# Patient Record
Sex: FEMALE | Race: WHITE | NOT HISPANIC OR LATINO | URBAN - METROPOLITAN AREA
[De-identification: names, ages, dates, MRNs, and addresses within clinical notes are randomized per-mention and may not be internally consistent; named-entity substitution may affect disease eponyms.]

---

## 2017-02-08 ENCOUNTER — OUTPATIENT (OUTPATIENT)
Dept: OUTPATIENT SERVICES | Age: 37
LOS: 1 days | Discharge: HOME | End: 2017-02-08

## 2017-02-08 DIAGNOSIS — Z95.4 PRESENCE OF OTHER HEART-VALVE REPLACEMENT: Chronic | ICD-10-CM

## 2017-02-08 DIAGNOSIS — Z98.89 OTHER SPECIFIED POSTPROCEDURAL STATES: Chronic | ICD-10-CM

## 2017-02-09 ENCOUNTER — APPOINTMENT (OUTPATIENT)
Dept: PEDIATRIC CARDIOLOGY | Facility: CLINIC | Age: 37
End: 2017-02-09

## 2017-03-05 ENCOUNTER — RESULT CHARGE (OUTPATIENT)
Age: 37
End: 2017-03-05

## 2017-03-06 ENCOUNTER — APPOINTMENT (OUTPATIENT)
Dept: PEDIATRIC CARDIOLOGY | Facility: CLINIC | Age: 37
End: 2017-03-06

## 2017-03-06 VITALS
WEIGHT: 144.4 LBS | HEIGHT: 55.51 IN | BODY MASS INDEX: 32.95 KG/M2 | OXYGEN SATURATION: 98 % | HEART RATE: 64 BPM | SYSTOLIC BLOOD PRESSURE: 105 MMHG | DIASTOLIC BLOOD PRESSURE: 69 MMHG

## 2017-03-09 NOTE — ASU PATIENT PROFILE, ADULT - VISION (WITH CORRECTIVE LENSES IF THE PATIENT USUALLY WEARS THEM):
Partially impaired: cannot see medication labels or newsprint, but can see obstacles in path, and the surrounding layout; can count fingers at arm's length/Right eye blurry

## 2017-03-09 NOTE — ASU PATIENT PROFILE, ADULT - PMH
Asthma  never been admitted in the hospital or intubated  DM (diabetes mellitus)  Type II  Down syndrome    HTN (hypertension)    Hypothyroidism    Ovarian cyst    Sleep apnea

## 2017-03-09 NOTE — ASU PATIENT PROFILE, ADULT - MEDICATIONS TO HOLD
Diuretics,diabetic pills and vitamins if applicable. Diuretics, diabetic pills and vitamins if applicable.

## 2017-03-10 ENCOUNTER — OUTPATIENT (OUTPATIENT)
Dept: OUTPATIENT SERVICES | Facility: HOSPITAL | Age: 37
LOS: 1 days | End: 2017-03-10
Payer: MEDICARE

## 2017-03-10 ENCOUNTER — TRANSCRIPTION ENCOUNTER (OUTPATIENT)
Age: 37
End: 2017-03-10

## 2017-03-10 VITALS
WEIGHT: 142.42 LBS | HEIGHT: 55.5 IN | OXYGEN SATURATION: 100 % | RESPIRATION RATE: 10 BRPM | TEMPERATURE: 98 F | HEART RATE: 67 BPM | SYSTOLIC BLOOD PRESSURE: 118 MMHG | DIASTOLIC BLOOD PRESSURE: 81 MMHG

## 2017-03-10 VITALS
HEART RATE: 66 BPM | RESPIRATION RATE: 13 BRPM | DIASTOLIC BLOOD PRESSURE: 68 MMHG | OXYGEN SATURATION: 100 % | SYSTOLIC BLOOD PRESSURE: 120 MMHG

## 2017-03-10 DIAGNOSIS — H25.11 AGE-RELATED NUCLEAR CATARACT, RIGHT EYE: ICD-10-CM

## 2017-03-10 DIAGNOSIS — Z98.89 OTHER SPECIFIED POSTPROCEDURAL STATES: Chronic | ICD-10-CM

## 2017-03-10 DIAGNOSIS — Z95.4 PRESENCE OF OTHER HEART-VALVE REPLACEMENT: Chronic | ICD-10-CM

## 2017-03-10 LAB — HCG UR QL: NEGATIVE — SIGNIFICANT CHANGE UP

## 2017-03-10 PROCEDURE — 81025 URINE PREGNANCY TEST: CPT

## 2017-03-10 PROCEDURE — 66984 XCAPSL CTRC RMVL W/O ECP: CPT | Mod: RT

## 2017-03-10 NOTE — ASU DISCHARGE PLAN (ADULT/PEDIATRIC). - PT EDUC
other (specify)/Intraocular lens implant (IOL) Eye shield with instructions , sunglasses and eye kit given to patient./Implant card (specify)

## 2017-03-17 ENCOUNTER — OUTPATIENT (OUTPATIENT)
Dept: OUTPATIENT SERVICES | Facility: HOSPITAL | Age: 37
LOS: 1 days | End: 2017-03-17
Payer: MEDICARE

## 2017-03-17 ENCOUNTER — TRANSCRIPTION ENCOUNTER (OUTPATIENT)
Age: 37
End: 2017-03-17

## 2017-03-17 VITALS
SYSTOLIC BLOOD PRESSURE: 103 MMHG | RESPIRATION RATE: 14 BRPM | HEART RATE: 72 BPM | OXYGEN SATURATION: 98 % | DIASTOLIC BLOOD PRESSURE: 55 MMHG

## 2017-03-17 VITALS
HEIGHT: 56 IN | DIASTOLIC BLOOD PRESSURE: 60 MMHG | HEART RATE: 71 BPM | TEMPERATURE: 98 F | WEIGHT: 140.65 LBS | RESPIRATION RATE: 18 BRPM | SYSTOLIC BLOOD PRESSURE: 122 MMHG

## 2017-03-17 DIAGNOSIS — H25.12 AGE-RELATED NUCLEAR CATARACT, LEFT EYE: ICD-10-CM

## 2017-03-17 DIAGNOSIS — Z98.89 OTHER SPECIFIED POSTPROCEDURAL STATES: Chronic | ICD-10-CM

## 2017-03-17 DIAGNOSIS — Z95.4 PRESENCE OF OTHER HEART-VALVE REPLACEMENT: Chronic | ICD-10-CM

## 2017-03-17 PROCEDURE — 66984 XCAPSL CTRC RMVL W/O ECP: CPT | Mod: LT

## 2017-03-17 PROCEDURE — 99261: CPT

## 2017-03-17 NOTE — ASU DISCHARGE PLAN (ADULT/PEDIATRIC). - NOTIFY
Persistent Nausea and Vomiting/Fever greater than 101/Pain not relieved by Medications/Bleeding that does not stop Fever greater than 101/Swelling that continues/Pain not relieved by Medications/Persistent Nausea and Vomiting/Bleeding that does not stop

## 2017-03-24 ENCOUNTER — LABORATORY RESULT (OUTPATIENT)
Age: 37
End: 2017-03-24

## 2017-04-11 ENCOUNTER — RESULT REVIEW (OUTPATIENT)
Age: 37
End: 2017-04-11

## 2017-05-12 ENCOUNTER — OUTPATIENT (OUTPATIENT)
Dept: OUTPATIENT SERVICES | Facility: HOSPITAL | Age: 37
LOS: 1 days | Discharge: HOME | End: 2017-05-12

## 2017-05-12 DIAGNOSIS — D69.6 THROMBOCYTOPENIA, UNSPECIFIED: ICD-10-CM

## 2017-05-12 DIAGNOSIS — Z95.4 PRESENCE OF OTHER HEART-VALVE REPLACEMENT: Chronic | ICD-10-CM

## 2017-05-12 DIAGNOSIS — Z98.89 OTHER SPECIFIED POSTPROCEDURAL STATES: Chronic | ICD-10-CM

## 2017-05-15 ENCOUNTER — RESULT REVIEW (OUTPATIENT)
Age: 37
End: 2017-05-15

## 2017-05-15 ENCOUNTER — APPOINTMENT (OUTPATIENT)
Dept: HEMATOLOGY ONCOLOGY | Facility: CLINIC | Age: 37
End: 2017-05-15

## 2017-05-15 VITALS
RESPIRATION RATE: 16 BRPM | HEART RATE: 85 BPM | HEIGHT: 55.67 IN | WEIGHT: 149.03 LBS | OXYGEN SATURATION: 98 % | DIASTOLIC BLOOD PRESSURE: 78 MMHG | SYSTOLIC BLOOD PRESSURE: 115 MMHG | TEMPERATURE: 98.6 F | BODY MASS INDEX: 34 KG/M2

## 2017-05-15 LAB
HCT VFR BLD CALC: 39.3 % — SIGNIFICANT CHANGE UP (ref 34.5–45)
HGB BLD-MCNC: 13.8 G/DL — SIGNIFICANT CHANGE UP (ref 11.5–15.5)
MCHC RBC-ENTMCNC: 33.7 PG — SIGNIFICANT CHANGE UP (ref 27–34)
MCHC RBC-ENTMCNC: 35.2 G/DL — SIGNIFICANT CHANGE UP (ref 32–36)
MCV RBC AUTO: 95.7 FL — SIGNIFICANT CHANGE UP (ref 80–100)
PLATELET # BLD AUTO: 144 K/UL — LOW (ref 150–400)
RBC # BLD: 4.11 M/UL — SIGNIFICANT CHANGE UP (ref 3.8–5.2)
RBC # FLD: 12.5 % — SIGNIFICANT CHANGE UP (ref 10.3–14.5)
WBC # BLD: 6.2 K/UL — SIGNIFICANT CHANGE UP (ref 3.8–10.5)
WBC # FLD AUTO: 6.2 K/UL — SIGNIFICANT CHANGE UP (ref 3.8–10.5)

## 2017-05-15 RX ORDER — ALBUTEROL SULFATE 90 UG/1
AEROSOL, METERED RESPIRATORY (INHALATION)
Refills: 0 | Status: ACTIVE | COMMUNITY

## 2017-05-16 LAB
ALBUMIN MFR SERPL ELPH: 57.1 %
ALBUMIN SERPL ELPH-MCNC: 4 G/DL
ALBUMIN SERPL-MCNC: 3.9 G/DL
ALBUMIN/GLOB SERPL: 1.3 RATIO
ALP BLD-CCNC: 54 U/L
ALPHA1 GLOB MFR SERPL ELPH: 2.9 %
ALPHA1 GLOB SERPL ELPH-MCNC: 0.2 G/DL
ALPHA2 GLOB MFR SERPL ELPH: 7.6 %
ALPHA2 GLOB SERPL ELPH-MCNC: 0.5 G/DL
ALT SERPL-CCNC: 26 U/L
AST SERPL-CCNC: 27 U/L
B-GLOBULIN MFR SERPL ELPH: 13.6 %
B-GLOBULIN SERPL ELPH-MCNC: 0.9 G/DL
BILIRUB DIRECT SERPL-MCNC: 0.2 MG/DL
BILIRUB INDIRECT SERPL-MCNC: 0.3 MG/DL
BILIRUB SERPL-MCNC: 0.5 MG/DL
DEPRECATED KAPPA LC FREE/LAMBDA SER: 2.16 RATIO
GAMMA GLOB FLD ELPH-MCNC: 1.3 G/DL
GAMMA GLOB MFR SERPL ELPH: 18.8 %
IGA SER QL IEP: 455 MG/DL
IGG SER QL IEP: 1250 MG/DL
IGM SER QL IEP: 84 MG/DL
INTERPRETATION SERPL IEP-IMP: NORMAL
KAPPA LC CSF-MCNC: 3.86 MG/DL
KAPPA LC SERPL-MCNC: 8.34 MG/DL
LDH SERPL-CCNC: 243 U/L
M PROTEIN SPEC IFE-MCNC: NORMAL
PROT SERPL-MCNC: 6.9 G/DL

## 2017-05-17 LAB — ANA SER IF-ACNC: NEGATIVE

## 2017-08-16 ENCOUNTER — OUTPATIENT (OUTPATIENT)
Dept: OUTPATIENT SERVICES | Facility: HOSPITAL | Age: 37
LOS: 1 days | Discharge: HOME | End: 2017-08-16

## 2017-08-16 DIAGNOSIS — D69.6 THROMBOCYTOPENIA, UNSPECIFIED: ICD-10-CM

## 2017-08-16 DIAGNOSIS — Z98.89 OTHER SPECIFIED POSTPROCEDURAL STATES: Chronic | ICD-10-CM

## 2017-08-16 DIAGNOSIS — Z95.4 PRESENCE OF OTHER HEART-VALVE REPLACEMENT: Chronic | ICD-10-CM

## 2017-08-21 ENCOUNTER — RESULT REVIEW (OUTPATIENT)
Age: 37
End: 2017-08-21

## 2017-08-21 ENCOUNTER — APPOINTMENT (OUTPATIENT)
Dept: HEMATOLOGY ONCOLOGY | Facility: CLINIC | Age: 37
End: 2017-08-21
Payer: MEDICARE

## 2017-08-21 VITALS
OXYGEN SATURATION: 98 % | RESPIRATION RATE: 14 BRPM | HEART RATE: 68 BPM | SYSTOLIC BLOOD PRESSURE: 102 MMHG | TEMPERATURE: 98.3 F | BODY MASS INDEX: 34.51 KG/M2 | DIASTOLIC BLOOD PRESSURE: 60 MMHG | WEIGHT: 152.12 LBS

## 2017-08-21 DIAGNOSIS — R76.8 OTHER SPECIFIED ABNORMAL IMMUNOLOGICAL FINDINGS IN SERUM: ICD-10-CM

## 2017-08-21 LAB
HCT VFR BLD CALC: 41.2 % — SIGNIFICANT CHANGE UP (ref 34.5–45)
HGB BLD-MCNC: 13.9 G/DL — SIGNIFICANT CHANGE UP (ref 11.5–15.5)
MCHC RBC-ENTMCNC: 33 PG — SIGNIFICANT CHANGE UP (ref 27–34)
MCHC RBC-ENTMCNC: 33.8 G/DL — SIGNIFICANT CHANGE UP (ref 32–36)
MCV RBC AUTO: 97.7 FL — SIGNIFICANT CHANGE UP (ref 80–100)
PLATELET # BLD AUTO: 101 K/UL — LOW (ref 150–400)
RBC # BLD: 4.22 M/UL — SIGNIFICANT CHANGE UP (ref 3.8–5.2)
RBC # FLD: 14.6 % — HIGH (ref 10.3–14.5)
WBC # BLD: 6.4 K/UL — SIGNIFICANT CHANGE UP (ref 3.8–10.5)
WBC # FLD AUTO: 6.4 K/UL — SIGNIFICANT CHANGE UP (ref 3.8–10.5)

## 2017-08-21 PROCEDURE — 99215 OFFICE O/P EST HI 40 MIN: CPT

## 2017-08-27 PROBLEM — R76.8 ELEVATED SERUM IMMUNOGLOBULIN FREE LIGHT CHAIN LEVEL: Status: ACTIVE | Noted: 2017-08-27

## 2017-09-12 ENCOUNTER — OUTPATIENT (OUTPATIENT)
Dept: OUTPATIENT SERVICES | Facility: HOSPITAL | Age: 37
LOS: 1 days | End: 2017-09-12
Payer: MEDICARE

## 2017-09-12 DIAGNOSIS — D69.9 HEMORRHAGIC CONDITION, UNSPECIFIED: ICD-10-CM

## 2017-09-12 DIAGNOSIS — Z95.4 PRESENCE OF OTHER HEART-VALVE REPLACEMENT: Chronic | ICD-10-CM

## 2017-09-12 DIAGNOSIS — Z98.89 OTHER SPECIFIED POSTPROCEDURAL STATES: Chronic | ICD-10-CM

## 2017-09-13 ENCOUNTER — RESULT REVIEW (OUTPATIENT)
Age: 37
End: 2017-09-13

## 2017-09-13 ENCOUNTER — APPOINTMENT (OUTPATIENT)
Dept: HEMATOLOGY ONCOLOGY | Facility: CLINIC | Age: 37
End: 2017-09-13
Payer: MEDICARE

## 2017-09-13 VITALS
TEMPERATURE: 98.5 F | SYSTOLIC BLOOD PRESSURE: 130 MMHG | HEART RATE: 76 BPM | DIASTOLIC BLOOD PRESSURE: 81 MMHG | WEIGHT: 151.01 LBS | OXYGEN SATURATION: 92 % | BODY MASS INDEX: 34.26 KG/M2 | RESPIRATION RATE: 16 BRPM

## 2017-09-13 LAB
HCT VFR BLD CALC: 39.4 % — SIGNIFICANT CHANGE UP (ref 34.5–45)
HGB BLD-MCNC: 14.5 G/DL — SIGNIFICANT CHANGE UP (ref 11.5–15.5)
MCHC RBC-ENTMCNC: 35.5 PG — HIGH (ref 27–34)
MCHC RBC-ENTMCNC: 36.7 G/DL — HIGH (ref 32–36)
MCV RBC AUTO: 96.7 FL — SIGNIFICANT CHANGE UP (ref 80–100)
PLATELET # BLD AUTO: 93 K/UL — LOW (ref 150–400)
RBC # BLD: 4.07 M/UL — SIGNIFICANT CHANGE UP (ref 3.8–5.2)
RBC # FLD: 12.6 % — SIGNIFICANT CHANGE UP (ref 10.3–14.5)
WBC # BLD: 6.7 K/UL — SIGNIFICANT CHANGE UP (ref 3.8–10.5)
WBC # FLD AUTO: 6.7 K/UL — SIGNIFICANT CHANGE UP (ref 3.8–10.5)

## 2017-09-13 PROCEDURE — 88313 SPECIAL STAINS GROUP 2: CPT | Mod: 26

## 2017-09-13 PROCEDURE — 88280 CHROMOSOME KARYOTYPE STUDY: CPT

## 2017-09-13 PROCEDURE — 88313 SPECIAL STAINS GROUP 2: CPT

## 2017-09-13 PROCEDURE — 88189 FLOWCYTOMETRY/READ 16 & >: CPT

## 2017-09-13 PROCEDURE — 88360 TUMOR IMMUNOHISTOCHEM/MANUAL: CPT

## 2017-09-13 PROCEDURE — 85097 BONE MARROW INTERPRETATION: CPT

## 2017-09-13 PROCEDURE — 88305 TISSUE EXAM BY PATHOLOGIST: CPT | Mod: 26

## 2017-09-13 PROCEDURE — 88185 FLOWCYTOMETRY/TC ADD-ON: CPT

## 2017-09-13 PROCEDURE — 88237 TISSUE CULTURE BONE MARROW: CPT

## 2017-09-13 PROCEDURE — 88271 CYTOGENETICS DNA PROBE: CPT

## 2017-09-13 PROCEDURE — 88275 CYTOGENETICS 100-300: CPT

## 2017-09-13 PROCEDURE — 88342 IMHCHEM/IMCYTCHM 1ST ANTB: CPT

## 2017-09-13 PROCEDURE — 88364 INSITU HYBRIDIZATION (FISH): CPT | Mod: 26

## 2017-09-13 PROCEDURE — 38221 DX BONE MARROW BIOPSIES: CPT

## 2017-09-13 PROCEDURE — 88184 FLOWCYTOMETRY/ TC 1 MARKER: CPT

## 2017-09-13 PROCEDURE — 88365 INSITU HYBRIDIZATION (FISH): CPT | Mod: 26,59

## 2017-09-13 PROCEDURE — 88360 TUMOR IMMUNOHISTOCHEM/MANUAL: CPT | Mod: 26,59

## 2017-09-13 PROCEDURE — 88342 IMHCHEM/IMCYTCHM 1ST ANTB: CPT | Mod: 26,59

## 2017-09-13 PROCEDURE — 88365 INSITU HYBRIDIZATION (FISH): CPT

## 2017-09-13 PROCEDURE — 88264 CHROMOSOME ANALYSIS 20-25: CPT

## 2017-09-13 PROCEDURE — G0364: CPT | Mod: 59

## 2017-09-13 PROCEDURE — 88305 TISSUE EXAM BY PATHOLOGIST: CPT

## 2017-09-13 PROCEDURE — 88364 INSITU HYBRIDIZATION (FISH): CPT

## 2017-09-14 ENCOUNTER — RESULT REVIEW (OUTPATIENT)
Age: 37
End: 2017-09-14

## 2017-09-18 LAB
HEMATOPATHOLOGY REPORT: SIGNIFICANT CHANGE UP
TM INTERPRETATION: SIGNIFICANT CHANGE UP

## 2017-09-22 LAB — CHROM ANALY OVERALL INTERP SPEC-IMP: SIGNIFICANT CHANGE UP

## 2017-09-26 LAB — CHROM ANALY INTERPHASE BLD FISH-IMP: SIGNIFICANT CHANGE UP

## 2017-10-03 ENCOUNTER — OUTPATIENT (OUTPATIENT)
Dept: OUTPATIENT SERVICES | Facility: HOSPITAL | Age: 37
LOS: 1 days | Discharge: HOME | End: 2017-10-03

## 2017-10-03 DIAGNOSIS — Z95.4 PRESENCE OF OTHER HEART-VALVE REPLACEMENT: Chronic | ICD-10-CM

## 2017-10-03 DIAGNOSIS — D69.6 THROMBOCYTOPENIA, UNSPECIFIED: ICD-10-CM

## 2017-10-03 DIAGNOSIS — Z98.89 OTHER SPECIFIED POSTPROCEDURAL STATES: Chronic | ICD-10-CM

## 2017-10-06 ENCOUNTER — RESULT REVIEW (OUTPATIENT)
Age: 37
End: 2017-10-06

## 2017-10-06 ENCOUNTER — APPOINTMENT (OUTPATIENT)
Dept: HEMATOLOGY ONCOLOGY | Facility: CLINIC | Age: 37
End: 2017-10-06
Payer: MEDICARE

## 2017-10-06 VITALS
HEART RATE: 65 BPM | TEMPERATURE: 97.7 F | DIASTOLIC BLOOD PRESSURE: 72 MMHG | RESPIRATION RATE: 16 BRPM | BODY MASS INDEX: 34.31 KG/M2 | SYSTOLIC BLOOD PRESSURE: 113 MMHG | OXYGEN SATURATION: 98 % | WEIGHT: 151.24 LBS

## 2017-10-06 LAB
HCT VFR BLD CALC: 38 % — SIGNIFICANT CHANGE UP (ref 34.5–45)
HGB BLD-MCNC: 13.8 G/DL — SIGNIFICANT CHANGE UP (ref 11.5–15.5)
MCHC RBC-ENTMCNC: 34.8 PG — HIGH (ref 27–34)
MCHC RBC-ENTMCNC: 36.2 G/DL — HIGH (ref 32–36)
MCV RBC AUTO: 96.2 FL — SIGNIFICANT CHANGE UP (ref 80–100)
PLATELET # BLD AUTO: 111 K/UL — LOW (ref 150–400)
RBC # BLD: 3.96 M/UL — SIGNIFICANT CHANGE UP (ref 3.8–5.2)
RBC # FLD: 14.2 % — SIGNIFICANT CHANGE UP (ref 10.3–14.5)
WBC # BLD: 5.6 K/UL — SIGNIFICANT CHANGE UP (ref 3.8–10.5)
WBC # FLD AUTO: 5.6 K/UL — SIGNIFICANT CHANGE UP (ref 3.8–10.5)

## 2017-10-06 PROCEDURE — 99214 OFFICE O/P EST MOD 30 MIN: CPT

## 2017-12-01 ENCOUNTER — OUTPATIENT (OUTPATIENT)
Dept: OUTPATIENT SERVICES | Facility: HOSPITAL | Age: 37
LOS: 1 days | Discharge: HOME | End: 2017-12-01

## 2017-12-01 DIAGNOSIS — Z95.4 PRESENCE OF OTHER HEART-VALVE REPLACEMENT: Chronic | ICD-10-CM

## 2017-12-01 DIAGNOSIS — Z98.89 OTHER SPECIFIED POSTPROCEDURAL STATES: Chronic | ICD-10-CM

## 2017-12-01 DIAGNOSIS — D69.6 THROMBOCYTOPENIA, UNSPECIFIED: ICD-10-CM

## 2017-12-08 ENCOUNTER — RESULT REVIEW (OUTPATIENT)
Age: 37
End: 2017-12-08

## 2017-12-08 ENCOUNTER — APPOINTMENT (OUTPATIENT)
Dept: HEMATOLOGY ONCOLOGY | Facility: CLINIC | Age: 37
End: 2017-12-08
Payer: MEDICARE

## 2017-12-08 VITALS
OXYGEN SATURATION: 100 % | RESPIRATION RATE: 16 BRPM | BODY MASS INDEX: 33.76 KG/M2 | WEIGHT: 148.81 LBS | TEMPERATURE: 97.9 F | HEART RATE: 66 BPM | SYSTOLIC BLOOD PRESSURE: 114 MMHG | DIASTOLIC BLOOD PRESSURE: 68 MMHG

## 2017-12-08 LAB
HCT VFR BLD CALC: 38.7 % — SIGNIFICANT CHANGE UP (ref 34.5–45)
HGB BLD-MCNC: 14 G/DL — SIGNIFICANT CHANGE UP (ref 11.5–15.5)
MCHC RBC-ENTMCNC: 35.2 PG — HIGH (ref 27–34)
MCHC RBC-ENTMCNC: 36.3 G/DL — HIGH (ref 32–36)
MCV RBC AUTO: 97.1 FL — SIGNIFICANT CHANGE UP (ref 80–100)
PLATELET # BLD AUTO: 117 K/UL — SIGNIFICANT CHANGE UP (ref 150–400)
RBC # BLD: 3.99 M/UL — SIGNIFICANT CHANGE UP (ref 3.8–5.2)
RBC # FLD: 12.6 % — SIGNIFICANT CHANGE UP (ref 10.3–14.5)
WBC # BLD: 5.7 K/UL — SIGNIFICANT CHANGE UP (ref 3.8–10.5)
WBC # FLD AUTO: 5.7 K/UL — SIGNIFICANT CHANGE UP (ref 3.8–10.5)

## 2017-12-08 PROCEDURE — 99214 OFFICE O/P EST MOD 30 MIN: CPT

## 2018-01-31 ENCOUNTER — TRANSCRIPTION ENCOUNTER (OUTPATIENT)
Age: 38
End: 2018-01-31

## 2018-03-08 ENCOUNTER — OUTPATIENT (OUTPATIENT)
Dept: OUTPATIENT SERVICES | Age: 38
LOS: 1 days | Discharge: HOME | End: 2018-03-08

## 2018-03-08 DIAGNOSIS — Z98.89 OTHER SPECIFIED POSTPROCEDURAL STATES: Chronic | ICD-10-CM

## 2018-03-08 DIAGNOSIS — Z95.4 PRESENCE OF OTHER HEART-VALVE REPLACEMENT: Chronic | ICD-10-CM

## 2018-03-10 ENCOUNTER — RESULT CHARGE (OUTPATIENT)
Age: 38
End: 2018-03-10

## 2018-03-12 ENCOUNTER — APPOINTMENT (OUTPATIENT)
Dept: PEDIATRIC CARDIOLOGY | Facility: CLINIC | Age: 38
End: 2018-03-12
Payer: MEDICARE

## 2018-03-12 VITALS
RESPIRATION RATE: 20 BRPM | OXYGEN SATURATION: 98 % | WEIGHT: 149.91 LBS | BODY MASS INDEX: 34.2 KG/M2 | HEIGHT: 55.51 IN | SYSTOLIC BLOOD PRESSURE: 122 MMHG | DIASTOLIC BLOOD PRESSURE: 72 MMHG | HEART RATE: 68 BPM

## 2018-03-12 PROCEDURE — 93000 ELECTROCARDIOGRAM COMPLETE: CPT

## 2018-03-12 PROCEDURE — 93303 ECHO TRANSTHORACIC: CPT

## 2018-03-12 PROCEDURE — 93320 DOPPLER ECHO COMPLETE: CPT

## 2018-03-12 PROCEDURE — 99215 OFFICE O/P EST HI 40 MIN: CPT | Mod: 25

## 2018-03-12 PROCEDURE — 93325 DOPPLER ECHO COLOR FLOW MAPG: CPT

## 2018-03-12 RX ORDER — ASPIRIN 325 MG/1
325 TABLET, FILM COATED ORAL
Refills: 0 | Status: DISCONTINUED | COMMUNITY
End: 2018-03-12

## 2018-03-12 RX ORDER — MULTIVIT-MIN/FOLIC/VIT K/LYCOP 400-300MCG
25 MCG TABLET ORAL
Refills: 0 | Status: ACTIVE | COMMUNITY

## 2018-06-21 ENCOUNTER — OUTPATIENT (OUTPATIENT)
Dept: OUTPATIENT SERVICES | Facility: HOSPITAL | Age: 38
LOS: 1 days | Discharge: HOME | End: 2018-06-21

## 2018-06-21 DIAGNOSIS — Z95.4 PRESENCE OF OTHER HEART-VALVE REPLACEMENT: Chronic | ICD-10-CM

## 2018-06-21 DIAGNOSIS — Z98.89 OTHER SPECIFIED POSTPROCEDURAL STATES: Chronic | ICD-10-CM

## 2018-06-21 DIAGNOSIS — D69.6 THROMBOCYTOPENIA, UNSPECIFIED: ICD-10-CM

## 2018-06-25 ENCOUNTER — APPOINTMENT (OUTPATIENT)
Dept: HEMATOLOGY ONCOLOGY | Facility: CLINIC | Age: 38
End: 2018-06-25

## 2018-07-02 ENCOUNTER — APPOINTMENT (OUTPATIENT)
Dept: HEMATOLOGY ONCOLOGY | Facility: CLINIC | Age: 38
End: 2018-07-02
Payer: MEDICARE

## 2018-07-02 ENCOUNTER — RESULT REVIEW (OUTPATIENT)
Age: 38
End: 2018-07-02

## 2018-07-02 VITALS
DIASTOLIC BLOOD PRESSURE: 69 MMHG | SYSTOLIC BLOOD PRESSURE: 114 MMHG | HEART RATE: 68 BPM | RESPIRATION RATE: 16 BRPM | TEMPERATURE: 98 F | WEIGHT: 155.42 LBS | BODY MASS INDEX: 35.46 KG/M2 | OXYGEN SATURATION: 100 %

## 2018-07-02 DIAGNOSIS — J45.20 MILD INTERMITTENT ASTHMA, UNCOMPLICATED: ICD-10-CM

## 2018-07-02 LAB
HCT VFR BLD CALC: 37.2 % — SIGNIFICANT CHANGE UP (ref 34.5–45)
HGB BLD-MCNC: 13.1 G/DL — SIGNIFICANT CHANGE UP (ref 11.5–15.5)
MCHC RBC-ENTMCNC: 33.3 PG — SIGNIFICANT CHANGE UP (ref 27–34)
MCHC RBC-ENTMCNC: 35.1 G/DL — SIGNIFICANT CHANGE UP (ref 32–36)
MCV RBC AUTO: 94.9 FL — SIGNIFICANT CHANGE UP (ref 80–100)
PLATELET # BLD AUTO: 92 K/UL — LOW (ref 150–400)
RBC # BLD: 3.93 M/UL — SIGNIFICANT CHANGE UP (ref 3.8–5.2)
RBC # FLD: 12.9 % — SIGNIFICANT CHANGE UP (ref 10.3–14.5)
WBC # BLD: 5.8 K/UL — SIGNIFICANT CHANGE UP (ref 3.8–10.5)
WBC # FLD AUTO: 5.8 K/UL — SIGNIFICANT CHANGE UP (ref 3.8–10.5)

## 2018-07-02 PROCEDURE — 99214 OFFICE O/P EST MOD 30 MIN: CPT

## 2018-10-04 LAB
ALBUMIN SERPL ELPH-MCNC: 3.7 G/DL
ALP BLD-CCNC: 48 U/L
ALT SERPL-CCNC: 25 U/L
ANION GAP SERPL CALC-SCNC: 12 MMOL/L
APPEARANCE: CLEAR
AST SERPL-CCNC: 23 U/L
BASOPHILS # BLD AUTO: 0.05 K/UL
BASOPHILS NFR BLD AUTO: 1 %
BILIRUB SERPL-MCNC: 0.6 MG/DL
BILIRUBIN URINE: NEGATIVE
BLOOD URINE: NEGATIVE
BUN SERPL-MCNC: 21 MG/DL
CALCIUM SERPL-MCNC: 9.1 MG/DL
CHLORIDE SERPL-SCNC: 106 MMOL/L
CHOLEST SERPL-MCNC: 160 MG/DL
CHOLEST/HDLC SERPL: 2.9 RATIO
CO2 SERPL-SCNC: 25 MMOL/L
COLOR: YELLOW
CREAT SERPL-MCNC: 1.13 MG/DL
EOSINOPHIL # BLD AUTO: 0.14 K/UL
EOSINOPHIL NFR BLD AUTO: 2.9 %
GLUCOSE QUALITATIVE U: NORMAL MG/DL
GLUCOSE SERPL-MCNC: 86 MG/DL
HBA1C MFR BLD HPLC: 4.6 %
HCT VFR BLD CALC: 40.5 %
HDLC SERPL-MCNC: 56 MG/DL
HGB BLD-MCNC: 13.7 G/DL
IMM GRANULOCYTES NFR BLD AUTO: 0.2 %
KETONES URINE: NEGATIVE
LDLC SERPL CALC-MCNC: 94 MG/DL
LEUKOCYTE ESTERASE URINE: NEGATIVE
LYMPHOCYTES # BLD AUTO: 1.19 K/UL
LYMPHOCYTES NFR BLD AUTO: 24.4 %
MAN DIFF?: NORMAL
MCHC RBC-ENTMCNC: 32.5 PG
MCHC RBC-ENTMCNC: 33.8 GM/DL
MCV RBC AUTO: 96.2 FL
MONOCYTES # BLD AUTO: 0.27 K/UL
MONOCYTES NFR BLD AUTO: 5.5 %
NEUTROPHILS # BLD AUTO: 3.21 K/UL
NEUTROPHILS NFR BLD AUTO: 66 %
NITRITE URINE: NEGATIVE
PH URINE: 6.5
PLATELET # BLD AUTO: 99 K/UL
POTASSIUM SERPL-SCNC: 3.9 MMOL/L
PROT SERPL-MCNC: 6.3 G/DL
PROTEIN URINE: NEGATIVE MG/DL
RBC # BLD: 4.21 M/UL
RBC # FLD: 13.6 %
SODIUM SERPL-SCNC: 143 MMOL/L
SPECIFIC GRAVITY URINE: 1.02
T3 SERPL-MCNC: 89 NG/DL
T4 FREE SERPL-MCNC: 1.8 NG/DL
TRIGL SERPL-MCNC: 51 MG/DL
TSH SERPL-ACNC: 0.05 UIU/ML
UROBILINOGEN URINE: 1 MG/DL
WBC # FLD AUTO: 4.87 K/UL

## 2019-01-10 ENCOUNTER — OUTPATIENT (OUTPATIENT)
Dept: OUTPATIENT SERVICES | Facility: HOSPITAL | Age: 39
LOS: 1 days | Discharge: HOME | End: 2019-01-10

## 2019-01-10 DIAGNOSIS — Z98.89 OTHER SPECIFIED POSTPROCEDURAL STATES: Chronic | ICD-10-CM

## 2019-01-10 DIAGNOSIS — D69.6 THROMBOCYTOPENIA, UNSPECIFIED: ICD-10-CM

## 2019-01-10 DIAGNOSIS — Z95.4 PRESENCE OF OTHER HEART-VALVE REPLACEMENT: Chronic | ICD-10-CM

## 2019-01-14 ENCOUNTER — RESULT REVIEW (OUTPATIENT)
Age: 39
End: 2019-01-14

## 2019-01-14 ENCOUNTER — APPOINTMENT (OUTPATIENT)
Dept: HEMATOLOGY ONCOLOGY | Facility: CLINIC | Age: 39
End: 2019-01-14
Payer: MEDICARE

## 2019-01-14 VITALS
DIASTOLIC BLOOD PRESSURE: 82 MMHG | OXYGEN SATURATION: 98 % | TEMPERATURE: 98.1 F | HEART RATE: 66 BPM | WEIGHT: 169.73 LBS | SYSTOLIC BLOOD PRESSURE: 124 MMHG | RESPIRATION RATE: 16 BRPM | BODY MASS INDEX: 38.73 KG/M2

## 2019-01-14 LAB
HCT VFR BLD CALC: 38 % — SIGNIFICANT CHANGE UP (ref 34.5–45)
HGB BLD-MCNC: 13.2 G/DL — SIGNIFICANT CHANGE UP (ref 11.5–15.5)
MCHC RBC-ENTMCNC: 32.7 PG — SIGNIFICANT CHANGE UP (ref 27–34)
MCHC RBC-ENTMCNC: 34.6 G/DL — SIGNIFICANT CHANGE UP (ref 32–36)
MCV RBC AUTO: 94.4 FL — SIGNIFICANT CHANGE UP (ref 80–100)
PLATELET # BLD AUTO: 105 K/UL — LOW (ref 150–400)
RBC # BLD: 4.03 M/UL — SIGNIFICANT CHANGE UP (ref 3.8–5.2)
RBC # FLD: 13.5 % — SIGNIFICANT CHANGE UP (ref 10.3–14.5)
WBC # BLD: 6.4 K/UL — SIGNIFICANT CHANGE UP (ref 3.8–10.5)
WBC # FLD AUTO: 6.4 K/UL — SIGNIFICANT CHANGE UP (ref 3.8–10.5)

## 2019-01-14 PROCEDURE — 99214 OFFICE O/P EST MOD 30 MIN: CPT

## 2019-01-19 NOTE — ASSESSMENT
[FreeTextEntry1] : Ms. Hu has stable thrombocytopenia associated with Down syndrome. Previous bone marrow in 2017 was negative for MDS or other actionable pathology.\par \par Plan:\par 1. Continue to monitor every 6 months.\par 2. No specific therapy indicated at present. [Palliative Care Plan] : not applicable at this time

## 2019-01-19 NOTE — HISTORY OF PRESENT ILLNESS
[de-identified] : Clementina Hu is a 38 y.o. with Down syndrome, aortic stenosis S/P aortic valve replacement, obstructive sleep apnea, H/O dizziness, and hypothyroidism, who is referred because of thrombocytopenia. She has been in a group home environment and has had elementary school education. She underwent bioprosthetic aortic valve replacement in 2014. She has been noted to have intermittent thrombocytopenia, with a platelet count of 111 (5/4/16), 150 (12/8/16), and 99 (3/24/17) without other blood count abnormalities. She denies constitutional symptoms, abnormal bruising/bleeding, or recent infections. [de-identified] : Since being seen last, she has no new complaints. She had bone marrow biopsy and aspirate done on 9/13/17, which showed trilineage hematopoiesis with maturation and mild megakaryocytosis and increased iron stores. FISH MDS panel was negative, and cytogenetics confirmed trisomy 21. Since then, she has no complaints except for bruising while on aspirin. On 12/8/17, she has no complaints.\par \par When seen on 7/2/18, she has no new complaints except for bleeding and bruising on abdomen.\par \par On 1/14/19, she has no specific complaints. [Date: ____________] : Patient's last distress assessment performed on [unfilled]. [0 - No Distress] : Distress Level: 0

## 2019-01-19 NOTE — PHYSICAL EXAM
[Restricted in physically strenuous activity but ambulatory and able to carry out work of a light or sedentary nature] : Status 1- Restricted in physically strenuous activity but ambulatory and able to carry out work of a light or sedentary nature, e.g., light house work, office work [Normal] : affect appropriate [de-identified] : Down syndrome appearance; able to converse easily and answer simple questions [de-identified] : 2/6 systolic murmur [de-identified] : Fatty lipoma on right leg

## 2019-01-19 NOTE — REASON FOR VISIT
[Follow-Up Visit] : a follow-up visit for [Blood Count Assessment] : blood count assessment [Formal Caregiver] : formal caregiver [FreeTextEntry2] : thrombocytopenia and Down syndrome

## 2019-03-08 ENCOUNTER — OUTPATIENT (OUTPATIENT)
Dept: OUTPATIENT SERVICES | Age: 39
LOS: 1 days | Discharge: HOME | End: 2019-03-08

## 2019-03-08 DIAGNOSIS — Z95.4 PRESENCE OF OTHER HEART-VALVE REPLACEMENT: Chronic | ICD-10-CM

## 2019-03-08 DIAGNOSIS — Z98.89 OTHER SPECIFIED POSTPROCEDURAL STATES: Chronic | ICD-10-CM

## 2019-03-10 ENCOUNTER — RESULT CHARGE (OUTPATIENT)
Age: 39
End: 2019-03-10

## 2019-03-11 ENCOUNTER — APPOINTMENT (OUTPATIENT)
Age: 39
End: 2019-03-11
Payer: MEDICARE

## 2019-03-11 ENCOUNTER — APPOINTMENT (OUTPATIENT)
Age: 39
End: 2019-03-11

## 2019-03-11 ENCOUNTER — APPOINTMENT (OUTPATIENT)
Dept: PEDIATRIC CARDIOLOGY | Facility: CLINIC | Age: 39
End: 2019-03-11
Payer: MEDICARE

## 2019-03-11 VITALS
OXYGEN SATURATION: 96 % | WEIGHT: 171.96 LBS | HEIGHT: 55.31 IN | DIASTOLIC BLOOD PRESSURE: 61 MMHG | RESPIRATION RATE: 18 BRPM | SYSTOLIC BLOOD PRESSURE: 125 MMHG | HEART RATE: 67 BPM | BODY MASS INDEX: 39.8 KG/M2

## 2019-03-11 PROCEDURE — 93320 DOPPLER ECHO COMPLETE: CPT

## 2019-03-11 PROCEDURE — 93303 ECHO TRANSTHORACIC: CPT

## 2019-03-11 PROCEDURE — 93325 DOPPLER ECHO COLOR FLOW MAPG: CPT

## 2019-03-11 PROCEDURE — 93000 ELECTROCARDIOGRAM COMPLETE: CPT

## 2019-03-11 PROCEDURE — 99214 OFFICE O/P EST MOD 30 MIN: CPT | Mod: 25

## 2019-03-11 RX ORDER — SIMETHICONE 80 MG/1
80 TABLET, CHEWABLE ORAL
Refills: 0 | Status: ACTIVE | COMMUNITY

## 2019-03-11 RX ORDER — ZAFIRLUKAST 20 MG/1
20 TABLET, FILM COATED ORAL
Refills: 0 | Status: DISCONTINUED | COMMUNITY
End: 2019-03-11

## 2019-03-14 NOTE — HISTORY OF PRESENT ILLNESS
[FreeTextEntry1] : Clementina Matamoros was seen today in the Adult Congenital Heart Program of the [facility]. As you know, Cleemntina is a 38 year old woman with trisomy 21 and aortic stenosis. She underwent an aortic valve replacement in  with an bioprosthetic aortic valve.\par \par She continues to live in a group home. She has 2 sisters who visit often and are her legal guardians. Her mother is . According to Clementina, she exercises daily on the treadmill or participating in [place] at her program. There are no complaints of chest pain, palpitations, shortness of breath, peripheral edema, dizziness or syncope. \par \par She has regular dental followup with SBE prophylaxis prior to any dental work. She gets an annual flu shot.\par (Parts of this narrative were copied/pasted from my note of 2018 in order to maintain internal consistency of her past cardiac history).

## 2019-03-14 NOTE — REVIEW OF SYSTEMS
[Exercise Intolerance] : persistence of exercise intolerance [Shortness Of Breath] : expressed as feeling short of breath [Anxiety] : anxiety [Feeling Poorly] : not feeling poorly (malaise) [Fever] : no fever [Wgt Loss (___ Lbs)] : no recent weight loss [Pallor] : not pale [Eye Discharge] : no eye discharge [Redness] : no redness [Change in Vision] : no change in vision [Nasal Stuffiness] : no nasal congestion [Sore Throat] : no sore throat [Earache] : no earache [Loss Of Hearing] : no hearing loss [Cyanosis] : no cyanosis [Edema] : no edema [Chest Pain] : no chest pain or discomfort [Palpitations] : no palpitations [Wheezing] : no wheezing [Cough] : no cough [Abdominal Pain] : no abdominal pain [Decrease In Appetite] : appetite not decreased [Fainting (Syncope)] : no fainting [Seizure] : no seizures [Dizziness] : no dizziness [Joint Swelling] : no joint swelling [Wound problems] : no wound problems [Nosebleeds] : no epistaxis [Sleep Disturbances] : ~T no sleep disturbances [Failure To Thrive] : no failure to thrive [Heat/Cold Intolerance] : no temperature intolerance [Dec Urine Output] : no oliguria

## 2019-03-14 NOTE — DISCUSSION/SUMMARY
[Needs SBE Prophylaxis] : [unfilled]  needs bacterial endocarditis prophylaxis. SBE prophylaxis is indicated for dental and invasive ENT procedures. (Circulation. 2007; 116: 6704-9872) [May participate in all age-appropriate activities] : [unfilled] May participate in all age-appropriate activities. [Influenza vaccine is recommended] : Influenza vaccine is recommended [FreeTextEntry1] : In summary, Clementina is a 38 year old woman with Trisomy 21, s/p aortic valve replacement with a bioprosthetic valve. While her echocardiogram and her ECG remain essentially unchanged she has gained 10 kg since her visit last year.  When we saw her last year, the group home had placed her on an 1800 estelle/day diet in an effort to control her weight. We had a discussion with the  that accompanied her here today about her weight and the negative effects that it will have on her heart.\par \par Additionally, we did reach out to her sister Carol and have asked her to call back so that we may address our concerns regarding her weight and the deleterious effects that this and continued weight gain will place on her heart in addition to the development of diabetes and other co-morbidities. We have also noted our concerns on the medical visit summary that was sent back to the group home with Celmentina.\par \par We will continue to follow her annually and hope to see downward trend in her weight by her visit next year.

## 2019-03-14 NOTE — REASON FOR VISIT
[Follow-Up] : a follow-up visit for [Other: _____] : [unfilled] [FreeTextEntry3] : AS,AVR,[id number] and TRINITY

## 2019-03-14 NOTE — PHYSICAL EXAM
[General Appearance - Alert] : alert [Down Syndrome] : Down Syndrome [Sclera] : the sclera were normal [Examination Of The Oral Cavity] : mucous membranes were moist and pink [Low Set Ears] : the infant has low set ears [Enlarged Tongue] : was enlarged [Auscultation Breath Sounds / Voice Sounds] : breath sounds clear to auscultation bilaterally [Chest Surgical / Traumatic Scar] : chest incision well healed [Sternotomy] : sternotomy [Apical Impulse] : quiet precordium with normal apical impulse [Heart Rate And Rhythm] : normal heart rate and rhythm [Arterial Pulses] : normal upper and lower extremity pulses with no pulse delay [Normal S1] : normal  [S2 Single] : was single [___ +] : bilateral [unfilled]U+ pitting edema to the ankles [Systolic] : systolic [II] : a grade 2/6 [LUSB] : LUSB [Carotids] : the murmur was transmitted to the carotid arteries [Abdomen Soft] : soft [Abdomen Tenderness] : non-tender [Nail Clubbing] : no clubbing  or cyanosis of the fingers [Musculoskeletal Exam: Normal Movement Of All Extremities] : normal movements of all extremities [] : no rash [Demonstrated Behavior - Infant Nonreactive To Parents] : interactive [FreeTextEntry1] : right eye esotropia [Normal] : abnormal

## 2019-03-14 NOTE — CARDIOLOGY SUMMARY
[Today's Date] : [unfilled] [FreeTextEntry1] : NSR, ventricular rate 67 bpm [FreeTextEntry2] : Summary:\par  1. Status post surgical aortic valve replacement with bovine pericardial valve.\par  2. The posterior leaflet of the mitral valve appears tethered and accelerated flow seen across the mitral valve with a mean diastolic flow gradient of 5-7 mmHg.\par  3. Mild mitral valve regurgitation.\par  4. Mild tricuspid valve regurgitation.\par  5. Based on tricuspid regurgitation gradient, estimated RVp is 30-35 mmHg plus the right atrial pressure.\par  6. Mild aortic stenosis with the peak gradient of 25 mmHg and a mean of 15 mmHg.\par  7. No evidence of neoaortic regurgitation.\par  8. Qualitatively normal right ventricular systolic function.\par  9. The E/e' ratios are suggestive of abnormal left ventricular filling pressure.\par 10. Mild concentric left ventricular hypertrophy with qualitatively normal left ventricular systolic function.\par 11. No pericardial effusion.

## 2019-03-14 NOTE — CONSULT LETTER
[Today's Date] : [unfilled] [Name] : Name: [unfilled] [] : : ~~ [Today's Date:] : [unfilled] [Dear  ___:] : Dear Dr. [unfilled]: [Consult] : I had the pleasure of evaluating your patient, [unfilled]. My full evaluation follows. [Consult - Multiple Provider] : Thank you very much for allowing us to participate in the care of this patient. If you have any questions, please do not hesitate to contact us. [Sincerely,] : Sincerely, [DrIvanna  ___] : Dr. NAIR [FreeTextEntry4] : Michelle Price MD [FreeTextEntry5] : [address] [facility] [FreeTextEntry6] : [place] [id number] [de-identified] : \par Della Healy, MSN, CPNP-AC, PC\par Pediatric Cardiology, Adult Congenital Cardiology\par Shona Santos [facility]\par

## 2019-04-10 NOTE — ASU DISCHARGE PLAN (ADULT/PEDIATRIC). - CHECK WITH YOUR DOCTORS ABOUT SHOWERS, HAIR WASHING
Requesting refill for:lisdexamfetamine (VYVANSE) 70 MG capsule        Quantity requested: N/A    Pharmacy:   [address] -   Location or Phone Number: [phone]     Statement Selected

## 2019-07-09 ENCOUNTER — OUTPATIENT (OUTPATIENT)
Dept: OUTPATIENT SERVICES | Facility: HOSPITAL | Age: 39
LOS: 1 days | Discharge: HOME | End: 2019-07-09

## 2019-07-09 DIAGNOSIS — D69.6 THROMBOCYTOPENIA, UNSPECIFIED: ICD-10-CM

## 2019-07-09 DIAGNOSIS — Z98.89 OTHER SPECIFIED POSTPROCEDURAL STATES: Chronic | ICD-10-CM

## 2019-07-09 DIAGNOSIS — Z95.4 PRESENCE OF OTHER HEART-VALVE REPLACEMENT: Chronic | ICD-10-CM

## 2019-07-12 ENCOUNTER — RESULT REVIEW (OUTPATIENT)
Age: 39
End: 2019-07-12

## 2019-07-12 ENCOUNTER — APPOINTMENT (OUTPATIENT)
Dept: HEMATOLOGY ONCOLOGY | Facility: CLINIC | Age: 39
End: 2019-07-12
Payer: MEDICARE

## 2019-07-12 VITALS
TEMPERATURE: 97.6 F | OXYGEN SATURATION: 99 % | BODY MASS INDEX: 39.36 KG/M2 | RESPIRATION RATE: 16 BRPM | DIASTOLIC BLOOD PRESSURE: 69 MMHG | SYSTOLIC BLOOD PRESSURE: 116 MMHG | WEIGHT: 171.3 LBS | HEART RATE: 69 BPM

## 2019-07-12 LAB
HCT VFR BLD CALC: 37.2 % — SIGNIFICANT CHANGE UP (ref 34.5–45)
HGB BLD-MCNC: 13 G/DL — SIGNIFICANT CHANGE UP (ref 11.5–15.5)
MCHC RBC-ENTMCNC: 33.9 PG — SIGNIFICANT CHANGE UP (ref 27–34)
MCHC RBC-ENTMCNC: 35 G/DL — SIGNIFICANT CHANGE UP (ref 32–36)
MCV RBC AUTO: 97 FL — SIGNIFICANT CHANGE UP (ref 80–100)
PLATELET # BLD AUTO: 104 K/UL — LOW (ref 150–400)
RBC # BLD: 3.83 M/UL — SIGNIFICANT CHANGE UP (ref 3.8–5.2)
RBC # FLD: 13.3 % — SIGNIFICANT CHANGE UP (ref 10.3–14.5)
WBC # BLD: 7 K/UL — SIGNIFICANT CHANGE UP (ref 3.8–10.5)
WBC # FLD AUTO: 7 K/UL — SIGNIFICANT CHANGE UP (ref 3.8–10.5)

## 2019-07-12 PROCEDURE — 99213 OFFICE O/P EST LOW 20 MIN: CPT

## 2019-07-17 NOTE — HISTORY OF PRESENT ILLNESS
[de-identified] : Clementina Hu is a 39 y.o. with Down syndrome, aortic stenosis S/P aortic valve replacement, obstructive sleep apnea, H/O dizziness, and hypothyroidism, who is referred because of thrombocytopenia. She has been in a group home environment and has had elementary school education. She underwent bioprosthetic aortic valve replacement in 2014. She has been noted to have intermittent thrombocytopenia, with a platelet count of 111 (5/4/16), 150 (12/8/16), and 99 (3/24/17) without other blood count abnormalities. She denies constitutional symptoms, abnormal bruising/bleeding, or recent infections. [de-identified] : Since being seen last, she has no new complaints. She had bone marrow biopsy and aspirate done on 9/13/17, which showed trilineage hematopoiesis with maturation and mild megakaryocytosis and increased iron stores. FISH MDS panel was negative, and cytogenetics confirmed trisomy 21. Since then, she has no complaints except for bruising while on aspirin. On 12/8/17, she has no complaints.\par \par When seen on 7/2/18, she has no new complaints except for bleeding and bruising on abdomen.\par \par On 1/14/19, she has no specific complaints. On 7/12/19, she has no specific complaints but is being evaluated for possible cirrhosis.  [Date: ____________] : Patient's last distress assessment performed on [unfilled]. [0 - No Distress] : Distress Level: 0

## 2019-07-17 NOTE — REASON FOR VISIT
[Follow-Up Visit] : a follow-up visit for [Blood Count Assessment] : blood count assessment [Parent] : parent [Formal Caregiver] : formal caregiver [FreeTextEntry2] : thrombocytopenia and Down syndrome

## 2019-07-17 NOTE — PHYSICAL EXAM
[Restricted in physically strenuous activity but ambulatory and able to carry out work of a light or sedentary nature] : Status 1- Restricted in physically strenuous activity but ambulatory and able to carry out work of a light or sedentary nature, e.g., light house work, office work [Normal] : affect appropriate [de-identified] : Down syndrome appearance; able to converse easily and answer simple questions [de-identified] : 2/6 systolic murmur [de-identified] : Fatty lipoma on right leg

## 2019-07-17 NOTE — ASSESSMENT
[FreeTextEntry1] : Ms. Hu has chronic thrombocytopenia associated with Down syndrome. Prior bone marrow biopsy in 2017 was negative for MDS or malignancy.\par \par Plan:\par 1. Apparently, she is being evaluated for possible liver disease and will undergo endoscopy in August 2019. From hematologic standpoint, platelet count stable and >100, so no contraindication to procedure.\par 2. Continue to monitor CBC every 6 months. [Palliative Care Plan] : not applicable at this time

## 2019-08-30 ENCOUNTER — TRANSCRIPTION ENCOUNTER (OUTPATIENT)
Age: 39
End: 2019-08-30

## 2020-02-11 ENCOUNTER — APPOINTMENT (OUTPATIENT)
Dept: HEMATOLOGY ONCOLOGY | Facility: CLINIC | Age: 40
End: 2020-02-11

## 2020-02-26 ENCOUNTER — OUTPATIENT (OUTPATIENT)
Dept: OUTPATIENT SERVICES | Facility: HOSPITAL | Age: 40
LOS: 1 days | Discharge: HOME | End: 2020-02-26

## 2020-02-26 DIAGNOSIS — Z98.89 OTHER SPECIFIED POSTPROCEDURAL STATES: Chronic | ICD-10-CM

## 2020-02-26 DIAGNOSIS — Z95.4 PRESENCE OF OTHER HEART-VALVE REPLACEMENT: Chronic | ICD-10-CM

## 2020-02-26 DIAGNOSIS — D69.6 THROMBOCYTOPENIA, UNSPECIFIED: ICD-10-CM

## 2020-02-29 ENCOUNTER — TRANSCRIPTION ENCOUNTER (OUTPATIENT)
Age: 40
End: 2020-02-29

## 2020-03-02 ENCOUNTER — APPOINTMENT (OUTPATIENT)
Dept: PEDIATRIC CARDIOLOGY | Facility: CLINIC | Age: 40
End: 2020-03-02

## 2020-03-02 ENCOUNTER — APPOINTMENT (OUTPATIENT)
Dept: HEMATOLOGY ONCOLOGY | Facility: CLINIC | Age: 40
End: 2020-03-02
Payer: MEDICARE

## 2020-03-03 ENCOUNTER — RESULT CHARGE (OUTPATIENT)
Age: 40
End: 2020-03-03

## 2020-03-04 DIAGNOSIS — Z95.3 PRESENCE OF XENOGENIC HEART VALVE: ICD-10-CM

## 2020-03-05 ENCOUNTER — APPOINTMENT (OUTPATIENT)
Dept: PEDIATRIC CARDIOLOGY | Facility: CLINIC | Age: 40
End: 2020-03-05
Payer: MEDICARE

## 2020-03-05 VITALS
HEIGHT: 55.51 IN | SYSTOLIC BLOOD PRESSURE: 120 MMHG | DIASTOLIC BLOOD PRESSURE: 71 MMHG | WEIGHT: 167.55 LBS | BODY MASS INDEX: 38.23 KG/M2 | HEART RATE: 68 BPM | OXYGEN SATURATION: 99 % | RESPIRATION RATE: 16 BRPM

## 2020-03-05 PROCEDURE — 99215 OFFICE O/P EST HI 40 MIN: CPT | Mod: 25

## 2020-03-05 PROCEDURE — 93325 DOPPLER ECHO COLOR FLOW MAPG: CPT

## 2020-03-05 PROCEDURE — 93303 ECHO TRANSTHORACIC: CPT

## 2020-03-05 PROCEDURE — 93320 DOPPLER ECHO COMPLETE: CPT

## 2020-03-05 PROCEDURE — 93000 ELECTROCARDIOGRAM COMPLETE: CPT

## 2020-03-05 RX ORDER — OMEPRAZOLE 40 MG/1
40 CAPSULE, DELAYED RELEASE ORAL
Refills: 0 | Status: ACTIVE | COMMUNITY

## 2020-03-05 RX ORDER — CYCLOSPORINE 0.5 MG/ML
0.05 EMULSION OPHTHALMIC TWICE DAILY
Refills: 0 | Status: ACTIVE | COMMUNITY
Start: 2020-03-05

## 2020-03-05 NOTE — CARDIOLOGY SUMMARY
[Today's Date] : [unfilled] [FreeTextEntry1] : normal sinus rhythm\par lateral infarct, age undetermined\par inferior infarct, age undermined\par when compared to prior, no change is seen [FreeTextEntry2] : 1.  {S,D,S } Situs solitus, D-ventricular looping, normally related great arteries.\par 2. Status post surgical aortic valve replacement with bovine pericardial valve.\par 3. Mild neoaortic stenosis.\par 4. Peak aortic valve gradient = 32.6 mmHg; mean gradient = 19.4 mmHg.\par 5. Mild neoaortic regurgitation.\par 6. Mild mitral valve stenosis.\par 7. 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 Mitral valve mean gradient = 6.0 mmHg.\par 9. Trivial mitral valve regurgitation.\par 10. Mild tricuspid valve regurgitation.\par 11. Mild concentric left ventricular hypertrophy.\par 12. Normal left ventricular systolic function.\par 13. Left ventricular diastolic dysfunction.\par 14. No pericardial effusion.\par \par Estimated Pressures\par RV systolic pressure (TR): 46.30mmHg\par \par IVSd: 1.27cm 2.12*\par LVIDd: 4.42cm -1.53\par LVIDs: 2.29cm -2.67*\par LVPWd: 0.79cm -0.84\par LV mass (ASE padma.): 155g\par LV mass index: 61.42g/ht^2.7

## 2020-03-05 NOTE — CONSULT LETTER
[Today's Date] : [unfilled] [Name] : Name: [unfilled] [] : : ~~ [Today's Date:] : [unfilled] [Dear  ___:] : Dear Dr. [unfilled]: [Consult] : I had the pleasure of evaluating your patient, [unfilled]. My full evaluation follows. [Consult - Multiple Provider] : Thank you very much for allowing us to participate in the care of this patient. If you have any questions, please do not hesitate to contact us. [Sincerely,] : Sincerely, [DrIvanna  ___] : Dr. NAIR [FreeTextEntry4] : Michelle Price MD [FreeTextEntry5] : [address] [facility] [FreeTextEntry6] : [place] [id number] [de-identified] : \par Della Healy, MSN, CPNP-AC, PC\par Pediatric Cardiology, Adult Congenital Cardiology\par Shona Santos [facility]\par

## 2020-03-05 NOTE — REVIEW OF SYSTEMS
[Feeling Poorly] : not feeling poorly (malaise) [Cyanosis] : no cyanosis [Edema] : no edema [Chest Pain] : no chest pain or discomfort [Exercise Intolerance] : no persistence of exercise intolerance [Palpitations] : no palpitations [Orthopnea] : no orthopnea [Fast HR] : no tachycardia [Tachypnea] : not tachypneic [Shortness Of Breath] : not expressed as feeling short of breath [Dizziness] : no dizziness [Easy Bruising] : a tendency for easy bruising [Easy Bleeding] : no ~M tendency for easy bleeding [Nosebleeds] : no epistaxis [Anxiety] : no anxiety [Dec Urine Output] : no oliguria [FreeTextEntry2] : Obese

## 2020-03-05 NOTE — HISTORY OF PRESENT ILLNESS
[FreeTextEntry1] : We had the pleasure of seeing Clementina in the Adult Congenital Heart Program of [facility] on March 5, 2020 for a follow up.  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  She underwent posterior root enlargement, aortic valve replacement with a 23 mm bovine pericardial valve, and left ventricular outflow tract muscular resection on 7/1/2014.  \par \par She lives in a group home in [place]. During the weekdays, she goes to a day program.  Her sisters are her legal guardian.  She runs on the treadmill for 10 minutes every day and participates in [place].  She denies chest pain, palpitations, shortness of breath, near syncope, or syncope.  \par \par She has TRINITY and is compliant with CPAP therapy.  She has hypothyroidism and chronic thrombocytopenia related to Trisomy 21.

## 2020-03-05 NOTE — REASON FOR VISIT
[Follow-Up] : a follow-up visit for [Foster Parents/Guardian] : /guardian [Patient] : patient [Medical Records] : medical records [Other: _____] : [unfilled] [FreeTextEntry3] : AS,AVR,[id number] and TRINITY

## 2020-03-05 NOTE — DISCUSSION/SUMMARY
[FreeTextEntry1] : In summary, Clementina is a 39 year old female with a history of Trisomy 21 and left ventricular outflow tract obstruction s/p aortic valve replacement with posterior enlargement of the root and left ventricular outflow tract muscle resection.  She has a durable repair with a well functioning aortic valve and no clear evidence of residual subvalvar obstruction.  She has mild concentric left ventricular hypertrophy.   Over serial echocardiograms, her mitral valve mean gradient has gone from mild to now moderate in terms of obstruction.  The mechanism appears to be related to a tethered posterior leaflet and redundancy to the anterior leaflet.  There is no significant mitral regurgitation.  While not symptomatic from her MS, she does have elevation in her estimated RV pressure of about 50 mmHg which is likely due to the combination of her mitral valve and underlying TRINITY.  For now, we will continue to monitor this unless new symptoms arise.\par \par We discussed the importance of weight loss with Clementina and the aid from her group home. We will contact her family as well. In the past, Clementina was put on strict caloric intake in an attempt to achieve weight loss.  She is followed closely by endocrinology and is on medications for insulin resistance versus type 2 DM.  \par \par We placed a Holter monitor to evaluate for occult arrhythmia in light of AS, MS, and elevated RV pressures.  No new medications are necessary unless there is evidence of significant arrhythmia. In light of a history of thrombocytopenia and bruising, we recommended decreasing her Aspirin to 81 mg once daily.  Finally, she needs lifelong SBE prophylaxis and dental visits every 6 months.\par \par We will communicate the results of the Holter with the group home once they become available.\par \par Follow up in one year, sooner if any clinical concerns arise. [Needs SBE Prophylaxis] : [unfilled]  needs bacterial endocarditis prophylaxis. SBE prophylaxis is indicated for dental and invasive ENT procedures. (Circulation. 2007; 116: 3207-8443) [May participate in all age-appropriate activities] : [unfilled] May participate in all age-appropriate activities. [Influenza vaccine is recommended] : Influenza vaccine is recommended

## 2020-03-05 NOTE — PHYSICAL EXAM
[General Appearance - Alert] : alert [General Appearance - In No Acute Distress] : in no acute distress [General Appearance - Well Nourished] : well nourished [General Appearance - Well Developed] : well developed [Facies] : the head and face were normal in appearance [Appearance Of Head] : the head was normocephalic [Down Syndrome] : Down Syndrome [Sclera] : the conjunctiva were normal [Examination Of The Oral Cavity] : mucous membranes were moist and pink [] : no respiratory distress [Respiration, Rhythm And Depth] : normal respiratory rhythm and effort [Auscultation Breath Sounds / Voice Sounds] : breath sounds clear to auscultation bilaterally [No Cough] : no cough [Normal Chest Appearance] : the chest was normal in appearance [Sternotomy] : sternotomy [Well-Healed] : well-healed [Apical Impulse] : quiet precordium with normal apical impulse [Heart Rate And Rhythm] : normal heart rate and rhythm [Heart Sounds] : normal S1 and S2 [Heart Sounds Gallop] : no gallops [Heart Sounds Pericardial Friction Rub] : no pericardial rub [Arterial Pulses] : normal upper and lower extremity pulses with no pulse delay [Capillary Refill Test] : normal capillary refill [Systolic] : systolic [II] : a grade 2/6 [RUSB] : RUSB [Abdomen Soft] : soft [Nondistended] : nondistended [Abdomen Tenderness] : non-tender [FreeTextEntry1] : difficult to assess liver edge 2/2 obesity; scattered abdominal bruising [Nail Clubbing] : no clubbing  or cyanosis of the fingernails [Skin Color & Pigmentation] : normal skin color and pigmentation [Demonstrated Behavior - Infant Nonreactive To Parents] : interactive

## 2020-03-16 ENCOUNTER — APPOINTMENT (OUTPATIENT)
Dept: PEDIATRIC CARDIOLOGY | Facility: CLINIC | Age: 40
End: 2020-03-16

## 2020-03-28 ENCOUNTER — TRANSCRIPTION ENCOUNTER (OUTPATIENT)
Age: 40
End: 2020-03-28

## 2020-04-13 ENCOUNTER — OUTPATIENT (OUTPATIENT)
Dept: OUTPATIENT SERVICES | Facility: HOSPITAL | Age: 40
LOS: 1 days | Discharge: HOME | End: 2020-04-13

## 2020-04-13 DIAGNOSIS — Z95.4 PRESENCE OF OTHER HEART-VALVE REPLACEMENT: Chronic | ICD-10-CM

## 2020-04-13 DIAGNOSIS — D69.6 THROMBOCYTOPENIA, UNSPECIFIED: ICD-10-CM

## 2020-04-13 DIAGNOSIS — Z98.89 OTHER SPECIFIED POSTPROCEDURAL STATES: Chronic | ICD-10-CM

## 2020-04-20 ENCOUNTER — APPOINTMENT (OUTPATIENT)
Dept: HEMATOLOGY ONCOLOGY | Facility: CLINIC | Age: 40
End: 2020-04-20
Payer: MEDICARE

## 2020-04-20 PROCEDURE — 99204 OFFICE O/P NEW MOD 45 MIN: CPT | Mod: 95

## 2020-04-20 NOTE — PHYSICAL EXAM
[Restricted in physically strenuous activity but ambulatory and able to carry out work of a light or sedentary nature] : Status 1- Restricted in physically strenuous activity but ambulatory and able to carry out work of a light or sedentary nature, e.g., light house work, office work [de-identified] : Down syndrome appearance; able to converse easily and answer simple questions [Normal] : normal spine exam without palpable tenderness, no kyphosis or scoliosis [de-identified] : Fatty lipoma on right leg

## 2020-04-20 NOTE — HISTORY OF PRESENT ILLNESS
[Date: ____________] : Patient's last distress assessment performed on [unfilled]. [0 - No Distress] : Distress Level: 0 [Other Location: e.g. School (Enter Location, City,State)___] : at [unfilled], at the time of the visit. 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  [FreeTextEntry2] : Clementina Hu [de-identified] : Clementina Hu is a 39 y.o. with Down syndrome, aortic stenosis S/P aortic valve replacement, obstructive sleep apnea, H/O dizziness, and hypothyroidism, who is referred because of thrombocytopenia. She has been in a group home environment and has had elementary school education. She underwent bioprosthetic aortic valve replacement in 2014. She has been noted to have intermittent thrombocytopenia, with a platelet count of 111 (5/4/16), 150 (12/8/16), and 99 (3/24/17) without other blood count abnormalities. She denies constitutional symptoms, abnormal bruising/bleeding, or recent infections.\par \par  S/P  bone marrow biopsy and aspirate done on 9/13/17, which showed trilineage hematopoiesis with maturation and mild megakaryocytosis and increased iron stores. FISH MDS panel was negative, and cytogenetics confirmed trisomy 21. Since then, she has no complaints except for bruising while on aspirin. On 12/8/17, she has no complaints.\par  [de-identified] : Last blood work was 1/2020. Hb 13.1 g/dl, PLTs 106K. Denies bleeding episodes, denies dizziness, lightheadedness, sob. History obtained from group home staff. \par \par  \par No other changes in medical, surgical or social history since 7/12/2019.\par

## 2020-04-20 NOTE — ASSESSMENT
[Palliative Care Plan] : not applicable at this time [FreeTextEntry1] : Ms. Hu has chronic thrombocytopenia associated with Down syndrome. Prior bone marrow biopsy in 2017 was negative for MDS or malignancy.\par \par Plan:\par 1. Apparently, she is being evaluated for possible liver disease , LFTs are WNL. \par 2. Continue to monitor CBC every 6 months.\par \par This service was provided by using telehealth. The patient was at home and I was at [facility]. The patient requested and participated in this encounter. The encounter face to face last  30  minutes coordinating his/her care and counseling.\par \par \par RTC 6 months.

## 2020-04-20 NOTE — REASON FOR VISIT
[Blood Count Assessment] : blood count assessment [Parent] : parent [Follow-Up Visit] : a follow-up visit for [Formal Caregiver] : formal caregiver [FreeTextEntry2] : thrombocytopenia and Down syndrome

## 2020-07-18 ENCOUNTER — OUTPATIENT (OUTPATIENT)
Dept: OUTPATIENT SERVICES | Facility: HOSPITAL | Age: 40
LOS: 1 days | Discharge: HOME | End: 2020-07-18

## 2020-07-18 DIAGNOSIS — Z98.89 OTHER SPECIFIED POSTPROCEDURAL STATES: Chronic | ICD-10-CM

## 2020-07-18 DIAGNOSIS — Z95.4 PRESENCE OF OTHER HEART-VALVE REPLACEMENT: Chronic | ICD-10-CM

## 2020-07-18 DIAGNOSIS — D69.6 THROMBOCYTOPENIA, UNSPECIFIED: ICD-10-CM

## 2020-07-21 ENCOUNTER — APPOINTMENT (OUTPATIENT)
Dept: HEMATOLOGY ONCOLOGY | Facility: CLINIC | Age: 40
End: 2020-07-21

## 2020-08-10 ENCOUNTER — APPOINTMENT (OUTPATIENT)
Dept: HEMATOLOGY ONCOLOGY | Facility: CLINIC | Age: 40
End: 2020-08-10
Payer: MEDICARE

## 2020-08-10 ENCOUNTER — RESULT REVIEW (OUTPATIENT)
Age: 40
End: 2020-08-10

## 2020-08-10 VITALS
OXYGEN SATURATION: 100 % | WEIGHT: 151.46 LBS | BODY MASS INDEX: 34.56 KG/M2 | RESPIRATION RATE: 16 BRPM | TEMPERATURE: 98 F | HEART RATE: 58 BPM | DIASTOLIC BLOOD PRESSURE: 66 MMHG | SYSTOLIC BLOOD PRESSURE: 103 MMHG

## 2020-08-10 LAB
BASOPHILS # BLD AUTO: 0.08 K/UL — SIGNIFICANT CHANGE UP (ref 0–0.2)
BASOPHILS NFR BLD AUTO: 1.2 % — SIGNIFICANT CHANGE UP (ref 0–2)
EOSINOPHIL # BLD AUTO: 0.16 K/UL — SIGNIFICANT CHANGE UP (ref 0–0.5)
EOSINOPHIL NFR BLD AUTO: 2.4 % — SIGNIFICANT CHANGE UP (ref 0–6)
HCT VFR BLD CALC: 40.2 % — SIGNIFICANT CHANGE UP (ref 34.5–45)
HGB BLD-MCNC: 13.8 G/DL — SIGNIFICANT CHANGE UP (ref 11.5–15.5)
IMM GRANULOCYTES NFR BLD AUTO: 1 % — SIGNIFICANT CHANGE UP (ref 0–1.5)
LYMPHOCYTES # BLD AUTO: 1.54 K/UL — SIGNIFICANT CHANGE UP (ref 1–3.3)
LYMPHOCYTES # BLD AUTO: 22.7 % — SIGNIFICANT CHANGE UP (ref 13–44)
MCHC RBC-ENTMCNC: 32.9 PG — SIGNIFICANT CHANGE UP (ref 27–34)
MCHC RBC-ENTMCNC: 34.3 GM/DL — SIGNIFICANT CHANGE UP (ref 32–36)
MCV RBC AUTO: 95.9 FL — SIGNIFICANT CHANGE UP (ref 80–100)
MONOCYTES # BLD AUTO: 0.61 K/UL — SIGNIFICANT CHANGE UP (ref 0–0.9)
MONOCYTES NFR BLD AUTO: 9 % — SIGNIFICANT CHANGE UP (ref 2–14)
NEUTROPHILS # BLD AUTO: 4.31 K/UL — SIGNIFICANT CHANGE UP (ref 1.8–7.4)
NEUTROPHILS NFR BLD AUTO: 63.7 % — SIGNIFICANT CHANGE UP (ref 43–77)
NRBC # BLD: 0 /100 WBCS — SIGNIFICANT CHANGE UP (ref 0–0)
PLATELET # BLD AUTO: 97 K/UL — LOW (ref 150–400)
RBC # BLD: 4.19 M/UL — SIGNIFICANT CHANGE UP (ref 3.8–5.2)
RBC # FLD: 14.3 % — SIGNIFICANT CHANGE UP (ref 10.3–14.5)
WBC # BLD: 6.77 K/UL — SIGNIFICANT CHANGE UP (ref 3.8–10.5)
WBC # FLD AUTO: 6.77 K/UL — SIGNIFICANT CHANGE UP (ref 3.8–10.5)

## 2020-08-10 PROCEDURE — 99214 OFFICE O/P EST MOD 30 MIN: CPT

## 2020-08-10 NOTE — HISTORY OF PRESENT ILLNESS
[Date: ____________] : Patient's last distress assessment performed on [unfilled]. [0 - No Distress] : Distress Level: 0 [] :  [de-identified] : Clementina Hu is a 39 y.o. with Down syndrome, aortic stenosis S/P aortic valve replacement, obstructive sleep apnea, H/O dizziness, and hypothyroidism, who is referred because of thrombocytopenia. She has been in a group home environment and has had elementary school education. She underwent bioprosthetic aortic valve replacement in 2014. She has been noted to have intermittent thrombocytopenia, with a platelet count of 111 (5/4/16), 150 (12/8/16), and 99 (3/24/17) without other blood count abnormalities. She denies constitutional symptoms, abnormal bruising/bleeding, or recent infections.\par \par  S/P  bone marrow biopsy and aspirate done on 9/13/17, which showed trilineage hematopoiesis with maturation and mild megakaryocytosis and increased iron stores. FISH MDS panel was negative, and cytogenetics confirmed trisomy 21. Since then, she has no complaints except for bruising while on aspirin. On 12/8/17, she has no complaints.\par  [de-identified] :  Denies bleeding episodes, denies dizziness, lightheadedness, sob. History obtained from group home staff. 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

## 2020-08-10 NOTE — ASSESSMENT
[Palliative Care Plan] : not applicable at this time [FreeTextEntry1] : Ms. Hu has chronic thrombocytopenia associated with Down syndrome. Prior bone marrow biopsy in 2017 was negative for MDS or malignancy.\par \par Plan:\par 1. Apparently, she is being evaluated for possible liver disease , LFTs were checked. \par 2. Continue to monitor CBC every 6 months. PLTs today 97K stable. \par \par \par RTC 6 months.

## 2020-08-10 NOTE — PHYSICAL EXAM
[Restricted in physically strenuous activity but ambulatory and able to carry out work of a light or sedentary nature] : Status 1- Restricted in physically strenuous activity but ambulatory and able to carry out work of a light or sedentary nature, e.g., light house work, office work [Normal] : no peripheral adenopathy appreciated [de-identified] : Down syndrome appearance; able to converse easily and answer simple questions [de-identified] : Fatty lipoma on right leg

## 2020-08-11 LAB
ALBUMIN SERPL ELPH-MCNC: 4.1 G/DL
ALP BLD-CCNC: 62 U/L
ALT SERPL-CCNC: 24 U/L
ANION GAP SERPL CALC-SCNC: 11 MMOL/L
AST SERPL-CCNC: 30 U/L
BILIRUB SERPL-MCNC: 0.5 MG/DL
BUN SERPL-MCNC: 15 MG/DL
CALCIUM SERPL-MCNC: 9.1 MG/DL
CHLORIDE SERPL-SCNC: 102 MMOL/L
CO2 SERPL-SCNC: 26 MMOL/L
CREAT SERPL-MCNC: 1.02 MG/DL
GLUCOSE SERPL-MCNC: 82 MG/DL
HAV IGM SER QL: NONREACTIVE
HAV IGM SER QL: NONREACTIVE
HBV CORE IGG+IGM SER QL: NONREACTIVE
HBV CORE IGM SER QL: NONREACTIVE
HBV CORE IGM SER QL: NONREACTIVE
HBV SURFACE AB SER QL: NONREACTIVE
HBV SURFACE AG SER QL: NONREACTIVE
HBV SURFACE AG SER QL: NONREACTIVE
HCV AB SER QL: NONREACTIVE
HCV S/CO RATIO: 0.1 S/CO
POTASSIUM SERPL-SCNC: 3.9 MMOL/L
PROT SERPL-MCNC: 6.6 G/DL
SODIUM SERPL-SCNC: 139 MMOL/L

## 2020-08-17 NOTE — ASU PREOP CHECKLIST - SITE MARKED BY ANESTHESIOLOGIST
clinic in 2 weeks, 8/31    Referring Dr. Gabriella Ibanez   INR (no units)   Date Value   08/17/2020 3.5   07/14/2020 2.3   06/29/2020 2.0   06/08/2020 4.5   04/30/2020 3.29 (H)   09/18/2014 1.94 (H)   08/14/2014 2.46 (H)   08/13/2014 1.36 (H)         CLINICAL PHARMACY CONSULT: MED RECONCILIATION/REVIEW ADDENDUM    For Pharmacy Admin Tracking Only    PHSO: No  Total # of Interventions Recommended: 0  - Maintenance Safety Lab Monitoring #: 1  Total Interventions Accepted: 0  Time Spent (min): Via Ginny Farmer, PharmD
n/a

## 2021-01-15 ENCOUNTER — NON-APPOINTMENT (OUTPATIENT)
Age: 41
End: 2021-01-15

## 2021-02-03 ENCOUNTER — OUTPATIENT (OUTPATIENT)
Dept: OUTPATIENT SERVICES | Facility: HOSPITAL | Age: 41
LOS: 1 days | Discharge: HOME | End: 2021-02-03

## 2021-02-03 DIAGNOSIS — Z95.4 PRESENCE OF OTHER HEART-VALVE REPLACEMENT: Chronic | ICD-10-CM

## 2021-02-03 DIAGNOSIS — Z98.89 OTHER SPECIFIED POSTPROCEDURAL STATES: Chronic | ICD-10-CM

## 2021-02-03 DIAGNOSIS — D69.6 THROMBOCYTOPENIA, UNSPECIFIED: ICD-10-CM

## 2021-02-08 ENCOUNTER — APPOINTMENT (OUTPATIENT)
Dept: HEMATOLOGY ONCOLOGY | Facility: CLINIC | Age: 41
End: 2021-02-08
Payer: MEDICARE

## 2021-02-08 ENCOUNTER — RESULT REVIEW (OUTPATIENT)
Age: 41
End: 2021-02-08

## 2021-02-08 VITALS
HEART RATE: 69 BPM | WEIGHT: 131.59 LBS | RESPIRATION RATE: 16 BRPM | TEMPERATURE: 97.6 F | DIASTOLIC BLOOD PRESSURE: 67 MMHG | HEIGHT: 55.51 IN | SYSTOLIC BLOOD PRESSURE: 108 MMHG | OXYGEN SATURATION: 100 % | BODY MASS INDEX: 30.02 KG/M2

## 2021-02-08 LAB
ALBUMIN SERPL ELPH-MCNC: 3.7 G/DL
ALP BLD-CCNC: 78 U/L
ALT SERPL-CCNC: 23 U/L
ANION GAP SERPL CALC-SCNC: 11 MMOL/L
AST SERPL-CCNC: 28 U/L
BASOPHILS # BLD AUTO: 0.05 K/UL — SIGNIFICANT CHANGE UP (ref 0–0.2)
BASOPHILS NFR BLD AUTO: 0.9 % — SIGNIFICANT CHANGE UP (ref 0–2)
BILIRUB SERPL-MCNC: 0.4 MG/DL
BUN SERPL-MCNC: 17 MG/DL
CALCIUM SERPL-MCNC: 9.1 MG/DL
CHLORIDE SERPL-SCNC: 106 MMOL/L
CO2 SERPL-SCNC: 24 MMOL/L
CREAT SERPL-MCNC: 1 MG/DL
EOSINOPHIL # BLD AUTO: 0.18 K/UL — SIGNIFICANT CHANGE UP (ref 0–0.5)
EOSINOPHIL NFR BLD AUTO: 3.1 % — SIGNIFICANT CHANGE UP (ref 0–6)
GLUCOSE SERPL-MCNC: 83 MG/DL
HCT VFR BLD CALC: 40.3 % — SIGNIFICANT CHANGE UP (ref 34.5–45)
HGB BLD-MCNC: 13.7 G/DL — SIGNIFICANT CHANGE UP (ref 11.5–15.5)
IMM GRANULOCYTES NFR BLD AUTO: 0.3 % — SIGNIFICANT CHANGE UP (ref 0–1.5)
LYMPHOCYTES # BLD AUTO: 1.18 K/UL — SIGNIFICANT CHANGE UP (ref 1–3.3)
LYMPHOCYTES # BLD AUTO: 20.5 % — SIGNIFICANT CHANGE UP (ref 13–44)
MCHC RBC-ENTMCNC: 32.3 PG — SIGNIFICANT CHANGE UP (ref 27–34)
MCHC RBC-ENTMCNC: 34 G/DL — SIGNIFICANT CHANGE UP (ref 32–36)
MCV RBC AUTO: 95 FL — SIGNIFICANT CHANGE UP (ref 80–100)
MONOCYTES # BLD AUTO: 0.64 K/UL — SIGNIFICANT CHANGE UP (ref 0–0.9)
MONOCYTES NFR BLD AUTO: 11.1 % — SIGNIFICANT CHANGE UP (ref 2–14)
NEUTROPHILS # BLD AUTO: 3.69 K/UL — SIGNIFICANT CHANGE UP (ref 1.8–7.4)
NEUTROPHILS NFR BLD AUTO: 64.1 % — SIGNIFICANT CHANGE UP (ref 43–77)
NRBC # BLD: 0 /100 WBCS — SIGNIFICANT CHANGE UP (ref 0–0)
PLATELET # BLD AUTO: 110 K/UL — LOW (ref 150–400)
POTASSIUM SERPL-SCNC: 3.8 MMOL/L
PROT SERPL-MCNC: 6.1 G/DL
RBC # BLD: 4.24 M/UL — SIGNIFICANT CHANGE UP (ref 3.8–5.2)
RBC # FLD: 14.4 % — SIGNIFICANT CHANGE UP (ref 10.3–14.5)
SODIUM SERPL-SCNC: 141 MMOL/L
WBC # BLD: 5.76 K/UL — SIGNIFICANT CHANGE UP (ref 3.8–10.5)
WBC # FLD AUTO: 5.76 K/UL — SIGNIFICANT CHANGE UP (ref 3.8–10.5)

## 2021-02-08 PROCEDURE — 99213 OFFICE O/P EST LOW 20 MIN: CPT

## 2021-02-08 RX ORDER — DICLOFENAC SODIUM 1% 10 MG/G
1 GEL TOPICAL
Qty: 100 | Refills: 0 | Status: ACTIVE | COMMUNITY
Start: 2020-12-16

## 2021-02-08 RX ORDER — LEVOTHYROXINE SODIUM 0.1 MG/1
100 TABLET ORAL
Qty: 30 | Refills: 0 | Status: ACTIVE | COMMUNITY
Start: 2021-01-20

## 2021-02-08 RX ORDER — CLINDAMYCIN PHOSPHATE 10 MG/ML
1 LOTION TOPICAL
Qty: 60 | Refills: 0 | Status: ACTIVE | COMMUNITY
Start: 2020-08-10

## 2021-02-08 RX ORDER — AMMONIUM LACTATE 12 %
12 CREAM (GRAM) TOPICAL
Qty: 385 | Refills: 0 | Status: ACTIVE | COMMUNITY
Start: 2020-10-28

## 2021-02-08 NOTE — HISTORY OF PRESENT ILLNESS
[Date: ____________] : Patient's last distress assessment performed on [unfilled]. [0 - No Distress] : Distress Level: 0 [de-identified] : Clementina Hu is a 39 y.o. with Down syndrome, aortic stenosis S/P aortic valve replacement, obstructive sleep apnea, H/O dizziness, and hypothyroidism, who is referred because of thrombocytopenia. She has been in a group home environment and has had elementary school education. She underwent bioprosthetic aortic valve replacement in 2014. She has been noted to have intermittent thrombocytopenia, with a platelet count of 111 (5/4/16), 150 (12/8/16), and 99 (3/24/17) without other blood count abnormalities. She denies constitutional symptoms, abnormal bruising/bleeding, or recent infections.\par \par  S/P  bone marrow biopsy and aspirate done on 9/13/17, which showed trilineage hematopoiesis with maturation and mild megakaryocytosis and increased iron stores. FISH MDS panel was negative, and cytogenetics confirmed trisomy 21. Since then, she has no complaints except for bruising while on aspirin. On 12/8/17, she has no complaints.\par  [de-identified] : Patient presents for follow up accompanied by group home staff Marie. Reports feeling well today.  Denies bleeding symptoms, fevers, night sweats, chills, SOB, CP, dizziness, lightheadedness, and unintentional weight loss. Reports R hip pain for which she started 2x/week PT ~ 2 weeks ago with improvement. Has been on a diet and exercising, happy to have lost ~ 20 lbs. \par \par  \par No other changes in medical, surgical or social history since 8/10/2020.\par

## 2021-02-08 NOTE — PHYSICAL EXAM
[Restricted in physically strenuous activity but ambulatory and able to carry out work of a light or sedentary nature] : Status 1- Restricted in physically strenuous activity but ambulatory and able to carry out work of a light or sedentary nature, e.g., light house work, office work [Normal] : affect appropriate [de-identified] : Down syndrome appearance; able to converse easily and answer simple questions [de-identified] : Fatty lipoma on right leg

## 2021-02-08 NOTE — ASSESSMENT
[Palliative Care Plan] : not applicable at this time [FreeTextEntry1] : 39 yo female with chronic thrombocytopenia associated with Down syndrome. Prior bone marrow biopsy in 2017 was negative for MDS or malignancy.\par \par Reports feeling well today.  Denies bleeding symptoms, fevers, night sweats, chills, SOB, CP, dizziness, lightheadedness, and unintentional weight loss. Reports R hip pain for which she started 2x/week PT ~ 2 weeks ago with improvement. Has been on a diet and exercising, happy to have lost ~ 20 lbs. \par \par CBC today: WBC 5.76 K/uL, RBC 4.24 M/uL, Hgb 13.7 g/dL, Hct 40.3%, Plt 110K\par Educated on S/S of increased bleeding to report\par Call prior to procedures and surgeries\par \par \par RTC 6 months.\par Case and management discussed with Dr. Snyder

## 2021-03-01 ENCOUNTER — RESULT CHARGE (OUTPATIENT)
Age: 41
End: 2021-03-01

## 2021-03-03 ENCOUNTER — APPOINTMENT (OUTPATIENT)
Dept: PEDIATRIC CARDIOLOGY | Facility: CLINIC | Age: 41
End: 2021-03-03
Payer: MEDICARE

## 2021-03-03 VITALS
WEIGHT: 135.58 LBS | HEART RATE: 64 BPM | SYSTOLIC BLOOD PRESSURE: 97 MMHG | HEIGHT: 55.12 IN | DIASTOLIC BLOOD PRESSURE: 51 MMHG | OXYGEN SATURATION: 96 % | BODY MASS INDEX: 31.38 KG/M2 | RESPIRATION RATE: 18 BRPM

## 2021-03-03 PROCEDURE — 93303 ECHO TRANSTHORACIC: CPT

## 2021-03-03 PROCEDURE — 99214 OFFICE O/P EST MOD 30 MIN: CPT

## 2021-03-03 PROCEDURE — 93325 DOPPLER ECHO COLOR FLOW MAPG: CPT

## 2021-03-03 PROCEDURE — 93320 DOPPLER ECHO COMPLETE: CPT

## 2021-03-03 PROCEDURE — 93000 ELECTROCARDIOGRAM COMPLETE: CPT

## 2021-03-03 NOTE — REASON FOR VISIT
[Follow-Up] : a follow-up visit for [Trisomy 21 (Down Syndrome)] : Trisomy 21  [Aortic Stenosis] : aortic stenosis [Patient] : patient [Medical Records] : medical records [Other: _____] : [unfilled]

## 2021-03-05 NOTE — PHYSICAL EXAM
[General Appearance - Alert] : alert [Down Syndrome] : Down Syndrome [Low Set Ears] : the infant has low set ears [Auscultation Breath Sounds / Voice Sounds] : breath sounds clear to auscultation bilaterally [No Cough] : no cough [Sternotomy] : sternotomy [Well-Healed] : well-healed [Apical Impulse] : quiet precordium with normal apical impulse [Heart Rate And Rhythm] : normal heart rate and rhythm [Normal S1] : normal  [S2 Single] : was single [2+] : left 2+ [1+] : left 1+ [Systolic] : systolic [II] : a grade 2/6 [RUSB] : RUSB [LUSB] : LUSB [Holosystolic] : holosystolic [Carotids] : the murmur was transmitted to the carotid arteries [FreeTextEntry1] : s

## 2021-03-05 NOTE — HISTORY OF PRESENT ILLNESS
[FreeTextEntry1] : We had the pleasure of seeing Clementina in the Adult [facility] Program of [facility] on March 3, 2021 for a follow up. 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 She underwent posterior root enlargement, aortic valve replacement with a 23 mm bovine pericardial valve, and left ventricular outflow tract muscular resection on 7/1/2014. \par \par She lives in a group home in [place] and has continued to do so thru COVID and quarantine.. Her day program has been suspended. She says she has changed her eating habits and has lost 20 pounds. She says she exercises but the aide here with her says she does not participate in many of the exercise programs available. She denies chest pain, palpitations, shortness of breath, near syncope, or syncope. \par \par She has TRINITY and has no been compliant with wearing her CPAP at night. As a result, she is frequently fatigued during the day and likes to nap.. She has hypothyroidism and chronic thrombocytopenia related to Trisomy 21. She is followed routinely by Hematology. She has regular dental care.\par Her sisters are her legal guardian

## 2021-03-05 NOTE — CARDIOLOGY SUMMARY
[Today's Date] : [unfilled] [FreeTextEntry1] : NSR, ventricular rate 65 bpm. possible lateral infarct,inferior infarct [FreeTextEntry2] : Summary:\par 1.  {S,D,S } Situs solitus, D-ventricular looping, normally related great arteries.\par 2. Status post surgical aortic valve replacement with bovine pericardial valve.\par 3. Mild neoaortic stenosis.\par 4. Peak aortic valve gradient = 31.8 mmHg; mean gradient = 16.0 mmHg.\par 5. Mild mitral valve stenosis.\par 6. Thickened mitral valve.\par 7. 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 Mitral valve mean gradient = 4.4 mmHg.\par 9. Mild mitral valve regurgitation.\par 10. Mild concentric left ventricular hypertrophy.\par 11. Qualitatively normal right ventricular systolic function.\par 12. Normal left ventricular systolic function.\par 13. Left ventricular ejection fraction by 5/6 Area x Length is normal.\par 14. Left ventricular diastolic dysfunction\par 15. Mild tricuspid valve regurgitation.\par 16. No pericardial effusion.\par 17. There has been no significant interval change.

## 2021-03-05 NOTE — CONSULT LETTER
[Today's Date] : [unfilled] [Name] : Name: [unfilled] [] : : ~~ [Today's Date:] : [unfilled] [Dear  ___:] : Dear Dr. [unfilled]: [Consult] : I had the pleasure of evaluating your patient, [unfilled]. My full evaluation follows. [Consult - Multiple Provider] : Thank you very much for allowing us to participate in the care of this patient. If you have any questions, please do not hesitate to contact us. [Sincerely,] : Sincerely, [DrIvanna  ___] : Dr. NAIR [FreeTextEntry4] : Michelle Price MD [FreeTextEntry5] : [address] [facility] [FreeTextEntry6] : [place] [id number] [de-identified] : Della Healy, MSN, CPNP-AC, PC\par Pediatric Cardiology, Adult Congenital Cardiology\par Shona Santos [facility]\par \par Joce Sandoval MD, HERSON\par Medical Director, Adult Congenital Heart Program\par Professor of Pediatrics\par [employer] at [facility]\par \par

## 2021-03-05 NOTE — REVIEW OF SYSTEMS
[Sleep Disturbances] : ~T sleep disturbances [Short Stature] : short stature was noted [Feeling Poorly] : not feeling poorly (malaise) [Fever] : no fever [Change in Vision] : no change in vision [Cyanosis] : no cyanosis [Edema] : no edema [Diaphoresis] : not diaphoretic [Chest Pain] : no chest pain or discomfort [Exercise Intolerance] : no persistence of exercise intolerance [Palpitations] : no palpitations [Orthopnea] : no orthopnea [Cough] : no cough [Shortness Of Breath] : not expressed as feeling short of breath [Decrease In Appetite] : appetite not decreased [Fainting (Syncope)] : no fainting [Headache] : no headache [Dizziness] : no dizziness [Easy Bruising] : no tendency for easy bruising [Easy Bleeding] : no ~M tendency for easy bleeding [Depression] : no depression [Anxiety] : no anxiety [Heat/Cold Intolerance] : no temperature intolerance

## 2021-03-05 NOTE — DISCUSSION/SUMMARY
[Needs SBE Prophylaxis] : [unfilled]  needs bacterial endocarditis prophylaxis. SBE prophylaxis is indicated for dental and invasive ENT procedures. (Circulation. 2007; 116: 6413-5259) [FreeTextEntry1] : In summary, Clementina is a 40 year old female with a history of Trisomy 21 and left ventricular outflow tract obstruction s/p aortic valve replacement with posterior enlargement of the root and left ventricular outflow tract muscle resection. She continues to have a durable repair with a well functioning aortic valve and no clear evidence of residual subvalvar obstruction. She has mild concentric left ventricular hypertrophy. Her mitral valve mean gradient has remained moderate (~5-6) over serial echocardiograms. The mechanism appears to be related to a tethered posterior leaflet and redundancy to the anterior leaflet. There is no significant mitral regurgitation. Her peak aortic valve gradient has also remained stale at ~ 32mmHg. She had a normal Holter evaluation last year so there is no need to place a Holter at today’s visit.\par \par We are thrilled with Clementina's weight loss and encouraged her to continue her healthy eating habits. In additions we would encourage her to incorporate some exercise into her daily routine. She needs  to wear her CPAP at night and should ask the pulmonologist for better fitting equipment that may increase her compliance. She should continue her ASpirin.\par \par In light of a history of thrombocytopenia she continues to be followed closely by Hematology . Finally, she needs lifelong SBE prophylaxis.  We re-inforced the importance of meticulous dental hygiene and cleanings every 6 month.\par \par We will continue to follow her annually or sooner if clinically indicated.\par

## 2021-03-30 ENCOUNTER — RX RENEWAL (OUTPATIENT)
Age: 41
End: 2021-03-30

## 2021-08-19 ENCOUNTER — OUTPATIENT (OUTPATIENT)
Dept: OUTPATIENT SERVICES | Facility: HOSPITAL | Age: 41
LOS: 1 days | Discharge: HOME | End: 2021-08-19

## 2021-08-19 DIAGNOSIS — D69.6 THROMBOCYTOPENIA, UNSPECIFIED: ICD-10-CM

## 2021-08-19 DIAGNOSIS — Z98.89 OTHER SPECIFIED POSTPROCEDURAL STATES: Chronic | ICD-10-CM

## 2021-08-19 DIAGNOSIS — Z95.4 PRESENCE OF OTHER HEART-VALVE REPLACEMENT: Chronic | ICD-10-CM

## 2021-08-20 ENCOUNTER — APPOINTMENT (OUTPATIENT)
Dept: HEMATOLOGY ONCOLOGY | Facility: CLINIC | Age: 41
End: 2021-08-20
Payer: MEDICARE

## 2021-08-20 ENCOUNTER — RESULT REVIEW (OUTPATIENT)
Age: 41
End: 2021-08-20

## 2021-08-20 VITALS
OXYGEN SATURATION: 97 % | DIASTOLIC BLOOD PRESSURE: 61 MMHG | HEART RATE: 61 BPM | WEIGHT: 136.69 LBS | SYSTOLIC BLOOD PRESSURE: 96 MMHG | BODY MASS INDEX: 31.63 KG/M2 | RESPIRATION RATE: 18 BRPM | TEMPERATURE: 98.1 F

## 2021-08-20 LAB
ALBUMIN SERPL ELPH-MCNC: 3.8 G/DL
ALP BLD-CCNC: 60 U/L
ALT SERPL-CCNC: 21 U/L
ANION GAP SERPL CALC-SCNC: 10 MMOL/L
AST SERPL-CCNC: 28 U/L
BASOPHILS # BLD AUTO: 0.06 K/UL — SIGNIFICANT CHANGE UP (ref 0–0.2)
BASOPHILS NFR BLD AUTO: 1.2 % — SIGNIFICANT CHANGE UP (ref 0–2)
BILIRUB SERPL-MCNC: 0.6 MG/DL
BUN SERPL-MCNC: 16 MG/DL
CALCIUM SERPL-MCNC: 9.1 MG/DL
CHLORIDE SERPL-SCNC: 103 MMOL/L
CO2 SERPL-SCNC: 27 MMOL/L
CREAT SERPL-MCNC: 0.98 MG/DL
EOSINOPHIL # BLD AUTO: 0.08 K/UL — SIGNIFICANT CHANGE UP (ref 0–0.5)
EOSINOPHIL NFR BLD AUTO: 1.6 % — SIGNIFICANT CHANGE UP (ref 0–6)
GLUCOSE SERPL-MCNC: 86 MG/DL
HCT VFR BLD CALC: 39.2 % — SIGNIFICANT CHANGE UP (ref 34.5–45)
HGB BLD-MCNC: 13.4 G/DL — SIGNIFICANT CHANGE UP (ref 11.5–15.5)
IMM GRANULOCYTES NFR BLD AUTO: 0.4 % — SIGNIFICANT CHANGE UP (ref 0–1.5)
LDH SERPL-CCNC: 203 U/L
LYMPHOCYTES # BLD AUTO: 1.38 K/UL — SIGNIFICANT CHANGE UP (ref 1–3.3)
LYMPHOCYTES # BLD AUTO: 27.1 % — SIGNIFICANT CHANGE UP (ref 13–44)
MCHC RBC-ENTMCNC: 33.5 PG — SIGNIFICANT CHANGE UP (ref 27–34)
MCHC RBC-ENTMCNC: 34.2 G/DL — SIGNIFICANT CHANGE UP (ref 32–36)
MCV RBC AUTO: 98 FL — SIGNIFICANT CHANGE UP (ref 80–100)
MONOCYTES # BLD AUTO: 0.44 K/UL — SIGNIFICANT CHANGE UP (ref 0–0.9)
MONOCYTES NFR BLD AUTO: 8.6 % — SIGNIFICANT CHANGE UP (ref 2–14)
NEUTROPHILS # BLD AUTO: 3.12 K/UL — SIGNIFICANT CHANGE UP (ref 1.8–7.4)
NEUTROPHILS NFR BLD AUTO: 61.1 % — SIGNIFICANT CHANGE UP (ref 43–77)
NRBC # BLD: 0 /100 WBCS — SIGNIFICANT CHANGE UP (ref 0–0)
PLATELET # BLD AUTO: 124 K/UL — LOW (ref 150–400)
POTASSIUM SERPL-SCNC: 3.8 MMOL/L
PROT SERPL-MCNC: 6.2 G/DL
RBC # BLD: 4 M/UL — SIGNIFICANT CHANGE UP (ref 3.8–5.2)
RBC # FLD: 14.1 % — SIGNIFICANT CHANGE UP (ref 10.3–14.5)
SODIUM SERPL-SCNC: 140 MMOL/L
WBC # BLD: 5.1 K/UL — SIGNIFICANT CHANGE UP (ref 3.8–10.5)
WBC # FLD AUTO: 5.1 K/UL — SIGNIFICANT CHANGE UP (ref 3.8–10.5)

## 2021-08-20 PROCEDURE — 99213 OFFICE O/P EST LOW 20 MIN: CPT

## 2021-08-20 RX ORDER — ZAFIRLUKAST 20 MG/1
20 TABLET, COATED ORAL TWICE DAILY
Refills: 0 | Status: DISCONTINUED | COMMUNITY
Start: 2020-03-05 | End: 2021-08-20

## 2021-08-20 NOTE — HISTORY OF PRESENT ILLNESS
[Date: ____________] : Patient's last distress assessment performed on [unfilled]. [0 - No Distress] : Distress Level: 0 [] :  [de-identified] : Clementina Hu is a 39 y.o. with Down syndrome, aortic stenosis S/P aortic valve replacement, obstructive sleep apnea, H/O dizziness, and hypothyroidism, who is referred because of thrombocytopenia. She has been in a group home environment and has had elementary school education. She underwent bioprosthetic aortic valve replacement in 2014. She has been noted to have intermittent thrombocytopenia, with a platelet count of 111 (5/4/16), 150 (12/8/16), and 99 (3/24/17) without other blood count abnormalities. She denies constitutional symptoms, abnormal bruising/bleeding, or recent infections.\par \par  S/P  bone marrow biopsy and aspirate done on 9/13/17, which showed trilineage hematopoiesis with maturation and mild megakaryocytosis and increased iron stores. FISH MDS panel was negative, and cytogenetics confirmed trisomy 21. Since then, she has no complaints except for bruising while on aspirin. On 12/8/17, she has no complaints.\par  [de-identified] :  Denies bleeding episodes, denies dizziness, lightheadedness, sob. History obtained from group home staff. \par PLT count today is 124, denies bleeding, melena or hematochezia. Heavy menses. 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

## 2021-08-20 NOTE — DISCUSSION/SUMMARY
[FreeTextEntry1] : Returned call from Lin who reports patient has been complaining of intermittent hip pain. Saw Orthopedist and was advised to take Aleve. At worst pain is 5/10 and has tried Tylenol with good relief. Discussed patient has h/o thrombocytopenia, last plt  97 (8/2020), NSAIDs (Aleve) use can increase bleeding risks. Tylenol is a safe alternative. Advised to take Tylenol  650 every 4 to 6 hrs as needed and to call back if pain is not controlled.

## 2021-08-20 NOTE — ASSESSMENT
[Palliative Care Plan] : not applicable at this time [FreeTextEntry1] : Ms. Hu has chronic thrombocytopenia associated with Down syndrome. Prior bone marrow biopsy in 2017 was negative for MDS or malignancy.\par \par Counts are stable PLT count is 124 K today.\par No evidence of anemia. \par \par RTC 6 months.

## 2021-08-20 NOTE — PHYSICAL EXAM
[Restricted in physically strenuous activity but ambulatory and able to carry out work of a light or sedentary nature] : Status 1- Restricted in physically strenuous activity but ambulatory and able to carry out work of a light or sedentary nature, e.g., light house work, office work [Normal] : affect appropriate [de-identified] : Down syndrome appearance; able to converse easily and answer simple questions [de-identified] : Fatty lipoma on right leg

## 2021-10-31 ENCOUNTER — RESULT CHARGE (OUTPATIENT)
Age: 41
End: 2021-10-31

## 2021-11-01 ENCOUNTER — APPOINTMENT (OUTPATIENT)
Dept: PEDIATRIC CARDIOLOGY | Facility: CLINIC | Age: 41
End: 2021-11-01
Payer: MEDICARE

## 2021-11-01 VITALS
OXYGEN SATURATION: 98 % | HEIGHT: 55.12 IN | RESPIRATION RATE: 20 BRPM | BODY MASS INDEX: 33.11 KG/M2 | DIASTOLIC BLOOD PRESSURE: 55 MMHG | WEIGHT: 143.08 LBS | HEART RATE: 65 BPM | SYSTOLIC BLOOD PRESSURE: 104 MMHG

## 2021-11-01 PROCEDURE — 99213 OFFICE O/P EST LOW 20 MIN: CPT

## 2021-11-01 PROCEDURE — 93000 ELECTROCARDIOGRAM COMPLETE: CPT

## 2021-11-01 NOTE — REASON FOR VISIT
[Follow-Up] : a follow-up visit for [Trisomy 21 (Down Syndrome)] : Trisomy 21  [Aortic Stenosis] : aortic stenosis [Patient] : patient [Foster Parents/Guardian] : /guardian [Medical Records] : medical records

## 2021-11-01 NOTE — PHYSICAL EXAM
[General Appearance - Alert] : alert [Down Syndrome] : Down Syndrome [Sclera] : the conjunctiva were normal [Nasal Cavity] : the nasal mucosa was normal [Auscultation Breath Sounds / Voice Sounds] : breath sounds clear to auscultation bilaterally [No Cough] : no cough [Sternotomy] : sternotomy [Well-Healed] : well-healed [Apical Impulse] : quiet precordium with normal apical impulse [Heart Rate And Rhythm] : normal heart rate and rhythm [Arterial Pulses] : normal upper and lower extremity pulses with no pulse delay [Edema] : no edema [Normal S1] : normal  [Systolic] : systolic [II] : a grade 2/6 [Apical] : apex [LSB] : the murmur was transmitted to the LSB [] : no hepato-splenomegaly [Nail Clubbing] : no clubbing  or cyanosis of the fingers [Skin Color & Pigmentation] : normal skin color and pigmentation [Demonstrated Behavior - Infant Nonreactive To Parents] : interactive

## 2021-11-05 NOTE — REVIEW OF SYSTEMS
[Recent Weight Gain (___ Lbs)] : recent [unfilled] ~Ulb weight gain [Feeling Poorly] : not feeling poorly (malaise) [Fever] : no fever [Edema] : no edema [Chest Pain] : no chest pain or discomfort [Exercise Intolerance] : no persistence of exercise intolerance [Palpitations] : no palpitations [Orthopnea] : no orthopnea [Cough] : no cough [Shortness Of Breath] : not expressed as feeling short of breath [Decrease In Appetite] : appetite not decreased [Fainting (Syncope)] : no fainting [Headache] : no headache [Dizziness] : no dizziness [Sleep Disturbances] : ~T no sleep disturbances [Depression] : no depression [Anxiety] : no anxiety [Heat/Cold Intolerance] : no temperature intolerance

## 2021-11-05 NOTE — HISTORY OF PRESENT ILLNESS
[FreeTextEntry1] : We had the pleasure of seeing Clementina in the Adult Congenital Heart Program of [facility] on November 1, 2021 for a follow up. 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 She underwent posterior root enlargement, aortic valve replacement with a 23 mm bovine pericardial valve, and left ventricular outflow tract muscular resection on 7/1/2014. \par \par She lives in a group home in [place] and has continued to do so thru COVID and quarantine. When we saw her in March she had changed her eating habits and had lost 20 pounds. We saw her back today to track her weight loss. She denies chest pain, palpitations, shortness of breath, near syncope, or syncope. \par \par She has TRINITY and says she is compliant with wearing her CPAP at night, though the staff say otherwise. As a result, she is frequently fatigued during the day and likes to nap.. She has hypothyroidism and chronic thrombocytopenia related to Trisomy 21. She is followed routinely by Hematology. She has regular dental care.\par Her sisters are her legal guardians.\par

## 2021-11-05 NOTE — CONSULT LETTER
[Today's Date] : [unfilled] [Name] : Name: [unfilled] [] : : ~~ [Today's Date:] : [unfilled] [Dear  ___:] : Dear Dr. [unfilled]: [Consult] : I had the pleasure of evaluating your patient, [unfilled]. My full evaluation follows. [Consult - Multiple Provider] : Thank you very much for allowing us to participate in the care of this patient. If you have any questions, please do not hesitate to contact us. [Sincerely,] : Sincerely, [DrIvanna  ___] : Dr. NAIR [FreeTextEntry4] : Michelle Price MD [FreeTextEntry5] : [address] [facility] [FreeTextEntry6] : [place] [id number] [de-identified] : Della Healy, MSN, CPNP-AC, PC\par Pediatric Cardiology, Adult Congenital Cardiology\par Shona Santos [facility]\par \par Joce Sandoval MD, HERSON\par Medical Director, Adult Congenital Heart Program\par Professor of Pediatrics\par [employer] at [facility]\par \par

## 2021-11-05 NOTE — CARDIOLOGY SUMMARY
[Today's Date] : [unfilled] [FreeTextEntry1] : NSR, ventricular rate 63 bpm, possible lateral infarct,inferior infarct

## 2021-11-05 NOTE — DISCUSSION/SUMMARY
[Needs SBE Prophylaxis] : [unfilled]  needs bacterial endocarditis prophylaxis. SBE prophylaxis is indicated for dental and invasive ENT procedures. (Circulation. 2007; 116: 4516-7233) [FreeTextEntry1] : In summary, Clementina is a 41 year old female with a history of Trisomy 21 and left ventricular outflow tract obstruction s/p aortic valve replacement with posterior enlargement of the root and left ventricular outflow tract muscle resection.\par \par Clementina’s weight is up 8 pounds in 8 months. We encouraged her to become more active and return to her health eating habits. With the holiday’s approaching this is going to be a difficult endeavor. We will see her back in March which will be one year from her previous visit and will perform an echocardiogram at that visit.\par

## 2022-02-28 ENCOUNTER — RESULT CHARGE (OUTPATIENT)
Age: 42
End: 2022-02-28

## 2022-03-07 ENCOUNTER — APPOINTMENT (OUTPATIENT)
Dept: PEDIATRIC CARDIOLOGY | Facility: CLINIC | Age: 42
End: 2022-03-07
Payer: MEDICARE

## 2022-03-07 VITALS
BODY MASS INDEX: 33.27 KG/M2 | OXYGEN SATURATION: 97 % | WEIGHT: 143.74 LBS | SYSTOLIC BLOOD PRESSURE: 107 MMHG | HEIGHT: 55.12 IN | HEART RATE: 69 BPM | DIASTOLIC BLOOD PRESSURE: 68 MMHG

## 2022-03-07 PROCEDURE — 93000 ELECTROCARDIOGRAM COMPLETE: CPT

## 2022-03-07 PROCEDURE — 93306 TTE W/DOPPLER COMPLETE: CPT

## 2022-03-07 PROCEDURE — 99213 OFFICE O/P EST LOW 20 MIN: CPT

## 2022-03-07 NOTE — REASON FOR VISIT
<<-----Click on this checkbox to enter Procedure Umbilical hernia repair  06/25/2018    Active  [email or web address]  Inguinal hernia repair with mesh  06/25/2018    Active  [email or web address] [Follow-Up] : a follow-up visit for [Trisomy 21 (Down Syndrome)] : Trisomy 21  [Aortic Stenosis] : aortic stenosis [Patient] : patient [Medical Records] : medical records [Other: _____] : [unfilled]

## 2022-03-07 NOTE — PHYSICAL EXAM
[General Appearance - Alert] : alert [Down Syndrome] : Down Syndrome [Sclera] : the conjunctiva were normal [No Cough] : no cough [Sternotomy] : sternotomy [Well-Healed] : well-healed [Apical Impulse] : quiet precordium with normal apical impulse [Heart Rate And Rhythm] : normal heart rate and rhythm [Heart Sounds] : normal S1 and S2 [Systolic] : systolic [II] : a grade 2/6 [LMSB] : LMSB  [RLSB] : RLSB [Ejection] : ejection [Carotids] : the murmur was transmitted to the carotid arteries [Abdomen Soft] : soft [Palpable___cm below the RCM] : palpable [unfilled] cm below the right costal margin [Nail Clubbing] : no clubbing  or cyanosis of the fingers [Motor Tone] : normal muscle strength and tone [Skin Turgor] : normal turgor [Demonstrated Behavior - Infant Nonreactive To Parents] : interactive

## 2022-03-08 NOTE — HISTORY OF PRESENT ILLNESS
[FreeTextEntry1] : We had the pleasure of seeing Clementina in the Adult Congenital Heart Program of [facility] on March 7, 2022 for a follow up. 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 She underwent posterior root enlargement, aortic valve replacement with a 23 mm bovine pericardial valve, and left ventricular outflow tract muscular resection on 7/1/2014. \par \par She lives in a group home in [place] and has continued to do so thru COVID and quarantine. When we saw her in March 2021 she had changed her eating habits and had lost 20 pounds. We saw her back in November to track her weight loss and she had gained 8 pounds. Today she is here for her annual cardiac exam. She says she is exercising and continues to eat healthy. She denies chest pain, palpitations, shortness of breath, near syncope, or syncope. \par \par She has TRINITY and says she is compliant with wearing her CPAP at night. She has hypothyroidism and chronic thrombocytopenia related to Trisomy 21. She is followed routinely by Hematology. She has regular dental care.\par Her sisters are her legal guardians.

## 2022-03-08 NOTE — CARDIOLOGY SUMMARY
[Today's Date] : [unfilled] [FreeTextEntry1] : NSR, ventricular rate 66 bpm, inferior infarct [FreeTextEntry2] : See attached echo report

## 2022-03-08 NOTE — REVIEW OF SYSTEMS
[Feeling Poorly] : not feeling poorly (malaise) [Cyanosis] : no cyanosis [Exercise Intolerance] : no persistence of exercise intolerance [Palpitations] : no palpitations [Orthopnea] : no orthopnea [Fast HR] : no tachycardia [Cough] : no cough [Shortness Of Breath] : not expressed as feeling short of breath [Abdominal Pain] : no abdominal pain [Fainting (Syncope)] : no fainting [Headache] : no headache [Dizziness] : no dizziness [Easy Bruising] : no tendency for easy bruising [Easy Bleeding] : no ~M tendency for easy bleeding [Sleep Disturbances] : ~T no sleep disturbances [Depression] : no depression [Anxiety] : no anxiety [Short Stature] : short stature was not noted

## 2022-03-08 NOTE — DISCUSSION/SUMMARY
[Needs SBE Prophylaxis] : [unfilled]  needs bacterial endocarditis prophylaxis. SBE prophylaxis is indicated for dental and invasive ENT procedures. (Circulation. 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 Her left atrium is not dilated and her biventricular function is preserved. Her weight is stable. We encouraged her to continue her healthy eating habits and exercise routine and will see her back in one year.\par \par We reviewed the importance of meticulous dental hygiene and the need for regular dental cleanings.\par \par

## 2022-03-08 NOTE — CONSULT LETTER
[Today's Date] : [unfilled] [Name] : Name: [unfilled] [] : : ~~ [Today's Date:] : [unfilled] [Dear  ___:] : Dear Dr. [unfilled]: [Consult] : I had the pleasure of evaluating your patient, [unfilled]. My full evaluation follows. [Consult - Multiple Provider] : Thank you very much for allowing us to participate in the care of this patient. If you have any questions, please do not hesitate to contact us. [Sincerely,] : Sincerely, [DrIvanna  ___] : Dr. NAIR [FreeTextEntry4] : Michelle Price MD [FreeTextEntry5] : [address] [facility] [FreeTextEntry6] : [place] [id number] [de-identified] : Della Healy, MSN, CPNP-AC, PC\par Pediatric Cardiology, Adult Congenital Cardiology\par Shona Santos [facility]\par \par Joce Sandoval MD, HERSON\par Medical Director, Adult Congenital Heart Program\par Professor of Pediatrics\par [employer] at [facility]\par \par

## 2022-04-13 ENCOUNTER — OUTPATIENT (OUTPATIENT)
Dept: OUTPATIENT SERVICES | Facility: HOSPITAL | Age: 42
LOS: 1 days | Discharge: HOME | End: 2022-04-13

## 2022-04-13 DIAGNOSIS — Z98.89 OTHER SPECIFIED POSTPROCEDURAL STATES: Chronic | ICD-10-CM

## 2022-04-13 DIAGNOSIS — D69.6 THROMBOCYTOPENIA, UNSPECIFIED: ICD-10-CM

## 2022-04-13 DIAGNOSIS — Z95.4 PRESENCE OF OTHER HEART-VALVE REPLACEMENT: Chronic | ICD-10-CM

## 2022-04-19 ENCOUNTER — RESULT REVIEW (OUTPATIENT)
Age: 42
End: 2022-04-19

## 2022-04-19 ENCOUNTER — APPOINTMENT (OUTPATIENT)
Dept: HEMATOLOGY ONCOLOGY | Facility: CLINIC | Age: 42
End: 2022-04-19

## 2022-04-19 ENCOUNTER — APPOINTMENT (OUTPATIENT)
Dept: HEMATOLOGY ONCOLOGY | Facility: CLINIC | Age: 42
End: 2022-04-19
Payer: MEDICARE

## 2022-04-19 VITALS
BODY MASS INDEX: 32.5 KG/M2 | OXYGEN SATURATION: 98 % | HEART RATE: 76 BPM | DIASTOLIC BLOOD PRESSURE: 64 MMHG | TEMPERATURE: 97.7 F | WEIGHT: 140.43 LBS | HEIGHT: 55.12 IN | RESPIRATION RATE: 16 BRPM | SYSTOLIC BLOOD PRESSURE: 97 MMHG

## 2022-04-19 LAB
BASOPHILS # BLD AUTO: 0.05 K/UL — SIGNIFICANT CHANGE UP (ref 0–0.2)
BASOPHILS NFR BLD AUTO: 1 % — SIGNIFICANT CHANGE UP (ref 0–2)
EOSINOPHIL # BLD AUTO: 0.08 K/UL — SIGNIFICANT CHANGE UP (ref 0–0.5)
EOSINOPHIL NFR BLD AUTO: 1.5 % — SIGNIFICANT CHANGE UP (ref 0–6)
HCT VFR BLD CALC: 40.4 % — SIGNIFICANT CHANGE UP (ref 34.5–45)
HGB BLD-MCNC: 13.8 G/DL — SIGNIFICANT CHANGE UP (ref 11.5–15.5)
IMM GRANULOCYTES NFR BLD AUTO: 0.2 % — SIGNIFICANT CHANGE UP (ref 0–1.5)
LYMPHOCYTES # BLD AUTO: 1.18 K/UL — SIGNIFICANT CHANGE UP (ref 1–3.3)
LYMPHOCYTES # BLD AUTO: 22.5 % — SIGNIFICANT CHANGE UP (ref 13–44)
MCHC RBC-ENTMCNC: 33 PG — SIGNIFICANT CHANGE UP (ref 27–34)
MCHC RBC-ENTMCNC: 34.2 G/DL — SIGNIFICANT CHANGE UP (ref 32–36)
MCV RBC AUTO: 96.7 FL — SIGNIFICANT CHANGE UP (ref 80–100)
MONOCYTES # BLD AUTO: 0.48 K/UL — SIGNIFICANT CHANGE UP (ref 0–0.9)
MONOCYTES NFR BLD AUTO: 9.2 % — SIGNIFICANT CHANGE UP (ref 2–14)
NEUTROPHILS # BLD AUTO: 3.44 K/UL — SIGNIFICANT CHANGE UP (ref 1.8–7.4)
NEUTROPHILS NFR BLD AUTO: 65.6 % — SIGNIFICANT CHANGE UP (ref 43–77)
NRBC # BLD: 0 /100 WBCS — SIGNIFICANT CHANGE UP (ref 0–0)
PLATELET # BLD AUTO: 125 K/UL — LOW (ref 150–400)
RBC # BLD: 4.18 M/UL — SIGNIFICANT CHANGE UP (ref 3.8–5.2)
RBC # FLD: 14 % — SIGNIFICANT CHANGE UP (ref 10.3–14.5)
WBC # BLD: 5.24 K/UL — SIGNIFICANT CHANGE UP (ref 3.8–10.5)
WBC # FLD AUTO: 5.24 K/UL — SIGNIFICANT CHANGE UP (ref 3.8–10.5)

## 2022-04-19 PROCEDURE — 99214 OFFICE O/P EST MOD 30 MIN: CPT

## 2022-04-19 RX ORDER — FORMOTEROL FUMARATE DIHYDRATE 20 UG/2ML
20 SOLUTION RESPIRATORY (INHALATION)
Refills: 0 | Status: DISCONTINUED | COMMUNITY
Start: 2020-03-05 | End: 2022-04-19

## 2022-04-19 RX ORDER — CARBOXYMETHYLCELLULOSE SODIUM AND GLYCERIN 10; 9 MG/ML; MG/ML
SOLUTION/ DROPS OPHTHALMIC
Refills: 0 | Status: ACTIVE | COMMUNITY

## 2022-04-19 RX ORDER — FLUOXETINE HYDROCHLORIDE 20 MG/1
20 TABLET ORAL
Refills: 0 | Status: ACTIVE | COMMUNITY

## 2022-04-19 RX ORDER — PSYLLIUM HUSK 3.4 G/11G
POWDER, FOR SUSPENSION ORAL
Refills: 0 | Status: ACTIVE | COMMUNITY

## 2022-04-19 RX ORDER — AZELASTINE HYDROCHLORIDE 137 UG/1
0.1 SPRAY, METERED NASAL TWICE DAILY
Refills: 0 | Status: DISCONTINUED | COMMUNITY
Start: 2020-03-05 | End: 2022-04-19

## 2022-04-19 RX ORDER — CLINDAMYCIN HYDROCHLORIDE 300 MG/1
300 CAPSULE ORAL
Refills: 0 | Status: DISCONTINUED | COMMUNITY
Start: 2020-03-05 | End: 2022-04-19

## 2022-04-19 RX ORDER — BUDESONIDE 0.5 MG/2ML
0.5 INHALANT ORAL TWICE DAILY
Refills: 0 | Status: DISCONTINUED | COMMUNITY
Start: 2020-03-05 | End: 2022-04-19

## 2022-04-19 RX ORDER — ZAFIRLUKAST 20 MG/1
20 TABLET, FILM COATED ORAL TWICE DAILY
Refills: 0 | Status: DISCONTINUED | COMMUNITY
End: 2022-04-19

## 2022-04-19 RX ORDER — FLUOXETINE HYDROCHLORIDE 10 MG/1
10 CAPSULE ORAL
Refills: 0 | Status: DISCONTINUED | COMMUNITY
Start: 2020-03-05 | End: 2022-04-19

## 2022-04-19 RX ORDER — LEVOTHYROXINE SODIUM 75 UG/1
75 TABLET ORAL DAILY
Refills: 0 | Status: DISCONTINUED | COMMUNITY
End: 2022-04-19

## 2022-04-20 LAB
ALBUMIN SERPL ELPH-MCNC: 3.9 G/DL
ALP BLD-CCNC: 61 U/L
ALT SERPL-CCNC: 31 U/L
ANION GAP SERPL CALC-SCNC: 14 MMOL/L
AST SERPL-CCNC: 33 U/L
BILIRUB SERPL-MCNC: 0.4 MG/DL
BUN SERPL-MCNC: 26 MG/DL
CALCIUM SERPL-MCNC: 9.5 MG/DL
CHLORIDE SERPL-SCNC: 106 MMOL/L
CO2 SERPL-SCNC: 24 MMOL/L
CREAT SERPL-MCNC: 1.02 MG/DL
EGFR: 70 ML/MIN/1.73M2
GLUCOSE SERPL-MCNC: 87 MG/DL
LDH SERPL-CCNC: 233 U/L
POTASSIUM SERPL-SCNC: 4.1 MMOL/L
PROT SERPL-MCNC: 6.2 G/DL
SODIUM SERPL-SCNC: 144 MMOL/L

## 2022-04-29 NOTE — REVIEW OF SYSTEMS
[Negative] : Allergic/Immunologic [Recent Change In Weight] : ~T recent weight change [FreeTextEntry2] : intentional weight loss, has been exercising and eat well

## 2022-04-29 NOTE — ASSESSMENT
[Palliative Care Plan] : not applicable at this time [FreeTextEntry1] : Ms. Hu has chronic thrombocytopenia associated with Down syndrome. Prior bone marrow biopsy in 2017 was negative for MDS or malignancy.\par \par 4/19/22\par -CBC: WBC  5.24 K/uL,  HGB  13.8 g/dL,  HCT  40.4%,  PLTS  125 K/uL. Results reviewed with patient\par CMP, LDH pending\par -Counts are stable, PLT count is 125 K today. No evidence of anemia. \par -Advised patient and aid to make a f.u appt with patient's GYN MD Gomez for irregular menses. Both agreed\par \par RTC 6 months.\par Case and management discussed with MD Snyder.\par

## 2022-04-29 NOTE — HISTORY OF PRESENT ILLNESS
[de-identified] : Clementina Hu is a 39 y.o. with Down syndrome, aortic stenosis S/P aortic valve replacement, obstructive sleep apnea, H/O dizziness, and hypothyroidism, who is referred because of thrombocytopenia. She has been in a group home environment and has had elementary school education. She underwent bioprosthetic aortic valve replacement in 2014. She has been noted to have intermittent thrombocytopenia, with a platelet count of 111 (5/4/16), 150 (12/8/16), and 99 (3/24/17) without other blood count abnormalities. She denies constitutional symptoms, abnormal bruising/bleeding, or recent infections.\par \par  S/P  bone marrow biopsy and aspirate done on 9/13/17, which showed trilineage hematopoiesis with maturation and mild megakaryocytosis and increased iron stores. FISH MDS panel was negative, and cytogenetics confirmed trisomy 21. Since then, she has no complaints except for bruising while on aspirin. On 12/8/17, she has no complaints.\par  [de-identified] : Patient presents here today for a follow up. She is doing well. Denies bleeding episodes, melena, hematochezia, bruising, dizziness, lightheadedness, SOB. History obtained from group home staff, [facility]. Patient had a follow up with cardiology and echo done on 9/7/22 with slight slight increase in her mitral valve gradient but she remains asymptomatic. Patient states she has not had her menses for several months. States she is not sexually active. \par \par No other changes in medical, surgical or social history since 9/20/2021.\par

## 2022-04-29 NOTE — PHYSICAL EXAM
[Restricted in physically strenuous activity but ambulatory and able to carry out work of a light or sedentary nature] : Status 1- Restricted in physically strenuous activity but ambulatory and able to carry out work of a light or sedentary nature, e.g., light house work, office work [Normal] : normal appearance, no rash, nodules, vesicles, ulcers, erythema [de-identified] : Down syndrome appearance; able to converse easily and answer simple questions [de-identified] : murmur

## 2022-10-17 ENCOUNTER — OUTPATIENT (OUTPATIENT)
Dept: OUTPATIENT SERVICES | Facility: HOSPITAL | Age: 42
LOS: 1 days | Discharge: HOME | End: 2022-10-17

## 2022-10-17 DIAGNOSIS — Z98.89 OTHER SPECIFIED POSTPROCEDURAL STATES: Chronic | ICD-10-CM

## 2022-10-17 DIAGNOSIS — Z95.4 PRESENCE OF OTHER HEART-VALVE REPLACEMENT: Chronic | ICD-10-CM

## 2022-10-17 DIAGNOSIS — D69.6 THROMBOCYTOPENIA, UNSPECIFIED: ICD-10-CM

## 2022-10-24 ENCOUNTER — APPOINTMENT (OUTPATIENT)
Dept: HEMATOLOGY ONCOLOGY | Facility: CLINIC | Age: 42
End: 2022-10-24

## 2022-10-24 ENCOUNTER — RESULT REVIEW (OUTPATIENT)
Age: 42
End: 2022-10-24

## 2022-10-24 VITALS
HEIGHT: 55 IN | RESPIRATION RATE: 16 BRPM | OXYGEN SATURATION: 98 % | DIASTOLIC BLOOD PRESSURE: 68 MMHG | BODY MASS INDEX: 34.44 KG/M2 | SYSTOLIC BLOOD PRESSURE: 104 MMHG | HEART RATE: 75 BPM | TEMPERATURE: 98.4 F | WEIGHT: 148.81 LBS

## 2022-10-24 LAB
BASOPHILS # BLD AUTO: 0.06 K/UL — SIGNIFICANT CHANGE UP (ref 0–0.2)
BASOPHILS NFR BLD AUTO: 1.2 % — SIGNIFICANT CHANGE UP (ref 0–2)
EOSINOPHIL # BLD AUTO: 0.09 K/UL — SIGNIFICANT CHANGE UP (ref 0–0.5)
EOSINOPHIL NFR BLD AUTO: 1.8 % — SIGNIFICANT CHANGE UP (ref 0–6)
HCT VFR BLD CALC: 38.5 % — SIGNIFICANT CHANGE UP (ref 34.5–45)
HGB BLD-MCNC: 13.2 G/DL — SIGNIFICANT CHANGE UP (ref 11.5–15.5)
LYMPHOCYTES # BLD AUTO: 1.3 K/UL — SIGNIFICANT CHANGE UP (ref 1–3.3)
LYMPHOCYTES # BLD AUTO: 25.7 % — SIGNIFICANT CHANGE UP (ref 13–44)
MCHC RBC-ENTMCNC: 32.8 PG — SIGNIFICANT CHANGE UP (ref 27–34)
MCHC RBC-ENTMCNC: 34.3 G/DL — SIGNIFICANT CHANGE UP (ref 32–36)
MCV RBC AUTO: 95.8 FL — SIGNIFICANT CHANGE UP (ref 80–100)
MONOCYTES # BLD AUTO: 0.47 K/UL — SIGNIFICANT CHANGE UP (ref 0–0.9)
MONOCYTES NFR BLD AUTO: 9.3 % — SIGNIFICANT CHANGE UP (ref 2–14)
NEUTROPHILS # BLD AUTO: 3.12 K/UL — SIGNIFICANT CHANGE UP (ref 1.8–7.4)
NEUTROPHILS NFR BLD AUTO: 61.8 % — SIGNIFICANT CHANGE UP (ref 43–77)
NRBC # BLD: 0 /100 WBCS — SIGNIFICANT CHANGE UP (ref 0–0)
PLATELET # BLD AUTO: 107 K/UL — LOW (ref 150–400)
RBC # BLD: 4.02 M/UL — SIGNIFICANT CHANGE UP (ref 3.8–5.2)
RBC # FLD: 14 % — SIGNIFICANT CHANGE UP (ref 10.3–14.5)
WBC # BLD: 5.05 K/UL — SIGNIFICANT CHANGE UP (ref 3.8–10.5)
WBC # FLD AUTO: 5.05 K/UL — SIGNIFICANT CHANGE UP (ref 3.8–10.5)

## 2022-10-24 PROCEDURE — 99214 OFFICE O/P EST MOD 30 MIN: CPT

## 2022-10-24 RX ORDER — CLINDAMYCIN PHOSPHATE 10 MG/ML
1 SOLUTION TOPICAL
Qty: 60 | Refills: 0 | Status: ACTIVE | COMMUNITY
Start: 2022-10-04

## 2022-10-24 RX ORDER — AZITHROMYCIN 500 MG/1
500 TABLET, FILM COATED ORAL
Qty: 1 | Refills: 0 | Status: ACTIVE | COMMUNITY
Start: 2022-09-29

## 2022-10-24 RX ORDER — CLINDAMYCIN PHOSPHATE 1 G/10ML
1 GEL TOPICAL
Qty: 60 | Refills: 0 | Status: ACTIVE | COMMUNITY
Start: 2022-05-19

## 2022-10-24 RX ORDER — GENTAMICIN SULFATE 1 MG/G
0.1 OINTMENT TOPICAL
Qty: 30 | Refills: 0 | Status: ACTIVE | COMMUNITY
Start: 2022-05-27

## 2022-10-24 NOTE — HISTORY OF PRESENT ILLNESS
[0 - No Distress] : Distress Level: 0 [de-identified] : Clementina Hu is a 39 y.o. with Down syndrome, aortic stenosis S/P aortic valve replacement, obstructive sleep apnea, H/O dizziness, and hypothyroidism, who is referred because of thrombocytopenia. She has been in a group home environment and has had elementary school education. She underwent bioprosthetic aortic valve replacement in 2014. She has been noted to have intermittent thrombocytopenia, with a platelet count of 111 (5/4/16), 150 (12/8/16), and 99 (3/24/17) without other blood count abnormalities. She denies constitutional symptoms, abnormal bruising/bleeding, or recent infections.\par \par  S/P  bone marrow biopsy and aspirate done on 9/13/17, which showed trilineage hematopoiesis with maturation and mild megakaryocytosis and increased iron stores. FISH MDS panel was negative, and cytogenetics confirmed trisomy 21. Since then, she has no complaints except for bruising while on aspirin. On 12/8/17, she has no complaints.\par  [de-identified] : Patient presents here today for a follow up. She is doing well. Denies bleeding episodes, melena, hematochezia, bruising, dizziness, lightheadedness, SOB. \par \par No other changes in medical, surgical or social history since 4/19/2022.\par

## 2022-10-24 NOTE — PHYSICAL EXAM
[Restricted in physically strenuous activity but ambulatory and able to carry out work of a light or sedentary nature] : Status 1- Restricted in physically strenuous activity but ambulatory and able to carry out work of a light or sedentary nature, e.g., light house work, office work [Normal] : affect appropriate [de-identified] : Down syndrome appearance; able to converse easily and answer simple questions [de-identified] : murmur

## 2022-10-24 NOTE — REVIEW OF SYSTEMS
[Recent Change In Weight] : ~T recent weight change [Negative] : Allergic/Immunologic [FreeTextEntry2] : intentional weight loss, has been exercising and eat well

## 2022-10-24 NOTE — ASSESSMENT
[FreeTextEntry1] : Ms. Hu has chronic thrombocytopenia associated with Down syndrome. Prior bone marrow biopsy in 2017 was negative for MDS or malignancy.\par \par 4/19/22\par -CBC: WBC  5.24 K/uL,  HGB  13.8 g/dL,  HCT  40.4%,  PLTS  125 K/uL. Results reviewed with patient\par CMP, LDH pending\par -Counts are stable, PLT count is pending  today. No evidence of anemia. \par -Advised patient and aid to make a f.u appt with patient's GYN MD Gomez for irregular menses. Both agreed\par \par RTC 6 months.\par \par

## 2022-10-25 LAB
ALBUMIN SERPL ELPH-MCNC: 4 G/DL
ALP BLD-CCNC: 73 U/L
ALT SERPL-CCNC: 27 U/L
ANION GAP SERPL CALC-SCNC: 14 MMOL/L
AST SERPL-CCNC: 29 U/L
BILIRUB SERPL-MCNC: 0.4 MG/DL
BUN SERPL-MCNC: 18 MG/DL
CALCIUM SERPL-MCNC: 9.7 MG/DL
CHLORIDE SERPL-SCNC: 104 MMOL/L
CO2 SERPL-SCNC: 25 MMOL/L
CREAT SERPL-MCNC: 1.01 MG/DL
EGFR: 70 ML/MIN/1.73M2
GLUCOSE SERPL-MCNC: 76 MG/DL
LDH SERPL-CCNC: 218 U/L
POTASSIUM SERPL-SCNC: 4.4 MMOL/L
PROT SERPL-MCNC: 6.5 G/DL
SODIUM SERPL-SCNC: 142 MMOL/L

## 2023-03-12 ENCOUNTER — RESULT CHARGE (OUTPATIENT)
Age: 43
End: 2023-03-12

## 2023-03-15 ENCOUNTER — APPOINTMENT (OUTPATIENT)
Dept: PEDIATRIC CARDIOLOGY | Facility: CLINIC | Age: 43
End: 2023-03-15
Payer: MEDICARE

## 2023-03-15 PROCEDURE — 93320 DOPPLER ECHO COMPLETE: CPT

## 2023-03-15 PROCEDURE — 99214 OFFICE O/P EST MOD 30 MIN: CPT

## 2023-03-15 PROCEDURE — 93325 DOPPLER ECHO COLOR FLOW MAPG: CPT

## 2023-03-15 PROCEDURE — 93303 ECHO TRANSTHORACIC: CPT

## 2023-03-15 PROCEDURE — 93000 ELECTROCARDIOGRAM COMPLETE: CPT

## 2023-03-15 RX ORDER — ASPIRIN 81 MG/1
81 TABLET, CHEWABLE ORAL
Qty: 30 | Refills: 6 | Status: DISCONTINUED | COMMUNITY
Start: 2021-03-30 | End: 2023-03-15

## 2023-03-15 NOTE — REASON FOR VISIT
[Follow-Up] : a follow-up visit for [Trisomy 21 (Down Syndrome)] : Trisomy 21  [Aortic Stenosis] : aortic stenosis [Medical Records] : medical records [Other: _____] : [unfilled]

## 2023-03-16 NOTE — DISCUSSION/SUMMARY
[Needs SBE Prophylaxis] : [unfilled]  needs bacterial endocarditis prophylaxis. SBE prophylaxis is indicated for dental and invasive ENT procedures. (Circulation. 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 Her mitral valve gradient which was slightly higher last year is unchanged this year. \par \par We again reviewed the importance of meticulous dental hygiene and the need for regular dental cleanings. We reviewed that the SBE prophylaxis guidelines for Clementina should be Azithromycin and have sent in a prescription for her next dental appointment.\par \par Group Home or dentist should call for any questions or concerns. We will continue to follow her annually.

## 2023-03-16 NOTE — HISTORY OF PRESENT ILLNESS
[FreeTextEntry1] : We had the pleasure of seeing Clementina Matamoros today in the Adult [facility] of [facility]. As you know, Clementina is a 42 year old female with Trisomy 21 and left ventricular outflow tract obstruction with subvalvar and valvar aortic stenosis. She underwent posterior root enlargement, aortic valve replacement with a 23 mm bovine pericardial valve, and left ventricular outflow tract muscular resection on 7/1/2014. \par \par She lives in a group home in [place] and her sisters are her legal guardians . Previously she had lost ~20 pounds and over the past 2 years she has slowly put back about  10 pounds of that weight loss.. Today she is here for her annual cardiac exam. She says she is exercising, doing Aidan. She states she "eats healthy."  She denies chest pain, palpitations, shortness of breath, near syncope, or syncope. \par \par She has TRINITY and says she is compliant with wearing her CPAP at night. She has hypothyroidism and chronic thrombocytopenia related to Trisomy 21. She is followed routinely by Hematology with stable platelet counts. Her dental care is up to date.\par

## 2023-03-16 NOTE — PHYSICAL EXAM
[General Appearance - Alert] : alert [Down Syndrome] : Down Syndrome [Sclera] : the sclera were normal [Auscultation Breath Sounds / Voice Sounds] : breath sounds clear to auscultation bilaterally [Sternotomy] : sternotomy [Well-Healed] : well-healed [Apical Impulse] : quiet precordium with normal apical impulse [Heart Rate And Rhythm] : normal heart rate and rhythm [Heart Sounds] : normal S1 and S2 [Systolic] : systolic [II] : a grade 2/6 [Carotids] : the murmur was transmitted to the carotid arteries [Abdomen Soft] : soft [Palpable___cm below the RCM] : palpable [unfilled] cm below the right costal margin [Nail Clubbing] : no clubbing  or cyanosis of the fingers [Skin Color & Pigmentation] : normal skin color and pigmentation [Demonstrated Behavior - Infant Nonreactive To Parents] : interactive [Diastolic] : diastolic [I] : a grade 1/4  [RUSB] : RUSB [LUSB] : LUSB [Liver Enlarged] : not enlarged [FreeTextEntry1] : developmental delay

## 2023-03-16 NOTE — REVIEW OF SYSTEMS
[Feeling Poorly] : not feeling poorly (malaise) [Cyanosis] : no cyanosis [Exercise Intolerance] : no persistence of exercise intolerance [Palpitations] : no palpitations [Fast HR] : no tachycardia [Fainting (Syncope)] : no fainting [Headache] : no headache [Dizziness] : no dizziness [Easy Bruising] : no tendency for easy bruising [Easy Bleeding] : no ~M tendency for easy bleeding [Sleep Disturbances] : ~T no sleep disturbances [Depression] : no depression [Anxiety] : no anxiety

## 2023-03-16 NOTE — CARDIOLOGY SUMMARY
[Today's Date] : [unfilled] [FreeTextEntry1] : NSR, ventricular rate 64 bpm [FreeTextEntry2] : Summary:\par 1. History of aortic valve stenosis.\par 2. Status post surgical aortic valve replacement with bovine pericardial valve.\par 3. Mild aortic valve stenosis.\par 4. Peak aortic valve gradient = 23 mmHg; mean gradient = 13 mmHg.\par 5. Trivial aortic valve regurgitation.\par 6. Tethering and limited mobility of the posterior mitral valve leaflet.\par 7. Mild mitral valve stenosis.\par 8. Mitral valve mean gradient = 6.5 mmHg.\par 9. Trivial mitral valve regurgitation.\par 10. Mild tricuspid valve regurgitation, peak systolic instantaneous gradient 29.8 mmHg.\par 11. Qualitatively normal right ventricular systolic function.\par 12. Mild concentric left ventricular hypertrophy.\par 13. Normal left ventricular systolic function.\par 14. No pericardial effusion

## 2023-03-16 NOTE — CONSULT LETTER
[Today's Date] : [unfilled] [Name] : Name: [unfilled] [] : : ~~ [Today's Date:] : [unfilled] [Dear  ___:] : Dear Dr. [unfilled]: [Consult] : I had the pleasure of evaluating your patient, [unfilled]. My full evaluation follows. [Consult - Multiple Provider] : Thank you very much for allowing us to participate in the care of this patient. If you have any questions, please do not hesitate to contact us. [Sincerely,] : Sincerely, [FreeTextEntry4] : Michelle Price MD [FreeTextEntry5] : [address] [facility] [FreeTextEntry6] : [place] [id number] [de-identified] : Della Healy, MSN, CPNP-AC, PC\par Pediatric Cardiology, Adult Congenital Cardiology\par Shona Santos [facility]\par \par Joce Sandoval MD, HERSON\par Medical Director, Adult Congenital Heart Program\par Professor of Pediatrics\par [facility] [employer] [facility] [employer] [facility] [employer] at [facility]\par \par

## 2023-04-14 ENCOUNTER — OUTPATIENT (OUTPATIENT)
Dept: OUTPATIENT SERVICES | Facility: HOSPITAL | Age: 43
LOS: 1 days | Discharge: HOME | End: 2023-04-14

## 2023-04-14 DIAGNOSIS — D69.6 THROMBOCYTOPENIA, UNSPECIFIED: ICD-10-CM

## 2023-04-14 DIAGNOSIS — Z95.4 PRESENCE OF OTHER HEART-VALVE REPLACEMENT: Chronic | ICD-10-CM

## 2023-04-14 DIAGNOSIS — Z98.89 OTHER SPECIFIED POSTPROCEDURAL STATES: Chronic | ICD-10-CM

## 2023-04-18 ENCOUNTER — RESULT REVIEW (OUTPATIENT)
Age: 43
End: 2023-04-18

## 2023-04-18 ENCOUNTER — APPOINTMENT (OUTPATIENT)
Dept: HEMATOLOGY ONCOLOGY | Facility: CLINIC | Age: 43
End: 2023-04-18
Payer: MEDICARE

## 2023-04-18 VITALS
BODY MASS INDEX: 35.31 KG/M2 | RESPIRATION RATE: 16 BRPM | HEART RATE: 66 BPM | TEMPERATURE: 97.9 F | HEIGHT: 55 IN | SYSTOLIC BLOOD PRESSURE: 105 MMHG | DIASTOLIC BLOOD PRESSURE: 67 MMHG | OXYGEN SATURATION: 99 % | WEIGHT: 152.56 LBS

## 2023-04-18 LAB
BASOPHILS # BLD AUTO: 0.07 K/UL — SIGNIFICANT CHANGE UP (ref 0–0.2)
BASOPHILS NFR BLD AUTO: 1.5 % — SIGNIFICANT CHANGE UP (ref 0–2)
EOSINOPHIL # BLD AUTO: 0.11 K/UL — SIGNIFICANT CHANGE UP (ref 0–0.5)
EOSINOPHIL NFR BLD AUTO: 2.3 % — SIGNIFICANT CHANGE UP (ref 0–6)
HCT VFR BLD CALC: 38 % — SIGNIFICANT CHANGE UP (ref 34.5–45)
HGB BLD-MCNC: 12.9 G/DL — SIGNIFICANT CHANGE UP (ref 11.5–15.5)
IMM GRANULOCYTES NFR BLD AUTO: 0.4 % — SIGNIFICANT CHANGE UP (ref 0–0.9)
LYMPHOCYTES # BLD AUTO: 0.94 K/UL — LOW (ref 1–3.3)
LYMPHOCYTES # BLD AUTO: 19.7 % — SIGNIFICANT CHANGE UP (ref 13–44)
MCHC RBC-ENTMCNC: 32.8 PG — SIGNIFICANT CHANGE UP (ref 27–34)
MCHC RBC-ENTMCNC: 33.9 G/DL — SIGNIFICANT CHANGE UP (ref 32–36)
MCV RBC AUTO: 96.7 FL — SIGNIFICANT CHANGE UP (ref 80–100)
MONOCYTES # BLD AUTO: 0.54 K/UL — SIGNIFICANT CHANGE UP (ref 0–0.9)
MONOCYTES NFR BLD AUTO: 11.3 % — SIGNIFICANT CHANGE UP (ref 2–14)
NEUTROPHILS # BLD AUTO: 3.1 K/UL — SIGNIFICANT CHANGE UP (ref 1.8–7.4)
NEUTROPHILS NFR BLD AUTO: 64.8 % — SIGNIFICANT CHANGE UP (ref 43–77)
NRBC # BLD: 0 /100 WBCS — SIGNIFICANT CHANGE UP (ref 0–0)
PLATELET # BLD AUTO: 99 K/UL — LOW (ref 150–400)
RBC # BLD: 3.93 M/UL — SIGNIFICANT CHANGE UP (ref 3.8–5.2)
RBC # FLD: 13.7 % — SIGNIFICANT CHANGE UP (ref 10.3–14.5)
WBC # BLD: 4.78 K/UL — SIGNIFICANT CHANGE UP (ref 3.8–10.5)
WBC # FLD AUTO: 4.78 K/UL — SIGNIFICANT CHANGE UP (ref 3.8–10.5)

## 2023-04-18 PROCEDURE — 99214 OFFICE O/P EST MOD 30 MIN: CPT

## 2023-04-18 NOTE — PHYSICAL EXAM
[Restricted in physically strenuous activity but ambulatory and able to carry out work of a light or sedentary nature] : Status 1- Restricted in physically strenuous activity but ambulatory and able to carry out work of a light or sedentary nature, e.g., light house work, office work [Normal] : normal appearance, no rash, nodules, vesicles, ulcers, erythema [de-identified] : Down syndrome appearance; able to converse easily and answer simple questions [de-identified] : murmur

## 2023-04-18 NOTE — HISTORY OF PRESENT ILLNESS
[0 - No Distress] : Distress Level: 0 [de-identified] : Clementina Hu is a 39 y.o. with Down syndrome, aortic stenosis S/P aortic valve replacement, obstructive sleep apnea, H/O dizziness, and hypothyroidism, who is referred because of thrombocytopenia. She has been in a group home environment and has had elementary school education. She underwent bioprosthetic aortic valve replacement in 2014. She has been noted to have intermittent thrombocytopenia, with a platelet count of 111 (5/4/16), 150 (12/8/16), and 99 (3/24/17) without other blood count abnormalities. She denies constitutional symptoms, abnormal bruising/bleeding, or recent infections.\par \par  S/P  bone marrow biopsy and aspirate done on 9/13/17, which showed trilineage hematopoiesis with maturation and mild megakaryocytosis and increased iron stores. FISH MDS panel was negative, and cytogenetics confirmed trisomy 21. Since then, she has no complaints except for bruising while on aspirin. On 12/8/17, she has no complaints.\par  [de-identified] : Patient presents here today for a follow up. She is doing well. Denies bleeding episodes, melena, hematochezia, bruising, dizziness, lightheadedness, SOB. \par \par No other changes in medical, surgical or social history since 10/ 2022.\par  [80: Normal activity with effort; some signs or symptoms of disease.] : 80: Normal activity with effort; some signs or symptoms of disease.

## 2023-04-18 NOTE — ASSESSMENT
[FreeTextEntry1] : Ms. Hu has chronic thrombocytopenia associated with Down syndrome. Prior bone marrow biopsy in 2017 was negative for MDS or malignancy.\par \par 10/22\par - PLTS  107 K/uL. Results reviewed with patient\par CMP, LDH pending\par -Counts are stable, PLT count is pending  today. No evidence of anemia. \par -Advised patient and aid to make a f.u appt with patient's GYN MD Gomez for irregular menses. Both agreed\par \par RTC 8 months.\par \par

## 2023-04-20 LAB
ALBUMIN SERPL ELPH-MCNC: 3.8 G/DL
ALP BLD-CCNC: 63 U/L
ALT SERPL-CCNC: 24 U/L
ANION GAP SERPL CALC-SCNC: 11 MMOL/L
AST SERPL-CCNC: 34 U/L
BILIRUB SERPL-MCNC: 0.6 MG/DL
BUN SERPL-MCNC: 22 MG/DL
CALCIUM SERPL-MCNC: 9.4 MG/DL
CHLORIDE SERPL-SCNC: 104 MMOL/L
CO2 SERPL-SCNC: 25 MMOL/L
CREAT SERPL-MCNC: 1.09 MG/DL
EGFR: 64 ML/MIN/1.73M2
GLUCOSE SERPL-MCNC: 97 MG/DL
LDH SERPL-CCNC: 267 U/L
POTASSIUM SERPL-SCNC: 4.3 MMOL/L
PROT SERPL-MCNC: 6 G/DL
SODIUM SERPL-SCNC: 141 MMOL/L

## 2023-05-26 ENCOUNTER — NON-APPOINTMENT (OUTPATIENT)
Age: 43
End: 2023-05-26

## 2023-05-26 DIAGNOSIS — R07.9 CHEST PAIN, UNSPECIFIED: ICD-10-CM

## 2023-06-01 ENCOUNTER — NON-APPOINTMENT (OUTPATIENT)
Age: 43
End: 2023-06-01

## 2023-06-06 ENCOUNTER — APPOINTMENT (OUTPATIENT)
Dept: PEDIATRIC CARDIOLOGY | Facility: CLINIC | Age: 43
End: 2023-06-06
Payer: MEDICARE

## 2023-06-06 PROCEDURE — 93306 TTE W/DOPPLER COMPLETE: CPT

## 2023-06-07 DIAGNOSIS — I33.9 ACUTE AND SUBACUTE ENDOCARDITIS, UNSPECIFIED: ICD-10-CM

## 2023-06-08 ENCOUNTER — INPATIENT (INPATIENT)
Facility: HOSPITAL | Age: 43
LOS: 2 days | Discharge: HOME | End: 2023-06-11
Attending: STUDENT IN AN ORGANIZED HEALTH CARE EDUCATION/TRAINING PROGRAM | Admitting: STUDENT IN AN ORGANIZED HEALTH CARE EDUCATION/TRAINING PROGRAM
Payer: MEDICARE

## 2023-06-08 VITALS
HEART RATE: 62 BPM | SYSTOLIC BLOOD PRESSURE: 92 MMHG | DIASTOLIC BLOOD PRESSURE: 62 MMHG | TEMPERATURE: 98 F | HEIGHT: 55 IN | RESPIRATION RATE: 18 BRPM | OXYGEN SATURATION: 100 %

## 2023-06-08 DIAGNOSIS — I35.1 NONRHEUMATIC AORTIC (VALVE) INSUFFICIENCY: ICD-10-CM

## 2023-06-08 DIAGNOSIS — E11.9 TYPE 2 DIABETES MELLITUS WITHOUT COMPLICATIONS: ICD-10-CM

## 2023-06-08 DIAGNOSIS — R41.82 ALTERED MENTAL STATUS, UNSPECIFIED: ICD-10-CM

## 2023-06-08 DIAGNOSIS — Z29.9 ENCOUNTER FOR PROPHYLACTIC MEASURES, UNSPECIFIED: ICD-10-CM

## 2023-06-08 DIAGNOSIS — Z92.29 PERSONAL HISTORY OF OTHER DRUG THERAPY: ICD-10-CM

## 2023-06-08 DIAGNOSIS — Z98.89 OTHER SPECIFIED POSTPROCEDURAL STATES: Chronic | ICD-10-CM

## 2023-06-08 DIAGNOSIS — Z79.899 OTHER LONG TERM (CURRENT) DRUG THERAPY: ICD-10-CM

## 2023-06-08 DIAGNOSIS — Z95.4 PRESENCE OF OTHER HEART-VALVE REPLACEMENT: Chronic | ICD-10-CM

## 2023-06-08 DIAGNOSIS — E03.9 HYPOTHYROIDISM, UNSPECIFIED: ICD-10-CM

## 2023-06-08 LAB
ALBUMIN SERPL ELPH-MCNC: 3.7 G/DL — SIGNIFICANT CHANGE UP (ref 3.3–5)
ALP SERPL-CCNC: 53 U/L — SIGNIFICANT CHANGE UP (ref 40–120)
ALT FLD-CCNC: 22 U/L — SIGNIFICANT CHANGE UP (ref 4–33)
ANION GAP SERPL CALC-SCNC: 12 MMOL/L — SIGNIFICANT CHANGE UP (ref 7–14)
APTT BLD: 34.7 SEC — SIGNIFICANT CHANGE UP (ref 27–36.3)
AST SERPL-CCNC: 31 U/L — SIGNIFICANT CHANGE UP (ref 4–32)
BASOPHILS # BLD AUTO: 0.04 K/UL — SIGNIFICANT CHANGE UP (ref 0–0.2)
BASOPHILS NFR BLD AUTO: 1.1 % — SIGNIFICANT CHANGE UP (ref 0–2)
BILIRUB SERPL-MCNC: 0.8 MG/DL — SIGNIFICANT CHANGE UP (ref 0.2–1.2)
BLD GP AB SCN SERPL QL: NEGATIVE — SIGNIFICANT CHANGE UP
BUN SERPL-MCNC: 16 MG/DL — SIGNIFICANT CHANGE UP (ref 7–23)
CALCIUM SERPL-MCNC: 9.2 MG/DL — SIGNIFICANT CHANGE UP (ref 8.4–10.5)
CHLORIDE SERPL-SCNC: 105 MMOL/L — SIGNIFICANT CHANGE UP (ref 98–107)
CO2 SERPL-SCNC: 23 MMOL/L — SIGNIFICANT CHANGE UP (ref 22–31)
CREAT SERPL-MCNC: 1.12 MG/DL — SIGNIFICANT CHANGE UP (ref 0.5–1.3)
EGFR: 62 ML/MIN/1.73M2 — SIGNIFICANT CHANGE UP
EOSINOPHIL # BLD AUTO: 0.07 K/UL — SIGNIFICANT CHANGE UP (ref 0–0.5)
EOSINOPHIL NFR BLD AUTO: 1.9 % — SIGNIFICANT CHANGE UP (ref 0–6)
GLUCOSE BLDC GLUCOMTR-MCNC: 101 MG/DL — HIGH (ref 70–99)
GLUCOSE BLDC GLUCOMTR-MCNC: 86 MG/DL — SIGNIFICANT CHANGE UP (ref 70–99)
GLUCOSE BLDC GLUCOMTR-MCNC: 94 MG/DL — SIGNIFICANT CHANGE UP (ref 70–99)
GLUCOSE SERPL-MCNC: 80 MG/DL — SIGNIFICANT CHANGE UP (ref 70–99)
HCT VFR BLD CALC: 31.8 % — LOW (ref 34.5–45)
HGB BLD-MCNC: 11.1 G/DL — LOW (ref 11.5–15.5)
IANC: 2.41 K/UL — SIGNIFICANT CHANGE UP (ref 1.8–7.4)
IMM GRANULOCYTES NFR BLD AUTO: 0.5 % — SIGNIFICANT CHANGE UP (ref 0–0.9)
INR BLD: 1.11 RATIO — SIGNIFICANT CHANGE UP (ref 0.88–1.16)
LYMPHOCYTES # BLD AUTO: 0.83 K/UL — LOW (ref 1–3.3)
LYMPHOCYTES # BLD AUTO: 22 % — SIGNIFICANT CHANGE UP (ref 13–44)
MCHC RBC-ENTMCNC: 33.2 PG — SIGNIFICANT CHANGE UP (ref 27–34)
MCHC RBC-ENTMCNC: 34.9 GM/DL — SIGNIFICANT CHANGE UP (ref 32–36)
MCV RBC AUTO: 95.2 FL — SIGNIFICANT CHANGE UP (ref 80–100)
MONOCYTES # BLD AUTO: 0.41 K/UL — SIGNIFICANT CHANGE UP (ref 0–0.9)
MONOCYTES NFR BLD AUTO: 10.8 % — SIGNIFICANT CHANGE UP (ref 2–14)
NEUTROPHILS # BLD AUTO: 2.41 K/UL — SIGNIFICANT CHANGE UP (ref 1.8–7.4)
NEUTROPHILS NFR BLD AUTO: 63.7 % — SIGNIFICANT CHANGE UP (ref 43–77)
NRBC # BLD: 0 /100 WBCS — SIGNIFICANT CHANGE UP (ref 0–0)
NRBC # FLD: 0 K/UL — SIGNIFICANT CHANGE UP (ref 0–0)
PLATELET # BLD AUTO: 100 K/UL — LOW (ref 150–400)
POTASSIUM SERPL-MCNC: 3.8 MMOL/L — SIGNIFICANT CHANGE UP (ref 3.5–5.3)
POTASSIUM SERPL-SCNC: 3.8 MMOL/L — SIGNIFICANT CHANGE UP (ref 3.5–5.3)
PROT SERPL-MCNC: 6.3 G/DL — SIGNIFICANT CHANGE UP (ref 6–8.3)
PROTHROM AB SERPL-ACNC: 12.9 SEC — SIGNIFICANT CHANGE UP (ref 10.5–13.4)
RBC # BLD: 3.34 M/UL — LOW (ref 3.8–5.2)
RBC # FLD: 14.6 % — HIGH (ref 10.3–14.5)
RH IG SCN BLD-IMP: POSITIVE — SIGNIFICANT CHANGE UP
SODIUM SERPL-SCNC: 140 MMOL/L — SIGNIFICANT CHANGE UP (ref 135–145)
WBC # BLD: 3.78 K/UL — LOW (ref 3.8–10.5)
WBC # FLD AUTO: 3.78 K/UL — LOW (ref 3.8–10.5)

## 2023-06-08 PROCEDURE — 99285 EMERGENCY DEPT VISIT HI MDM: CPT

## 2023-06-08 PROCEDURE — 99223 1ST HOSP IP/OBS HIGH 75: CPT

## 2023-06-08 RX ORDER — B-COMPLEX WITH VITAMIN C
1 CAPSULE ORAL
Refills: 0 | DISCHARGE

## 2023-06-08 RX ORDER — FLUOXETINE HCL 10 MG
1 CAPSULE ORAL
Refills: 0 | DISCHARGE

## 2023-06-08 RX ORDER — DEXTROSE 50 % IN WATER 50 %
25 SYRINGE (ML) INTRAVENOUS ONCE
Refills: 0 | Status: DISCONTINUED | OUTPATIENT
Start: 2023-06-08 | End: 2023-06-11

## 2023-06-08 RX ORDER — ALBUTEROL 90 UG/1
2 AEROSOL, METERED ORAL EVERY 6 HOURS
Refills: 0 | Status: DISCONTINUED | OUTPATIENT
Start: 2023-06-08 | End: 2023-06-11

## 2023-06-08 RX ORDER — CYCLOSPORINE 0.5 MG/ML
1 EMULSION OPHTHALMIC
Refills: 0 | DISCHARGE

## 2023-06-08 RX ORDER — ASPIRIN/CALCIUM CARB/MAGNESIUM 324 MG
81 TABLET ORAL DAILY
Refills: 0 | Status: DISCONTINUED | OUTPATIENT
Start: 2023-06-08 | End: 2023-06-11

## 2023-06-08 RX ORDER — SIMETHICONE 80 MG/1
80 TABLET, CHEWABLE ORAL
Refills: 0 | Status: DISCONTINUED | OUTPATIENT
Start: 2023-06-08 | End: 2023-06-11

## 2023-06-08 RX ORDER — GLUCAGON INJECTION, SOLUTION 0.5 MG/.1ML
1 INJECTION, SOLUTION SUBCUTANEOUS ONCE
Refills: 0 | Status: DISCONTINUED | OUTPATIENT
Start: 2023-06-08 | End: 2023-06-11

## 2023-06-08 RX ORDER — CHOLECALCIFEROL (VITAMIN D3) 125 MCG
1000 CAPSULE ORAL DAILY
Refills: 0 | Status: DISCONTINUED | OUTPATIENT
Start: 2023-06-08 | End: 2023-06-11

## 2023-06-08 RX ORDER — BUDESONIDE AND FORMOTEROL FUMARATE DIHYDRATE 160; 4.5 UG/1; UG/1
2 AEROSOL RESPIRATORY (INHALATION)
Refills: 0 | Status: DISCONTINUED | OUTPATIENT
Start: 2023-06-08 | End: 2023-06-11

## 2023-06-08 RX ORDER — DICLOFENAC SODIUM 30 MG/G
1 GEL TOPICAL
Refills: 0 | DISCHARGE

## 2023-06-08 RX ORDER — ALBUTEROL 90 UG/1
2 AEROSOL, METERED ORAL
Refills: 0 | DISCHARGE

## 2023-06-08 RX ORDER — OMEPRAZOLE 10 MG/1
1 CAPSULE, DELAYED RELEASE ORAL
Refills: 0 | DISCHARGE

## 2023-06-08 RX ORDER — ASPIRIN/CALCIUM CARB/MAGNESIUM 324 MG
1 TABLET ORAL
Refills: 0 | DISCHARGE

## 2023-06-08 RX ORDER — MELOXICAM 15 MG/1
1 TABLET ORAL
Refills: 0 | DISCHARGE

## 2023-06-08 RX ORDER — LEVOTHYROXINE SODIUM 125 MCG
1 TABLET ORAL
Refills: 0 | DISCHARGE

## 2023-06-08 RX ORDER — SOD,AMMONIUM,POTASSIUM LACTATE
1 CREAM (GRAM) TOPICAL
Refills: 0 | DISCHARGE

## 2023-06-08 RX ORDER — FLUTICASONE PROPIONATE AND SALMETEROL 50; 250 UG/1; UG/1
1 POWDER ORAL; RESPIRATORY (INHALATION)
Refills: 0 | DISCHARGE

## 2023-06-08 RX ORDER — DEXTROSE 50 % IN WATER 50 %
15 SYRINGE (ML) INTRAVENOUS ONCE
Refills: 0 | Status: DISCONTINUED | OUTPATIENT
Start: 2023-06-08 | End: 2023-06-11

## 2023-06-08 RX ORDER — METFORMIN HYDROCHLORIDE 850 MG/1
1 TABLET ORAL
Refills: 0 | DISCHARGE

## 2023-06-08 RX ORDER — INSULIN LISPRO 100/ML
VIAL (ML) SUBCUTANEOUS AT BEDTIME
Refills: 0 | Status: DISCONTINUED | OUTPATIENT
Start: 2023-06-08 | End: 2023-06-11

## 2023-06-08 RX ORDER — PSYLLIUM SEED (WITH DEXTROSE)
1 POWDER (GRAM) ORAL DAILY
Refills: 0 | Status: DISCONTINUED | OUTPATIENT
Start: 2023-06-08 | End: 2023-06-11

## 2023-06-08 RX ORDER — OLOPATADINE HYDROCHLORIDE 1 MG/ML
1 SOLUTION/ DROPS OPHTHALMIC
Refills: 0 | DISCHARGE

## 2023-06-08 RX ORDER — SOD,AMMONIUM,POTASSIUM LACTATE
1 CREAM (GRAM) TOPICAL
Refills: 0 | Status: DISCONTINUED | OUTPATIENT
Start: 2023-06-08 | End: 2023-06-11

## 2023-06-08 RX ORDER — ALUMINUM ZIRCONIUM TRICHLOROHYDREX GLY 0.2 G/G
1 STICK TOPICAL
Refills: 0 | DISCHARGE

## 2023-06-08 RX ORDER — ACETAMINOPHEN 500 MG
650 TABLET ORAL EVERY 6 HOURS
Refills: 0 | Status: DISCONTINUED | OUTPATIENT
Start: 2023-06-08 | End: 2023-06-11

## 2023-06-08 RX ORDER — INSULIN LISPRO 100/ML
VIAL (ML) SUBCUTANEOUS
Refills: 0 | Status: DISCONTINUED | OUTPATIENT
Start: 2023-06-08 | End: 2023-06-11

## 2023-06-08 RX ORDER — FLUOXETINE HCL 10 MG
20 CAPSULE ORAL DAILY
Refills: 0 | Status: DISCONTINUED | OUTPATIENT
Start: 2023-06-08 | End: 2023-06-11

## 2023-06-08 RX ORDER — PANTOPRAZOLE SODIUM 20 MG/1
40 TABLET, DELAYED RELEASE ORAL
Refills: 0 | Status: DISCONTINUED | OUTPATIENT
Start: 2023-06-08 | End: 2023-06-11

## 2023-06-08 RX ORDER — SODIUM CHLORIDE 9 MG/ML
1000 INJECTION, SOLUTION INTRAVENOUS
Refills: 0 | Status: DISCONTINUED | OUTPATIENT
Start: 2023-06-08 | End: 2023-06-11

## 2023-06-08 RX ORDER — CALCIUM CARBONATE 500(1250)
2 TABLET ORAL EVERY 4 HOURS
Refills: 0 | Status: DISCONTINUED | OUTPATIENT
Start: 2023-06-08 | End: 2023-06-11

## 2023-06-08 RX ORDER — KETOTIFEN FUMARATE 0.34 MG/ML
1 SOLUTION OPHTHALMIC
Refills: 0 | Status: DISCONTINUED | OUTPATIENT
Start: 2023-06-08 | End: 2023-06-11

## 2023-06-08 RX ORDER — CYCLOSPORINE 0.5 MG/ML
1 EMULSION OPHTHALMIC
Refills: 0 | Status: DISCONTINUED | OUTPATIENT
Start: 2023-06-08 | End: 2023-06-11

## 2023-06-08 RX ORDER — LACTOBACILLUS ACIDOPHILUS 100MM CELL
1 CAPSULE ORAL DAILY
Refills: 0 | Status: DISCONTINUED | OUTPATIENT
Start: 2023-06-08 | End: 2023-06-11

## 2023-06-08 RX ORDER — LEVOTHYROXINE SODIUM 125 MCG
100 TABLET ORAL DAILY
Refills: 0 | Status: DISCONTINUED | OUTPATIENT
Start: 2023-06-08 | End: 2023-06-11

## 2023-06-08 RX ORDER — CELECOXIB 200 MG/1
200 CAPSULE ORAL
Refills: 0 | Status: DISCONTINUED | OUTPATIENT
Start: 2023-06-08 | End: 2023-06-11

## 2023-06-08 RX ORDER — DEXTROSE 50 % IN WATER 50 %
12.5 SYRINGE (ML) INTRAVENOUS ONCE
Refills: 0 | Status: DISCONTINUED | OUTPATIENT
Start: 2023-06-08 | End: 2023-06-11

## 2023-06-08 RX ADMIN — Medication 2 TABLET(S): at 22:39

## 2023-06-08 RX ADMIN — BUDESONIDE AND FORMOTEROL FUMARATE DIHYDRATE 2 PUFF(S): 160; 4.5 AEROSOL RESPIRATORY (INHALATION) at 22:39

## 2023-06-08 NOTE — H&P ADULT - HISTORY OF PRESENT ILLNESS
42 y/o F with pmhx of Down syndrome, TRINITY on nocturnal  C-pap, NIDDM,  recently diagnosed aortic regurg, hypothyroidism, chronic back pain sent to the ED by cardio for admission for CECY, cardio and neuro evaluation. Pt was recently diagnosed with aortic regurgitation at outside hospital and considered for possible valve replacement. Pt was admitted to [facility] on 5/31/23 after having an episode of decreased responsiveness and transient altered mental status. Pt had TTE and cardiac cath done at outside hospital and was discharged on 6/2/23 without additional w/u and neuro evaluation. Pt had repeat echo done at cardiologist office showing worsening AR. Pt had blood work drawn this AM at [facility] for pre-op procedure and was told to come to the ED by PCP. Pt has no complaints at this time. Denies CP, SOB, recent fevers, HA, dizziness, syncope or near-syncope, abd pain, N/V, leg swelling, palpitations.    On my exam, pt alert/oriented. Denies cp, sob, HA, change in vision, focal weakness/numbness. Reasonably competent historian, but appears unaware of what CECY may entail; father at bedside reports being HCP. Reji [phone]. Discussed necessity of being available prior to CECY

## 2023-06-08 NOTE — ED ADULT TRIAGE NOTE - CHIEF COMPLAINT QUOTE
Patient sent by cardiologist (Della Healy -[phone]) for clearance for CECY on 06/13/23. Patient has no complaints at this time. Past medical history of aortic valve regurgitation, down syndrome, hypotension. [facility]. Patient sent by cardiologist (Della Healy -[phone]) for clearance for TTE on 06/13/23. Patient has no complaints at this time. Past medical history of aortic valve regurgitation, down syndrome, hypotension. [facility]

## 2023-06-08 NOTE — PHARMACOTHERAPY INTERVENTION NOTE - COMMENTS
Medication history is incomplete. Unable to verify patient's medication list with two sources.  Please call spectra [phone] if you have any questions.   Source: [facility]

## 2023-06-08 NOTE — ED PROVIDER NOTE - CLINICAL SUMMARY MEDICAL DECISION MAKING FREE TEXT BOX
42 y/o F with pmhx of Down syndrome, recently diagnosed aortic regurg, hypothyroidism, chronic back pain sent to the ED by PCP for cardiologist clearance prior to CECY procedure scheduled for 6/13/23. Pt had pre-procedure labs drawn this morning.   Pt mildly hypotensive to 92/62 in ED. Physical exam remarkable for blowing systolic murmur, consistent with aortic regurgitation, otherwise unremarkable.   Plan to obtain labs, EKG. Will consult PCP office. 44 y/o F with pmhx of Down syndrome, recently diagnosed aortic regurg, hypothyroidism, chronic back pain sent to the ED by PCP/cardio for admission for CECY, cardio and neuro w/u after echo done on 6/1/23 showed worsening AR.  Pt mildly hypotensive to 92/62 in ED. Physical exam remarkable for blowing systolic murmur, consistent with aortic regurgitation, otherwise unremarkable.   Plan to obtain labs, EKG. Will consult PCP office and admit. 42 y/o F with pmhx of Down syndrome, recently diagnosed aortic regurg, hypothyroidism, chronic back pain sent to the ED by PCP/cardio for admission for CECY, cardio and neuro w/u after echo done on 6/1/23 showed worsening AR and episode of transient AMS.   Pt mildly hypotensive to 92/62 in ED. Physical exam remarkable for blowing systolic murmur, consistent with aortic regurgitation, otherwise unremarkable.   Plan to obtain labs, EKG. Will consult PCP office and admit. 44 y/o F with pmhx of Down syndrome, recently diagnosed aortic regurg, hypothyroidism, chronic back pain sent to the ED by PCP/cardio for admission for CECY, cardio and neuro w/u after echo done on 6/1/23 showed worsening AR and episode of transient AMS.   Pt mildly hypotensive to 92/62 in ED. Physical exam remarkable for blowing systolic murmur, consistent with aortic regurgitation, otherwise unremarkable.   Based on history and physical, differentials include but are not limited to valvular d/o vs infective endocarditis. Plan to assess patient for acute pathology as listed above with labs, EKG, cultures. Will consult PCP office and admit.

## 2023-06-08 NOTE — ED PROVIDER NOTE - NSICDXFAMILYHX_GEN_ALL_CORE_FT
FAMILY HISTORY:  Family history of MS (multiple sclerosis)    Father  Still living? Yes, Estimated age: Age Unknown  Family history of coronary artery disease, Age at diagnosis: Age Unknown    Sibling  Still living? Unknown  Family history of epilepsy, Age at diagnosis: Age Unknown

## 2023-06-08 NOTE — ED ADULT NURSE NOTE - NSFALLRISK_ED_ALL_ED
MENTAL HEALTH PSYCHIATRIC MEDICATION MANAGEMENT     Medical records were reviewed for 5 minutes to aid in diagnostic and treatment plan.    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), inattentive type  F19.21 Polysubstance dependence in early, early partial, sustained full, or sustained partial remission (CMS/[facility])  F12.21 Cannabis dependence, in remission (CMS/[facility])  F13.11 Benzodiazepine abuse in remission (CMS/[facility])  F17.290 Nicotine dependence, other tobacco product, uncomplicated    Additonally patient has the following medical problems:  Past Medical History:   Diagnosis Date   • ADHD (attention deficit hyperactivity disorder)    • Bronchitis    • Gastro-oesophageal reflux disease    • RAD (reactive airway disease)      No past surgical history on file.    current medications:  Current Outpatient Medications   Medication Sig Dispense Refill   • amphetamine-dextroamphetamine XR (ADDERALL XR) 10 MG 24 hr capsule Take 1 capsule by mouth daily. 30 capsule 0   • risperiDONE (RISPERDAL) 1 MG tablet Take 1 tablet by mouth nightly. 30 tablet 5   • amitriptyline (ELAVIL) 100 MG tablet Take 1 tablet by mouth nightly. 30 tablet 5   • OLANZapine (ZYPREXA) 10 MG tablet Take 2.5 tablets by mouth nightly. 75 tablet 5   • guanFACINE (TENEX) 1 MG tablet Take 1 tablet by mouth nightly. 30 tablet 1     No current facility-administered medications for this visit.        primary care doctor:  No Pcp    Allergies:  ALLERGIES:  No Known Allergies    18 year old male with a history of   F25.0 Schizoaffective disorder, bipolar type (CMS/[facility])  (primary encounter diagnosis)  F41.1 LA NENA (generalized anxiety disorder)  F41.0 Panic disorder  F51.05 Insomnia due to mental disorder  F90.0  ADHD (attention deficit hyperactivity disorder), inattentive type  F19.21 Polysubstance dependence in early, early partial, sustained full, or sustained partial remission (CMS/[facility])  F12.21 Cannabis dependence, in remission (CMS/[facility])  F13.11 Benzodiazepine abuse in remission (CMS/[facility])  F17.290 Nicotine dependence, other tobacco product, uncomplicated   who presents for follow up appointment    Today patient reports over the last month since last appointment:  Stressors:    Energy: Low  Focus: Low  Appetite: good  Suicidal/Homicidal Ideation: denies  Auditory/Visual Hallucinations: denies  Med Compliant: yes  Pain: denies  Medication Side Effects: none  Mood: \"improving \"  Anxiety: Stable, mild anxiety due to inattention issues  Sleep/nightmares: improving     MENTAL STATUS EXAM:  Appearance: appropriate dress, appears stated age  Speech: normal rate and tone, able to repeat phrases  Mood: \"Difficulty focusing\"  Affect: mood congruent  Thought Process: Linear, logical, no flight of ideas  Associations: intact; no loose /tangential/ circumstantial associations  Thought content: no suicidal thoughts, homicidal thoughts or psychotic symptoms such as auditory/visual hallucinations, paranoia, delusions  Insight and judgement: good  Orientation: alert and oriented to person, place, time, and situation.  Behavior: calm, cooperative, Good eye contact  Motor: Mild psychomotor agitation due to inattention issues  Memory: fair, good fund of knowledge  Concentration: Mildly distracted due to inattention issues  Abstraction: able to abstract?  Neuro: normal gait, no tremor or abnormal movements noted, no facial asymmetry, normal speech, gross motor intact without atrophy, normal muscle strength and tone  AIMS score = 0             Pt feels that current treatment is: \"okay\"       Pt denies:  fever  weakness  m. stiffness or pain or spasms   tremors  fatigue  high Blood pressure  loss of consciousness  seizure disorder  memory  loss  chest pain  tachycardia  shortness of breath  syncope  blurry vision  weight change  headache or migraines  nausea or vomiting  gastrointestinal problems or discomfort  genitourinary problems or discomfort  pain    LABS:  No results found for: HGBA1C  No components found for: GLU0T  No results found for: CHOLESTEROL  No results found for: HDL  No results found for: CHOHDL  No results found for: TRIGLYCERIDE  No results found for: CALCLDL  No results found for: TSH  Results for orders placed or performed during the hospital encounter of 07/25/17   ECG   Result Value Ref Range    Ventricular Rate EKG/Min (BPM) 110     Atrial Rate (BPM) 111     TN-Interval (MSEC) 176     QRS-Interval (MSEC) 86     QT-Interval (MSEC) 318     QTc 430     P Axis (Degrees) 64     R Axis (Degrees) 78     T Axis (Degrees) 36     REPORT TEXT       Sinus tachycardia  Otherwise normal ECG  Confirmed by ARGENIS ARAYA MD ([id number]) on 7/27/2017 7:48:50 AM       WBC (K/mcL)   Date Value   07/26/2018 9.4     RBC (mil/mcL)   Date Value   07/26/2018 5.13     HCT (%)   Date Value   07/26/2018 44.3     HGB (g/dL)   Date Value   07/26/2018 15.4     PLT (K/mcL)   Date Value   07/26/2018 296   10/03/2013 214     Sodium (mmol/L)   Date Value   07/26/2018 136     Potassium (mmol/L)   Date Value   07/26/2018 3.8     Chloride (mmol/L)   Date Value   07/26/2018 101     Glucose (mg/dL)   Date Value   07/26/2018 87     CALCIUM (mg/dL)   Date Value   07/26/2018 9.8     Carbon Dioxide (mmol/L)   Date Value   07/26/2018 26     BUN (mg/dL)   Date Value   07/26/2018 12     Creatinine (mg/dL)   Date Value   07/26/2018 0.85     AST/SGOT (Units/L)   Date Value   07/26/2018 15     ALT/SGPT (Units/L)   Date Value   07/26/2018 22     No results found for: GGTP  ALK PHOSPHATASE (Units/L)   Date Value   07/26/2018 70     TOTAL BILIRUBIN (mg/dL)   Date Value   07/26/2018 0.3     Albumin (g/dL)   Date Value   07/26/2018 4.4     No results found for: TP  GFR Estimate, Non   (no units)   Date Value   07/26/2018 >90     GFR Estimate,  (no units)   Date Value   07/26/2018 >90        Comprehensive Past/Family/Social history which was performed during initial evaluation was reexamined and reviewed with patient. For further details, please refer to my initial evaluation note in this chart as well as below for updates.    VS:   There were no vitals filed for this visit.      There were no vitals filed for this visit.    Living with: Mom  Family Support: Mom  Hobbies: Skateboarding    FAMILY HISTORY   drugs and alcohol:  dad depression       mental illness: Dad schizophrenia     Suicide: Denies    Sudden cardiac death: denies    PSYCHIATRIC HISTORY  Inpatient: 8/18 [facility], 2019 brito in psychosis  Outpatient: [facility]    Previous diagnoses:  bipolar with psychosis, anxiety, ADD  History of suicide attempts: 1 attempt OD years ago  Weapons: denies  Past treatment for alcohol/drugs: Classes for marijuana possession 2012, history of alcohol and benzos painkillers LSD hallucinogens and cannabis in the past and completed one detox treatment    Trauma/Abuse: Denies  Legal History: In probation for battery    IMPRESSION:   18 year old, male with a history of   F25.0 Schizoaffective disorder, bipolar type (CMS/HCC)  (primary encounter diagnosis)  F41.1 LA NENA (generalized anxiety disorder)  F41.0 Panic disorder  F51.05 Insomnia due to mental disorder  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              Patient with improving bipolar disorder and symptoms of lata such as distractibility, indiscretion, grandiose thoughts, flight of  ideas, increased activity, pressured speech, talkativeness, and insomnia.    Patient with improving depression and symptoms of Fatigue, Distractibility, Anhedonia, Guilt, and insomnia. Pt denies suicidal thoughts.    Patient with improving psychosis during and outside of mood symptoms and symptoms of:   Paranoia  Delusions  RIS    Patient with improving generalized anxiety disorder and symptoms of worries, muscle tension, irritability, fatigue, and insomnia.     Patient with improving panic disorder and symptoms of shortness of breath, fear of dying, tremors, palpitations, and sweats.    Patient with unstable ADD and symptoms of inattention (carelessness, poorly sustaining focus, distractibility, poor organization, forgetfulness, poor attention to detail, distracted).     This patient is not suicidal, nor is pt homicidal. Pt is not gravely disabled. Inpatient admission is not appropriate at this time.    Past psych meds:   Klonopin  Vyvanse  Paxil  Temazepam  Abilify  Vistaril  ambien ineffic  Venlafaxine  Zoloft  Trazodone inefficacious  Ambien  Focalin  Wellbutrin inefficacious  Lexapro   adderall ineffic psychosis at 20 mg  Ritalin psychosis    RECOMMENDATIONS     -Meds:   Zyprexa 25 mg p.o. q.h.s.  Risperdal 1 mg po qhs  Elavil 100 mg po qhs   adderall ER 10 mg po qam starting slowly at 20 mg in past caused psychosis but pt having focus issues    - Individual Therapy:   Patient is not currently engaged in individual psychotherapy but is interested (referral provided)     Substance use:  Tobacco- vape 3mg nicotine  Motivational interviewing provided. Continue at follow-up visits   Alcohol- denies (denies history of withdrawal symptoms, blackouts, seizure)  Drugs-  denies any benzo or marijuana use ×6+ month    Collateral obtained from jim Vela    Laboratory testing:   Reviewed CBC, CMP, Lipid profile, EKG, UDS 11/18 negative, BAL 7/18 negative  Next pending TSH,      Master treatment plan due:  2/6/19    Discussed with patient both short-term and long-term side effects of antipsychotics such as akathisia, tremors, extrapyramidal symptoms, palpitation, sexual dysfunction, agranulocytosis, orthostatic hypotension, neuroleptic malignant syndrome, tardive dyskinesia, dystonia, prolonged QT interval, and other side effects not limited to these. Other side effects such as suicidality, depression exacerbation, hyperthermia, hypothermia, severe skin reactions, hypotension, seizures, stroke, QT prolongation, diabetes, agranulocytosis, leukopenia, neutropenia, cataracts, priapism, angioedema, enlarged breasts, rhabdomyolysis, anemia, thrombocytopenia, pancreatitis cholecystitis, hemorrhage, severe dysphasia were also discussed. Patient understands the risks and benefits and agrees to treatment. patient agrees to contact Dr for any signs and symptoms as such.    Patient has been informed about adverse effects of neuroleptic malignant syndrome with symptoms such as fever, encephalopathy, vital sign instability, elevated white blood cell count and CPK, and muscle rigidity. patient agrees to treatment and to contact Dr for any signs and symptoms as such.    Patient was informed about possible symptoms of lata such as distractibility, indiscretion, fast speech, decreased sleep for days, increased energy, and flight of ideas. Patient was informed that this could be a possible side effect of antidepressant and agrees to go to the emergency room/call 911/call crisis hotline/call a family member if he has any of these symptoms. Patient was also informed about increased risk of suicidality, depression, severe skin reactions, seizures, low sodium, SIADH, hepatotoxicity, priapism, extrapyramidal symptoms, and withdrawal symptoms with abrupt discontinuation of antidepressants.    Patient was educated about side effects such as, but not limited to agitation, agranulocytosis, anxiety, arrhythmia, constipation, dizziness,  diarrhea, dry mouth, ECG changes, EPS, hallucinations, headaches, hypertension, increased LFTs, insomnia, MI, photosensitivity, rash, restlessness, seizures, sexual dysfunction, SIADH, stroke, tachycardia, vomiting, urinary retention, weight gain with amitriptyline.    Patient understand that if pt has any symptoms of palpitations or arrhythmia or chest pain, to go to the nearest emergency room for evaluation and understands the risks associated with stimulant use such as serious cardiovascular adverse events and sudden cardiac death. patient feels benefits outweigh risks and agrees to treatment and to contact Dr for any signs and symptoms as such. Patient also educated about serious side effects such as psychosis, lata, aggressive behavior, stroke, heart attack, cardiomyopathy from long-term use, seizures, severe dermatological reactions, dependency issues, priapism, peripheral vascular apathy, rhabdomyolysis, and withdrawal symptoms is suddenly discontinued.    p    Orders Placed This Encounter   • amphetamine-dextroamphetamine XR (ADDERALL XR) 10 MG 24 hr capsule   • risperiDONE (RISPERDAL) 1 MG tablet       -[place]ed 10/17/2019      Follow up: 4 weeks    advised to call this MD during clinic hours with any concerns prior to next appt.  Emergency Room/Urgent Care with Suicidal/Homicidal Ideation or worsening signs/symptoms.  Suicide hotline number provided.  Patient is in agreement with treatment plan.    Time spent with patient: 15 minutes     Suicide risk remains low; Violence risk remains low; No changes from last risk assessment. Please refer to initial evaluation/progress notes.     Safety measures and plan ( warning signs, coping strategies, earlier doctor appointment, go to ER or local urgent care, call crisis hotline, reach out to support system for help, etc ) were discussed.    For any symptoms such as heart palpitations, headaches, increase in blood pressure, patient  should contact primary care physician for evaluation and treatment and personally check blood pressure individually with home blood pressure machine or store blood pressure (example [facility]) as well since blood pressures are not checked at every visit during psychiatry appointments unless patient complains of specific symptoms such as headaches, palpitations, etc. Patient understands this and agrees.    Pt understands not to drive with any medications which have a sedative side effect as discussed in appointment.     Patient has also been informed that if they are ever prescribed pain medications from opiate class of medications for any short-term pain management while being on benzodiazepines, it is very important to take the pain medications up to 2 hours apart from any benzodiazepines and to make new prescriber aware that they are on benzodiazepines. Patient in agreement.    Patient understands that if patient is on a medication that requires tapering off as there can be severe side effects due to withdrawals, and if patient misses scheduled appointment according to treatment plan, patient needs to self taper off of medications as we don't prescribe medications if patient misses appointments as there needs to be proper assessment appointment by appointment before further prescriptions. If patient is suddenly out of medications they need to go to the emergency room to be appropriately tapered off especially regarding controlled substances such as benzodiazepines, etc.     Pt educated to call office or schedule an earlier appointment if any issues arise and pt in agreement    Patient understand if controlled substances (example benzodiazepines, etc) are not given after evaluation and for any concerns regarding withdrawal symptoms such tremors, sweats, headaches, seizures patient will have to go to the emergency room or contact primary care for appropriate acute treatment for detoxification. Patient's blood  pressure and vitals will be monitored to properly assess if there is a need for detoxification at these facilities and patient understands to appropriately taper off of substances by cutting dose in half until these upcoming appointments.    If female: The pt  is not currently pregnant or planning a family. We discussed the risks to a fetus should pt become pregnant, as well and risks of mental illness on a fetus. Discussed employing safe sex practices. Discussed need to discuss medications prior to family planning as patient agrees to contact physician as well if pt is to become pregnant.  Pt expressed understanding.     Writer discussed with patient at length about patient's diagnosis, indications of treatment, risks and benefits of the treatment, medication side effects (low, moderate, and high risk). Patient aware of alternative treatments available,  higher levels of care, and options of treatment vs non treatment as discussed in initial evaluation. Patient also educated about the potential benefits of the medications--bearing the risk and benefit in mind patient has agreed to take the aforementioned medications. Patient educated about the risks of death and severe adverse side effects in case of use of any psychotropic medications along with any potential amount of alcohol and patient verbalizes understanding of this risk. Patient counseled on need for medication compliance. Patient counseled on the signs and symptoms of serotonin syndrome such as hyperthermia, autonomic instability, rigidity, myoclonus, encephalopathy, and diaphoresis and to dial 911/ER if occur. Patient also verbalizes understanding of probable consequences of not receiving or not being compliant with the proposed treatment/medications (including but not limited to leading to a hospital visit, ER (Emergency Room) visit or death). Patient educated that the full benefit of the medication may not be seen if the patient is not taking it as  prescribed. Patient informed that the duration of treatment is dependent on treatment response and that the desired outcome is complete remission from symptoms--patient also educated that in certain cases wherein symptom episodes recur for a given diagnosis and then remit, that life-long medication treatment is still recommended in this maintenance phase to prevent symptoms from recurring.   Pt understand Pt has the right to withdraw consent to take the prescribed medication at any time. Writer reinforced with the patient the need for compliance with care by reinforcing the importance of keeping regular appointments in order to accomplish therapy goals/safely monitor medications. Writer also emphasized the need for giving at least 24-hour cancellation notice explaining that another patient mat be able to benefit from the vacant appointment time slot. Writer also assisted the patient in verbalizing a plan to maximize committment to treatment/ scheduled appointments by assessing the best time/day of week for appointments, mode of transportation, arranging for  if applicable, and assessing motivation for treatment. Pt was also given an opportunity to ask questions. Risk of dementia with antidepressants and antipsychotics, psychotropics dicussed.    Patient understands that if being prescribed control substances, random urine drug screens can take place and if the drug screen is positive for any substances other than those prescribed (even if it is an old prescription) AND/OR if patient is non-compliant with prescribed substances THEN controlled substances will be ceased. Also, if patient refuses random drug screen, provider may refuse to prescribe controlled substances if there is suspicion of use of other substances due to the risk of major side effects of mixing multiple substances and provider also may consider discharging patient for noncompliance with treatment plan.  If decision is made by provider to  get back on controlled substances, this will not occur until negative urine drug screen is on file.. Patient also understands that if there is a history of substance dependence, and if a relapse occurs, patient is to complete an IOP/PHP dual diagnosis program for substance dependence/mental illness prior to returning back for a follow-up appointment and controlled substances would be ceased or tapered off in this scenario as well until IOP/PHP program is completed. This also holds true for any newly diagnosed substance use disorder.     Patient has capacity to make decisions and voiced understanding and consents to treatment. Medication reconciliation was completed within the realm of my speciality and pertaining to this encounter. I have obtained and reviewed medications and allergy information with the patient. Patient was educated on the importance of maintaining and sharing this information with all healthcare providers.     Fozia Gamez MD           No

## 2023-06-08 NOTE — ED PROVIDER NOTE - OBJECTIVE STATEMENT
44 y/o F with pmhx of Down syndrome, recently diagnosed aortic regurg, hypothyroidism, chronic back pain sent to the ED by PCP for cardiologist clearance prior to CECY procedure scheduled for 6/13/23. Pt was recently diagnosed with aortic regurgitation at outside hospital and considered for possible valve replacement. Pt was discharged on 6/2/23 and had repeat echo done at cardiologist office showing worsening AR. Pt had blood work drawn this AM at [facility] for pre-op procedure and was told to come to the ED by PCP. Pt has no complaints at this time. Denies CP, SOB, recent fevers, HA, dizziness, syncope or near-syncope, abd pain, N/V, leg swelling, palpitations. 44 y/o F with pmhx of Down syndrome, recently diagnosed aortic regurg, hypothyroidism, chronic back pain sent to the ED by cardio for admission for CECY, cardio/neuro evaluation. Pt was recently diagnosed with aortic regurgitation at outside hospital and considered for possible valve replacement. Pt was discharged on 6/2/23 and had repeat echo done at cardiologist office showing worsening AR. Pt had blood work drawn this AM at [facility] for pre-op procedure and was told to come to the ED by PCP. Pt has no complaints at this time. Denies CP, SOB, recent fevers, HA, dizziness, syncope or near-syncope, abd pain, N/V, leg swelling, palpitations. 44 y/o F with pmhx of Down syndrome, recently diagnosed aortic regurg, hypothyroidism, chronic back pain sent to the ED by cardio for admission for CECY, cardio/neuro evaluation. Pt was recently diagnosed with aortic regurgitation at outside hospital and considered for possible valve replacement. Pt was admitted to [facility] on 5/31/23 after having an episode of decreased responsiveness and transient altered mental status. Pt had TTE and cardiac cath done at outside hospital and was discharged on 6/2/23 without additional w/u and neuro evaluation. Pt had repeat echo done at cardiologist office showing worsening AR. Pt had blood work drawn this AM at [facility] for pre-op procedure and was told to come to the ED by PCP. Pt has no complaints at this time. Denies CP, SOB, recent fevers, HA, dizziness, syncope or near-syncope, abd pain, N/V, leg swelling, palpitations.

## 2023-06-08 NOTE — H&P ADULT - NSHPPHYSICALEXAM_GEN_ALL_CORE
PHYSICAL EXAM:      Constitutional: NAD, well-groomed, well-developed  HEENT: + strabismus,  Normal Hearing  Neck: No LAD, No JVD  Back: Normal spine flexure, No CVA tenderness  Respiratory: CTAB  Cardiovascular: S1 and S2, RRR, ++ blowing  murmur   Gastrointestinal: BS+, soft, NT/ND  Extremities: No peripheral edema  Vascular: 2+ peripheral pulses  Neurological: A/O x 3, no focal deficits; Reasonably competent historian, but appears unaware of what CECY may entail;  Psychiatric: Normal mood, normal affect  Musculoskeletal: 5-5/5 strength b/l upper and lower extremities

## 2023-06-08 NOTE — ED PROVIDER NOTE - PHYSICAL EXAMINATION
General: Well appearing in no acute distress, alert and cooperative  Head: Normocephalic, atraumatic  Eyes: PERRLA, no conjunctival injection, no scleral icterus, EOMI  ENMT: Atraumatic external nose and ears, moist mucous membranes, oropharynx clear  Neck: Soft and supple, full ROM without pain, no midline tenderness, no thyromegaly,  no lymphadenopathy  Cardiac: Regular rate and regular rhythm, blowing systolic murmur heard at left sternal border. peripheral pulses 2+ and symmetric in all extremities, no LE edema.    Resp: Unlabored respiratory effort, lungs CTAB, speaking in full sentences, no wheezes  Abd: Soft, non-tender, non-distended, no guarding or rebound  MSK: Spine midline and non-tender, no CVA tenderness   Skin: Warm and dry, Ecchymosis noted on R lower extremity where angiocatheter was placed during prior hospitalization.  Neuro: AO x 3, moves all extremities symmetrically, Motor strength 5/5 bilaterally UE and LE, sensation grossly intact.

## 2023-06-08 NOTE — H&P ADULT - NSHPLABSRESULTS_GEN_ALL_CORE
11.1   3.78  )-----------( 100      ( 08 Jun 2023 13:10 )             31.8     06-08    140  |  105  |  16  ----------------------------<  80  3.8   |  23  |  1.12    Ca    9.2      08 Jun 2023 13:10    TPro  6.3  /  Alb  3.7  /  TBili  0.8  /  DBili  x   /  AST  31  /  ALT  22  /  AlkPhos  53  06-08    CAPILLARY BLOOD GLUCOSE      POCT Blood Glucose.: 94 mg/dL (08 Jun 2023 20:14)  POCT Blood Glucose.: 86 mg/dL (08 Jun 2023 17:43)    PT/INR - ( 08 Jun 2023 13:10 )   PT: 12.9 sec;   INR: 1.11 ratio         PTT - ( 08 Jun 2023 13:10 )  PTT:34.7 sec        Vital Signs Last 24 Hrs  T(C): 36.7 (08 Jun 2023 19:31), Max: 36.7 (08 Jun 2023 11:23)  T(F): 98.1 (08 Jun 2023 19:31), Max: 98.1 (08 Jun 2023 19:31)  HR: 66 (08 Jun 2023 19:31) (62 - 66)  BP: 113/54 (08 Jun 2023 19:31) (92/62 - 113/54)  BP(mean): --  RR: 14 (08 Jun 2023 19:31) (14 - 18)  SpO2: 97% (08 Jun 2023 19:31) (97% - 100%)    Parameters below as of 08 Jun 2023 19:31  Patient On (Oxygen Delivery Method): room air

## 2023-06-08 NOTE — ED PROVIDER NOTE - ATTENDING APP SHARED VISIT CONTRIBUTION OF CARE
43F recent dx Ao regurg, DM, Down's syndrome.  Pt is scheduled for nerve procedure to back but pt had AMS, pt found to have Ao regurg and planned to have CECY.  Pt saw cards as outpt yesterday, saw that Ao regurg was worse so sent in for presurgical labs.  Pt denies complaints at present.  LATHA Lu spoke with their doctor and they were concerned due to an episode of AMS on previous admission?  as well as worsening AR so they wanted to send blood cultures r/o endocarditis as well as have CECY done inpatient as pt at elevated ?anesthesia risk?  ?desire to expedite procedure given worsening AR? Regardless aside from AR murmur pt in NAD, not in CHF clinically.  Will recheck labs, trops, CXR, admit.    VS:  unremarkable    GEN - NAD;   well appearing;   A+O x3   HEAD - NC/AT     ENT - PEERL, EOMI, mucous membranes    moist , no discharge      NECK: Neck supple, non-tender without lymphadenopathy, no masses, no JVD  PULM - CTA b/l,  symmetric breath sounds  COR - holosystolic SARKIS    ABD - , ND, NT, soft,  BACK - no CVA tenderness, nontender spine     EXTREMS - no edema, no deformity, warm and well perfused    SKIN - no rash    or bruising      NEUROLOGIC - alert, face symmetric, speech fluent, sensation nl, motor no focal deficit.

## 2023-06-08 NOTE — H&P ADULT - NSHPREVIEWOFSYSTEMS_GEN_ALL_CORE
Review of Systems:   CONSTITUTIONAL: No fever  EYES: No eye pain, visual disturbances, or discharge  ENMT:  No difficulty hearing, tinnitus, vertigo; No sinus or throat pain  NECK: No pain or stiffness  RESPIRATORY: No cough, wheezing, chills or hemoptysis; No shortness of breath  CARDIOVASCULAR: No chest pain, palpitations, dizziness, or leg swelling  GASTROINTESTINAL: No abdominal or epigastric pain. No nausea, vomiting, or hematemesis; No diarrhea or constipation. No melena or hematochezia.  GENITOURINARY: No dysuria, frequency, hematuria, or incontinence  NEUROLOGICAL: No headaches  SKIN: No itching, burning, rashes, or lesions   ENDOCRINE: No heat or cold intolerance; No hair loss  MUSCULOSKELETAL: No joint pain or swelling; No muscle, back, or extremity pain

## 2023-06-08 NOTE — ED ADULT NURSE NOTE - NSFALLUNIVINTERV_ED_ALL_ED
Bed/Stretcher in lowest position, wheels locked, appropriate side rails in place/Call bell, personal items and telephone in reach/Instruct patient to call for assistance before getting out of bed/chair/stretcher/Non-slip footwear applied when patient is off stretcher/[place] to call system/Physically safe environment - no spills, clutter or unnecessary equipment/Purposeful proactive rounding/Room/bathroom lighting operational, light cord in reach

## 2023-06-08 NOTE — ED PROVIDER NOTE - NSICDXPASTSURGICALHX_GEN_ALL_CORE_FT
PAST SURGICAL HISTORY:  Aortic valve replaced July 2014 Bovine    S/P laparotomy 2004 removal of ovarian cyst

## 2023-06-08 NOTE — H&P ADULT - PROBLEM SELECTOR PLAN 1
-npo post midnight for CECY  -Reasonably competent historian, but appears unaware of what CECY may entail; father at bedside reports being HCP. Reji [phone]. Discussed necessity of being available prior to CECY  -pt afebrile, normal wbc

## 2023-06-08 NOTE — ED ADULT NURSE NOTE - CHIEF COMPLAINT QUOTE
Patient sent by cardiologist (Della Healy -[phone]) for clearance for CECY on 06/13/23. Patient has no complaints at this time. Past medical history of aortic valve regurgitation, down syndrome, hypotension. [facility] staff.

## 2023-06-08 NOTE — ED ADULT NURSE REASSESSMENT NOTE - NS ED NURSE REASSESS COMMENT FT1
Pt complaining of mild pain, requesting pain meds. ACP contacted at [phone]. Awaiting orders.
Pt fingerstick 86. Meal tray provided. Safety maintained. Continue to monitor.
hand off report given to Shalini ENNIS on receiving unit No signs of acute distress noted. Patient stable for transport Keenan ENNIS
Report received from RAYMON vallejo. Pt a&ox4, resting comfortably in stretcher. Pt denies cp, sob, n/v, headache, dizziness, fever/chills. breathing even, unlabored. Transport staff at bedside for transport.
No signs of acute distress noted. pending further interventions. Denies pains Comfort and safety maintained. All current care needs met. Care plan continued Keenan ENNIS

## 2023-06-08 NOTE — ED PROVIDER NOTE - PROGRESS NOTE DETAILS
LATHA Lu: Called pt PCP office [phone]. Was told PCP is not in office.  in office sent message out to NP. Waiting for callback. LATHA Lu: Spoke with NP at Dr. Donohue office. Pt was to be seen by Dr. Tellez. Awaiting call from Dr. Tellez LATHA Lu: [facility]. Pt was seen by cardiologist and found to have worsening AR and was told to come to ED to have CECY, cardiology and neuro eval done inpatient. Per RN Michelle, ECHO report showed enlarged valve, will obtain cultures to r/o endocarditis. Pt to be admitted. LATHA Lu: [facility] Pt was seen by cardiologist and found to have worsening AR and was told to come to ED to have CECY, cardiology and neuro eval done inpatient. Per RN Michelle, ECHO report showed enlarged valve, will obtain cultures to r/o endocarditis. Pt to be admitted. LATHA Lu: Spoke with JUAN J Healy with cardio office. Pt was found to have worsening stenosis, aortic insufficiency, dilated LV, increased RV pressure and worsening mitral regurg from ECHO done in March. Pt to be admitted for inpatient CECY, cardio evaluation and neuro evaluation after having an episode of transient AMS near end of May. Adult cardiology team aware of pt. Will admit to medicine. LATHA Lu: Called [facility] group home nurse Michelle. Pt was seen by cardiologist and found to have worsening AR and was told to come to ED to have CECY, cardiology and neuro eval done inpatient. Will contact JUAN J Healy with outpt cardio office. LATHA Lu: Spoke with NP Della Healy with cardio office. Pt was found to have worsening stenosis, aortic insufficiency, dilated LV, increased RV pressure and worsening mitral regurg from ECHO done in March. Pt to be admitted for inpatient CECY, cardio evaluation and neuro evaluation after having an episode of transient AMS near end of May. Pt with enlarged valves on ECHO, r/o endocarditis, will obtain cultures. Adult cardiology team aware of pt. Will admit to medicine.

## 2023-06-08 NOTE — ED PROVIDER NOTE - NSICDXPASTMEDICALHX_GEN_ALL_CORE_FT
PAST MEDICAL HISTORY:  Asthma never been admitted in the hospital or intubated    DM (diabetes mellitus) Type II    Down syndrome     HTN (hypertension)     Hypothyroidism     Ovarian cyst     Sleep apnea

## 2023-06-08 NOTE — H&P ADULT - PROBLEM SELECTOR PLAN 2
-Pt was admitted to [facility] on 5/31/23 after having an episode of decreased responsiveness and transient altered mental status. Per father, no workup or head imaging performed; will obtain CT head for now

## 2023-06-09 LAB
A1C WITH ESTIMATED AVERAGE GLUCOSE RESULT: 4 % — SIGNIFICANT CHANGE UP (ref 4–5.6)
ALBUMIN SERPL ELPH-MCNC: 3.5 G/DL — SIGNIFICANT CHANGE UP (ref 3.3–5)
ALP SERPL-CCNC: 51 U/L — SIGNIFICANT CHANGE UP (ref 40–120)
ALT FLD-CCNC: 21 U/L — SIGNIFICANT CHANGE UP (ref 4–33)
ANION GAP SERPL CALC-SCNC: 14 MMOL/L
ANION GAP SERPL CALC-SCNC: 14 MMOL/L — SIGNIFICANT CHANGE UP (ref 7–14)
AST SERPL-CCNC: 31 U/L — SIGNIFICANT CHANGE UP (ref 4–32)
BASOPHILS # BLD AUTO: 0.04 K/UL — SIGNIFICANT CHANGE UP (ref 0–0.2)
BASOPHILS NFR BLD AUTO: 1.1 % — SIGNIFICANT CHANGE UP (ref 0–2)
BILIRUB SERPL-MCNC: 0.9 MG/DL — SIGNIFICANT CHANGE UP (ref 0.2–1.2)
BUN SERPL-MCNC: 14 MG/DL — SIGNIFICANT CHANGE UP (ref 7–23)
BUN SERPL-MCNC: 17 MG/DL
CALCIUM SERPL-MCNC: 9.3 MG/DL — SIGNIFICANT CHANGE UP (ref 8.4–10.5)
CALCIUM SERPL-MCNC: 9.5 MG/DL
CHLORIDE SERPL-SCNC: 107 MMOL/L
CHLORIDE SERPL-SCNC: 107 MMOL/L — SIGNIFICANT CHANGE UP (ref 98–107)
CO2 SERPL-SCNC: 22 MMOL/L — SIGNIFICANT CHANGE UP (ref 22–31)
CO2 SERPL-SCNC: 23 MMOL/L
CREAT SERPL-MCNC: 1.12 MG/DL — SIGNIFICANT CHANGE UP (ref 0.5–1.3)
CREAT SERPL-MCNC: 1.19 MG/DL
CRP SERPL-MCNC: 9 MG/L
EGFR: 57 ML/MIN/1.73M2
EGFR: 62 ML/MIN/1.73M2 — SIGNIFICANT CHANGE UP
EOSINOPHIL # BLD AUTO: 0.1 K/UL — SIGNIFICANT CHANGE UP (ref 0–0.5)
EOSINOPHIL NFR BLD AUTO: 2.6 % — SIGNIFICANT CHANGE UP (ref 0–6)
ERYTHROCYTE [SEDIMENTATION RATE] IN BLOOD BY WESTERGREN METHOD: 19 MM/HR
ESTIMATED AVERAGE GLUCOSE: 68 — SIGNIFICANT CHANGE UP
GLUCOSE BLDC GLUCOMTR-MCNC: 110 MG/DL — HIGH (ref 70–99)
GLUCOSE BLDC GLUCOMTR-MCNC: 76 MG/DL — SIGNIFICANT CHANGE UP (ref 70–99)
GLUCOSE BLDC GLUCOMTR-MCNC: 78 MG/DL — SIGNIFICANT CHANGE UP (ref 70–99)
GLUCOSE BLDC GLUCOMTR-MCNC: 78 MG/DL — SIGNIFICANT CHANGE UP (ref 70–99)
GLUCOSE BLDC GLUCOMTR-MCNC: 90 MG/DL — SIGNIFICANT CHANGE UP (ref 70–99)
GLUCOSE SERPL-MCNC: 84 MG/DL — SIGNIFICANT CHANGE UP (ref 70–99)
GLUCOSE SERPL-MCNC: 94 MG/DL
HCG SERPL-ACNC: <1 MIU/ML — SIGNIFICANT CHANGE UP
HCT VFR BLD CALC: 33.1 % — LOW (ref 34.5–45)
HGB BLD-MCNC: 11.2 G/DL — LOW (ref 11.5–15.5)
IANC: 2.23 K/UL — SIGNIFICANT CHANGE UP (ref 1.8–7.4)
IMM GRANULOCYTES NFR BLD AUTO: 0.3 % — SIGNIFICANT CHANGE UP (ref 0–0.9)
LYMPHOCYTES # BLD AUTO: 1 K/UL — SIGNIFICANT CHANGE UP (ref 1–3.3)
LYMPHOCYTES # BLD AUTO: 26.4 % — SIGNIFICANT CHANGE UP (ref 13–44)
MCHC RBC-ENTMCNC: 33.2 PG — SIGNIFICANT CHANGE UP (ref 27–34)
MCHC RBC-ENTMCNC: 33.8 GM/DL — SIGNIFICANT CHANGE UP (ref 32–36)
MCV RBC AUTO: 98.2 FL — SIGNIFICANT CHANGE UP (ref 80–100)
MONOCYTES # BLD AUTO: 0.41 K/UL — SIGNIFICANT CHANGE UP (ref 0–0.9)
MONOCYTES NFR BLD AUTO: 10.8 % — SIGNIFICANT CHANGE UP (ref 2–14)
NEUTROPHILS # BLD AUTO: 2.23 K/UL — SIGNIFICANT CHANGE UP (ref 1.8–7.4)
NEUTROPHILS NFR BLD AUTO: 58.8 % — SIGNIFICANT CHANGE UP (ref 43–77)
NRBC # BLD: 0 /100 WBCS — SIGNIFICANT CHANGE UP (ref 0–0)
NRBC # FLD: 0 K/UL — SIGNIFICANT CHANGE UP (ref 0–0)
PLATELET # BLD AUTO: 100 K/UL — LOW (ref 150–400)
POTASSIUM SERPL-MCNC: 4.1 MMOL/L — SIGNIFICANT CHANGE UP (ref 3.5–5.3)
POTASSIUM SERPL-SCNC: 3.8 MMOL/L
POTASSIUM SERPL-SCNC: 4.1 MMOL/L — SIGNIFICANT CHANGE UP (ref 3.5–5.3)
PROT SERPL-MCNC: 6 G/DL — SIGNIFICANT CHANGE UP (ref 6–8.3)
RBC # BLD: 3.37 M/UL — LOW (ref 3.8–5.2)
RBC # FLD: 14.6 % — HIGH (ref 10.3–14.5)
SODIUM SERPL-SCNC: 143 MMOL/L — SIGNIFICANT CHANGE UP (ref 135–145)
SODIUM SERPL-SCNC: 144 MMOL/L
WBC # BLD: 3.79 K/UL — LOW (ref 3.8–10.5)
WBC # FLD AUTO: 3.79 K/UL — LOW (ref 3.8–10.5)

## 2023-06-09 PROCEDURE — 99223 1ST HOSP IP/OBS HIGH 75: CPT

## 2023-06-09 PROCEDURE — 99233 SBSQ HOSP IP/OBS HIGH 50: CPT

## 2023-06-09 PROCEDURE — 93312 ECHO TRANSESOPHAGEAL: CPT | Mod: 26

## 2023-06-09 PROCEDURE — 70450 CT HEAD/BRAIN W/O DYE: CPT | Mod: 26

## 2023-06-09 PROCEDURE — 93306 TTE W/DOPPLER COMPLETE: CPT | Mod: 26

## 2023-06-09 RX ADMIN — Medication 100 MICROGRAM(S): at 05:40

## 2023-06-09 RX ADMIN — SIMETHICONE 80 MILLIGRAM(S): 80 TABLET, CHEWABLE ORAL at 05:39

## 2023-06-09 RX ADMIN — Medication 1 DROP(S): at 00:07

## 2023-06-09 RX ADMIN — CELECOXIB 200 MILLIGRAM(S): 200 CAPSULE ORAL at 05:39

## 2023-06-09 RX ADMIN — Medication 1 DROP(S): at 21:22

## 2023-06-09 RX ADMIN — Medication 20 MILLIGRAM(S): at 17:21

## 2023-06-09 RX ADMIN — SIMETHICONE 80 MILLIGRAM(S): 80 TABLET, CHEWABLE ORAL at 17:20

## 2023-06-09 RX ADMIN — Medication 1000 UNIT(S): at 17:21

## 2023-06-09 RX ADMIN — CYCLOSPORINE 1 DROP(S): 0.5 EMULSION OPHTHALMIC at 05:40

## 2023-06-09 RX ADMIN — Medication 1 TABLET(S): at 17:21

## 2023-06-09 RX ADMIN — Medication 1 PACKET(S): at 17:20

## 2023-06-09 RX ADMIN — Medication 81 MILLIGRAM(S): at 17:20

## 2023-06-09 RX ADMIN — KETOTIFEN FUMARATE 1 DROP(S): 0.34 SOLUTION OPHTHALMIC at 05:40

## 2023-06-09 RX ADMIN — Medication 1 APPLICATION(S): at 17:22

## 2023-06-09 RX ADMIN — PANTOPRAZOLE SODIUM 40 MILLIGRAM(S): 20 TABLET, DELAYED RELEASE ORAL at 05:39

## 2023-06-09 RX ADMIN — CYCLOSPORINE 1 DROP(S): 0.5 EMULSION OPHTHALMIC at 17:51

## 2023-06-09 RX ADMIN — Medication 1 APPLICATION(S): at 05:39

## 2023-06-09 RX ADMIN — KETOTIFEN FUMARATE 1 DROP(S): 0.34 SOLUTION OPHTHALMIC at 17:23

## 2023-06-09 RX ADMIN — BUDESONIDE AND FORMOTEROL FUMARATE DIHYDRATE 2 PUFF(S): 160; 4.5 AEROSOL RESPIRATORY (INHALATION) at 21:22

## 2023-06-09 RX ADMIN — CELECOXIB 200 MILLIGRAM(S): 200 CAPSULE ORAL at 18:21

## 2023-06-09 RX ADMIN — CELECOXIB 200 MILLIGRAM(S): 200 CAPSULE ORAL at 17:51

## 2023-06-09 RX ADMIN — CELECOXIB 200 MILLIGRAM(S): 200 CAPSULE ORAL at 06:34

## 2023-06-09 NOTE — CONSULT NOTE ADULT - ATTENDING COMMENTS
CECY with central prosthetic AI. difficult to visualize leaflets but suspect may be degenerative in nature.  No evidence of paravalvular leak.  mild MS    f/u BCx, though clinically not IE  rest of workup as per primary team    will follow as needed

## 2023-06-09 NOTE — CHART NOTE - NSCHARTNOTEFT_GEN_A_CORE
Type of Procedure: CECY (Transesophageal echocardiogram)  Licensed independent practitioner: Faisal Roque MD  Assistant: Manjeet  Estimated blood loss: None  Specimen removed: None  Preoperative Dx: AI  Postoperative Dx: AI and MS  Complications: None  Anesthesia type: Sedation by the attending anesthesiologist    Findings:     Normal Biventricular function  No LA/SILVINA clot  Moderate AI, moderate MS      Full report to follow    Faisal Roque MD  Chief, Cardiology  [facility]

## 2023-06-09 NOTE — PATIENT PROFILE ADULT - FALL HARM RISK - HARM RISK INTERVENTIONS
Communicate Risk of Fall with Harm to all staff/Reinforce activity limits and safety measures with patient and family/Tailored Fall Risk Interventions/Visual Cue: Yellow wristband and red socks/Bed in lowest position, wheels locked, appropriate side rails in place/Call bell, personal items and telephone in reach/Instruct patient to call for assistance before getting out of bed or chair/Non-slip footwear when patient is out of bed/[place] to call system/Physically safe environment - no spills, clutter or unnecessary equipment/Purposeful Proactive Rounding/Room/bathroom lighting operational, light cord in reach Assistance with ambulation/Assistance OOB with selected safe patient handling equipment/Communicate Risk of Fall with Harm to all staff/Discuss with provider need for PT consult/Monitor gait and stability/Provide patient with walking aids - walker, cane, crutches/Reinforce activity limits and safety measures with patient and family/Tailored Fall Risk Interventions/Visual Cue: Yellow wristband and red socks/Bed in lowest position, wheels locked, appropriate side rails in place/Call bell, personal items and telephone in reach/Instruct patient to call for assistance before getting out of bed or chair/Non-slip footwear when patient is out of bed/[place] to call system/Physically safe environment - no spills, clutter or unnecessary equipment/Purposeful Proactive Rounding/Room/bathroom lighting operational, light cord in reach

## 2023-06-09 NOTE — CONSULT NOTE ADULT - SUBJECTIVE AND OBJECTIVE BOX
Patient seen and evaluated at bedside    Reason for consult: CECY     HPI:  44 y/o F with pmhx of Down syndrome, TRINITY on nocturnal  C-pap, NIDDM,  recently diagnosed aortic regurg, hypothyroidism, chronic back pain sent to the ED by cardio for admission for CECY, cardio and neuro evaluation. Pt was recently diagnosed with aortic regurgitation at outside hospital and considered for possible valve replacement. Pt was admitted to [facility] on 23 after having an episode of decreased responsiveness and transient altered mental status. Pt had TTE and cardiac cath done at outside hospital and was discharged on 23 without additional w/u and neuro evaluation. Pt had repeat echo done at cardiologist office showing worsening AR. Pt had blood work drawn this AM at [facility] for pre-op procedure and was told to come to the ED by PCP. Pt has no complaints at this time. Denies CP, SOB, recent fevers, HA, dizziness, syncope or near-syncope, abd pain, N/V, leg swelling, palpitations.    On my exam, pt alert/oriented. Denies cp, sob, HA, change in vision, focal weakness/numbness. Reasonably competent historian, but appears unaware of what CECY may entail; father at bedside reports being HCP. Reji [phone]. Discussed necessity of being available prior to CECY (2023 18:04)    Ms Hu is a 44 yo F with history of Trisomy 21, aortic stenosis s/p posterior root enlargement, aortic valve replacement (23 mm bovine pericardial valve) and LVOT muscular resection on 2014, TRINITY on nocturnal  C-pap, NIDDM, hypothyroidism, chronic back pain sent to the ED by outpatient congenital cardiology team (Dr. Sandoval) after recent admission for AMS and recent TTE findings as below with c/f worsening AI and elevated RVSP. Plan for neuroimaging, endocarditis r/o, and CECY.    PMHx:   Down syndrome    HTN (hypertension)    DM (diabetes mellitus)    Asthma    Sleep apnea    Hypothyroidism    Ovarian cyst        PSHx:   S/P laparotomy    Aortic valve replaced        Allergies:  penicillin (Unknown)      Home Meds:    Current Medications:   acetaminophen     Tablet .. 650 milliGRAM(s) Oral every 6 hours PRN  albuterol    90 MICROgram(s) HFA Inhaler 2 Puff(s) Inhalation every 6 hours PRN  ammonium lactate 12% Lotion 1 Application(s) Topical two times a day  artificial  tears Solution 1 Drop(s) Both EYES at bedtime  aspirin enteric coated 81 milliGRAM(s) Oral daily  budesonide 160 MICROgram(s)/formoterol 4.5 MICROgram(s) Inhaler 2 Puff(s) Inhalation two times a day  calcium carbonate    500 mG (Tums) Chewable 2 Tablet(s) Chew every 4 hours PRN  celecoxib 200 milliGRAM(s) Oral two times a day  cholecalciferol 1000 Unit(s) Oral daily  cycloSPORINE (RESTASIS) 0.05% Emulsion 1 Drop(s) Both EYES two times a day  dextrose 5%. 1000 milliLiter(s) IV Continuous <Continuous>  dextrose 5%. 1000 milliLiter(s) IV Continuous <Continuous>  dextrose 50% Injectable 12.5 Gram(s) IV Push once  dextrose 50% Injectable 25 Gram(s) IV Push once  dextrose 50% Injectable 25 Gram(s) IV Push once  dextrose Oral Gel 15 Gram(s) Oral once PRN  FLUoxetine 20 milliGRAM(s) Oral daily  glucagon  Injectable 1 milliGRAM(s) IntraMuscular once  insulin lispro (ADMELOG) corrective regimen sliding scale   SubCutaneous at bedtime  insulin lispro (ADMELOG) corrective regimen sliding scale   SubCutaneous three times a day before meals  ketotifen 0.025% Ophthalmic Solution 1 Drop(s) Both EYES two times a day  lactobacillus acidophilus 1 Tablet(s) Oral daily  levothyroxine 100 MICROGram(s) Oral daily  multivitamin 1 Tablet(s) Oral daily  pantoprazole    Tablet 40 milliGRAM(s) Oral before breakfast  psyllium Powder 1 Packet(s) Oral daily  simethicone 80 milliGRAM(s) Chew two times a day      FAMILY HISTORY:  Family history of MS (multiple sclerosis)    Family history of coronary artery disease (Father)    Family history of epilepsy (Sibling)        Social History:  Smoking History:  Alcohol Use:  Drug Use:    Review of Systems:  REVIEW OF SYSTEMS:  CONSTITUTIONAL: No weakness, fevers or chills  EYES/ENT: No visual changes;  No dysphagia  NECK: No pain or stiffness  RESPIRATORY: No cough, wheezing, hemoptysis; No shortness of breath  CARDIOVASCULAR: No chest pain or palpitations; No lower extremity edema  GASTROINTESTINAL: No abdominal or epigastric pain. No nausea, vomiting, or hematemesis; No diarrhea or constipation. No melena or hematochezia.  BACK: No back pain  GENITOURINARY: No dysuria, frequency or hematuria  NEUROLOGICAL: No numbness or weakness  SKIN: No itching, burning, rashes, or lesions   All other review of systems is negative unless indicated above.    [ ] All other systems negative  [ ] Unable to assess ROS due to    Physical Exam:  T(F): 98 (-), Max: 98.5 ()  HR: 62 () (59 - 68)  BP: 108/58 () (92/62 - 127/59)  RR: 18 ()  SpO2: 98% ()  GENERAL: No acute distress, well-developed  HEAD:  Atraumatic, Normocephalic  ENT: EOMI, PERRLA, conjunctiva and sclera clear, Neck supple, No JVD, moist mucosa  CHEST/LUNG: Clear to auscultation bilaterally; No wheeze, equal breath sounds bilaterally   BACK: No spinal tenderness  HEART: Regular rate and rhythm; No murmurs, rubs, or gallops  ABDOMEN: Soft, Nontender, Nondistended; Bowel sounds present  EXTREMITIES:  No clubbing, cyanosis, or edema  PSYCH: Nl behavior, nl affect  NEUROLOGY: AAOx3, non-focal, cranial nerves intact  SKIN: Normal color, No rashes or lesions  LINES:    Cardiovascular Diagnostic Testing:    CXR: Personally reviewed    Labs: Personally reviewed                        11.2   3.79  )-----------( 100      ( 2023 05:45 )             33.1     -    143  |  107  |  14  ----------------------------<  84  4.1   |  22  |  1.12    Ca    9.3      2023 05:45    TPro  6.0  /  Alb  3.5  /  TBili  0.9  /  DBili  x   /  AST  31  /  ALT  21  /  AlkPhos  51  -    PT/INR - ( 2023 13:10 )   PT: 12.9 sec;   INR: 1.11 ratio         PTT - ( 2023 13:10 )  PTT:34.7 sec         Peds Card Transthoracic Echo             Final    No Documents Attached            [facility] [place] [address]  Phone: [phone] Fax: [phone]    Transthoracic Echocardiogram Report      Name:  RICHELLE HU Sex: F         Date: 2023 / 2:28:47 PM  IDX #: [id number]        : 1980 Hosp. MR #:  [facility]#:  [id number]    Ht:  139.00 cm BP: 103/50 mm Hg  Site:  SHAKIR             Wt:  69.00 kg  BSA: 1.67 m2  Age: 43 years    Referring Physician: Joce Sandoval M.D.  Indications:         S/P aortic valve replacement, assess aorticvalve gradient,  r/o vegetations.  Sonographer          Rosa Hinton          TTE (23)  1. History of aortic valve stenosis.  2. Status post surgical aortic valve replacement with bovine pericardial valve.  3. Mild tricuspid valve regurgitation, peak systolic instantaneous gradient 43.6 mmHg.  4. Normal right ventricular morphology with qualitatively normal size and systolic function.  5. Mildy elevated pulmonary artery pressures.  6. Mild neoaortic stenosis.  7. Moderate neoaortic regurgitation.  8. Tethering and limited mobility of the posterior mitral valve leaflet, mean 7mmHg.  9. Mild mitral valve stenosis.  10. Trivial mitral valve regurgitation.  11. Normal left ventricular size, morphology and systolic function.  12. No residual left ventricular outflow tract obstruction.  13. Holodiastolic reversal of flow in the descending aorta.  14. No obvious masses visualized on the cardiac valves.  15. No pericardial effusion.  16. Compared to the previous echocardiogram of 3/15/2023; there is more neoaortic insufficiency and the right ventricular pressure is mildly elevated.    Segmental Cardiotype, Cardiac Position, and Situs:  The heart is normally positioned in the left chest with the apex pointing leftward.  Systemic Veins:  The systemic veins were not adequately demonstrated.  Pulmonary Veins:  At least one pulmonary vein on each side return normally to the morphologic left atrium.  Atria:    Status of interatrial septum unknown. The right atrium is normal in size. The left atrium is normal in size.  Mitral Valve:  There is an acceleration of flow across the mitral valve. There is mild mitral valve stenosis. Trivial mitral valve regurgitation is seen. Tethering and limited mobility of the posterior mitral valve leaflet, mean 7mmHg.  Tricuspid Valve:  There is no evidence of tricuspid valve stenosis. There is mild tricuspid valve regurgitation.  Left Ventricle:    Normal left ventricular size and morphology, with normal systolic function. The E/e' ratios are suggestive of abnormal left ventricular filling pressure (high = greater than 10 lateral; high = greater than 15 septal; normal = less than 8 lateral and septal).  Right Ventricle:  Normal right ventricular morphology with qualitatively normal size and systolic function. Evidence of mildy elevated pulmonary artery pressures. Pulmonary artery pressure estimate is based on tricuspid regurgitation peak systolic instantaneous gradient.  Interventricular Septum:    Mild systolic flattening of the interventricular septum.  Conotruncal Anatomy:  Normal conotruncal anatomy.  Left Ventricular Outflow Tract and Aortic Valve:  No residual left ventricular outflow tract obstruction. Status post surgical aortic valve replacement with bovine pericardial valve. History of aortic valve stenosis. Mild neoaortic stenosis. Moderate neoaortic regurgitation.  Right Ventricular Outflow Tract and Pulmonary Valve:  There is no evidence of right ventricular outflow tract obstruction. No evidence of pulmonary valve stenosis. Trivial pulmonary valve regurgitation. There is normal systolic flow across the pulmonary valve.  Aorta:  Normal ascending, transverse and descending aorta, with normal aorta Doppler profiles. There is holodiastolic reversal of flow in the descending aorta.  Pulmonary Arteries:    Normal main pulmonary artery confluent with the right and left branch pulmonary arteries.  Coronary Arteries:  The coronary arteries were not evaluated.  Pericardium:  No pericardial effusion.  Other:  No obvious masses visualized on the cardiac valves.  Interventional / Surgical Procedures:  Status post surgical aortic valve replacement with bovine pericardial valve. History of aortic valve stenosis.    2-Dimensional                             Z-score (where applicable)  LV volume, d (AL)            155 mL       1.24  LV volume, s (AL)             54 mL  LV mass (SAX area-length):   151 g  LV mass index:             61.90 g/ht\S\2.7  TAPSE:                      2.85 cm    Systolic Function  LV EF (5/6 AL)    65 %    LV Diastolic Function  Lateral annulus e':   0.07 m/s    Mitral Valve Doppler  Mean gradient:       6.90 mmHg  VTI:                 0.81 m    Tricuspid Valve Doppler  Regurg peak velocity:   3.30 m/s    Estimated Pressures  RV systolic pressure (TR): 48.56 mmHg

## 2023-06-10 LAB
ANION GAP SERPL CALC-SCNC: 13 MMOL/L — SIGNIFICANT CHANGE UP (ref 7–14)
BUN SERPL-MCNC: 15 MG/DL — SIGNIFICANT CHANGE UP (ref 7–23)
CALCIUM SERPL-MCNC: 9 MG/DL — SIGNIFICANT CHANGE UP (ref 8.4–10.5)
CHLORIDE SERPL-SCNC: 107 MMOL/L — SIGNIFICANT CHANGE UP (ref 98–107)
CO2 SERPL-SCNC: 23 MMOL/L — SIGNIFICANT CHANGE UP (ref 22–31)
CREAT SERPL-MCNC: 1.09 MG/DL — SIGNIFICANT CHANGE UP (ref 0.5–1.3)
CRP SERPL-MCNC: 9.3 MG/L — HIGH
EGFR: 64 ML/MIN/1.73M2 — SIGNIFICANT CHANGE UP
ERYTHROCYTE [SEDIMENTATION RATE] IN BLOOD: 22 MM/HR — SIGNIFICANT CHANGE UP (ref 4–25)
GLUCOSE BLDC GLUCOMTR-MCNC: 110 MG/DL — HIGH (ref 70–99)
GLUCOSE BLDC GLUCOMTR-MCNC: 110 MG/DL — HIGH (ref 70–99)
GLUCOSE BLDC GLUCOMTR-MCNC: 112 MG/DL — HIGH (ref 70–99)
GLUCOSE BLDC GLUCOMTR-MCNC: 97 MG/DL — SIGNIFICANT CHANGE UP (ref 70–99)
GLUCOSE SERPL-MCNC: 79 MG/DL — SIGNIFICANT CHANGE UP (ref 70–99)
HCT VFR BLD CALC: 32.4 % — LOW (ref 34.5–45)
HGB BLD-MCNC: 11.2 G/DL — LOW (ref 11.5–15.5)
MAGNESIUM SERPL-MCNC: 1.9 MG/DL — SIGNIFICANT CHANGE UP (ref 1.6–2.6)
MCHC RBC-ENTMCNC: 33 PG — SIGNIFICANT CHANGE UP (ref 27–34)
MCHC RBC-ENTMCNC: 34.6 GM/DL — SIGNIFICANT CHANGE UP (ref 32–36)
MCV RBC AUTO: 95.6 FL — SIGNIFICANT CHANGE UP (ref 80–100)
NRBC # BLD: 0 /100 WBCS — SIGNIFICANT CHANGE UP (ref 0–0)
NRBC # FLD: 0 K/UL — SIGNIFICANT CHANGE UP (ref 0–0)
PHOSPHATE SERPL-MCNC: 4.4 MG/DL — SIGNIFICANT CHANGE UP (ref 2.5–4.5)
PLATELET # BLD AUTO: 114 K/UL — LOW (ref 150–400)
POTASSIUM SERPL-MCNC: 4.1 MMOL/L — SIGNIFICANT CHANGE UP (ref 3.5–5.3)
POTASSIUM SERPL-SCNC: 4.1 MMOL/L — SIGNIFICANT CHANGE UP (ref 3.5–5.3)
RBC # BLD: 3.39 M/UL — LOW (ref 3.8–5.2)
RBC # FLD: 14.6 % — HIGH (ref 10.3–14.5)
SODIUM SERPL-SCNC: 143 MMOL/L — SIGNIFICANT CHANGE UP (ref 135–145)
WBC # BLD: 4.27 K/UL — SIGNIFICANT CHANGE UP (ref 3.8–10.5)
WBC # FLD AUTO: 4.27 K/UL — SIGNIFICANT CHANGE UP (ref 3.8–10.5)

## 2023-06-10 PROCEDURE — 99233 SBSQ HOSP IP/OBS HIGH 50: CPT

## 2023-06-10 RX ADMIN — Medication 1 PACKET(S): at 12:00

## 2023-06-10 RX ADMIN — Medication 1 TABLET(S): at 12:01

## 2023-06-10 RX ADMIN — KETOTIFEN FUMARATE 1 DROP(S): 0.34 SOLUTION OPHTHALMIC at 18:25

## 2023-06-10 RX ADMIN — PANTOPRAZOLE SODIUM 40 MILLIGRAM(S): 20 TABLET, DELAYED RELEASE ORAL at 07:04

## 2023-06-10 RX ADMIN — CYCLOSPORINE 1 DROP(S): 0.5 EMULSION OPHTHALMIC at 18:24

## 2023-06-10 RX ADMIN — Medication 1000 UNIT(S): at 12:01

## 2023-06-10 RX ADMIN — Medication 20 MILLIGRAM(S): at 12:02

## 2023-06-10 RX ADMIN — SIMETHICONE 80 MILLIGRAM(S): 80 TABLET, CHEWABLE ORAL at 07:02

## 2023-06-10 RX ADMIN — Medication 2 TABLET(S): at 20:10

## 2023-06-10 RX ADMIN — CELECOXIB 200 MILLIGRAM(S): 200 CAPSULE ORAL at 18:54

## 2023-06-10 RX ADMIN — Medication 1 DROP(S): at 22:16

## 2023-06-10 RX ADMIN — Medication 1 APPLICATION(S): at 07:02

## 2023-06-10 RX ADMIN — CELECOXIB 200 MILLIGRAM(S): 200 CAPSULE ORAL at 07:01

## 2023-06-10 RX ADMIN — Medication 1 TABLET(S): at 12:02

## 2023-06-10 RX ADMIN — BUDESONIDE AND FORMOTEROL FUMARATE DIHYDRATE 2 PUFF(S): 160; 4.5 AEROSOL RESPIRATORY (INHALATION) at 09:15

## 2023-06-10 RX ADMIN — CELECOXIB 200 MILLIGRAM(S): 200 CAPSULE ORAL at 18:24

## 2023-06-10 RX ADMIN — KETOTIFEN FUMARATE 1 DROP(S): 0.34 SOLUTION OPHTHALMIC at 07:02

## 2023-06-10 RX ADMIN — Medication 100 MICROGRAM(S): at 07:02

## 2023-06-10 RX ADMIN — BUDESONIDE AND FORMOTEROL FUMARATE DIHYDRATE 2 PUFF(S): 160; 4.5 AEROSOL RESPIRATORY (INHALATION) at 22:16

## 2023-06-10 RX ADMIN — Medication 1 APPLICATION(S): at 18:23

## 2023-06-10 RX ADMIN — SIMETHICONE 80 MILLIGRAM(S): 80 TABLET, CHEWABLE ORAL at 18:24

## 2023-06-10 RX ADMIN — CYCLOSPORINE 1 DROP(S): 0.5 EMULSION OPHTHALMIC at 07:02

## 2023-06-10 RX ADMIN — Medication 81 MILLIGRAM(S): at 12:01

## 2023-06-10 NOTE — PROGRESS NOTE ADULT - PROBLEM SELECTOR PLAN 1
-npofor CECY  -Reasonably competent historian, father at bedside reports being HCP. Reji [phone]. Discussed necessity of being available prior to CECY  -pt afebrile, normal wbc  - F/u CECY [as above ], blood cultures [NGTD ], ESR [ ], CRP [9 mildly elevated ], CT head [ NAICA]
-npofor CECY  -Reasonably competent historian, father at bedside reports being HCP. Reji [phone]. Discussed necessity of being available prior to CECY  -pt afebrile, normal wbc  - F/u CECY [ ], blood cultures [ ], ESR [ ], CRP [ ], CT head [ ]

## 2023-06-10 NOTE — PROGRESS NOTE ADULT - PROBLEM SELECTOR PLAN 2
-Pt was admitted to [facility] on 5/31/23 after having an episode of decreased responsiveness and transient altered mental status. Per father, no workup or head imaging performed; will obtain CT head for now  - F/u CT head [ ]
-Pt was admitted to [facility] on 5/31/23 after having an episode of decreased responsiveness and transient altered mental status. Per father, no workup or head imaging performed; will obtain CT head for now  - F/u CT head [ ]

## 2023-06-10 NOTE — PROGRESS NOTE ADULT - SUBJECTIVE AND OBJECTIVE BOX
[facility] Division of Hospital Medicine  Abril Lemon MD  Pager (MARIANNA-ANGEL, 8A-5P): [phone]  Other Times:  [phone]      SUBJECTIVE / OVERNIGHT EVENTS: Pt in good spirits this morning, aide at the bedside updated by me. Tried to call father, no answer. Updated Michelle over the phone as well.No events on tele. Afebrile overnight.    MEDICATIONS  (STANDING):  ammonium lactate 12% Lotion 1 Application(s) Topical two times a day  artificial  tears Solution 1 Drop(s) Both EYES at bedtime  aspirin enteric coated 81 milliGRAM(s) Oral daily  budesonide 160 MICROgram(s)/formoterol 4.5 MICROgram(s) Inhaler 2 Puff(s) Inhalation two times a day  celecoxib 200 milliGRAM(s) Oral two times a day  cholecalciferol 1000 Unit(s) Oral daily  cycloSPORINE (RESTASIS) 0.05% Emulsion 1 Drop(s) Both EYES two times a day  dextrose 5%. 1000 milliLiter(s) (50 mL/Hr) IV Continuous <Continuous>  dextrose 5%. 1000 milliLiter(s) (100 mL/Hr) IV Continuous <Continuous>  dextrose 50% Injectable 12.5 Gram(s) IV Push once  dextrose 50% Injectable 25 Gram(s) IV Push once  dextrose 50% Injectable 25 Gram(s) IV Push once  FLUoxetine 20 milliGRAM(s) Oral daily  glucagon  Injectable 1 milliGRAM(s) IntraMuscular once  insulin lispro (ADMELOG) corrective regimen sliding scale   SubCutaneous three times a day before meals  insulin lispro (ADMELOG) corrective regimen sliding scale   SubCutaneous at bedtime  ketotifen 0.025% Ophthalmic Solution 1 Drop(s) Both EYES two times a day  lactobacillus acidophilus 1 Tablet(s) Oral daily  levothyroxine 100 MICROGram(s) Oral daily  multivitamin 1 Tablet(s) Oral daily  pantoprazole    Tablet 40 milliGRAM(s) Oral before breakfast  psyllium Powder 1 Packet(s) Oral daily  simethicone 80 milliGRAM(s) Chew two times a day    MEDICATIONS  (PRN):  acetaminophen     Tablet .. 650 milliGRAM(s) Oral every 6 hours PRN Temp greater or equal to 38C (100.4F), Mild Pain (1 - 3)  albuterol    90 MICROgram(s) HFA Inhaler 2 Puff(s) Inhalation every 6 hours PRN for shortness of breath and/or wheezing  calcium carbonate    500 mG (Tums) Chewable 2 Tablet(s) Chew every 4 hours PRN Indigestion  dextrose Oral Gel 15 Gram(s) Oral once PRN Blood Glucose LESS THAN 70 milliGRAM(s)/deciliter      I&O's Summary      PHYSICAL EXAM:  Vital Signs Last 24 Hrs  T(C): 36.2 (10 Frankie 2023 06:52), Max: 36.5 (09 Jun 2023 16:12)  T(F): 97.2 (10 Frankie 2023 06:52), Max: 97.7 (09 Jun 2023 16:12)  HR: 76 (10 Frankie 2023 06:52) (53 - 76)  BP: 102/50 (10 Frankie 2023 06:52) (95/47 - 107/42)  BP(mean): --  RR: 15 (10 Frankie 2023 06:52) (15 - 18)  SpO2: 95% (10 Frankie 2023 06:52) (95% - 99%)    Parameters below as of 10 Frankie 2023 06:52  Patient On (Oxygen Delivery Method): room air      Constitutional: NAD  HEENT: + strabismus,  Normal Hearing  Neck: No LAD, No JVD  Back: Normal spine flexure, No CVA tenderness  Respiratory: CTAB  Cardiovascular: S1 and S2, RRR, 5/6 holosystolic murmur prominent   Gastrointestinal: BS+, soft, NT/ND  Extremities: No peripheral edema  Vascular: 2+ peripheral pulses  Neurological: A/O x 3, no focal deficits  Psychiatric: Normal mood, normal affect  Musculoskeletal: 5-5/5 strength b/l upper and lower extremities  LABS:                        11.2   4.27  )-----------( 114      ( 10 Frankie 2023 07:06 )             32.4     06-10    143  |  107  |  15  ----------------------------<  79  4.1   |  23  |  1.09    Ca    9.0      10 Frankie 2023 07:06  Phos  4.4     06-10  Mg     1.90     06-10    TPro  6.0  /  Alb  3.5  /  TBili  0.9  /  DBili  x   /  AST  31  /  ALT  21  /  AlkPhos  51  06-09              Culture - Blood (collected 08 Jun 2023 15:35)  Source: .Blood Blood-Peripheral  Preliminary Report (09 Jun 2023 19:02):    No growth to date.    Culture - Blood (collected 08 Jun 2023 15:25)  Source: .Blood Blood-Venous  Preliminary Report (09 Jun 2023 19:02):    No growth to date.        RADIOLOGY & ADDITIONAL TESTS:  Results Reviewed:   Imaging Personally Reviewed:  Electrocardiogram Personally Reviewed:    COORDINATION OF CARE:  Care Discussed with Consultants/Other Providers [Y/N]: Cardiology  Prior or Outpatient Records Reviewed [Y/N]:  
[facility] Division of Hospital Medicine  Abril Lemon MD  Pager (MARIANNA-F, 8A-5P): [phone]  Other Times:  [phone]      SUBJECTIVE / OVERNIGHT EVENTS: Pt evaluated pre CECY in the cath lab recovery room. Father at bedside providing collateral. She endorses some chest pain earlier this am that has resolved. No LE edema, no nightsweats, fevers, chills, recent dental work, dysuria or weight changes. No recent medication changes either.     MEDICATIONS  (STANDING):  ammonium lactate 12% Lotion 1 Application(s) Topical two times a day  artificial  tears Solution 1 Drop(s) Both EYES at bedtime  aspirin enteric coated 81 milliGRAM(s) Oral daily  budesonide 160 MICROgram(s)/formoterol 4.5 MICROgram(s) Inhaler 2 Puff(s) Inhalation two times a day  celecoxib 200 milliGRAM(s) Oral two times a day  cholecalciferol 1000 Unit(s) Oral daily  cycloSPORINE (RESTASIS) 0.05% Emulsion 1 Drop(s) Both EYES two times a day  dextrose 5%. 1000 milliLiter(s) (50 mL/Hr) IV Continuous <Continuous>  dextrose 5%. 1000 milliLiter(s) (100 mL/Hr) IV Continuous <Continuous>  dextrose 50% Injectable 25 Gram(s) IV Push once  dextrose 50% Injectable 12.5 Gram(s) IV Push once  dextrose 50% Injectable 25 Gram(s) IV Push once  FLUoxetine 20 milliGRAM(s) Oral daily  glucagon  Injectable 1 milliGRAM(s) IntraMuscular once  insulin lispro (ADMELOG) corrective regimen sliding scale   SubCutaneous at bedtime  insulin lispro (ADMELOG) corrective regimen sliding scale   SubCutaneous three times a day before meals  ketotifen 0.025% Ophthalmic Solution 1 Drop(s) Both EYES two times a day  lactobacillus acidophilus 1 Tablet(s) Oral daily  levothyroxine 100 MICROGram(s) Oral daily  multivitamin 1 Tablet(s) Oral daily  pantoprazole    Tablet 40 milliGRAM(s) Oral before breakfast  psyllium Powder 1 Packet(s) Oral daily  simethicone 80 milliGRAM(s) Chew two times a day    MEDICATIONS  (PRN):  acetaminophen     Tablet .. 650 milliGRAM(s) Oral every 6 hours PRN Temp greater or equal to 38C (100.4F), Mild Pain (1 - 3)  albuterol    90 MICROgram(s) HFA Inhaler 2 Puff(s) Inhalation every 6 hours PRN for shortness of breath and/or wheezing  calcium carbonate    500 mG (Tums) Chewable 2 Tablet(s) Chew every 4 hours PRN Indigestion  dextrose Oral Gel 15 Gram(s) Oral once PRN Blood Glucose LESS THAN 70 milliGRAM(s)/deciliter      I&O's Summary      PHYSICAL EXAM:  Vital Signs Last 24 Hrs  T(C): 36.7 (09 Jun 2023 05:37), Max: 36.9 (08 Jun 2023 21:30)  T(F): 98 (09 Jun 2023 05:37), Max: 98.5 (08 Jun 2023 21:30)  HR: 62 (09 Jun 2023 07:44) (59 - 68)  BP: 108/58 (09 Jun 2023 05:37) (103/44 - 127/59)  BP(mean): --  RR: 18 (09 Jun 2023 05:37) (14 - 18)  SpO2: 98% (09 Jun 2023 07:44) (97% - 100%)    Parameters below as of 09 Jun 2023 05:37  Patient On (Oxygen Delivery Method): room air      Constitutional: NAD  HEENT: + strabismus,  Normal Hearing  Neck: No LAD, No JVD  Back: Normal spine flexure, No CVA tenderness  Respiratory: CTAB  Cardiovascular: S1 and S2, RRR, 5/6 holosystolic murmur prominent   Gastrointestinal: BS+, soft, NT/ND  Extremities: No peripheral edema  Vascular: 2+ peripheral pulses  Neurological: A/O x 3, no focal deficits  Psychiatric: Normal mood, normal affect  Musculoskeletal: 5-5/5 strength b/l upper and lower extremities    LABS:                        11.2   3.79  )-----------( 100      ( 09 Jun 2023 05:45 )             33.1     06-09    143  |  107  |  14  ----------------------------<  84  4.1   |  22  |  1.12    Ca    9.3      09 Jun 2023 05:45    TPro  6.0  /  Alb  3.5  /  TBili  0.9  /  DBili  x   /  AST  31  /  ALT  21  /  AlkPhos  51  06-09    PT/INR - ( 08 Jun 2023 13:10 )   PT: 12.9 sec;   INR: 1.11 ratio         PTT - ( 08 Jun 2023 13:10 )  PTT:34.7 sec            RADIOLOGY & ADDITIONAL TESTS:  Results Reviewed:   Imaging Personally Reviewed:  Electrocardiogram Personally Reviewed:    COORDINATION OF CARE:  Care Discussed with Consultants/Other Providers [Y/N]: Cards  Prior or Outpatient Records Reviewed [Y/N]:

## 2023-06-10 NOTE — DIETITIAN INITIAL EVALUATION ADULT - OTHER INFO
42 y/o female with Down syndrome, from [facility], and hx of HTN and DM admitted with nonrheumatic aortic valve insufficiency. Visited with pt to obtain nutrition hx. Pt said she is eating "ok" but is not happy with hospital food. Food preferences taken and menu alternatives discussed. She denies food allergies, nausea/vomiting/diarrhea/constipation, or issues with chewing/swallowing. She said she has been told by her doctors that she is overweight, so she has been trying to exercise more and be more careful not to eat too much "junk." She thinks she may have lost approx 5 lbs over the past few months. Provided pt with literature on General Healthy Eating and reviewed with pt. Diet order with renal restriction, however pt does not have any medical need for restriction - please d/c. She said she gets "an upset stomach" when she drinks milk - Lactose restriction ordered. Pt without additional nutrition related issues or concerns at this time. RDN services to remain available as needed.

## 2023-06-10 NOTE — DIETITIAN INITIAL EVALUATION ADULT - PERTINENT LABORATORY DATA
06-10    143  |  107  |  15  ----------------------------<  79  4.1   |  23  |  1.09    Ca    9.0      10 Frankie 2023 07:06  Phos  4.4     06-10  Mg     1.90     06-10    TPro  6.0  /  Alb  3.5  /  TBili  0.9  /  DBili  x   /  AST  31  /  ALT  21  /  AlkPhos  51  06-09  POCT Blood Glucose.: 112 mg/dL (06-10-23 @ 12:43)  A1C with Estimated Average Glucose Result: 4.0 % (06-09-23 @ 05:45)

## 2023-06-10 NOTE — PROGRESS NOTE ADULT - ASSESSMENT
44 y/o F with pmhx of aortic stenosis s/p posterior root enlargement, aortic valve replacement in 2014 (23 mm bovine pericardial valve) and LVOT muscular resection on 7/1/2014, Down syndrome, TRINITY on nocturnal  C-pap, DM, hypothyroidism, chronic back pain with recent admission at [facility] for episodes of unresponsiveness last week. TTE w AR.  CECY planned for 6/9. Low concern for infective endocarditis at this time. 
42 y/o F with pmhx of aortic stenosis s/p posterior root enlargement, aortic valve replacement in 2014 (23 mm bovine pericardial valve) and LVOT muscular resection on 7/1/2014, Down syndrome, TRINITY on nocturnal  C-pap, DM, hypothyroidism, chronic back pain with recent admission at [facility] for episodes of unresponsiveness last week. TTE w AR.  CECY planned for 6/9-- evidence of AI and mod MS, no evidence of LV thrombus, did also show pulmonary HTN. Low concern for infective endocarditis at this time, blood culture NGTD. CT head noncon negative for CVA.     Prep for d/c 6/11.

## 2023-06-10 NOTE — DIETITIAN INITIAL EVALUATION ADULT - PROBLEM SELECTOR PLAN 1
-npo post midnight for CECY  -Reasonably competent historian, but appears unaware of what CECY may entail; father at bedside reports being HCP. Reji [phone]. Discussed necessity of being available prior to CECY  -pt afebrile, normal wbc

## 2023-06-10 NOTE — DIETITIAN INITIAL EVALUATION ADULT - PROBLEM SELECTOR PLAN 2
-Pt was admitted to [facility] on 5/31/23 after having an episode of decreased responsiveness and transient altered mental status. Per father, no workup or head imaging performed; will obtain CT head for now

## 2023-06-10 NOTE — DIETITIAN INITIAL EVALUATION ADULT - PERTINENT MEDS FT
MEDICATIONS  (STANDING):  ammonium lactate 12% Lotion 1 Application(s) Topical two times a day  artificial  tears Solution 1 Drop(s) Both EYES at bedtime  aspirin enteric coated 81 milliGRAM(s) Oral daily  budesonide 160 MICROgram(s)/formoterol 4.5 MICROgram(s) Inhaler 2 Puff(s) Inhalation two times a day  celecoxib 200 milliGRAM(s) Oral two times a day  cholecalciferol 1000 Unit(s) Oral daily  cycloSPORINE (RESTASIS) 0.05% Emulsion 1 Drop(s) Both EYES two times a day  dextrose 5%. 1000 milliLiter(s) (50 mL/Hr) IV Continuous <Continuous>  dextrose 5%. 1000 milliLiter(s) (100 mL/Hr) IV Continuous <Continuous>  dextrose 50% Injectable 12.5 Gram(s) IV Push once  dextrose 50% Injectable 25 Gram(s) IV Push once  dextrose 50% Injectable 25 Gram(s) IV Push once  FLUoxetine 20 milliGRAM(s) Oral daily  glucagon  Injectable 1 milliGRAM(s) IntraMuscular once  insulin lispro (ADMELOG) corrective regimen sliding scale   SubCutaneous three times a day before meals  insulin lispro (ADMELOG) corrective regimen sliding scale   SubCutaneous at bedtime  ketotifen 0.025% Ophthalmic Solution 1 Drop(s) Both EYES two times a day  lactobacillus acidophilus 1 Tablet(s) Oral daily  levothyroxine 100 MICROGram(s) Oral daily  multivitamin 1 Tablet(s) Oral daily  pantoprazole    Tablet 40 milliGRAM(s) Oral before breakfast  psyllium Powder 1 Packet(s) Oral daily  simethicone 80 milliGRAM(s) Chew two times a day    MEDICATIONS  (PRN):  acetaminophen     Tablet .. 650 milliGRAM(s) Oral every 6 hours PRN Temp greater or equal to 38C (100.4F), Mild Pain (1 - 3)  albuterol    90 MICROgram(s) HFA Inhaler 2 Puff(s) Inhalation every 6 hours PRN for shortness of breath and/or wheezing  calcium carbonate    500 mG (Tums) Chewable 2 Tablet(s) Chew every 4 hours PRN Indigestion  dextrose Oral Gel 15 Gram(s) Oral once PRN Blood Glucose LESS THAN 70 milliGRAM(s)/deciliter

## 2023-06-10 NOTE — PROGRESS NOTE ADULT - PROBLEM SELECTOR PLAN 5
medications reconciled      TRINITY on CPAP  - Cont nightly CPAP
medications reconciled      TRINITY on CPAP  - Cont nightly CPAP

## 2023-06-11 ENCOUNTER — TRANSCRIPTION ENCOUNTER (OUTPATIENT)
Age: 43
End: 2023-06-11

## 2023-06-11 VITALS
OXYGEN SATURATION: 100 % | RESPIRATION RATE: 16 BRPM | TEMPERATURE: 98 F | DIASTOLIC BLOOD PRESSURE: 64 MMHG | HEART RATE: 61 BPM | SYSTOLIC BLOOD PRESSURE: 118 MMHG

## 2023-06-11 LAB — GLUCOSE BLDC GLUCOMTR-MCNC: 88 MG/DL — SIGNIFICANT CHANGE UP (ref 70–99)

## 2023-06-11 PROCEDURE — 99233 SBSQ HOSP IP/OBS HIGH 50: CPT

## 2023-06-11 RX ADMIN — Medication 1 TABLET(S): at 12:37

## 2023-06-11 RX ADMIN — KETOTIFEN FUMARATE 1 DROP(S): 0.34 SOLUTION OPHTHALMIC at 05:55

## 2023-06-11 RX ADMIN — Medication 2 TABLET(S): at 00:34

## 2023-06-11 RX ADMIN — Medication 1 APPLICATION(S): at 05:56

## 2023-06-11 RX ADMIN — CELECOXIB 200 MILLIGRAM(S): 200 CAPSULE ORAL at 05:56

## 2023-06-11 RX ADMIN — SIMETHICONE 80 MILLIGRAM(S): 80 TABLET, CHEWABLE ORAL at 05:55

## 2023-06-11 RX ADMIN — Medication 81 MILLIGRAM(S): at 12:37

## 2023-06-11 RX ADMIN — CYCLOSPORINE 1 DROP(S): 0.5 EMULSION OPHTHALMIC at 05:55

## 2023-06-11 RX ADMIN — Medication 100 MICROGRAM(S): at 05:56

## 2023-06-11 RX ADMIN — Medication 1 PACKET(S): at 12:37

## 2023-06-11 RX ADMIN — PANTOPRAZOLE SODIUM 40 MILLIGRAM(S): 20 TABLET, DELAYED RELEASE ORAL at 12:37

## 2023-06-11 RX ADMIN — Medication 1000 UNIT(S): at 12:37

## 2023-06-11 NOTE — DISCHARGE NOTE PROVIDER - HOSPITAL COURSE
Admission diagnoses:   Episodes of unresponsiveness, witnessed  Aortic valve insufficiency   TRINITY on cpap  DM    Discharge diagnoses:   Aortic valve insufficiency   TRINITY on cpap  DM  Pulmonary HTN  Moderate mitral valve stenosis     Hospital Course:   For full details, please see H&P, progress notes, consult notes and ancillary notes. Briefly, 42 y/o F with pmhx of aortic stenosis s/p posterior root enlargement, aortic valve replacement in 2014 (23 mm bovine pericardial valve) and LVOT muscular resection on 7/1/2014, Down syndrome, TRINITY on nocturnal  C-pap, DM, hypothyroidism, chronic back pain with recent admission at [facility] for episodes of unresponsiveness last week. TTE w AR.  CECY planned for 6/9-- evidence of AI and mod MS, no evidence of LV thrombus, did also show pulmonary HTN. Low concern for infective endocarditis at this time, blood culture NGTD. CT head noncon negative for CVA. Cardiology rec's outpatient follow up in clinic to discuss further if interventions with the valve are needed.     On day of discharge, patient is clinically stable with no new exam findings or acute symptoms compared to prior. The patient was seen by the attending physician on the date of discharge and deemed satable and acceptable for discharge. The patient's chronic medical conditions were treated accordingly per the patient's home medication regimen. The patient's medication reconciliation (with changes made to chronic medications), follow up appointments, discharge orders, instructions, and significant lab and diagnostic studies are as noted. Greater than 35 minutes was spent on patient on day of d/c arrangements.    Discharge follow up action items:     1. Follow up with PCP in 1-2 weeks. Follow up with Cardiology in 1-2 weeks.  2. Follow up labs- none  3. Medication changes- none  4. On hold medications - none    Patient's ordered code status: FULL    Patient disposition: Group Home     Admission diagnoses:   Episodes of unresponsiveness, witnessed  Aortic valve insufficiency   TRINITY on cpap  DM    Discharge diagnoses:   Aortic valve insufficiency   TRINITY on cpap  DM  Pulmonary HTN  Moderate mitral valve stenosis     Hospital Course:   For full details, please see H&P, progress notes, consult notes and ancillary notes. Briefly, 44 y/o F with pmhx of aortic stenosis s/p posterior root enlargement, aortic valve replacement in 2014 (23 mm bovine pericardial valve) and LVOT muscular resection on 7/1/2014, Down syndrome, TRINITY on nocturnal  C-pap, DM, hypothyroidism, chronic back pain with recent admission at [facility] for episodes of unresponsiveness last week. TTE w AR.  CECY planned for 6/9-- evidence of AI and mod MS, no evidence of LV thrombus, did also show pulmonary HTN. Low concern for infective endocarditis at this time, blood culture NGTD. CT head noncon negative for CVA. Cardiology rec's outpatient follow up in clinic to discuss further if interventions with the valve are needed.     On day of discharge, patient is clinically stable with no new exam findings or acute symptoms compared to prior. The patient was seen by the attending physician on the date of discharge and deemed satable and acceptable for discharge. The patient's chronic medical conditions were treated accordingly per the patient's home medication regimen. The patient's medication reconciliation (with changes made to chronic medications), follow up appointments, discharge orders, instructions, and significant lab and diagnostic studies are as noted. Greater than 35 minutes was spent on patient on day of d/c arrangements.    Discharge follow up action items:     1. Follow up with PCP in 1-2 weeks. Follow up with Cardiology in 1-2 weeks.  2. Follow up labs- none  3. Medication changes- none  4. On hold medications - none    Patient's ordered code status: FULL    Patient disposition: Group Home, able to resume prior level of activity

## 2023-06-11 NOTE — DISCHARGE NOTE NURSING/CASE MANAGEMENT/SOCIAL WORK - NSDCPEFALRISK_GEN_ALL_CORE
For information on Fall & Injury Prevention, visit: [email or web address] OR  [email or web address] OR  https://www.cdc.gov/steadi/patient.html

## 2023-06-11 NOTE — DISCHARGE NOTE PROVIDER - NSDCCPTREATMENT_GEN_ALL_CORE_FT
Spoke with patient, informed her that  noted that she spoke with her 2 weeks ago in regards to her results. She states she wanted to know if she needed to go to physical therapy. PRINCIPAL PROCEDURE  Procedure: US echo transesophageal  Findings and Treatment: CONCLUSIONS:  1. Mitral annular calcification and calcified mitral  leaflets with reducedl diastolic opening. Moderate mitral  regurgitation. Peak mitral valve gradient equals 15 mm Hg,  mean transmitral valve gradient equals 6 mm Hg, consistent  with moderate mitral stenosis.  2. Bioprosthetic aortic valve replacement. Peak transaortic  valve gradient equals 41 mm Hg, mean transaortic valve  gradient equals 22 mm Hg, which is probably normal in the  presence of a prosthetic valve. Moderate, eccentric aortic  regurgitation. The jet is intravalvar. The valve itself is  well seated without evidence of paravalvular regurgitation.  3. Normal aortic root.  4. Severely dilated left atrium.  LA volume index = 67  cc/m2. No left atrial or left atrial appendage thrombus.  5.Normal left ventricular systolic function. No segmental  wall motion abnormalities.  6. Normal left ventricular diastolic function.  7. Normal right ventricular size and function.  8. Estimated pulmonary artery systolic pressure equals 57  mm Hg, assuming right atrial pressure equals 10  mm Hg,  consistent with moderate pulmonary hypertension.  9. Contrast injection demonstrates no evidence of a patent  foramen ovale.  ------------------------------------------------------------------------  Confirmed on  6/9/2023 - 14:22:39 by Faisal Roque M.D.  ------------------------------------------------------------------------

## 2023-06-11 NOTE — DISCHARGE NOTE PROVIDER - NSDCCPCAREPLAN_GEN_ALL_CORE_FT
PRINCIPAL DISCHARGE DIAGNOSIS  Diagnosis: Aortic regurgitation  Assessment and Plan of Treatment: Your CECY showed aortic insufficiency as well as mitral valve stenosis. These may be the cause of your episodes of unresponsiveness, though it is not entirely clear at this time. You do not have evidence of an arrhthmia on telemetry this admission. You do not have infective endocarditis. You did not have any intracranial abnormalities on your CT head.  Please follow up with your Cardiologist in 1-2 weeks to discuss further plans for your aortic and mitral valve.

## 2023-06-11 NOTE — DISCHARGE NOTE PROVIDER - CARE PROVIDER_API CALL
[facility],   Phone: (   )    -  Fax: (   )    -  Established Patient  Follow Up Time: 1 week

## 2023-06-11 NOTE — DISCHARGE NOTE NURSING/CASE MANAGEMENT/SOCIAL WORK - PATIENT PORTAL LINK FT
You can access the FollowMyHealth Patient Portal offered by [facility] by registering at the following website: [email or web address]. By joining [employer]’s FollowMyHealth portal, you will also be able to view your health information using other applications (apps) compatible with our system.

## 2023-06-11 NOTE — DISCHARGE NOTE PROVIDER - PROVIDER TOKENS
FREE:[LAST:[[facility]],PHONE:[(   )    -],FAX:[(   )    -],FOLLOWUP:[1 week],ESTABLISHEDPATIENT:[T]]

## 2023-06-11 NOTE — DISCHARGE NOTE PROVIDER - NSDCMRMEDTOKEN_GEN_ALL_CORE_FT
Advair Diskus 250 mcg-50 mcg inhalation powder: 1 puff(s) inhaled 2 times a day  Albuterol (Eqv-Ventolin HFA) 90 mcg/inh inhalation aerosol: 2 puff(s) inhaled every 4 hours as needed for  shortness of breath and/or wheezing  Align 4 mg oral capsule: 1 cap(s) orally once a day  ammonium lactate 12% topical cream: Apply topically to affected area 2 times a day to feet  Aspir 81 oral delayed release tablet: 1 tab(s) orally once a day  diclofenac 1% topical gel: Apply topically to affected area 2 times a day ..(ankles)  FLUoxetine 20 mg oral capsule: 1 cap(s) orally once a day  Lactaid Fast Act 9000 units oral tablet: 1 tab(s) orally daily as needed as directed before dairy products  levothyroxine 100 mcg (0.1 mg) oral tablet: 1 tab(s) orally once a day  meloxicam 15 mg oral tablet: 1 tab(s) orally once a day  metFORMIN 750 mg oral tablet, extended release: 1 tab(s) orally 2 times a day  Multiple Vitamins oral tablet: 1 tab(s) orally once a day  ocular lubricant ophthalmic gel forming solution: 1 drop(s) in each eye once a day (at bedtime)  omeprazole 40 mg oral delayed release capsule: 1 cap(s) orally once a day  Pataday 0.2% ophthalmic solution: 1 drop(s) in each affected eye 2 times a day  Restasis 0.05% ophthalmic emulsion: 1 drop(s) in each eye 2 times a day

## 2023-06-12 LAB — ANA SER IF-ACNC: NEGATIVE

## 2023-06-13 LAB
ANA TITR SER: NEGATIVE — SIGNIFICANT CHANGE UP
BACTERIA BLD CULT: NORMAL
BACTERIA BLD CULT: NORMAL
CULTURE RESULTS: SIGNIFICANT CHANGE UP
CULTURE RESULTS: SIGNIFICANT CHANGE UP
SPECIMEN SOURCE: SIGNIFICANT CHANGE UP
SPECIMEN SOURCE: SIGNIFICANT CHANGE UP

## 2023-06-29 ENCOUNTER — INPATIENT (INPATIENT)
Facility: HOSPITAL | Age: 43
LOS: 10 days | Discharge: NURSING, REHAB OR LONG-TERM CARE | DRG: 266 | End: 2023-07-10
Attending: STUDENT IN AN ORGANIZED HEALTH CARE EDUCATION/TRAINING PROGRAM | Admitting: INTERNAL MEDICINE
Payer: MEDICARE

## 2023-06-29 VITALS
RESPIRATION RATE: 18 BRPM | OXYGEN SATURATION: 93 % | WEIGHT: 148.81 LBS | TEMPERATURE: 97 F | HEART RATE: 64 BPM | SYSTOLIC BLOOD PRESSURE: 114 MMHG | DIASTOLIC BLOOD PRESSURE: 68 MMHG

## 2023-06-29 DIAGNOSIS — Z98.89 OTHER SPECIFIED POSTPROCEDURAL STATES: Chronic | ICD-10-CM

## 2023-06-29 DIAGNOSIS — R07.9 CHEST PAIN, UNSPECIFIED: ICD-10-CM

## 2023-06-29 DIAGNOSIS — Z95.4 PRESENCE OF OTHER HEART-VALVE REPLACEMENT: Chronic | ICD-10-CM

## 2023-06-29 PROCEDURE — 99223 1ST HOSP IP/OBS HIGH 75: CPT | Mod: GC

## 2023-06-29 NOTE — PATIENT PROFILE ADULT - MONEY FOR FOOD
Discharge Summary    Physician: Sav Samayoa MD    Physician Assistant: Luanna Holter, PA-C    Admit Diagnosis:  DJD RIGHT HIP   Primary osteoarthritis of right hip    Final Diagnosis:   1. Advanced degenerative arthritis of right hip . Procedure: Right total hip replacement    Complications: None. History of Present Illness: The patient has a long-standing history of pain within the right hip . The patient has severe degenerative arthritis of the right hip and has exhausted conservative therapy at this time including prior intra-articular injections, activity modifications, OTC NSAIDS. The patient has been limited in their ability to perform ADLs, walk short distances or climb steps appropriately. The patient presents for the above-mentioned procedure. The patient has been cleared from a medical and cardiac standpoint. Past Medical History:  Recorded in the chart. Physical Examination: Also recorded in the chart. The patient is noted for ambulating with an antalgic gait favoring the right side. Examination of the right hip reveals marked reduction to rotation with any attempts to rotate are painful. Right leg is approximately 3 mm shorter than the left. No calf pain or DVT. No numbness, tingling or paresthesias. No signs of infection . X-rays reveal marked degenerative changes in the right hip. Essentially complete loss of joint space. No fracture. No metastatic disease. Course in the Hospital:  The patient was admitted to the hospital.  The Pt. Underwent a right total hip replacement . Postoperatively, the pt. did well. The pt. Remained stable from a medical standpoint. The patient ambulated, 50% WBAT weightbearing within the hospital with Physical Therapy. The patient continued with this therapy at [address] [place] [address] with PT. The patient began taking Xarelto post-operatively for DVT prophylaxis and will continue on this for 35 days.  At the time of discharge, there was no evidence of any DVT noted. The patient had negative Homans signs bilateral lower extremities. At the time of discharge, the patient's right hip incision appeared with staples intact. No signs of infection or inflammation noted. The patient will follow up in our office in 2-1/2 weeks. DC med list:  Discharge Medication List as of 8/25/2018 10:41 AM      START taking these medications    Details   oxyCODONE IR (ROXICODONE) 5 mg immediate release tablet Take 1 Tab by mouth every four (4) hours as needed for Pain. Max Daily Amount: 30 mg., Print, Disp-60 Tab, R-0      rivaroxaban (XARELTO) 10 mg tablet Take 1 Tab by mouth daily (with lunch) for 35 days. Indications: hip surgery deep vein thrombosis prevention, Print, Disp-35 Tab, R-0      celecoxib (CELEBREX) 200 mg capsule Take 1 Cap by mouth daily for 30 days. , Print, Disp-30 Cap, R-0         CONTINUE these medications which have NOT CHANGED    Details   losartan (COZAAR) 50 mg tablet Take 50 mg by mouth daily. , Historical Med      triamterene-hydroCHLOROthiazide (MAXZIDE) 37.5-25 mg per tablet Take 1 Tab by mouth daily. , Historical Med         STOP taking these medications       naproxen sodium (ALEVE) 220 mg cap Comments:   Reason for Stopping:         diclofenac EC (VOLTAREN) 50 mg EC tablet Comments:   Reason for Stopping:         aspirin 81 mg chewable tablet Comments:   Reason for Stopping:               Patient Disposition: Home  Followup Care:  Discharge Condition: good  PT/OT per Home Health  Regular Diet  As directed    Follow-up Information     Follow up With Details Comments [address]   [id number] [facility] [id number]  [address] [place] [address]  For home health physical therapy.  [address]                  Miguel Gomez PA-C no

## 2023-06-29 NOTE — H&P ADULT - HISTORY OF PRESENT ILLNESS
43F c hx down syndrome, cognitive impairment, AVR (1989) c/b moderate paravalvular insufficiency and mod-severe AS, nonobstructive CAD, PCOS, hypothyroidism, asthma, TRINITY on nocturnal CPAP, DM2, gout, and unconfirmed chart history of cirrhosis, CVA, reported recent hospitalization at [facility] for ?STEMI s/p LHC (5/31/23) showing nonobstructive CAD, presented from group home to [facility] on 6/29/23 with CP, now transferred to [facility] for JOSEE TAVR eval and at request of pt's father (Reji [phone]).    Pt currently A&Ox1. Per aide pt is normally A&Ox2-3 baseline. Pt reportedly presented to OSH with nonradiating CP. Pt previously had LHC. CE neg @ OSH. Now txf'd to [facility] for JOSEE TAVR eval. Pt currently denies CP, SOB, presyncopal symptoms, GI symptoms. Reports dry cough.

## 2023-06-29 NOTE — H&P ADULT - ASSESSMENT
43F c hx down syndrome, cognitive impairment, AVR (1989) c/b moderate paravalvular insufficiency and mod-severe AS, nonobstructive CAD, PCOS, hypothyroidism, asthma, TRINITY on nocturnal CPAP, DM2, gout, and unconfirmed chart history of cirrhosis, CVA, reported recent hospitalization at [facility] for ?STEMI s/p C (5/31/23) showing nonobstructive CAD, presented from group home to [facility] on 6/29/23 with CP, now transferred to [facility] for JOSEE TAVR eval and at request of pt's father (Reji [phone]). Hemigard Postcare Instructions: The HEMIGARD strips are to remain completely dry for at least 5-7 days.

## 2023-06-29 NOTE — PATIENT PROFILE ADULT - FALL HARM RISK - HARM RISK INTERVENTIONS

## 2023-06-29 NOTE — H&P ADULT - NSHPLABSRESULTS_GEN_ALL_CORE
Personally reviewed OSH old records.  Personally reviewed OSH labs.  Personally reviewed OSH EKG. NSR rate 64. No ST changes. TWI in V1-V2.

## 2023-06-29 NOTE — PATIENT PROFILE ADULT - INTERNATIONAL TRAVEL
Pt states she is having some pain in her clavicle right above her breast implant and was told by radiology to have Dr. Flower order bilateral diagnostic mammogram. Order pended.    No

## 2023-06-29 NOTE — H&P ADULT - NSHPREVIEWOFSYSTEMS_GEN_ALL_CORE
REVIEW OF SYSTEMS:  CONSTITUTIONAL: No weakness. No fevers. No chills. No rigors.   EYES: No blurry or double vision. No eye pain.  ENT: No hearing difficulty. No sore throat.   NECK: No pain. No stiffness/rigidity.  CARDIAC: No chest pain. No palpitations. No lightheadedness.   RESPIRATORY: No cough. No SOB.   GASTROINTESTINAL: No abdominal pain. No nausea. No vomiting. No diarrhea. No constipation.   GENITOURINARY: No dysuria. No frequency. No oliguria.  NEUROLOGICAL: No numbness/tingling. No focal weakness. No headache. No unsteady gait.  BACK: No back pain. No flank pain.  EXTREMITIES: No lower extremity edema.   All other review of systems is negative unless indicated above.  Unless indicated above, unable to assess ROS 2/2

## 2023-06-30 DIAGNOSIS — T82.857A STENOSIS OF OTHER CARDIAC PROSTHETIC DEVICES, IMPLANTS AND GRAFTS, INITIAL ENCOUNTER: ICD-10-CM

## 2023-06-30 DIAGNOSIS — G47.33 OBSTRUCTIVE SLEEP APNEA (ADULT) (PEDIATRIC): ICD-10-CM

## 2023-06-30 DIAGNOSIS — I35.0 NONRHEUMATIC AORTIC (VALVE) STENOSIS: ICD-10-CM

## 2023-06-30 DIAGNOSIS — D69.3 IMMUNE THROMBOCYTOPENIC PURPURA: ICD-10-CM

## 2023-06-30 DIAGNOSIS — E11.9 TYPE 2 DIABETES MELLITUS WITHOUT COMPLICATIONS: ICD-10-CM

## 2023-06-30 DIAGNOSIS — I20.0 UNSTABLE ANGINA: ICD-10-CM

## 2023-06-30 DIAGNOSIS — Q90.9 DOWN SYNDROME, UNSPECIFIED: ICD-10-CM

## 2023-06-30 DIAGNOSIS — E03.9 HYPOTHYROIDISM, UNSPECIFIED: ICD-10-CM

## 2023-06-30 LAB
A1C WITH ESTIMATED AVERAGE GLUCOSE RESULT: 4.3 % — SIGNIFICANT CHANGE UP (ref 4–5.6)
ALBUMIN SERPL ELPH-MCNC: 3.6 G/DL — SIGNIFICANT CHANGE UP (ref 3.3–5)
ALP SERPL-CCNC: 60 U/L — SIGNIFICANT CHANGE UP (ref 40–120)
ALT FLD-CCNC: 21 U/L — SIGNIFICANT CHANGE UP (ref 10–45)
ANION GAP SERPL CALC-SCNC: 11 MMOL/L — SIGNIFICANT CHANGE UP (ref 5–17)
APTT BLD: 31.1 SEC — SIGNIFICANT CHANGE UP (ref 27.5–35.5)
AST SERPL-CCNC: 32 U/L — SIGNIFICANT CHANGE UP (ref 10–40)
BASOPHILS # BLD AUTO: 0.04 K/UL — SIGNIFICANT CHANGE UP (ref 0–0.2)
BASOPHILS NFR BLD AUTO: 1.1 % — SIGNIFICANT CHANGE UP (ref 0–2)
BILIRUB SERPL-MCNC: 0.7 MG/DL — SIGNIFICANT CHANGE UP (ref 0.2–1.2)
BUN SERPL-MCNC: 16 MG/DL — SIGNIFICANT CHANGE UP (ref 7–23)
CALCIUM SERPL-MCNC: 9.5 MG/DL — SIGNIFICANT CHANGE UP (ref 8.4–10.5)
CHLORIDE SERPL-SCNC: 109 MMOL/L — HIGH (ref 96–108)
CK SERPL-CCNC: 34 U/L — SIGNIFICANT CHANGE UP (ref 25–170)
CO2 SERPL-SCNC: 22 MMOL/L — SIGNIFICANT CHANGE UP (ref 22–31)
CREAT SERPL-MCNC: 1.24 MG/DL — SIGNIFICANT CHANGE UP (ref 0.5–1.3)
EGFR: 55 ML/MIN/1.73M2 — LOW
EOSINOPHIL # BLD AUTO: 0.14 K/UL — SIGNIFICANT CHANGE UP (ref 0–0.5)
EOSINOPHIL NFR BLD AUTO: 3.8 % — SIGNIFICANT CHANGE UP (ref 0–6)
ESTIMATED AVERAGE GLUCOSE: 77 MG/DL — SIGNIFICANT CHANGE UP (ref 68–114)
GLUCOSE BLDC GLUCOMTR-MCNC: 108 MG/DL — HIGH (ref 70–99)
GLUCOSE BLDC GLUCOMTR-MCNC: 94 MG/DL — SIGNIFICANT CHANGE UP (ref 70–99)
GLUCOSE SERPL-MCNC: 86 MG/DL — SIGNIFICANT CHANGE UP (ref 70–99)
HCT VFR BLD CALC: 32.4 % — LOW (ref 34.5–45)
HGB BLD-MCNC: 11 G/DL — LOW (ref 11.5–15.5)
IMM GRANULOCYTES NFR BLD AUTO: 0.3 % — SIGNIFICANT CHANGE UP (ref 0–0.9)
INR BLD: 1.17 RATIO — HIGH (ref 0.88–1.16)
LYMPHOCYTES # BLD AUTO: 1.07 K/UL — SIGNIFICANT CHANGE UP (ref 1–3.3)
LYMPHOCYTES # BLD AUTO: 29 % — SIGNIFICANT CHANGE UP (ref 13–44)
MAGNESIUM SERPL-MCNC: 1.9 MG/DL — SIGNIFICANT CHANGE UP (ref 1.6–2.6)
MCHC RBC-ENTMCNC: 32.5 PG — SIGNIFICANT CHANGE UP (ref 27–34)
MCHC RBC-ENTMCNC: 34 GM/DL — SIGNIFICANT CHANGE UP (ref 32–36)
MCV RBC AUTO: 95.9 FL — SIGNIFICANT CHANGE UP (ref 80–100)
MONOCYTES # BLD AUTO: 0.43 K/UL — SIGNIFICANT CHANGE UP (ref 0–0.9)
MONOCYTES NFR BLD AUTO: 11.7 % — SIGNIFICANT CHANGE UP (ref 2–14)
NEUTROPHILS # BLD AUTO: 2 K/UL — SIGNIFICANT CHANGE UP (ref 1.8–7.4)
NEUTROPHILS NFR BLD AUTO: 54.1 % — SIGNIFICANT CHANGE UP (ref 43–77)
NRBC # BLD: 0 /100 WBCS — SIGNIFICANT CHANGE UP (ref 0–0)
PHOSPHATE SERPL-MCNC: 4.4 MG/DL — SIGNIFICANT CHANGE UP (ref 2.5–4.5)
PLATELET # BLD AUTO: 130 K/UL — LOW (ref 150–400)
POTASSIUM SERPL-MCNC: 4.2 MMOL/L — SIGNIFICANT CHANGE UP (ref 3.5–5.3)
POTASSIUM SERPL-SCNC: 4.2 MMOL/L — SIGNIFICANT CHANGE UP (ref 3.5–5.3)
PROT SERPL-MCNC: 6.4 G/DL — SIGNIFICANT CHANGE UP (ref 6–8.3)
PROTHROM AB SERPL-ACNC: 13.5 SEC — HIGH (ref 10.5–13.4)
RBC # BLD: 3.38 M/UL — LOW (ref 3.8–5.2)
RBC # FLD: 14 % — SIGNIFICANT CHANGE UP (ref 10.3–14.5)
SODIUM SERPL-SCNC: 142 MMOL/L — SIGNIFICANT CHANGE UP (ref 135–145)
TROPONIN T, HIGH SENSITIVITY RESULT: 11 NG/L — SIGNIFICANT CHANGE UP (ref 0–51)
TSH SERPL-MCNC: 0.01 UIU/ML — LOW (ref 0.27–4.2)
WBC # BLD: 3.69 K/UL — LOW (ref 3.8–10.5)
WBC # FLD AUTO: 3.69 K/UL — LOW (ref 3.8–10.5)

## 2023-06-30 PROCEDURE — 93306 TTE W/DOPPLER COMPLETE: CPT | Mod: 26

## 2023-06-30 PROCEDURE — 99232 SBSQ HOSP IP/OBS MODERATE 35: CPT | Mod: FS

## 2023-06-30 PROCEDURE — 99233 SBSQ HOSP IP/OBS HIGH 50: CPT

## 2023-06-30 PROCEDURE — 93010 ELECTROCARDIOGRAM REPORT: CPT

## 2023-06-30 RX ORDER — PANTOPRAZOLE SODIUM 20 MG/1
40 TABLET, DELAYED RELEASE ORAL
Refills: 0 | Status: DISCONTINUED | OUTPATIENT
Start: 2023-06-30 | End: 2023-07-10

## 2023-06-30 RX ORDER — PSYLLIUM SEED (WITH DEXTROSE)
1 POWDER (GRAM) ORAL DAILY
Refills: 0 | Status: DISCONTINUED | OUTPATIENT
Start: 2023-06-30 | End: 2023-07-10

## 2023-06-30 RX ORDER — CHOLECALCIFEROL (VITAMIN D3) 125 MCG
1000 CAPSULE ORAL DAILY
Refills: 0 | Status: DISCONTINUED | OUTPATIENT
Start: 2023-06-30 | End: 2023-07-10

## 2023-06-30 RX ORDER — DEXTROSE 50 % IN WATER 50 %
15 SYRINGE (ML) INTRAVENOUS ONCE
Refills: 0 | Status: DISCONTINUED | OUTPATIENT
Start: 2023-06-30 | End: 2023-07-02

## 2023-06-30 RX ORDER — INSULIN LISPRO 100/ML
VIAL (ML) SUBCUTANEOUS
Refills: 0 | Status: DISCONTINUED | OUTPATIENT
Start: 2023-06-30 | End: 2023-07-02

## 2023-06-30 RX ORDER — ENOXAPARIN SODIUM 100 MG/ML
40 INJECTION SUBCUTANEOUS EVERY 24 HOURS
Refills: 0 | Status: DISCONTINUED | OUTPATIENT
Start: 2023-06-30 | End: 2023-07-06

## 2023-06-30 RX ORDER — LEVOTHYROXINE SODIUM 125 MCG
100 TABLET ORAL DAILY
Refills: 0 | Status: DISCONTINUED | OUTPATIENT
Start: 2023-06-30 | End: 2023-07-10

## 2023-06-30 RX ORDER — DEXTROSE 50 % IN WATER 50 %
25 SYRINGE (ML) INTRAVENOUS ONCE
Refills: 0 | Status: DISCONTINUED | OUTPATIENT
Start: 2023-06-30 | End: 2023-07-02

## 2023-06-30 RX ORDER — KETOTIFEN FUMARATE 0.34 MG/ML
1 SOLUTION OPHTHALMIC
Refills: 0 | Status: DISCONTINUED | OUTPATIENT
Start: 2023-06-30 | End: 2023-07-10

## 2023-06-30 RX ORDER — BUDESONIDE AND FORMOTEROL FUMARATE DIHYDRATE 160; 4.5 UG/1; UG/1
2 AEROSOL RESPIRATORY (INHALATION)
Refills: 0 | Status: DISCONTINUED | OUTPATIENT
Start: 2023-06-30 | End: 2023-07-10

## 2023-06-30 RX ORDER — SODIUM CHLORIDE 9 MG/ML
1000 INJECTION, SOLUTION INTRAVENOUS
Refills: 0 | Status: DISCONTINUED | OUTPATIENT
Start: 2023-06-30 | End: 2023-07-03

## 2023-06-30 RX ORDER — DEXTROSE 50 % IN WATER 50 %
12.5 SYRINGE (ML) INTRAVENOUS ONCE
Refills: 0 | Status: DISCONTINUED | OUTPATIENT
Start: 2023-06-30 | End: 2023-07-02

## 2023-06-30 RX ORDER — CALCIUM CARBONATE 500(1250)
2 TABLET ORAL DAILY
Refills: 0 | Status: DISCONTINUED | OUTPATIENT
Start: 2023-06-30 | End: 2023-07-10

## 2023-06-30 RX ORDER — GLUCAGON INJECTION, SOLUTION 0.5 MG/.1ML
1 INJECTION, SOLUTION SUBCUTANEOUS ONCE
Refills: 0 | Status: DISCONTINUED | OUTPATIENT
Start: 2023-06-30 | End: 2023-07-03

## 2023-06-30 RX ORDER — ASPIRIN/CALCIUM CARB/MAGNESIUM 324 MG
81 TABLET ORAL DAILY
Refills: 0 | Status: DISCONTINUED | OUTPATIENT
Start: 2023-06-30 | End: 2023-07-07

## 2023-06-30 RX ORDER — FLUOXETINE HCL 10 MG
20 CAPSULE ORAL DAILY
Refills: 0 | Status: DISCONTINUED | OUTPATIENT
Start: 2023-06-30 | End: 2023-07-10

## 2023-06-30 RX ORDER — SIMETHICONE 80 MG/1
80 TABLET, CHEWABLE ORAL
Refills: 0 | Status: DISCONTINUED | OUTPATIENT
Start: 2023-06-30 | End: 2023-07-10

## 2023-06-30 RX ORDER — ALBUTEROL 90 UG/1
2 AEROSOL, METERED ORAL EVERY 6 HOURS
Refills: 0 | Status: DISCONTINUED | OUTPATIENT
Start: 2023-06-30 | End: 2023-07-10

## 2023-06-30 RX ADMIN — Medication 81 MILLIGRAM(S): at 12:03

## 2023-06-30 RX ADMIN — Medication 100 MICROGRAM(S): at 05:17

## 2023-06-30 RX ADMIN — BUDESONIDE AND FORMOTEROL FUMARATE DIHYDRATE 2 PUFF(S): 160; 4.5 AEROSOL RESPIRATORY (INHALATION) at 16:57

## 2023-06-30 RX ADMIN — ENOXAPARIN SODIUM 40 MILLIGRAM(S): 100 INJECTION SUBCUTANEOUS at 12:04

## 2023-06-30 RX ADMIN — KETOTIFEN FUMARATE 1 DROP(S): 0.34 SOLUTION OPHTHALMIC at 16:57

## 2023-06-30 RX ADMIN — Medication 20 MILLIGRAM(S): at 12:04

## 2023-06-30 RX ADMIN — SIMETHICONE 80 MILLIGRAM(S): 80 TABLET, CHEWABLE ORAL at 17:07

## 2023-06-30 RX ADMIN — PANTOPRAZOLE SODIUM 40 MILLIGRAM(S): 20 TABLET, DELAYED RELEASE ORAL at 05:17

## 2023-06-30 NOTE — CONSULT NOTE ADULT - NS ATTEND AMEND GEN_ALL_CORE FT
The Structural Heart team was asked to evaluate Ms Hu in the setting of a degenerated SAVR (23mm Perimount) with severe stenosis (and mild AI). I interviewed/examined her at the bedside with her aide present and her father joining us on speakerphone. She notes several months of MEADOWS, angina, dizziness, and labile BP (with intermittent hypotension, prompting this admission). She reports no fatigue or syncope. I have reviewed her TTE today which demonstrates significant bioprosthetic AS. Notes report a recent angiogram at an outside hospital on 5/31/23 with no significant CAD--we will have to obtain this report (and preferably a CD with the angiographic images for our review). She will be scheduled for a cardiac structural CT on Monday to evaluate if her anatomy is suitable for a TAV in HUBER (aka Valve in Valve) procedure--she and her father would prefer this to a re-do surgical AVR if her anatomy is favorable for it. I discussed the potential risks and benefits with them in detail. We will touch base after our team evaluates her CT next week. Ideally, if her anatomy is suitable for TAV in HUBER, I would propose we keep her in the hospital and do the procedure at the end of next week (pending review of her angiogram). I have answered all questions in detail.  Rafy Henry MD / Cell [phone]

## 2023-06-30 NOTE — PROGRESS NOTE ADULT - SUBJECTIVE AND OBJECTIVE BOX
[facility] Division of Hospital Medicine  Cheryl Snyder MD  Pager (M-F, 8A-5P): [phone]  Other Times:  [phone]    Patient is a 43y old  Female who presents with a chief complaint of chest pain, tavr eval (29 Jun 2023 23:00)      SUBJECTIVE / OVERNIGHT EVENTS:  ADDITIONAL REVIEW OF SYSTEMS:    MEDICATIONS  (STANDING):  aspirin enteric coated 81 milliGRAM(s) Oral daily  enoxaparin Injectable 40 milliGRAM(s) SubCutaneous every 24 hours  FLUoxetine 20 milliGRAM(s) Oral daily  levothyroxine 100 MICROGram(s) Oral daily  pantoprazole    Tablet 40 milliGRAM(s) Oral before breakfast    MEDICATIONS  (PRN):      CAPILLARY BLOOD GLUCOSE        I&O's Summary      PHYSICAL EXAM:  Vital Signs Last 24 Hrs  T(C): 36.7 (30 Jun 2023 04:40), Max: 36.7 (30 Jun 2023 04:40)  T(F): 98 (30 Jun 2023 04:40), Max: 98 (30 Jun 2023 04:40)  HR: 65 (30 Jun 2023 04:40) (64 - 65)  BP: 106/58 (30 Jun 2023 04:40) (106/58 - 114/68)  BP(mean): --  RR: 18 (30 Jun 2023 04:40) (18 - 18)  SpO2: 92% (30 Jun 2023 04:40) (92% - 93%)    Parameters below as of 30 Jun 2023 04:40  Patient On (Oxygen Delivery Method): room air      CONSTITUTIONAL: NAD, well-developed, well-groomed  EYES: PERRLA; conjunctiva and sclera clear  ENMT: Moist oral mucosa, no pharyngeal injection or exudates; normal dentition  NECK: Supple, no palpable masses; no thyromegaly  RESPIRATORY: Normal respiratory effort; lungs are clear to auscultation bilaterally  CARDIOVASCULAR: Regular rate and rhythm, normal S1 and S2, no murmur/rub/gallop; No lower extremity edema; Peripheral pulses are 2+ bilaterally  ABDOMEN: Nontender to palpation, normoactive bowel sounds, no rebound/guarding; No hepatosplenomegaly  MUSCULOSKELETAL:  Normal gait; no clubbing or cyanosis of digits; no joint swelling or tenderness to palpation  PSYCH: A+O to person, place, and time; affect appropriate  NEUROLOGY: CN 2-12 are intact and symmetric; no gross sensory deficits   SKIN: No rashes; no palpable lesions    LABS: reviewed                        11.0   3.69  )-----------( 130      ( 30 Jun 2023 07:53 )             32.4     06-30    142  |  109<H>  |  16  ----------------------------<  86  4.2   |  22  |  1.24    Ca    9.5      30 Jun 2023 07:53  Phos  4.4     06-30  Mg     1.9     06-30    TPro  6.4  /  Alb  3.6  /  TBili  0.7  /  DBili  x   /  AST  32  /  ALT  21  /  AlkPhos  60  06-30    PT/INR - ( 30 Jun 2023 07:53 )   PT: 13.5 sec;   INR: 1.17 ratio         PTT - ( 30 Jun 2023 07:53 )  PTT:31.1 sec  CARDIAC MARKERS ( 30 Jun 2023 07:53 )  x     / x     / 34 U/L / x     / x          Urinalysis Basic - ( 30 Jun 2023 07:53 )    Color: x / Appearance: x / SG: x / pH: x  Gluc: 86 mg/dL / Ketone: x  / Bili: x / Urobili: x   Blood: x / Protein: x / Nitrite: x   Leuk Esterase: x / RBC: x / WBC x   Sq Epi: x / Non Sq Epi: x / Bacteria: x     [facility] Division of Hospital Medicine  Cheryl Snyder MD  Pager (M-F, 8A-5P): [phone]  Other Times:  [phone]    Patient is a 43y old  Female who presents with a chief complaint of chest pain, tavr eval (29 Jun 2023 23:00)      SUBJECTIVE / OVERNIGHT EVENTS: No acute events  ADDITIONAL REVIEW OF SYSTEMS: negative    MEDICATIONS  (STANDING):  aspirin enteric coated 81 milliGRAM(s) Oral daily  enoxaparin Injectable 40 milliGRAM(s) SubCutaneous every 24 hours  FLUoxetine 20 milliGRAM(s) Oral daily  levothyroxine 100 MICROGram(s) Oral daily  pantoprazole    Tablet 40 milliGRAM(s) Oral before breakfast    PHYSICAL EXAM:  T(C): 36.4 (06-30-23 @ 12:30), Max: 36.7 (06-30-23 @ 04:40)  T(F): 97.6 (06-30-23 @ 12:30), Max: 98 (06-30-23 @ 04:40)  HR: 65 (06-30-23 @ 12:30) (64 - 65)  BP: 97/51 (06-30-23 @ 12:30) (97/51 - 114/68)  RR: 18 (06-30-23 @ 12:30) (18 - 18)  SpO2: 97% (06-30-23 @ 12:30) (92% - 97%)  Wt(kg): --      CONSTITUTIONAL: NAD, well-appearing  EYES: PERRLA; conjunctiva and sclera clear  ENMT: Moist oral mucosa, no pharyngeal injection or exudate  NECK: Supple, no palpable masses; no thyromegaly  RESPIRATORY: Normal respiratory effort; lungs are clear to auscultation bilaterally  CARDIOVASCULAR: Regular rate and rhythm, +systolic murmur with click, No lower extremity edema  ABDOMEN: Nontender to palpation, normoactive bowel sounds, no rebound/guarding; No hepatosplenomegaly  MUSCULOSKELETAL:  Normal gait; no clubbing or cyanosis of digits; no joint swelling or tenderness to palpation  PSYCH: A+O to person, place, and time; affect appropriate  NEUROLOGY: CN 2-12 are intact and symmetric; no gross sensory deficits   SKIN: No rashes; no palpable lesions    LABS: reviewed                        11.0   3.69  )-----------( 130      ( 30 Jun 2023 07:53 )             32.4     06-30    142  |  109<H>  |  16  ----------------------------<  86  4.2   |  22  |  1.24    Ca    9.5      30 Jun 2023 07:53  Phos  4.4     06-30  Mg     1.9     06-30    TPro  6.4  /  Alb  3.6  /  TBili  0.7  /  DBili  x   /  AST  32  /  ALT  21  /  AlkPhos  60  06-30    PT/INR - ( 30 Jun 2023 07:53 )   PT: 13.5 sec;   INR: 1.17 ratio         PTT - ( 30 Jun 2023 07:53 )  PTT:31.1 sec  CARDIAC MARKERS ( 30 Jun 2023 07:53 )  x     / x     / 34 U/L / x     / x          Urinalysis Basic - ( 30 Jun 2023 07:53 )    Color: x / Appearance: x / SG: x / pH: x  Gluc: 86 mg/dL / Ketone: x  / Bili: x / Urobili: x   Blood: x / Protein: x / Nitrite: x   Leuk Esterase: x / RBC: x / WBC x   Sq Epi: x / Non Sq Epi: x / Bacteria: x    < from: TTE W or WO Ultrasound Enhancing Agent (06.30.23 @ 10:46) >  CONCLUSIONS:      1. The left ventricular systolic function is normal with an ejection fraction of 74 % by Meyers's method of disks.   2. Perimembranous defect. Left to right shunting.   3. Normal right ventricular cavity size,normal right ventricular wall thickness and normal right ventricular systolic function.   4. Perimembranous defect. Left to right shunting.   5. A bioprosthetic valve replacement present in the aortic position. Severe prosthetic aortic stenosis. Mildintravalvular regurgitation No paravalvular regurgitation.   6. Elevated gradients measured in the descending aorta of 2.7m/s, consider further evaluation for aortic coarctation.   7. No prior echocardiogram is available for comparison.    < end of copied text >

## 2023-06-30 NOTE — PROGRESS NOTE ADULT - ASSESSMENT
43F c hx down syndrome, cognitive impairment, AVR (1989) c/b moderate paravalvular insufficiency and mod-severe AS, nonobstructive CAD, PCOS, hypothyroidism, asthma, TRINITY on nocturnal CPAP, DM2, gout, and unconfirmed chart history of cirrhosis, CVA, reported recent hospitalization at [facility] for ?STEMI s/p [facility] (5/31/23) showing nonobstructive CAD, presented from group home to [facility] on 6/29/23 with CP, now transferred to [facility] for JOSEE TAVR eval and at request of pt's father (Reji [phone]).       Problem/Plan - 1:  ·  Problem: Unstable angina pectoris.   ·  Plan: - will need to obtain cath report from OSH  - tele  - TTE  - CE  - EKG.     Problem/Plan - 2:  ·  Problem: Aortic stenosis.   ·  Plan: - will need to call structural heart consult in AM  - TTE as above.     Problem/Plan - 3:  ·  Problem: Down syndrome.   ·  Plan: - PT eval  - cont fluoxetine  - fall precautions. 44 yo F with history of Trisomy 21, aortic stenosis s/p posterior root enlargement, aortic valve replacement (23 mm bovine pericardialvalve) and LVOT muscular resection on 7/1/2014, TRINITY on nocturnal C-pap, NIDDM, hypothyroidism, asthma, DM2, gout, recent hospitalization at [facility] for chest pain s/p LHC (5/31/23) showing nonobstructive CAD, presented from [facility] home to [facility] on 6/29/23 with CP, now transferred to [facility] for JOSEE TAVR eval and at request of pt's father (Reji [phone]).    outpatient congenital cardiology team (Dr. Sandoval)

## 2023-07-01 LAB
A1C WITH ESTIMATED AVERAGE GLUCOSE RESULT: 4.3 % — SIGNIFICANT CHANGE UP (ref 4–5.6)
ESTIMATED AVERAGE GLUCOSE: 77 MG/DL — SIGNIFICANT CHANGE UP (ref 68–114)
GLUCOSE BLDC GLUCOMTR-MCNC: 85 MG/DL — SIGNIFICANT CHANGE UP (ref 70–99)
GLUCOSE BLDC GLUCOMTR-MCNC: 89 MG/DL — SIGNIFICANT CHANGE UP (ref 70–99)
GLUCOSE BLDC GLUCOMTR-MCNC: 90 MG/DL — SIGNIFICANT CHANGE UP (ref 70–99)
T3 SERPL-MCNC: 114 NG/DL — SIGNIFICANT CHANGE UP (ref 80–200)
T4 AB SER-ACNC: 11.9 UG/DL — SIGNIFICANT CHANGE UP (ref 4.6–12)
TSH SERPL-MCNC: 0.01 UIU/ML — LOW (ref 0.27–4.2)

## 2023-07-01 PROCEDURE — 99232 SBSQ HOSP IP/OBS MODERATE 35: CPT

## 2023-07-01 RX ORDER — POLYETHYLENE GLYCOL 3350 17 G/17G
17 POWDER, FOR SOLUTION ORAL DAILY
Refills: 0 | Status: DISCONTINUED | OUTPATIENT
Start: 2023-07-01 | End: 2023-07-10

## 2023-07-01 RX ORDER — SENNA PLUS 8.6 MG/1
2 TABLET ORAL AT BEDTIME
Refills: 0 | Status: DISCONTINUED | OUTPATIENT
Start: 2023-07-01 | End: 2023-07-10

## 2023-07-01 RX ADMIN — Medication 100 MICROGRAM(S): at 05:38

## 2023-07-01 RX ADMIN — KETOTIFEN FUMARATE 1 DROP(S): 0.34 SOLUTION OPHTHALMIC at 17:44

## 2023-07-01 RX ADMIN — KETOTIFEN FUMARATE 1 DROP(S): 0.34 SOLUTION OPHTHALMIC at 05:38

## 2023-07-01 RX ADMIN — BUDESONIDE AND FORMOTEROL FUMARATE DIHYDRATE 2 PUFF(S): 160; 4.5 AEROSOL RESPIRATORY (INHALATION) at 17:44

## 2023-07-01 RX ADMIN — SIMETHICONE 80 MILLIGRAM(S): 80 TABLET, CHEWABLE ORAL at 17:44

## 2023-07-01 RX ADMIN — ENOXAPARIN SODIUM 40 MILLIGRAM(S): 100 INJECTION SUBCUTANEOUS at 12:01

## 2023-07-01 RX ADMIN — POLYETHYLENE GLYCOL 3350 17 GRAM(S): 17 POWDER, FOR SOLUTION ORAL at 17:44

## 2023-07-01 RX ADMIN — Medication 1 TABLET(S): at 12:00

## 2023-07-01 RX ADMIN — PANTOPRAZOLE SODIUM 40 MILLIGRAM(S): 20 TABLET, DELAYED RELEASE ORAL at 05:38

## 2023-07-01 RX ADMIN — Medication 1000 UNIT(S): at 12:00

## 2023-07-01 RX ADMIN — BUDESONIDE AND FORMOTEROL FUMARATE DIHYDRATE 2 PUFF(S): 160; 4.5 AEROSOL RESPIRATORY (INHALATION) at 05:38

## 2023-07-01 RX ADMIN — Medication 20 MILLIGRAM(S): at 08:36

## 2023-07-01 RX ADMIN — SIMETHICONE 80 MILLIGRAM(S): 80 TABLET, CHEWABLE ORAL at 05:38

## 2023-07-01 RX ADMIN — Medication 81 MILLIGRAM(S): at 12:00

## 2023-07-01 RX ADMIN — Medication 1 PACKET(S): at 12:01

## 2023-07-01 RX ADMIN — Medication 2 TABLET(S): at 12:01

## 2023-07-01 NOTE — PHYSICAL THERAPY INITIAL EVALUATION ADULT - GENERAL OBSERVATIONS, REHAB EVAL
Pt a/w chest pain, +TAVR workup. Pt received sitting EOB, +Tele, +IVL, +caregiver at bedside, A&Ox3, follows commands. Attempted earlier, + with AM Care and in shower. Pt agreeable now for ambulation.

## 2023-07-01 NOTE — PROGRESS NOTE ADULT - SUBJECTIVE AND OBJECTIVE BOX
[facility] Division of Hospital Medicine  Cheryl Snyder MD  Pager (M-F, 8A-5P): [phone]  Other Times:  [phone]    Patient is a 43y old  Female who presents with a chief complaint of chest pain, tavr eval       SUBJECTIVE / OVERNIGHT EVENTS: No acute events  ADDITIONAL REVIEW OF SYSTEMS: negative    MEDICATIONS  (STANDING):  aspirin enteric coated 81 milliGRAM(s) Oral daily  budesonide  80 MICROgram(s)/formoterol 4.5 MICROgram(s) Inhaler 2 Puff(s) Inhalation two times a day  calcium carbonate    500 mG (Tums) Chewable 2 Tablet(s) Chew daily  cholecalciferol 1000 Unit(s) Oral daily  dextrose 5%. 1000 milliLiter(s) (100 mL/Hr) IV Continuous <Continuous>  dextrose 5%. 1000 milliLiter(s) (50 mL/Hr) IV Continuous <Continuous>  dextrose 50% Injectable 25 Gram(s) IV Push once  dextrose 50% Injectable 12.5 Gram(s) IV Push once  dextrose 50% Injectable 25 Gram(s) IV Push once  enoxaparin Injectable 40 milliGRAM(s) SubCutaneous every 24 hours  FLUoxetine 20 milliGRAM(s) Oral daily  glucagon  Injectable 1 milliGRAM(s) IntraMuscular once  insulin lispro (ADMELOG) corrective regimen sliding scale   SubCutaneous three times a day before meals  ketotifen 0.025% Ophthalmic Solution 1 Drop(s) Both EYES two times a day  levothyroxine 100 MICROGram(s) Oral daily  multivitamin 1 Tablet(s) Oral daily  pantoprazole    Tablet 40 milliGRAM(s) Oral before breakfast  polyethylene glycol 3350 17 Gram(s) Oral daily  psyllium Powder 1 Packet(s) Oral daily  senna 2 Tablet(s) Oral at bedtime  simethicone 80 milliGRAM(s) Chew two times a day    MEDICATIONS  (PRN):  albuterol    90 MICROgram(s) HFA Inhaler 2 Puff(s) Inhalation every 6 hours PRN Shortness of Breath and/or Wheezing  aluminum hydroxide/magnesium hydroxide/simethicone Suspension 30 milliLiter(s) Oral once PRN Dyspepsia  dextrose Oral Gel 15 Gram(s) Oral once PRN Blood Glucose LESS THAN 70 milliGRAM(s)/deciliter      T(C): 36.5 (07-01-23 @ 12:41), Max: 36.9 (06-30-23 @ 20:26)  T(F): 97.7 (07-01-23 @ 12:41), Max: 98.5 (06-30-23 @ 20:26)  HR: 63 (07-01-23 @ 12:43) (58 - 95)  BP: 98/56 (07-01-23 @ 12:43) (89/44 - 105/46)  RR: 17 (07-01-23 @ 12:41) (17 - 18)  SpO2: 100% (07-01-23 @ 12:43) (93% - 100%)  Wt(kg): --    CONSTITUTIONAL: NAD, well-appearing  EYES: PERRLA; conjunctiva and sclera clear  ENMT: Moist oral mucosa, no pharyngeal injection or exudate  NECK: Supple, no palpable masses; no thyromegaly  RESPIRATORY: Normal respiratory effort; lungs are clear to auscultation bilaterally  CARDIOVASCULAR: Regular rate and rhythm, +systolic murmur with click, No lower extremity edema  ABDOMEN: Nontender to palpation, normoactive bowel sounds, no rebound/guarding; No hepatosplenomegaly  MUSCULOSKELETAL:  No clubbing or cyanosis of digits; no joint swelling or tenderness to palpation  PSYCH: A+O to person, place, and time; affect appropriate  NEUROLOGY: CN 2-12 are intact and symmetric; no gross sensory deficits   SKIN: No rashes; no palpable lesions    LABS: reviewed                                       11.0   3.69  )-----------( 130      ( 30 Jun 2023 07:53 )             32.4       06-30    142  |  109<H>  |  16  ----------------------------<  86  4.2   |  22  |  1.24    Ca    9.5      30 Jun 2023 07:53  Phos  4.4     06-30  Mg     1.9     06-30    TPro  6.4  /  Alb  3.6  /  TBili  0.7  /  DBili  x   /  AST  32  /  ALT  21  /  AlkPhos  60  06-30              Urinalysis Basic - ( 30 Jun 2023 07:53 )    Color: x / Appearance: x / SG: x / pH: x  Gluc: 86 mg/dL / Ketone: x  / Bili: x / Urobili: x   Blood: x / Protein: x / Nitrite: x   Leuk Esterase: x / RBC: x / WBC x   Sq Epi: x / Non Sq Epi: x / Bacteria: x        PT/INR - ( 30 Jun 2023 07:53 )   PT: 13.5 sec;   INR: 1.17 ratio         PTT - ( 30 Jun 2023 07:53 )  PTT:31.1 sec    CARDIAC MARKERS ( 30 Jun 2023 07:53 )  x     / x     / 34 U/L / x     / x            CAPILLARY BLOOD GLUCOSE      POCT Blood Glucose.: 89 mg/dL (01 Jul 2023 12:07)            < from: TTE W or WO Ultrasound Enhancing Agent (06.30.23 @ 10:46) >  CONCLUSIONS:      1. The left ventricular systolic function is normal with an ejection fraction of 74 % by Meyers's method of disks.   2. Perimembranous defect. Left to right shunting.   3. Normal right ventricular cavity size,normal right ventricular wall thickness and normal right ventricular systolic function.   4. Perimembranous defect. Left to right shunting.   5. A bioprosthetic valve replacement present in the aortic position. Severe prosthetic aortic stenosis. Mildintravalvular regurgitation No paravalvular regurgitation.   6. Elevated gradients measured in the descending aorta of 2.7m/s, consider further evaluation for aortic coarctation.   7. No prior echocardiogram is available for comparison.    < end of copied text >

## 2023-07-01 NOTE — PHYSICAL THERAPY INITIAL EVALUATION ADULT - PERTINENT HX OF CURRENT PROBLEM, REHAB EVAL
Pt is a 43F admitted to [facility] on 6/29/23 c hx down syndrome, cognitive impairment, AVR (1989) c/b moderate paravalvular insufficiency and mod-severe AS, nonobstructive CAD, PCOS, hypothyroidism, asthma, TRINITY on nocturnal CPAP, DM2, gout, and unconfirmed chart history of cirrhosis, CVA, reported recent hospitalization at [facility] for ?STEMI s/p LHC (5/31/23) showing nonobstructive CAD, presented from group home to [facility] on 6/29/23 with CP, now transferred to [facility] for JOSEE TAVR eval and at request of pt's father (Reji [phone]). Hospital course: Pt currently A&Ox1. Per aide pt is normally A&Ox2-3 baseline. Pt reportedly presented to OSH with nonradiating CP. Pt previously had LHC. CE neg @ OSH. Now txf'd to [facility] for JOSEE TAVR eval. Pt currently denies CP, SOB, presyncopal symptoms, GI symptoms. Reports dry cough. 6/30: Structural consulted: testing will include cardiac CTA, carotid dopplers, PFTs. We will try to assess her CAD on the cardiac CTA to avoid a LHC if possible, TAVR inpatient.

## 2023-07-01 NOTE — PROGRESS NOTE ADULT - ASSESSMENT
42 yo F with history of Trisomy 21, aortic stenosis s/p posterior root enlargement, aortic valve replacement (23 mm bovine pericardialvalve) and LVOT muscular resection on 7/1/2014, TRINITY on nocturnal C-pap, NIDDM, hypothyroidism, asthma, DM2, gout, recent hospitalization at [facility] for chest pain s/p LHC (5/31/23) showing nonobstructive CAD, presented from [facility] home to [facility] on 6/29/23 with CP, now transferred to [facility] for JOSEE TAVR eval and at request of pt's father (Reji [phone]).    outpatient congenital cardiology team (Dr. Sandoval)

## 2023-07-01 NOTE — PHYSICAL THERAPY INITIAL EVALUATION ADULT - ADDITIONAL COMMENTS
Pt resides in a group home, +few steps to enter, +few steps to room, lives with 6 other individuals, +group home staff to assist. PTA amb independently, ind dressing, staff to assist with showers as needed, + group dinners, attends a day program every day.

## 2023-07-01 NOTE — PHYSICAL THERAPY INITIAL EVALUATION ADULT - NSPTDISCHREC_GEN_A_CORE
DC no PT needs at this time, assist from group home staff as prior to admission, no DME needs at this time, +few steps to negotiate to enter group home/to room, CM to be notiifed./No skilled PT needs

## 2023-07-02 LAB — GLUCOSE BLDC GLUCOMTR-MCNC: 75 MG/DL — SIGNIFICANT CHANGE UP (ref 70–99)

## 2023-07-02 PROCEDURE — 99232 SBSQ HOSP IP/OBS MODERATE 35: CPT

## 2023-07-02 RX ADMIN — SENNA PLUS 2 TABLET(S): 8.6 TABLET ORAL at 21:51

## 2023-07-02 RX ADMIN — KETOTIFEN FUMARATE 1 DROP(S): 0.34 SOLUTION OPHTHALMIC at 17:49

## 2023-07-02 RX ADMIN — KETOTIFEN FUMARATE 1 DROP(S): 0.34 SOLUTION OPHTHALMIC at 05:17

## 2023-07-02 RX ADMIN — Medication 1 TABLET(S): at 12:04

## 2023-07-02 RX ADMIN — Medication 81 MILLIGRAM(S): at 12:04

## 2023-07-02 RX ADMIN — PANTOPRAZOLE SODIUM 40 MILLIGRAM(S): 20 TABLET, DELAYED RELEASE ORAL at 06:14

## 2023-07-02 RX ADMIN — Medication 100 MICROGRAM(S): at 05:16

## 2023-07-02 RX ADMIN — Medication 20 MILLIGRAM(S): at 12:05

## 2023-07-02 RX ADMIN — Medication 2 TABLET(S): at 07:42

## 2023-07-02 RX ADMIN — ALBUTEROL 2 PUFF(S): 90 AEROSOL, METERED ORAL at 05:17

## 2023-07-02 RX ADMIN — POLYETHYLENE GLYCOL 3350 17 GRAM(S): 17 POWDER, FOR SOLUTION ORAL at 12:03

## 2023-07-02 RX ADMIN — BUDESONIDE AND FORMOTEROL FUMARATE DIHYDRATE 2 PUFF(S): 160; 4.5 AEROSOL RESPIRATORY (INHALATION) at 17:49

## 2023-07-02 RX ADMIN — SIMETHICONE 80 MILLIGRAM(S): 80 TABLET, CHEWABLE ORAL at 17:49

## 2023-07-02 RX ADMIN — BUDESONIDE AND FORMOTEROL FUMARATE DIHYDRATE 2 PUFF(S): 160; 4.5 AEROSOL RESPIRATORY (INHALATION) at 05:18

## 2023-07-02 RX ADMIN — Medication 1000 UNIT(S): at 12:04

## 2023-07-02 RX ADMIN — Medication 1 PACKET(S): at 12:04

## 2023-07-02 RX ADMIN — SIMETHICONE 80 MILLIGRAM(S): 80 TABLET, CHEWABLE ORAL at 05:16

## 2023-07-02 RX ADMIN — ENOXAPARIN SODIUM 40 MILLIGRAM(S): 100 INJECTION SUBCUTANEOUS at 12:04

## 2023-07-02 NOTE — PROGRESS NOTE ADULT - ASSESSMENT
44 yo F with history of Trisomy 21, aortic stenosis s/p posterior root enlargement, aortic valve replacement (23 mm bovine pericardialvalve) and LVOT muscular resection on 7/1/2014, TRINITY on nocturnal C-pap, NIDDM, hypothyroidism, asthma, DM2, gout, recent hospitalization at [facility] for chest pain s/p LHC (5/31/23) showing nonobstructive CAD, presented from [facility] home to [facility] on 6/29/23 with CP, now transferred to [facility] for JOSEE TAVR eval and at request of pt's father (Reji [phone]).    outpatient congenital cardiology team (Dr. Sandoval)

## 2023-07-02 NOTE — PROGRESS NOTE ADULT - SUBJECTIVE AND OBJECTIVE BOX
[facility] Division of Hospital Medicine  Cheryl Snyder MD  Pager (M-F, 8A-5P): [phone]  Other Times:  [phone]    Patient is a 43y old  Female who presents with a chief complaint of chest pain, tavr eval       SUBJECTIVE / OVERNIGHT EVENTS: No acute events  ADDITIONAL REVIEW OF SYSTEMS: negative    MEDICATIONS  (STANDING):  aspirin enteric coated 81 milliGRAM(s) Oral daily  budesonide  80 MICROgram(s)/formoterol 4.5 MICROgram(s) Inhaler 2 Puff(s) Inhalation two times a day  calcium carbonate    500 mG (Tums) Chewable 2 Tablet(s) Chew daily  cholecalciferol 1000 Unit(s) Oral daily  dextrose 5%. 1000 milliLiter(s) (100 mL/Hr) IV Continuous <Continuous>  dextrose 5%. 1000 milliLiter(s) (50 mL/Hr) IV Continuous <Continuous>  dextrose 50% Injectable 25 Gram(s) IV Push once  dextrose 50% Injectable 12.5 Gram(s) IV Push once  dextrose 50% Injectable 25 Gram(s) IV Push once  enoxaparin Injectable 40 milliGRAM(s) SubCutaneous every 24 hours  FLUoxetine 20 milliGRAM(s) Oral daily  glucagon  Injectable 1 milliGRAM(s) IntraMuscular once  insulin lispro (ADMELOG) corrective regimen sliding scale   SubCutaneous three times a day before meals  ketotifen 0.025% Ophthalmic Solution 1 Drop(s) Both EYES two times a day  levothyroxine 100 MICROGram(s) Oral daily  multivitamin 1 Tablet(s) Oral daily  pantoprazole    Tablet 40 milliGRAM(s) Oral before breakfast  polyethylene glycol 3350 17 Gram(s) Oral daily  psyllium Powder 1 Packet(s) Oral daily  senna 2 Tablet(s) Oral at bedtime  simethicone 80 milliGRAM(s) Chew two times a day    MEDICATIONS  (PRN):  albuterol    90 MICROgram(s) HFA Inhaler 2 Puff(s) Inhalation every 6 hours PRN Shortness of Breath and/or Wheezing  aluminum hydroxide/magnesium hydroxide/simethicone Suspension 30 milliLiter(s) Oral once PRN Dyspepsia  dextrose Oral Gel 15 Gram(s) Oral once PRN Blood Glucose LESS THAN 70 milliGRAM(s)/deciliter    Vital Signs Last 24 Hrs  T(C): 36.4 (02 Jul 2023 11:59), Max: 36.8 (01 Jul 2023 20:04)  T(F): 97.6 (02 Jul 2023 11:59), Max: 98.3 (01 Jul 2023 20:04)  HR: 61 (02 Jul 2023 11:59) (56 - 90)  BP: 105/65 (02 Jul 2023 11:59) (102/51 - 108/62)  BP(mean): --  RR: 18 (02 Jul 2023 11:59) (16 - 18)  SpO2: 98% (02 Jul 2023 11:59) (95% - 98%)    Parameters below as of 02 Jul 2023 11:59  Patient On (Oxygen Delivery Method): room air    CONSTITUTIONAL: NAD, well-appearing  EYES: PERRLA; conjunctiva and sclera clear  ENMT: Moist oral mucosa, no pharyngeal injection or exudate  NECK: Supple, no palpable masses; no thyromegaly  RESPIRATORY: Normal respiratory effort; lungs are clear to auscultation bilaterally  CARDIOVASCULAR: Regular rate and rhythm, +systolic murmur with click, No lower extremity edema  ABDOMEN: Nontender to palpation, normoactive bowel sounds, no rebound/guarding  MUSCULOSKELETAL:  No clubbing or cyanosis of digits; no joint swelling or tenderness to palpation  PSYCH: A+O to person, place, and time; affect appropriate  NEUROLOGY: CN 2-12 are intact and symmetric; no gross sensory deficits   SKIN: No rashes; no palpable lesions    LABS: reviewed                                                CAPILLARY BLOOD GLUCOSE      POCT Blood Glucose.: 75 mg/dL (02 Jul 2023 12:11)

## 2023-07-03 LAB
ANION GAP SERPL CALC-SCNC: 11 MMOL/L — SIGNIFICANT CHANGE UP (ref 5–17)
APPEARANCE UR: CLEAR — SIGNIFICANT CHANGE UP
BACTERIA # UR AUTO: NEGATIVE — SIGNIFICANT CHANGE UP
BILIRUB UR-MCNC: NEGATIVE — SIGNIFICANT CHANGE UP
BUN SERPL-MCNC: 17 MG/DL — SIGNIFICANT CHANGE UP (ref 7–23)
CALCIUM SERPL-MCNC: 9.6 MG/DL — SIGNIFICANT CHANGE UP (ref 8.4–10.5)
CHLORIDE SERPL-SCNC: 107 MMOL/L — SIGNIFICANT CHANGE UP (ref 96–108)
CO2 SERPL-SCNC: 24 MMOL/L — SIGNIFICANT CHANGE UP (ref 22–31)
COLOR SPEC: YELLOW — SIGNIFICANT CHANGE UP
CREAT SERPL-MCNC: 1.05 MG/DL — SIGNIFICANT CHANGE UP (ref 0.5–1.3)
DIFF PNL FLD: NEGATIVE — SIGNIFICANT CHANGE UP
EGFR: 67 ML/MIN/1.73M2 — SIGNIFICANT CHANGE UP
EPI CELLS # UR: 4 /HPF — SIGNIFICANT CHANGE UP
GLUCOSE BLDC GLUCOMTR-MCNC: 116 MG/DL — HIGH (ref 70–99)
GLUCOSE SERPL-MCNC: 86 MG/DL — SIGNIFICANT CHANGE UP (ref 70–99)
GLUCOSE UR QL: NEGATIVE — SIGNIFICANT CHANGE UP
HCT VFR BLD CALC: 34.8 % — SIGNIFICANT CHANGE UP (ref 34.5–45)
HGB BLD-MCNC: 11.9 G/DL — SIGNIFICANT CHANGE UP (ref 11.5–15.5)
HYALINE CASTS # UR AUTO: 2 /LPF — SIGNIFICANT CHANGE UP (ref 0–2)
KETONES UR-MCNC: NEGATIVE — SIGNIFICANT CHANGE UP
LEUKOCYTE ESTERASE UR-ACNC: NEGATIVE — SIGNIFICANT CHANGE UP
MCHC RBC-ENTMCNC: 32.2 PG — SIGNIFICANT CHANGE UP (ref 27–34)
MCHC RBC-ENTMCNC: 34.2 GM/DL — SIGNIFICANT CHANGE UP (ref 32–36)
MCV RBC AUTO: 94.1 FL — SIGNIFICANT CHANGE UP (ref 80–100)
NITRITE UR-MCNC: NEGATIVE — SIGNIFICANT CHANGE UP
NRBC # BLD: 0 /100 WBCS — SIGNIFICANT CHANGE UP (ref 0–0)
PH UR: 6.5 — SIGNIFICANT CHANGE UP (ref 5–8)
PLATELET # BLD AUTO: 120 K/UL — LOW (ref 150–400)
POTASSIUM SERPL-MCNC: 4 MMOL/L — SIGNIFICANT CHANGE UP (ref 3.5–5.3)
POTASSIUM SERPL-SCNC: 4 MMOL/L — SIGNIFICANT CHANGE UP (ref 3.5–5.3)
PROT UR-MCNC: ABNORMAL
RBC # BLD: 3.7 M/UL — LOW (ref 3.8–5.2)
RBC # FLD: 13.5 % — SIGNIFICANT CHANGE UP (ref 10.3–14.5)
RBC CASTS # UR COMP ASSIST: 2 /HPF — SIGNIFICANT CHANGE UP (ref 0–4)
SODIUM SERPL-SCNC: 142 MMOL/L — SIGNIFICANT CHANGE UP (ref 135–145)
SP GR SPEC: >1.05 (ref 1.01–1.02)
UROBILINOGEN FLD QL: ABNORMAL
WBC # BLD: 3.45 K/UL — LOW (ref 3.8–10.5)
WBC # FLD AUTO: 3.45 K/UL — LOW (ref 3.8–10.5)
WBC UR QL: 6 /HPF — HIGH (ref 0–5)

## 2023-07-03 PROCEDURE — 74174 CTA ABD&PLVS W/CONTRAST: CPT | Mod: 26

## 2023-07-03 PROCEDURE — 75574 CT ANGIO HRT W/3D IMAGE: CPT | Mod: 26

## 2023-07-03 PROCEDURE — 71045 X-RAY EXAM CHEST 1 VIEW: CPT | Mod: 26

## 2023-07-03 PROCEDURE — 71275 CT ANGIOGRAPHY CHEST: CPT | Mod: 26

## 2023-07-03 PROCEDURE — 99233 SBSQ HOSP IP/OBS HIGH 50: CPT

## 2023-07-03 RX ADMIN — Medication 1000 UNIT(S): at 12:09

## 2023-07-03 RX ADMIN — BUDESONIDE AND FORMOTEROL FUMARATE DIHYDRATE 2 PUFF(S): 160; 4.5 AEROSOL RESPIRATORY (INHALATION) at 17:52

## 2023-07-03 RX ADMIN — KETOTIFEN FUMARATE 1 DROP(S): 0.34 SOLUTION OPHTHALMIC at 05:40

## 2023-07-03 RX ADMIN — Medication 1 TABLET(S): at 12:09

## 2023-07-03 RX ADMIN — KETOTIFEN FUMARATE 1 DROP(S): 0.34 SOLUTION OPHTHALMIC at 17:52

## 2023-07-03 RX ADMIN — SENNA PLUS 2 TABLET(S): 8.6 TABLET ORAL at 20:57

## 2023-07-03 RX ADMIN — Medication 2 TABLET(S): at 12:08

## 2023-07-03 RX ADMIN — PANTOPRAZOLE SODIUM 40 MILLIGRAM(S): 20 TABLET, DELAYED RELEASE ORAL at 05:39

## 2023-07-03 RX ADMIN — Medication 100 MICROGRAM(S): at 05:39

## 2023-07-03 RX ADMIN — ENOXAPARIN SODIUM 40 MILLIGRAM(S): 100 INJECTION SUBCUTANEOUS at 12:08

## 2023-07-03 RX ADMIN — POLYETHYLENE GLYCOL 3350 17 GRAM(S): 17 POWDER, FOR SOLUTION ORAL at 12:09

## 2023-07-03 RX ADMIN — SIMETHICONE 80 MILLIGRAM(S): 80 TABLET, CHEWABLE ORAL at 05:39

## 2023-07-03 RX ADMIN — Medication 1 PACKET(S): at 12:08

## 2023-07-03 RX ADMIN — Medication 81 MILLIGRAM(S): at 12:09

## 2023-07-03 RX ADMIN — SIMETHICONE 80 MILLIGRAM(S): 80 TABLET, CHEWABLE ORAL at 17:52

## 2023-07-03 RX ADMIN — BUDESONIDE AND FORMOTEROL FUMARATE DIHYDRATE 2 PUFF(S): 160; 4.5 AEROSOL RESPIRATORY (INHALATION) at 05:39

## 2023-07-03 RX ADMIN — Medication 20 MILLIGRAM(S): at 12:09

## 2023-07-03 NOTE — RAPID RESPONSE TEAM SUMMARY - NSSITUATIONBACKGROUNDRRT_GEN_ALL_CORE
43F with history of Trisomy 21, aortic stenosis s/p posterior root enlargement, aortic valve replacement (23 mm bovine pericardialvalve) and LVOT muscular resection on 7/1/2014, TRINITY on nocturnal C-pap, NIDDM, hypothyroidism, asthma, DM2, gout, recent hospitalization at [facility] for chest pain s/p C (5/31/23) showing nonobstructive CAD, planned for TAV in [place] tentatively 7/7. Rapid response called for unresponsiveness, on arrival of team patient no longer unresponsive.

## 2023-07-03 NOTE — PROGRESS NOTE ADULT - ASSESSMENT
43F with history of Trisomy 21, aortic stenosis s/p posterior root enlargement, aortic valve replacement (23 mm bovine pericardialvalve) and LVOT muscular resection on 7/1/2014, TRINITY on nocturnal C-pap, NIDDM, hypothyroidism, asthma, DM2, gout, recent hospitalization at [facility] for chest pain s/p LHC (5/31/23) showing nonobstructive CAD, presented from [facility] home to [facility] on 6/29/23 with CP, now transferred to [facility] for JOSEE TAVR eval and at request of pt's father (Reji [phone]).    outpatient congenital cardiology team (Dr. Sandoval)

## 2023-07-03 NOTE — PROGRESS NOTE ADULT - SUBJECTIVE AND OBJECTIVE BOX
[facility] Division of Hospital Medicine  Laron De La Cruz MD  Available via MS Teams    SUBJECTIVE / OVERNIGHT EVENTS: No acute events overnight. Pt seen and examined at bedside. Has some baseline substernal chest pain, denies any sob.      MEDICATIONS  (STANDING):  aspirin enteric coated 81 milliGRAM(s) Oral daily  budesonide  80 MICROgram(s)/formoterol 4.5 MICROgram(s) Inhaler 2 Puff(s) Inhalation two times a day  calcium carbonate    500 mG (Tums) Chewable 2 Tablet(s) Chew daily  cholecalciferol 1000 Unit(s) Oral daily  dextrose 5%. 1000 milliLiter(s) (50 mL/Hr) IV Continuous <Continuous>  dextrose 5%. 1000 milliLiter(s) (100 mL/Hr) IV Continuous <Continuous>  enoxaparin Injectable 40 milliGRAM(s) SubCutaneous every 24 hours  FLUoxetine 20 milliGRAM(s) Oral daily  glucagon  Injectable 1 milliGRAM(s) IntraMuscular once  ketotifen 0.025% Ophthalmic Solution 1 Drop(s) Both EYES two times a day  levothyroxine 100 MICROGram(s) Oral daily  multivitamin 1 Tablet(s) Oral daily  pantoprazole    Tablet 40 milliGRAM(s) Oral before breakfast  polyethylene glycol 3350 17 Gram(s) Oral daily  psyllium Powder 1 Packet(s) Oral daily  senna 2 Tablet(s) Oral at bedtime  simethicone 80 milliGRAM(s) Chew two times a day    MEDICATIONS  (PRN):  albuterol    90 MICROgram(s) HFA Inhaler 2 Puff(s) Inhalation every 6 hours PRN Shortness of Breath and/or Wheezing  aluminum hydroxide/magnesium hydroxide/simethicone Suspension 30 milliLiter(s) Oral once PRN Dyspepsia      I&O's Summary    02 Jul 2023 07:01  -  03 Jul 2023 07:00  --------------------------------------------------------  IN: 1020 mL / OUT: 400 mL / NET: 620 mL        PHYSICAL EXAM:  Vital Signs Last 24 Hrs  T(C): 36.6 (03 Jul 2023 04:10), Max: 36.6 (02 Jul 2023 19:41)  T(F): 97.8 (03 Jul 2023 04:10), Max: 97.9 (02 Jul 2023 19:41)  HR: 58 (03 Jul 2023 04:10) (58 - 62)  BP: 108/63 (03 Jul 2023 04:10) (105/62 - 108/63)  BP(mean): --  RR: 16 (03 Jul 2023 04:10) (16 - 18)  SpO2: 99% (03 Jul 2023 04:10) (95% - 100%)    Parameters below as of 03 Jul 2023 04:10  Patient On (Oxygen Delivery Method): room air      CONSTITUTIONAL: NAD, well appearing  NECK: Supple, no palpable masses; no thyromegaly  RESPIRATORY: Normal respiratory effort; lungs are clear to auscultation bilaterally  CARDIOVASCULAR: systolic murmur, regular murmur  ABDOMEN: Nontender to palpation, normoactive bowel sounds, no rebound/guarding; No hepatosplenomegaly  MUSCULOSKELETAL: no clubbing or cyanosis of digits; no joint swelling or tenderness to palpation  PSYCH: A+O to person, place, and time; affect appropriate  NEUROLOGY: CN 2-12 are intact and symmetric; no gross sensory deficits   SKIN: No rashes; no palpable lesions    LABS:                        11.9   3.45  )-----------( 120      ( 03 Jul 2023 06:15 )             34.8     07-03    142  |  107  |  17  ----------------------------<  86  4.0   |  24  |  1.05    Ca    9.6      03 Jul 2023 06:15      Urinalysis Basic - ( 03 Jul 2023 06:15 )    Color: x / Appearance: x / SG: x / pH: x  Gluc: 86 mg/dL / Ketone: x  / Bili: x / Urobili: x   Blood: x / Protein: x / Nitrite: x   Leuk Esterase: x / RBC: x / WBC x   Sq Epi: x / Non Sq Epi: x / Bacteria: x        RADIOLOGY & ADDITIONAL TESTS:  New Results Reviewed Today:   New Imaging Personally Reviewed Today:  New Electrocardiogram Personally Reviewed Today:  Prior or Outpatient Records Reviewed Today:    COMMUNICATION:  Care Discussed with Consultants/Other Providers and Details of Discussion: Discussed with ACP  Discussions with Patient/Family:  PCP Communication:

## 2023-07-03 NOTE — PROGRESS NOTE ADULT - SUBJECTIVE AND OBJECTIVE BOX
*****Structural Heart Team*****    Subjective:    Patient is resting comfortably at the edge of the bed, currently offering no complaints. She denies chest pain, but states she has SOB "at Times".    PAST MEDICAL & SURGICAL HISTORY:        T(C): 36.6 (07-03-23 @ 04:10), Max: 36.6 (07-02-23 @ 19:41)  HR: 58 (07-03-23 @ 04:10) (58 - 62)  BP: 108/63 (07-03-23 @ 04:10) (105/62 - 108/63)  RR: 16 (07-03-23 @ 04:10) (16 - 18)  SpO2: 99% (07-03-23 @ 04:10) (95% - 100%)  Wt(kg): --  07-02 @ 07:01  -  07-03 @ 07:00  --------------------------------------------------------  IN: 1020 mL / OUT: 400 mL / NET: 620 mL      MEDICATIONS  (STANDING):  aspirin enteric coated 81 milliGRAM(s) Oral daily  budesonide  80 MICROgram(s)/formoterol 4.5 MICROgram(s) Inhaler 2 Puff(s) Inhalation two times a day  calcium carbonate    500 mG (Tums) Chewable 2 Tablet(s) Chew daily  cholecalciferol 1000 Unit(s) Oral daily  dextrose 5%. 1000 milliLiter(s) (50 mL/Hr) IV Continuous <Continuous>  dextrose 5%. 1000 milliLiter(s) (100 mL/Hr) IV Continuous <Continuous>  enoxaparin Injectable 40 milliGRAM(s) SubCutaneous every 24 hours  FLUoxetine 20 milliGRAM(s) Oral daily  glucagon  Injectable 1 milliGRAM(s) IntraMuscular once  ketotifen 0.025% Ophthalmic Solution 1 Drop(s) Both EYES two times a day  levothyroxine 100 MICROGram(s) Oral daily  multivitamin 1 Tablet(s) Oral daily  pantoprazole    Tablet 40 milliGRAM(s) Oral before breakfast  polyethylene glycol 3350 17 Gram(s) Oral daily  psyllium Powder 1 Packet(s) Oral daily  senna 2 Tablet(s) Oral at bedtime  simethicone 80 milliGRAM(s) Chew two times a day    MEDICATIONS  (PRN):  albuterol    90 MICROgram(s) HFA Inhaler 2 Puff(s) Inhalation every 6 hours PRN Shortness of Breath and/or Wheezing  aluminum hydroxide/magnesium hydroxide/simethicone Suspension 30 milliLiter(s) Oral once PRN Dyspepsia      Review of Symptoms:  Constitutional: Awake, Alert, Follows commands  Respiratory: + SOB  Cardiac: Denies CP, Denies Palpitations  Gastrointestinal: Denies Pain, Denies N/V, tolerating po intake  Vascular: Negative  Extremities: No Edema, No joint pain or swelling  Neurological: Negative  Endocrine: No heat or cold intolerance, No excessive thirst  Heme/Onc: Negative    Exam:  General: Awake and alert  HEENT: Supple, No JVD, Trachea midline, no masses  Pulmonary: CTAB, = Chest Excursion, no accessory muscle use  Cor: S1S2, RRR, III/VI SARKIS  ECG: SR  Gastrointestinal: Soft, NT/ND, + Bowel Sounds  Neuro: = motor and sensory B/L, No focal deficits  Vascular: 1+ Pulses B/L, No Edema  Extremities: No Joint pain or swelling  Skin: Warm/Dry/Normal color, Normal turgor, no rashes                          11.9   3.45  )-----------( 120      ( 03 Jul 2023 06:15 )             34.8   07-03    142  |  107  |  17  ----------------------------<  86  4.0   |  24  |  1.05    Ca    9.6      03 Jul 2023 06:15        Imaging Reviewed:    Echocardiogram: < from: TTE W or WO Ultrasound Enhancing Agent (06.30.23 @ 10:46) >  FINDINGS:     Left Ventricle:  The left ventricular systolic function is normal with a calculated ejection fraction of 74 % by the Meyers's biplane method of disks. Thereare no regional wall motion abnormalities seen.     Right Ventricle:  Normal right ventricular cavity size, normal wall thickness and normal right ventricular systolic function. Tricuspid annular plane systolic excursion (TAPSE) is 2.2 cm (normal >=1.7 cm).     Interventricular Septum:  Perimembranous defect. Left to right shunting.     Left Atrium:  The left atrium is moderately dilated with an indexed volume of 44.17 ml/m².     Right Atrium:  The right atrium is normal with an indexed volume of20.41 ml/m².     Interatrial Septum:  The interatrial septum appears intact.     Aortic Valve:  A bioprosthetic valve replacement present in the aortic position. The aortic valve area (by Continuity Equation) is 0.93 cm² with a Doppler velocity indexof 0.30. Aortic valve peak gradient is 55.6 mmHg and aortic valve mean gradient is 29.9 mmHg with a peak velocity of 3.73 m/s. There is severe prosthetic aortic stenosis. There is mild intravalvular regurgitation. There is no paravalvular regurgitation. There is mild aortic regurgitation.     Mitral Valve:  Thickened mitral valve leaflets. There is moderate mitral valve stenosis. The transmitral mean gradient is 7.47 mmHg at a heart rate of 62 bpm. The mitral valvemean gradient is 7.47 mmHg at a heart rate of 62 bpm. There is mild to moderate mitral regurgitation.     Tricuspid Valve:  Structurally normal tricuspid valve with normal leaflet excursion. There is mild-moderate tricuspid regurgitation. Estimated pulmonary artery systolic pressureis 46 mmHg.     Aorta:  Elevated gradients measured in the descending aorta of 2.7m/s, consider further evaluation for aortic coarctation.     Pericardium:  No pericardial effusion seen.     Systemic Veins:  The inferior vena cava is normal (normal <2.1cm) with normal inspiratory collapse (normal >50%) consistent with normal right atrial pressure (~3, range 0-5mmHg).  ____________________________________________________________________  Quantitative Data:  Left Ventricle Measurements: (Indexed toBSA)     IVSd (2D):   1.2 cm  LVPWd (2D):  1.2 cm  LVIDd (2D):  4.6 cm  LVIDs (2D):  2.7 cm  LV Mass:     197 g  124.2 g/m²  BiPlane LV EF%: 74 %     MV E Vmax:    1.89 m/s  MV A Vmax:    1.24 m/s  MV E/A:       1.52  e' lateral:   9.46 cm/s  e' medial:    6.31 cm/s  E/e' lateral: 19.98  E/e' medial:  29.95  E/e' Average: 23.97    Aorta Measurements:     Ao Sinus:      3.0 cm  Ao Root s, 2D: 3.0 cm       Left Atrium Measurements: (Indexed to BSA)  LA Diam 2D: 4.10 cm    Right Ventricle Measurements: Right Atrial Measurements:     TAPSE:           2.2 cm       RA Vol:       32.30 ml  TV Cyndi. S':      12.10 cm/s   RA Vol Index: 20.41 ml/m²  RV Base (RVID1): 3.9 cm  RV Mid (RVID2):  2.5 cm       LVOT / RVOT/ Qp/Qs Data: (Indexed to BSA)  LVOT Diameter: 2.00 cm  LVOT Vmax:     1.13 m/s  LVOT VTI:      25.72 cm  LVOT SV:       80.8 ml    51.07 ml/m²  LVOT CO:       4.93 l/min 3.12 l/min/m²    Aortic Valve Measurements:  AV Vmax:           372.8 cm/s  AV Peak Gradient:  55.6 mmHg  AV Mean Gradient:  29.9 mmHg  AV VTI:            87.0 cm  AV VTI Ratio:      0.30  AoV EOA, Contin:   0.93 cm²  AoV EOA, Contin i: 0.59 cm²/m²      < end of copied text >      Cardiac Catheterization: Awaiting Report from [facility]        Assesment/Plan:  44 y/o female with Down Syndrome, Severe AI secondary to Degenerated Bioprosthetic Valve    1.) Severe Bio AVR Insufficiency: Patient still c/o some MEADOWS. She had a Cardiac Catheterization on 5/31/23, so we will try to reach out and get the report and possibly the images for our review. She is on the schedule for a Gated Cardiac CT today. Possible TAV in [place] this Friday once all testing reviewed.  2.) Ambulate as tolerated.    LATHA Cifuentes  [id number]

## 2023-07-03 NOTE — RAPID RESPONSE TEAM SUMMARY - NSADDTLFINDINGSRRT_GEN_ALL_CORE
Vitals on arrival wnl. Patient afebrile, ___________. Patient AOx3 and stated she remembered starring and feeling "out of it," as she went from a laying down to seated position. Physical exam concerning for left sided weakness LUE 4/5 strength and LLE 4/5, compared to 5/5 on RUE/RLL. Pupils equal and reactive.  Vitals on arrival wnl. Patient afebrile, 118/69, 75 bpm, satting 99% oxygen saturation (pt placed on 3L for comfort) finger stick 116. Patient AOx3 and stated she remembered starring and feeling "out of it," as she went from a laying down to seated position. Physical exam concerning for left sided weakness LUE 4/5 strength and LLE 4/5, compared to 5/5 on RUE/RLL. Pupils equal and reactive. Repeat vitals with pt sitting more upright: 97/47, HR 67 bpm.

## 2023-07-03 NOTE — RAPID RESPONSE TEAM SUMMARY - NSOTHERINTERVENTIONSRRT_GEN_ALL_CORE
Patient likely with vasovagal episode. Primary team to obtain orthostatic vitals, and consider fluid repletion but will touch base with cardiology prior given aortic stenosis/LVOT. CT head per primary team given change in neurologic findings (per family at bedside pt intermittently weak on left side, unknown etiology), can obtain infectious work up although less likely infectious in nature given lack of fever and normal vitals (RVP, blood cultures, urine culture). Patient likely with orthostatic hypotensive episode. Primary team to obtain orthostatic vitals, and consider fluid repletion but will touch base with cardiology prior given aortic stenosis/LVOT. CT head per primary team given change in neurologic findings (per family at bedside pt intermittently weak on left side, unknown etiology), can obtain infectious work up although less likely infectious in nature given lack of fever and normal vitals (RVP, blood cultures, urine culture). Patient likely with orthostatic hypotensive episode. Primary team to obtain orthostatic vitals, and consider fluid repletion but will touch base with cardiology prior given aortic stenosis/LVOT. CT head per primary team given change in neurologic findings (per family at bedside pt intermittently weak on left side, unknown etiology), can obtain infectious work up although less likely infectious in nature given lack of fever and grossly normal vitals (would obtain RVP, blood cultures, urine culture). No need for empiric antibiotics at this time

## 2023-07-04 DIAGNOSIS — R41.89 OTHER SYMPTOMS AND SIGNS INVOLVING COGNITIVE FUNCTIONS AND AWARENESS: ICD-10-CM

## 2023-07-04 LAB
ANION GAP SERPL CALC-SCNC: 9 MMOL/L — SIGNIFICANT CHANGE UP (ref 5–17)
BLD GP AB SCN SERPL QL: NEGATIVE — SIGNIFICANT CHANGE UP
BUN SERPL-MCNC: 16 MG/DL — SIGNIFICANT CHANGE UP (ref 7–23)
CALCIUM SERPL-MCNC: 9.3 MG/DL — SIGNIFICANT CHANGE UP (ref 8.4–10.5)
CHLORIDE SERPL-SCNC: 105 MMOL/L — SIGNIFICANT CHANGE UP (ref 96–108)
CO2 SERPL-SCNC: 26 MMOL/L — SIGNIFICANT CHANGE UP (ref 22–31)
CREAT SERPL-MCNC: 1 MG/DL — SIGNIFICANT CHANGE UP (ref 0.5–1.3)
EGFR: 71 ML/MIN/1.73M2 — SIGNIFICANT CHANGE UP
GLUCOSE SERPL-MCNC: 84 MG/DL — SIGNIFICANT CHANGE UP (ref 70–99)
HCT VFR BLD CALC: 33.2 % — LOW (ref 34.5–45)
HGB BLD-MCNC: 11.5 G/DL — SIGNIFICANT CHANGE UP (ref 11.5–15.5)
MAGNESIUM SERPL-MCNC: 2.2 MG/DL — SIGNIFICANT CHANGE UP (ref 1.6–2.6)
MCHC RBC-ENTMCNC: 32 PG — SIGNIFICANT CHANGE UP (ref 27–34)
MCHC RBC-ENTMCNC: 34.6 GM/DL — SIGNIFICANT CHANGE UP (ref 32–36)
MCV RBC AUTO: 92.5 FL — SIGNIFICANT CHANGE UP (ref 80–100)
NRBC # BLD: 0 /100 WBCS — SIGNIFICANT CHANGE UP (ref 0–0)
PHOSPHATE SERPL-MCNC: 5 MG/DL — HIGH (ref 2.5–4.5)
PLATELET # BLD AUTO: 126 K/UL — LOW (ref 150–400)
POTASSIUM SERPL-MCNC: 4.2 MMOL/L — SIGNIFICANT CHANGE UP (ref 3.5–5.3)
POTASSIUM SERPL-SCNC: 4.2 MMOL/L — SIGNIFICANT CHANGE UP (ref 3.5–5.3)
RBC # BLD: 3.59 M/UL — LOW (ref 3.8–5.2)
RBC # FLD: 13.8 % — SIGNIFICANT CHANGE UP (ref 10.3–14.5)
RH IG SCN BLD-IMP: POSITIVE — SIGNIFICANT CHANGE UP
SODIUM SERPL-SCNC: 140 MMOL/L — SIGNIFICANT CHANGE UP (ref 135–145)
WBC # BLD: 3.84 K/UL — SIGNIFICANT CHANGE UP (ref 3.8–10.5)
WBC # FLD AUTO: 3.84 K/UL — SIGNIFICANT CHANGE UP (ref 3.8–10.5)

## 2023-07-04 PROCEDURE — 70450 CT HEAD/BRAIN W/O DYE: CPT | Mod: 26

## 2023-07-04 PROCEDURE — 99232 SBSQ HOSP IP/OBS MODERATE 35: CPT

## 2023-07-04 RX ADMIN — KETOTIFEN FUMARATE 1 DROP(S): 0.34 SOLUTION OPHTHALMIC at 17:38

## 2023-07-04 RX ADMIN — Medication 1 TABLET(S): at 11:30

## 2023-07-04 RX ADMIN — Medication 1 PACKET(S): at 13:11

## 2023-07-04 RX ADMIN — Medication 2 TABLET(S): at 11:31

## 2023-07-04 RX ADMIN — Medication 100 MICROGRAM(S): at 05:49

## 2023-07-04 RX ADMIN — Medication 20 MILLIGRAM(S): at 11:30

## 2023-07-04 RX ADMIN — POLYETHYLENE GLYCOL 3350 17 GRAM(S): 17 POWDER, FOR SOLUTION ORAL at 11:30

## 2023-07-04 RX ADMIN — PANTOPRAZOLE SODIUM 40 MILLIGRAM(S): 20 TABLET, DELAYED RELEASE ORAL at 05:49

## 2023-07-04 RX ADMIN — SENNA PLUS 2 TABLET(S): 8.6 TABLET ORAL at 21:08

## 2023-07-04 RX ADMIN — SIMETHICONE 80 MILLIGRAM(S): 80 TABLET, CHEWABLE ORAL at 17:38

## 2023-07-04 RX ADMIN — Medication 81 MILLIGRAM(S): at 11:41

## 2023-07-04 RX ADMIN — SIMETHICONE 80 MILLIGRAM(S): 80 TABLET, CHEWABLE ORAL at 05:49

## 2023-07-04 RX ADMIN — ENOXAPARIN SODIUM 40 MILLIGRAM(S): 100 INJECTION SUBCUTANEOUS at 11:31

## 2023-07-04 RX ADMIN — Medication 1000 UNIT(S): at 11:30

## 2023-07-04 RX ADMIN — BUDESONIDE AND FORMOTEROL FUMARATE DIHYDRATE 2 PUFF(S): 160; 4.5 AEROSOL RESPIRATORY (INHALATION) at 17:38

## 2023-07-04 RX ADMIN — BUDESONIDE AND FORMOTEROL FUMARATE DIHYDRATE 2 PUFF(S): 160; 4.5 AEROSOL RESPIRATORY (INHALATION) at 05:49

## 2023-07-04 RX ADMIN — KETOTIFEN FUMARATE 1 DROP(S): 0.34 SOLUTION OPHTHALMIC at 05:49

## 2023-07-04 NOTE — PROGRESS NOTE ADULT - ASSESSMENT
43F with history of Trisomy 21, aortic stenosis s/p posterior root enlargement, aortic valve replacement (23 mm bovine pericardialvalve) and LVOT muscular resection on 7/1/2014, TRINITY on nocturnal C-pap, NIDDM, hypothyroidism, asthma, DM2, gout, recent hospitalization at [facility] for chest pain s/p LHC (5/31/23) showing nonobstructive CAD, presented from [facility] home to [facility] on 6/29/23 with CP, now transferred to [facility] for JOSEE TAVR eval and at request of pt's father (Reji [phone]).    outpatient congenital cardiology team (Dr. Sandoval)

## 2023-07-04 NOTE — PROGRESS NOTE ADULT - SUBJECTIVE AND OBJECTIVE BOX
[facility] Division of Hospital Medicine  Cheryl Snyder MD  Available on TEAMS 8a-8p  Other Times:  [phone]    SUBJECTIVE / OVERNIGHT EVENTS: RRT overnight for unresponsiveness, now resolved. Pt otherwise denies acute complaints      MEDICATIONS  (STANDING):  aspirin enteric coated 81 milliGRAM(s) Oral daily  budesonide  80 MICROgram(s)/formoterol 4.5 MICROgram(s) Inhaler 2 Puff(s) Inhalation two times a day  calcium carbonate    500 mG (Tums) Chewable 2 Tablet(s) Chew daily  cholecalciferol 1000 Unit(s) Oral daily  dextrose 5%. 1000 milliLiter(s) (50 mL/Hr) IV Continuous <Continuous>  dextrose 5%. 1000 milliLiter(s) (100 mL/Hr) IV Continuous <Continuous>  enoxaparin Injectable 40 milliGRAM(s) SubCutaneous every 24 hours  FLUoxetine 20 milliGRAM(s) Oral daily  glucagon  Injectable 1 milliGRAM(s) IntraMuscular once  ketotifen 0.025% Ophthalmic Solution 1 Drop(s) Both EYES two times a day  levothyroxine 100 MICROGram(s) Oral daily  multivitamin 1 Tablet(s) Oral daily  pantoprazole    Tablet 40 milliGRAM(s) Oral before breakfast  polyethylene glycol 3350 17 Gram(s) Oral daily  psyllium Powder 1 Packet(s) Oral daily  senna 2 Tablet(s) Oral at bedtime  simethicone 80 milliGRAM(s) Chew two times a day    MEDICATIONS  (PRN):  albuterol    90 MICROgram(s) HFA Inhaler 2 Puff(s) Inhalation every 6 hours PRN Shortness of Breath and/or Wheezing  aluminum hydroxide/magnesium hydroxide/simethicone Suspension 30 milliLiter(s) Oral once PRN Dyspepsia    I&O's Summary    03 Jul 2023 07:01  -  04 Jul 2023 07:00  --------------------------------------------------------  IN: 585 mL / OUT: 200 mL / NET: 385 mL    04 Jul 2023 07:01  -  04 Jul 2023 13:18  --------------------------------------------------------  IN: 600 mL / OUT: 0 mL / NET: 600 mL        PHYSICAL EXAM:  T(C): 36.4 (07-04-23 @ 11:25), Max: 36.7 (07-03-23 @ 20:11)  T(F): 97.6 (07-04-23 @ 11:25), Max: 98 (07-03-23 @ 20:11)  HR: 64 (07-04-23 @ 11:25) (60 - 68)  BP: 113/66 (07-04-23 @ 11:25) (103/61 - 113/66)  RR: 17 (07-04-23 @ 11:25) (16 - 17)  SpO2: 97% (07-04-23 @ 11:25) (97% - 100%)  Wt(kg): --    CONSTITUTIONAL: NAD, well appearing  NECK: Supple, no palpable masses; no thyromegaly  RESPIRATORY: Normal respiratory effort; lungs are clear to auscultation bilaterally  CARDIOVASCULAR: systolic murmur, regular murmur  ABDOMEN: Nontender to palpation, normoactive bowel sounds, no rebound/guarding; No hepatosplenomegaly  MUSCULOSKELETAL: no clubbing or cyanosis of digits; no joint swelling or tenderness to palpation  PSYCH: A+O to person, place, and time; affect appropriate  NEUROLOGY: CN 2-12 are intact and symmetric; no gross sensory deficits   SKIN: No rashes; no palpable lesions    LABS: reviewed                                         11.5   3.84  )-----------( 126      ( 04 Jul 2023 06:17 )             33.2       07-04    140  |  105  |  16  ----------------------------<  84  4.2   |  26  |  1.00    Ca    9.3      04 Jul 2023 06:17  Phos  5.0     07-04  Mg     2.2     07-04                Urinalysis Basic - ( 04 Jul 2023 06:17 )    Color: x / Appearance: x / SG: x / pH: x  Gluc: 84 mg/dL / Ketone: x  / Bili: x / Urobili: x   Blood: x / Protein: x / Nitrite: x   Leuk Esterase: x / RBC: x / WBC x   Sq Epi: x / Non Sq Epi: x / Bacteria: x                  CAPILLARY BLOOD GLUCOSE      POCT Blood Glucose.: 116 mg/dL (03 Jul 2023 16:41)

## 2023-07-05 LAB
HCT VFR BLD CALC: 35.8 % — SIGNIFICANT CHANGE UP (ref 34.5–45)
HGB BLD-MCNC: 12.3 G/DL — SIGNIFICANT CHANGE UP (ref 11.5–15.5)
MCHC RBC-ENTMCNC: 32.2 PG — SIGNIFICANT CHANGE UP (ref 27–34)
MCHC RBC-ENTMCNC: 34.4 GM/DL — SIGNIFICANT CHANGE UP (ref 32–36)
MCV RBC AUTO: 93.7 FL — SIGNIFICANT CHANGE UP (ref 80–100)
NRBC # BLD: 0 /100 WBCS — SIGNIFICANT CHANGE UP (ref 0–0)
PLATELET # BLD AUTO: 117 K/UL — LOW (ref 150–400)
RBC # BLD: 3.82 M/UL — SIGNIFICANT CHANGE UP (ref 3.8–5.2)
RBC # FLD: 13.7 % — SIGNIFICANT CHANGE UP (ref 10.3–14.5)
WBC # BLD: 2.99 K/UL — LOW (ref 3.8–10.5)
WBC # FLD AUTO: 2.99 K/UL — LOW (ref 3.8–10.5)

## 2023-07-05 PROCEDURE — 99233 SBSQ HOSP IP/OBS HIGH 50: CPT

## 2023-07-05 RX ADMIN — KETOTIFEN FUMARATE 1 DROP(S): 0.34 SOLUTION OPHTHALMIC at 17:08

## 2023-07-05 RX ADMIN — Medication 81 MILLIGRAM(S): at 11:47

## 2023-07-05 RX ADMIN — BUDESONIDE AND FORMOTEROL FUMARATE DIHYDRATE 2 PUFF(S): 160; 4.5 AEROSOL RESPIRATORY (INHALATION) at 05:34

## 2023-07-05 RX ADMIN — POLYETHYLENE GLYCOL 3350 17 GRAM(S): 17 POWDER, FOR SOLUTION ORAL at 11:48

## 2023-07-05 RX ADMIN — Medication 1 PACKET(S): at 11:48

## 2023-07-05 RX ADMIN — Medication 1 TABLET(S): at 11:47

## 2023-07-05 RX ADMIN — BUDESONIDE AND FORMOTEROL FUMARATE DIHYDRATE 2 PUFF(S): 160; 4.5 AEROSOL RESPIRATORY (INHALATION) at 17:08

## 2023-07-05 RX ADMIN — SENNA PLUS 2 TABLET(S): 8.6 TABLET ORAL at 21:12

## 2023-07-05 RX ADMIN — ENOXAPARIN SODIUM 40 MILLIGRAM(S): 100 INJECTION SUBCUTANEOUS at 11:49

## 2023-07-05 RX ADMIN — SIMETHICONE 80 MILLIGRAM(S): 80 TABLET, CHEWABLE ORAL at 17:08

## 2023-07-05 RX ADMIN — Medication 100 MICROGRAM(S): at 05:35

## 2023-07-05 RX ADMIN — Medication 1000 UNIT(S): at 11:47

## 2023-07-05 RX ADMIN — PANTOPRAZOLE SODIUM 40 MILLIGRAM(S): 20 TABLET, DELAYED RELEASE ORAL at 05:35

## 2023-07-05 RX ADMIN — Medication 20 MILLIGRAM(S): at 11:49

## 2023-07-05 RX ADMIN — SIMETHICONE 80 MILLIGRAM(S): 80 TABLET, CHEWABLE ORAL at 05:35

## 2023-07-05 RX ADMIN — KETOTIFEN FUMARATE 1 DROP(S): 0.34 SOLUTION OPHTHALMIC at 05:35

## 2023-07-05 RX ADMIN — Medication 2 TABLET(S): at 11:48

## 2023-07-05 NOTE — CONSULT NOTE ADULT - ASSESSMENT
42 y/o female with Down Syndrome, Severe Aortic Stenosis
Ms Hu is a 42y/o female with PMHx of down syndrome, cognitive impairment, AVR, nonobstructive CAD, PCOS, hypothyroidism, asthma, TRINITY on nocturnal CPAP, DM2, gout, and unconfirmed chart history of cirrhosis, CVA, transferred for evaluation of severe degeneration of bioprosthetic AVR.       AVR is from 2014 and is a 23mm Norton [id number] TFX

## 2023-07-05 NOTE — PROGRESS NOTE ADULT - ASSESSMENT
43F with history of Trisomy 21, aortic stenosis s/p posterior root enlargement, aortic valve replacement (23 mm bovine pericardialvalve) and LVOT muscular resection on 7/1/2014, TRINITY on nocturnal C-pap, NIDDM, hypothyroidism, asthma, DM2, gout, recent hospitalization at [facility] for chest pain s/p LHC (5/31/23) showing nonobstructive CAD, presented from [facility] home to [facility] on 6/29/23 with CP, now transferred to [facility] for JOSEE TAVR eval and at request of pt's father (Reji [phone]).    outpatient congenital cardiology team (Dr. Sandoval)

## 2023-07-05 NOTE — PROGRESS NOTE ADULT - TIME BILLING
- preparing to see the patient (eg, review of tests, documents)  - performing a medically appropriate examination and/or evaluation  - documenting clinical information in the electronic or other health record  - independently interpreting results and communicating results to the patient/family/caregiver
- preparing to see the patient (eg, review of tests, documents)  - obtaining and/or reviewing separately obtained history  - performing a medically appropriate examination and/or evaluation  - counseling and educating the patient/family/caregiver  - ordering medications, tests, or procedures  - referring and communicating with other health care professionals  - documenting clinical information in the electronic or other health record  - independently interpreting results and communicating results to the patient/family/caregiver  - care coordination
- preparing to see the patient (eg, review of tests, documents), performing a medically appropriate examination and/or evaluation, ordering medications, tests, or procedures, and documenting clinical information in the electronic or other health record
- preparing to see the patient (eg, review of tests, documents), performing a medically appropriate examination and/or evaluation, ordering medications, tests, or procedures, and documenting clinical information in the electronic or other health record
- preparing to see the patient (eg, review of tests, documents)  - performing a medically appropriate examination and/or evaluation  - documenting clinical information in the electronic or other health record  - independently interpreting results and communicating results to the patient/family/caregiver

## 2023-07-05 NOTE — CONSULT NOTE ADULT - SUBJECTIVE AND OBJECTIVE BOX
Structural Heart Team    HPI:  43F c hx down syndrome, cognitive impairment, AVR (1991) c/b moderate paravalvular insufficiency and mod-severe AS, nonobstructive CAD, PCOS, hypothyroidism, asthma, TRINITY on nocturnal CPAP, DM2, gout, and unconfirmed chart history of cirrhosis, CVA, reported recent hospitalization at [facility] for ?STEMI s/p LHC (5/31/23) showing nonobstructive CAD, presented from group home to [facility] on 6/29/23 with CP, now transferred to [facility] for JOSEE TAVR eval and at request of pt's father (Reji [phone]).    Pt currently A&Ox1. Per aide pt is normally A&Ox2-3 baseline. Pt reportedly presented to OSH with nonradiating CP. Pt previously had LHC. CE neg @ OSH. Now txf'd to [facility] for JOSEE TAVR eval. Pt currently denies CP, SOB, presyncopal symptoms, GI symptoms. Reports dry cough.    Today she is resting comfortably in a chair, offering no complaints. She denies any SOB or Chest pain at the moment. We are currently working her up for possible TAVR procedure this Friday.            07-04 @ 07:01 - 07-05 @ 07:00  --------------------------------------------------------  IN: 960 mL / OUT: 0 mL / NET: 960 mL    07-05 @ 07:01 - 07-05 @ 10:40  --------------------------------------------------------  IN: 240 mL / OUT: 0 mL / NET: 240 mL        PAST MEDICAL & SURGICAL HISTORY:      FAMILY HISTORY:            penicillins (Urticaria)    aspirin enteric coated 81 milliGRAM(s) Oral daily  budesonide  80 MICROgram(s)/formoterol 4.5 MICROgram(s) Inhaler 2 Puff(s) Inhalation two times a day  calcium carbonate    500 mG (Tums) Chewable 2 Tablet(s) Chew daily  cholecalciferol 1000 Unit(s) Oral daily  enoxaparin Injectable 40 milliGRAM(s) SubCutaneous every 24 hours  FLUoxetine 20 milliGRAM(s) Oral daily  ketotifen 0.025% Ophthalmic Solution 1 Drop(s) Both EYES two times a day  levothyroxine 100 MICROGram(s) Oral daily  multivitamin 1 Tablet(s) Oral daily  pantoprazole    Tablet 40 milliGRAM(s) Oral before breakfast  polyethylene glycol 3350 17 Gram(s) Oral daily  psyllium Powder 1 Packet(s) Oral daily  senna 2 Tablet(s) Oral at bedtime  simethicone 80 milliGRAM(s) Chew two times a day      T(C): 36.8 (07-05-23 @ 04:10), Max: 36.8 (07-05-23 @ 04:10)  HR: 67 (07-05-23 @ 04:10) (64 - 67)  BP: 125/55 (07-05-23 @ 04:10) (113/66 - 125/55)  RR: 18 (07-05-23 @ 04:10) (17 - 18)  SpO2: 94% (07-05-23 @ 04:10) (94% - 98%)  Wt(kg): --      Review of Symptoms:  General: Alert, Follows commands  Respiratory:  + MEADOWS,  Cardiac: Denies CP, Denies Palpitations  Gastrointestinal: Denies Pain, Denies N/V  Extremities: Denies Pedal Edema, No Joint pain  Vascular: Negative  Neurological: Negative  Endocrine: negative      Physical Exam:  Gen: Alert, Awake, NAD  HEENT: No JVD, Neck Supple, Trachea midline, No masses  Pulmonary: CTAB,  No accessory muscle use for respiration  Cardiac: S1S2, RRR, III/VI SARKIS    No Gallops/Rubs  ECG: SR  Gastrointestinal: Soft, NT/ND, + Bowel Sounds  Extremities:  No Edema,  No joint pain or swelling +PMSx4  Vascular: 1+ pulses B/L, No Bruits  Neurological: Non Focal, = motor and sensory      Laboratory:                        12.3   2.99  )-----------( 117      ( 05 Jul 2023 05:34 )             35.8    07-04    140  |  105  |  16  ----------------------------<  84  4.2   |  26  |  1.00    Ca    9.3      04 Jul 2023 06:17  Phos  5.0     07-04  Mg     2.2     07-04         Pro-BNP:        Transthoracic Echo:    < from: TTE W or WO Ultrasound Enhancing Agent (06.30.23 @ 10:46) >  CONCLUSIONS:      1. The left ventricular systolic function is normal with an ejection fraction of 74 % by Meyers's method of disks.   2. Perimembranous defect. Left to right shunting.   3. Normal right ventricular cavity size,normal right ventricular wall thickness and normal right ventricular systolic function.   4. Perimembranous defect. Left to right shunting.   5. A bioprosthetic valve replacement present in the aortic position. Severe prosthetic aortic stenosis. Mildintravalvular regurgitation No paravalvular regurgitation.   6. Elevated gradients measured in the descending aorta of 2.7m/s, consider further evaluation for aortic coarctation.   7. No prior echocardiogram is available for comparison.    ________________________________________________________________________________________  FINDINGS:     Left Ventricle:  The left ventricular systolic function is normal with a calculated ejection fraction of 74 % by the Meyers's biplane method of disks. Thereare no regional wall motion abnormalities seen.     Right Ventricle:  Normal right ventricular cavity size, normal wall thickness and normal right ventricular systolic function. Tricuspid annular plane systolic excursion (TAPSE) is 2.2 cm (normal >=1.7 cm).     Interventricular Septum:  Perimembranous defect. Left to right shunting.     Left Atrium:  The left atrium is moderately dilated with an indexed volume of 44.17 ml/m².     Right Atrium:  The right atrium is normal with an indexed volume of20.41 ml/m².     Interatrial Septum:  The interatrial septum appears intact.     Aortic Valve:  A bioprosthetic valve replacement present in the aortic position. The aortic valve area (by Continuity Equation) is 0.93 cm² with a Doppler velocity indexof 0.30. Aortic valve peak gradient is 55.6 mmHg and aortic valve mean gradient is 29.9 mmHg with a peak velocity of 3.73 m/s. There is severe prosthetic aortic stenosis. There is mild intravalvular regurgitation. There is no paravalvular regurgitation. There is mild aortic regurgitation.     Mitral Valve:  Thickened mitral valve leaflets. There is moderate mitral valve stenosis. The transmitral mean gradient is 7.47 mmHg at a heart rate of 62 bpm. The mitral valvemean gradient is 7.47 mmHg at a heart rate of 62 bpm. There is mild to moderate mitral regurgitation.     Tricuspid Valve:  Structurally normal tricuspid valve with normal leaflet excursion. There is mild-moderate tricuspid regurgitation. Estimated pulmonary artery systolic pressureis 46 mmHg.     Aorta:  Elevated gradients measured in the descending aorta of 2.7m/s, consider further evaluation for aortic coarctation.     Pericardium:  No pericardial effusion seen.     Systemic Veins:  The inferior vena cava is normal (normal <2.1cm) with normal inspiratory collapse (normal >50%) consistent with normal right atrial pressure (~3, range 0-5mmHg).  ____________________________________________________________________  Quantitative Data:  Left Ventricle Measurements: (Indexed toBSA)     IVSd (2D):   1.2 cm  LVPWd (2D):  1.2 cm  LVIDd (2D):  4.6 cm  LVIDs (2D):  2.7 cm  LV Mass:     197 g  124.2 g/m²  BiPlane LV EF%: 74 %     MV E Vmax:    1.89 m/s  MV A Vmax:    1.24 m/s  MV E/A:       1.52  e' lateral:   9.46 cm/s  e' medial:    6.31 cm/s  E/e' lateral: 19.98  E/e' medial:  29.95  E/e' Average: 23.97    Aorta Measurements:     Ao Sinus:      3.0 cm  Ao Root s, 2D: 3.0 cm       Left Atrium Measurements: (Indexed to BSA)  LA Diam 2D: 4.10 cm    Right Ventricle Measurements: Right Atrial Measurements:     TAPSE:           2.2 cm       RA Vol:       32.30 ml  TV Cyndi. S':      12.10 cm/s   RA Vol Index: 20.41 ml/m²  RV Base (RVID1): 3.9 cm  RV Mid (RVID2):  2.5 cm       LVOT / RVOT/ Qp/Qs Data: (Indexed to BSA)  LVOT Diameter: 2.00 cm  LVOT Vmax:     1.13 m/s  LVOT VTI:      25.72 cm  LVOT SV:       80.8 ml    51.07 ml/m²  LVOT CO:       4.93 l/min 3.12 l/min/m²    Aortic Valve Measurements:  AV Vmax:           372.8 cm/s  AV Peak Gradient:  55.6 mmHg  AV Mean Gradient:  29.9 mmHg  AV VTI:            87.0 cm  AV VTI Ratio:      0.30  AoV EOA, Contin:   0.93 cm²  AoV EOA, Contin i: 0.59 cm²/m²    Mitral Valve Measurements:     MV VTI (tips):  63.21 cm  MV Mean Grad:   7.47 mmHg  MV E Vmax:      1.9 m/s  MV A Vmax:      1.2 m/s  MV E/A:         1.5  MV Gradient HR: 62 bpm       Tricuspid Valve Measurements:     TR Vmax:          3.3 m/s  TR Peak Gradient: 43.0 mmHg  RA Pressure:      3 mmHg  PASP:             46 mmHg      < end of copied text >    TransEsophageal Echo:    Cardiac Cath: 5/31 @ [facility]: Non Obstructive  
Structural Heart Team    HPI:  43F c hx down syndrome, cognitive impairment, AVR (1991) c/b moderate paravalvular insufficiency and mod-severe AS, nonobstructive CAD, PCOS, hypothyroidism, asthma, TRINITY on nocturnal CPAP, DM2, gout, and unconfirmed chart history of cirrhosis, CVA, reported recent hospitalization at [facility] for ?STEMI s/p LHC (5/31/23) showing nonobstructive CAD, presented from group home to [facility] on 6/29/23 with CP, now transferred to [facility] for JOSEE TAVR eval and at request of pt's father (Reji [phone]).        Allergies    penicillins (Urticaria)    Intolerances      PAST MEDICAL & SURGICAL HISTORY:    MEDICATIONS  (STANDING):  aspirin enteric coated 81 milliGRAM(s) Oral daily  budesonide  80 MICROgram(s)/formoterol 4.5 MICROgram(s) Inhaler 2 Puff(s) Inhalation two times a day  calcium carbonate    500 mG (Tums) Chewable 2 Tablet(s) Chew daily  cholecalciferol 1000 Unit(s) Oral daily  dextrose 5%. 1000 milliLiter(s) (100 mL/Hr) IV Continuous <Continuous>  dextrose 5%. 1000 milliLiter(s) (50 mL/Hr) IV Continuous <Continuous>  dextrose 50% Injectable 25 Gram(s) IV Push once  dextrose 50% Injectable 12.5 Gram(s) IV Push once  dextrose 50% Injectable 25 Gram(s) IV Push once  enoxaparin Injectable 40 milliGRAM(s) SubCutaneous every 24 hours  FLUoxetine 20 milliGRAM(s) Oral daily  glucagon  Injectable 1 milliGRAM(s) IntraMuscular once  insulin lispro (ADMELOG) corrective regimen sliding scale   SubCutaneous three times a day before meals  ketotifen 0.025% Ophthalmic Solution 1 Drop(s) Both EYES two times a day  levothyroxine 100 MICROGram(s) Oral daily  multivitamin 1 Tablet(s) Oral daily  pantoprazole    Tablet 40 milliGRAM(s) Oral before breakfast  psyllium Powder 1 Packet(s) Oral daily  simethicone 80 milliGRAM(s) Chew two times a day      REVIEW OF SYSTEMS:    CONSTITUTIONAL: No weakness, fevers or chills  EYES/ENT: No visual changes;  No vertigo or throat pain   NECK: No pain or stiffness  RESPIRATORY: No cough, wheezing, hemoptysis; No shortness of breath  CARDIOVASCULAR: No chest pain or palpitations  GASTROINTESTINAL: No abdominal or epigastric pain. No nausea, vomiting, or hematemesis; No diarrhea or constipation. No melena or hematochezia.  GENITOURINARY: No dysuria, frequency or hematuria  NEUROLOGICAL: No numbness or weakness  SKIN: No itching, rashes      Vital Signs Last 24 Hrs  T(C): 36.4 (30 Jun 2023 12:30), Max: 36.7 (30 Jun 2023 04:40)  T(F): 97.6 (30 Jun 2023 12:30), Max: 98 (30 Jun 2023 04:40)  HR: 65 (30 Jun 2023 12:30) (64 - 65)  BP: 97/51 (30 Jun 2023 12:30) (97/51 - 114/68)  BP(mean): --  RR: 18 (30 Jun 2023 12:30) (18 - 18)  SpO2: 97% (30 Jun 2023 12:30) (92% - 97%)    Parameters below as of 30 Jun 2023 12:30  Patient On (Oxygen Delivery Method): room air                              11.0   3.69  )-----------( 130      ( 30 Jun 2023 07:53 )             32.4   06-30    142  |  109<H>  |  16  ----------------------------<  86  4.2   |  22  |  1.24    Ca    9.5      30 Jun 2023 07:53  Phos  4.4     06-30  Mg     1.9     06-30    TPro  6.4  /  Alb  3.6  /  TBili  0.7  /  DBili  x   /  AST  32  /  ALT  21  /  AlkPhos  60  06-30    PT/INR - ( 30 Jun 2023 07:53 )   PT: 13.5 sec;   INR: 1.17 ratio         PTT - ( 30 Jun 2023 07:53 )  PTT:31.1 sec    I&O's Summary    30 Jun 2023 07:01  -  30 Jun 2023 15:24  --------------------------------------------------------  IN: 480 mL / OUT: 400 mL / NET: 80 mL          Physical Exam  General: NAD, appropriate affect  Cardiac: s1s2, RRR, II/VI systolic murmur  Pulmonary: CTA b/l, no w/r/r  Gastrointestinal: soft abdomen, nontender, nondistended, + bowel sounds throughout  Extremities: no edema, 2+ DP pulses  Neuro: alert, nonfocal  EKG: SR    < from: TTE W or WO Ultrasound Enhancing Agent (06.30.23 @ 10:46) >  CONCLUSIONS:      1. The left ventricular systolic function is normal with an ejection fraction of 74 % by Meyers's method of disks.   2. Perimembranous defect. Left to right shunting.   3. Normal right ventricular cavity size,normal right ventricular wall thickness and normal right ventricular systolic function.   4. Perimembranous defect. Left to right shunting.   5. A bioprosthetic valve replacement present in the aortic position. Severe prosthetic aortic stenosis. Mildintravalvular regurgitation No paravalvular regurgitation.   6. Elevated gradients measured in the descending aorta of 2.7m/s, consider further evaluation for aortic coarctation.   7. No prior echocardiogram is available for comparison.    ________________________________________________________________________________________  FINDINGS:     Left Ventricle:  The left ventricular systolic function is normal with a calculated ejection fraction of 74 % by the Meyers's biplane method of disks. Thereare no regional wall motion abnormalities seen.     Right Ventricle:  Normal right ventricular cavity size, normal wall thickness and normal right ventricular systolic function. Tricuspid annular plane systolic excursion (TAPSE) is 2.2 cm (normal >=1.7 cm).     Interventricular Septum:  Perimembranous defect. Left to right shunting.     Left Atrium:  The left atrium is moderately dilated with an indexed volume of 44.17 ml/m².     Right Atrium:  The right atrium is normal with an indexed volume of20.41 ml/m².     Interatrial Septum:  The interatrial septum appears intact.     Aortic Valve:  A bioprosthetic valve replacement present in the aortic position. The aortic valve area (by Continuity Equation) is 0.93 cm² with a Doppler velocity indexof 0.30. Aortic valve peak gradient is 55.6 mmHg and aortic valve mean gradient is 29.9 mmHg with a peak velocity of 3.73 m/s. There is severe prosthetic aortic stenosis. There is mild intravalvular regurgitation. There is no paravalvular regurgitation. There is mild aortic regurgitation.     Mitral Valve:  Thickened mitral valve leaflets. There is moderate mitral valve stenosis. The transmitral mean gradient is 7.47 mmHg at a heart rate of 62 bpm. The mitral valvemean gradient is 7.47 mmHg at a heart rate of 62 bpm. There is mild to moderate mitral regurgitation.     Tricuspid Valve:  Structurally normal tricuspid valve with normal leaflet excursion. There is mild-moderate tricuspid regurgitation. Estimated pulmonary artery systolic pressureis 46 mmHg.     Aorta:  Elevated gradients measured in the descending aorta of 2.7m/s, consider further evaluation for aortic coarctation.     Pericardium:  No pericardial effusion seen.     Systemic Veins:  The inferior vena cava is normal (normal <2.1cm) with normal inspiratory collapse (normal >50%) consistent with normal right atrial pressure (~3, range 0-5mmHg).  ____________________________________________________________________  Quantitative Data:  Left Ventricle Measurements: (Indexed toBSA)     IVSd (2D):   1.2 cm  LVPWd (2D):  1.2 cm  LVIDd (2D):  4.6 cm  LVIDs (2D):  2.7 cm  LV Mass:     197 g  124.2 g/m²  BiPlane LV EF%: 74 %     MV E Vmax:    1.89 m/s  MV A Vmax:    1.24 m/s  MV E/A:       1.52  e' lateral:   9.46 cm/s  e' medial:    6.31 cm/s  E/e' lateral: 19.98  E/e' medial:  29.95  E/e' Average: 23.97    Aorta Measurements:     Ao Sinus:      3.0 cm  Ao Root s, 2D: 3.0 cm       Left Atrium Measurements: (Indexed to BSA)  LA Diam 2D: 4.10 cm    Right Ventricle Measurements: Right Atrial Measurements:     TAPSE:           2.2 cm       RA Vol:       32.30 ml  TV Cyndi. S':      12.10 cm/s   RA Vol Index: 20.41 ml/m²  RV Base (RVID1): 3.9 cm  RV Mid (RVID2):  2.5 cm       LVOT / RVOT/ Qp/Qs Data: (Indexed to BSA)  LVOT Diameter: 2.00 cm  LVOT Vmax:     1.13 m/s  LVOT VTI:      25.72 cm  LVOT SV:       80.8 ml    51.07 ml/m²  LVOT CO:       4.93 l/min 3.12 l/min/m²    Aortic Valve Measurements:  AV Vmax:           372.8 cm/s  AV Peak Gradient:  55.6 mmHg  AV Mean Gradient:  29.9 mmHg  AV VTI:            87.0 cm  AV VTI Ratio:      0.30  AoV EOA, Contin:   0.93 cm²  AoV EOA, Contin i: 0.59 cm²/m²    Mitral Valve Measurements:     MV VTI (tips):  63.21 cm  MV Mean Grad:   7.47 mmHg  MV E Vmax:      1.9 m/s  MV A Vmax:      1.2 m/s  MV E/A:         1.5  MV Gradient HR: 62 bpm       Tricuspid Valve Measurements:     TR Vmax:          3.3 m/s  TR Peak Gradient: 43.0 mmHg  RA Pressure:      3 mmHg  PASP:             46 mmHg    ________________________________________________________________________________________    < end of copied text >

## 2023-07-05 NOTE — CONSULT NOTE ADULT - PROBLEM SELECTOR RECOMMENDATION 9
Patient is being evaluated for Severe Aortic Stenosis, and is tentatively scheduled for DINO on Friday 7/7. Her CT and Cath are complete. It is noted that on her TTE, she has a membranous Septal defect with a Left to Right Shunt, and Moderate MR. These need to be further evaluated. Her TTE was reviewed with Dr. RODERICK Quick. We will get a CECY for a better look tomorrow. This should not hold up her DINO for Friday, unless the CECY shows something prohibitive for TAVR. Discussed with Dr. Henry and Ashley. Also discussed with MIRIAN Hebert from Medicine to let her know the plan. Someone from the team will discuss with the patients father also.
- echo confirms severe degeneration of her bioprosthetic aortic valve  - evaluation for Rowdy TAVR is in progress  -- testing will include cardiac CTA, carotid dopplers, PFTs. We will try to assess her CAD on the cardiac CTA to avoid a C if possible  - will request CTA for Monday with a goal of Rowdy TAVR possibly on Friday

## 2023-07-05 NOTE — PROGRESS NOTE ADULT - SUBJECTIVE AND OBJECTIVE BOX
[facility] Division of Hospital Medicine  Laron De La Cruz MD  Available via MS Teams    SUBJECTIVE / OVERNIGHT EVENTS: No acute events overnight. Pt seen and examined at bedside. Feels well, denies any chest pain, no further episodes of unresponsiveness.     ADDITIONAL REVIEW OF SYSTEMS:    MEDICATIONS  (STANDING):  aspirin enteric coated 81 milliGRAM(s) Oral daily  budesonide  80 MICROgram(s)/formoterol 4.5 MICROgram(s) Inhaler 2 Puff(s) Inhalation two times a day  calcium carbonate    500 mG (Tums) Chewable 2 Tablet(s) Chew daily  cholecalciferol 1000 Unit(s) Oral daily  enoxaparin Injectable 40 milliGRAM(s) SubCutaneous every 24 hours  FLUoxetine 20 milliGRAM(s) Oral daily  ketotifen 0.025% Ophthalmic Solution 1 Drop(s) Both EYES two times a day  levothyroxine 100 MICROGram(s) Oral daily  multivitamin 1 Tablet(s) Oral daily  pantoprazole    Tablet 40 milliGRAM(s) Oral before breakfast  polyethylene glycol 3350 17 Gram(s) Oral daily  psyllium Powder 1 Packet(s) Oral daily  senna 2 Tablet(s) Oral at bedtime  simethicone 80 milliGRAM(s) Chew two times a day    MEDICATIONS  (PRN):  albuterol    90 MICROgram(s) HFA Inhaler 2 Puff(s) Inhalation every 6 hours PRN Shortness of Breath and/or Wheezing  aluminum hydroxide/magnesium hydroxide/simethicone Suspension 30 milliLiter(s) Oral once PRN Dyspepsia      I&O's Summary    04 Jul 2023 07:01  -  05 Jul 2023 07:00  --------------------------------------------------------  IN: 960 mL / OUT: 0 mL / NET: 960 mL    05 Jul 2023 07:01  -  05 Jul 2023 14:05  --------------------------------------------------------  IN: 240 mL / OUT: 0 mL / NET: 240 mL        T(F): 97.8 (07-05-23 @ 12:03), Max: 98.3 (07-05-23 @ 04:10)  HR: 63 (07-05-23 @ 12:03) (63 - 67)  BP: 115/68 (07-05-23 @ 12:03) (114/68 - 125/55)  RR: 18 (07-05-23 @ 12:03) (18 - 18)  SpO2: 99% (07-05-23 @ 12:03) (93% - 99%)        CONSTITUTIONAL: NAD, well appearing  NECK: Supple, no palpable masses; no thyromegaly  RESPIRATORY: Normal respiratory effort; lungs are clear to auscultation bilaterally  CARDIOVASCULAR: systolic murmur, regular rate  ABDOMEN: Nontender to palpation, normoactive bowel sounds, no rebound/guarding; No hepatosplenomegaly  MUSCULOSKELETAL: no clubbing or cyanosis of digits; no joint swelling or tenderness to palpation  PSYCH: A+O to person, place, and time; affect appropriate  NEUROLOGY: CN 2-12 are intact and symmetric; no gross sensory deficits   SKIN: No rashes; no palpable lesions    LABS:                        12.3   2.99  )-----------( 117      ( 05 Jul 2023 05:34 )             35.8     07-04    140  |  105  |  16  ----------------------------<  84  4.2   |  26  |  1.00    Ca    9.3      04 Jul 2023 06:17  Phos  5.0     07-04  Mg     2.2     07-04            Urinalysis Basic - ( 04 Jul 2023 06:17 )    Color: x / Appearance: x / SG: x / pH: x  Gluc: 84 mg/dL / Ketone: x  / Bili: x / Urobili: x   Blood: x / Protein: x / Nitrite: x   Leuk Esterase: x / RBC: x / WBC x   Sq Epi: x / Non Sq Epi: x / Bacteria: x        Culture - Blood (collected 03 Jul 2023 18:16)  Source: .Blood Blood  Preliminary Report (04 Jul 2023 22:02):    No growth at 24 hours    Culture - Blood (collected 03 Jul 2023 18:16)  Source: .Blood Blood  Preliminary Report (04 Jul 2023 22:02):    No growth at 24 hours          RADIOLOGY & ADDITIONAL TESTS:  New Results Reviewed Today:   New Imaging Personally Reviewed Today:  New Electrocardiogram Personally Reviewed Today:  Prior or Outpatient Records Reviewed Today:    COMMUNICATION:  Care Discussed with Consultants/Other Providers and Details of Discussion: Discussed with ACP  Discussions with Patient/Family:  PCP Communication:

## 2023-07-06 LAB
ANION GAP SERPL CALC-SCNC: 10 MMOL/L — SIGNIFICANT CHANGE UP (ref 5–17)
BLD GP AB SCN SERPL QL: NEGATIVE — SIGNIFICANT CHANGE UP
BUN SERPL-MCNC: 16 MG/DL — SIGNIFICANT CHANGE UP (ref 7–23)
CALCIUM SERPL-MCNC: 9.4 MG/DL — SIGNIFICANT CHANGE UP (ref 8.4–10.5)
CHLORIDE SERPL-SCNC: 106 MMOL/L — SIGNIFICANT CHANGE UP (ref 96–108)
CO2 SERPL-SCNC: 24 MMOL/L — SIGNIFICANT CHANGE UP (ref 22–31)
CREAT SERPL-MCNC: 1.04 MG/DL — SIGNIFICANT CHANGE UP (ref 0.5–1.3)
EGFR: 68 ML/MIN/1.73M2 — SIGNIFICANT CHANGE UP
GLUCOSE SERPL-MCNC: 87 MG/DL — SIGNIFICANT CHANGE UP (ref 70–99)
HCG SERPL-ACNC: <2 MIU/ML — SIGNIFICANT CHANGE UP
HCT VFR BLD CALC: 32.1 % — LOW (ref 34.5–45)
HGB BLD-MCNC: 11 G/DL — LOW (ref 11.5–15.5)
MAGNESIUM SERPL-MCNC: 1.9 MG/DL — SIGNIFICANT CHANGE UP (ref 1.6–2.6)
MCHC RBC-ENTMCNC: 32.4 PG — SIGNIFICANT CHANGE UP (ref 27–34)
MCHC RBC-ENTMCNC: 34.3 GM/DL — SIGNIFICANT CHANGE UP (ref 32–36)
MCV RBC AUTO: 94.7 FL — SIGNIFICANT CHANGE UP (ref 80–100)
MRSA PCR RESULT.: SIGNIFICANT CHANGE UP
NRBC # BLD: 0 /100 WBCS — SIGNIFICANT CHANGE UP (ref 0–0)
PLATELET # BLD AUTO: 116 K/UL — LOW (ref 150–400)
POTASSIUM SERPL-MCNC: 4.2 MMOL/L — SIGNIFICANT CHANGE UP (ref 3.5–5.3)
POTASSIUM SERPL-SCNC: 4.2 MMOL/L — SIGNIFICANT CHANGE UP (ref 3.5–5.3)
RBC # BLD: 3.39 M/UL — LOW (ref 3.8–5.2)
RBC # FLD: 13.5 % — SIGNIFICANT CHANGE UP (ref 10.3–14.5)
RH IG SCN BLD-IMP: POSITIVE — SIGNIFICANT CHANGE UP
S AUREUS DNA NOSE QL NAA+PROBE: SIGNIFICANT CHANGE UP
SODIUM SERPL-SCNC: 140 MMOL/L — SIGNIFICANT CHANGE UP (ref 135–145)
WBC # BLD: 3.79 K/UL — LOW (ref 3.8–10.5)
WBC # FLD AUTO: 3.79 K/UL — LOW (ref 3.8–10.5)

## 2023-07-06 PROCEDURE — 99233 SBSQ HOSP IP/OBS HIGH 50: CPT

## 2023-07-06 PROCEDURE — 76376 3D RENDER W/INTRP POSTPROCES: CPT | Mod: 26

## 2023-07-06 PROCEDURE — 93325 DOPPLER ECHO COLOR FLOW MAPG: CPT | Mod: 26

## 2023-07-06 PROCEDURE — 93312 ECHO TRANSESOPHAGEAL: CPT | Mod: 26

## 2023-07-06 PROCEDURE — 93320 DOPPLER ECHO COMPLETE: CPT | Mod: 26

## 2023-07-06 DEVICE — SURGICEL 2 X 14": Type: IMPLANTABLE DEVICE | Status: FUNCTIONAL

## 2023-07-06 DEVICE — INTRO SHEATH PERC 6FRX45CM: Type: IMPLANTABLE DEVICE | Status: FUNCTIONAL

## 2023-07-06 DEVICE — IMPLANTABLE DEVICE: Type: IMPLANTABLE DEVICE | Status: FUNCTIONAL

## 2023-07-06 DEVICE — CATH THERMODIL PACE 7.5FR: Type: IMPLANTABLE DEVICE | Status: FUNCTIONAL

## 2023-07-06 DEVICE — CANNULA VENOUS 1 STAGE RIGHT ANGLE METAL TIP 20FR X 3/8": Type: IMPLANTABLE DEVICE | Status: FUNCTIONAL

## 2023-07-06 DEVICE — GUIDEWIRE AMPLATZ SUPER-STIFF STRAIGHT .038" X 260CM: Type: IMPLANTABLE DEVICE | Status: FUNCTIONAL

## 2023-07-06 DEVICE — GUIDEWIRE AMPLATZ SUPER-STIFF SHORT TAPER .035" X 260CM: Type: IMPLANTABLE DEVICE | Status: FUNCTIONAL

## 2023-07-06 DEVICE — INTRODUCER SHEATH DRYSEAL FLEX 18FR X 33CM: Type: IMPLANTABLE DEVICE | Status: FUNCTIONAL

## 2023-07-06 DEVICE — SHEATH INTRODUCER TERUMO PINNACLE CORONARY 7FR X 10CM X 0.038" MINI WIRE: Type: IMPLANTABLE DEVICE | Status: FUNCTIONAL

## 2023-07-06 DEVICE — CANNULA AORTIC ROOT WITH VENT LINE 9G X 13.3CM FLANGED PRESSURE MONITORING: Type: IMPLANTABLE DEVICE | Status: FUNCTIONAL

## 2023-07-06 DEVICE — SURGICEL FIBRILLAR 2 X 4": Type: IMPLANTABLE DEVICE | Status: FUNCTIONAL

## 2023-07-06 DEVICE — CATH VASCULAR EXPO VENTRICULAR PIGTAIL CURVE (PIG) 0.045" X 5FR X 100CM: Type: IMPLANTABLE DEVICE | Status: FUNCTIONAL

## 2023-07-06 DEVICE — KIT CVC 2LUM MAC 9FR CHG: Type: IMPLANTABLE DEVICE | Status: FUNCTIONAL

## 2023-07-06 DEVICE — CANNULA VENOUS 1 STAGE RIGHT ANGLE METAL TIP 24FR X 3/8": Type: IMPLANTABLE DEVICE | Status: FUNCTIONAL

## 2023-07-06 DEVICE — CANNULA VENOUS 1 STAGE STRAIGHT 34FR X 3/8": Type: IMPLANTABLE DEVICE | Status: FUNCTIONAL

## 2023-07-06 DEVICE — LIGATING CLIPS WECK HORIZON MEDIUM (BLUE) 24: Type: IMPLANTABLE DEVICE | Status: FUNCTIONAL

## 2023-07-06 DEVICE — CANNULA VENOUS 1 STAGE STRAIGHT 28FR X 3/8": Type: IMPLANTABLE DEVICE | Status: FUNCTIONAL

## 2023-07-06 DEVICE — INTRODUCER PERCUTANEOUS INSERTION KIT: Type: IMPLANTABLE DEVICE | Status: FUNCTIONAL

## 2023-07-06 DEVICE — CANNULA CORONARY OSTIAL 12FR X 6" BASKET TIP: Type: IMPLANTABLE DEVICE | Status: FUNCTIONAL

## 2023-07-06 DEVICE — LIGATING CLIPS WECK HORIZON SMALL-WIDE (RED) 24: Type: IMPLANTABLE DEVICE | Status: FUNCTIONAL

## 2023-07-06 DEVICE — CANNULA VENOUS 1 STAGE RIGHT ANGLE METAL TIP 28FR X 3/8": Type: IMPLANTABLE DEVICE | Status: FUNCTIONAL

## 2023-07-06 DEVICE — LIGATING CLIPS WECK HORIZON LARGE (ORANGE) 6: Type: IMPLANTABLE DEVICE | Status: FUNCTIONAL

## 2023-07-06 DEVICE — CANNULA AORTIC ROOT 14G X 14CM FLANGED: Type: IMPLANTABLE DEVICE | Status: FUNCTIONAL

## 2023-07-06 DEVICE — KIT A-LINE 1LUM 20G X 12CM SAFE KIT: Type: IMPLANTABLE DEVICE | Status: FUNCTIONAL

## 2023-07-06 DEVICE — SHEATH INTRODUCER TERUMO PINNACLE CORONARY 5FR X 10CM X 0.038" MINI WIRE: Type: IMPLANTABLE DEVICE | Status: FUNCTIONAL

## 2023-07-06 DEVICE — SHEATH INTRODUCER TERUMO PINNACLE CORONARY 6FR X 10CM X 0.038" MINI WIRE: Type: IMPLANTABLE DEVICE | Status: FUNCTIONAL

## 2023-07-06 DEVICE — LIGATING CLIPS WECK HORIZON SMALL (YELLOW) 24: Type: IMPLANTABLE DEVICE | Status: FUNCTIONAL

## 2023-07-06 DEVICE — PACK CATH TEMP PACE 5FR: Type: IMPLANTABLE DEVICE | Status: FUNCTIONAL

## 2023-07-06 DEVICE — HEARTSTRING III PROXIMAL SEAL SYSTEM: Type: IMPLANTABLE DEVICE | Status: FUNCTIONAL

## 2023-07-06 DEVICE — FELT PTFE 2 X 2": Type: IMPLANTABLE DEVICE | Status: FUNCTIONAL

## 2023-07-06 DEVICE — CANNULA VENOUS 1 STAGE STRAIGHT 24FR X 1/4-3/8": Type: IMPLANTABLE DEVICE | Status: FUNCTIONAL

## 2023-07-06 DEVICE — FELT PTFE 6 X 6": Type: IMPLANTABLE DEVICE | Status: FUNCTIONAL

## 2023-07-06 DEVICE — CANNULA VENOUS 1 STAGE STRAIGHT 30FR X 3/8": Type: IMPLANTABLE DEVICE | Status: FUNCTIONAL

## 2023-07-06 DEVICE — CHEST DRAIN THORACIC ARGYLE PVC 28FR STRAIGHT: Type: IMPLANTABLE DEVICE | Status: FUNCTIONAL

## 2023-07-06 DEVICE — CANNULA VENOUS 1 STAGE STRAIGHT 36FR X 1/2": Type: IMPLANTABLE DEVICE | Status: FUNCTIONAL

## 2023-07-06 DEVICE — CATH VASCULAR EXPO EXPO AMPLATZ LEFT CURVE (AL1) 0.045" X 5FR X 100CM: Type: IMPLANTABLE DEVICE | Status: FUNCTIONAL

## 2023-07-06 DEVICE — CANNULA VENOUS 1 STAGE RIGHT ANGLE METAL TIP 22FR X 3/8": Type: IMPLANTABLE DEVICE | Status: FUNCTIONAL

## 2023-07-06 DEVICE — PACING WIRE ORANGE M-25 WINGED WIRE 37MM X 89MM: Type: IMPLANTABLE DEVICE | Status: FUNCTIONAL

## 2023-07-06 RX ORDER — VANCOMYCIN HCL 1 G
1000 VIAL (EA) INTRAVENOUS ONCE
Refills: 0 | Status: DISCONTINUED | OUTPATIENT
Start: 2023-07-06 | End: 2023-07-10

## 2023-07-06 RX ADMIN — BUDESONIDE AND FORMOTEROL FUMARATE DIHYDRATE 2 PUFF(S): 160; 4.5 AEROSOL RESPIRATORY (INHALATION) at 21:00

## 2023-07-06 RX ADMIN — Medication 81 MILLIGRAM(S): at 21:00

## 2023-07-06 RX ADMIN — Medication 100 MICROGRAM(S): at 05:40

## 2023-07-06 RX ADMIN — Medication 20 MILLIGRAM(S): at 21:00

## 2023-07-06 RX ADMIN — BUDESONIDE AND FORMOTEROL FUMARATE DIHYDRATE 2 PUFF(S): 160; 4.5 AEROSOL RESPIRATORY (INHALATION) at 05:41

## 2023-07-06 RX ADMIN — Medication 1000 UNIT(S): at 21:00

## 2023-07-06 RX ADMIN — Medication 2 TABLET(S): at 21:00

## 2023-07-06 RX ADMIN — KETOTIFEN FUMARATE 1 DROP(S): 0.34 SOLUTION OPHTHALMIC at 19:55

## 2023-07-06 RX ADMIN — SIMETHICONE 80 MILLIGRAM(S): 80 TABLET, CHEWABLE ORAL at 21:00

## 2023-07-06 RX ADMIN — KETOTIFEN FUMARATE 1 DROP(S): 0.34 SOLUTION OPHTHALMIC at 05:41

## 2023-07-06 RX ADMIN — SIMETHICONE 80 MILLIGRAM(S): 80 TABLET, CHEWABLE ORAL at 05:40

## 2023-07-06 RX ADMIN — Medication 1 TABLET(S): at 21:00

## 2023-07-06 NOTE — PROGRESS NOTE ADULT - SUBJECTIVE AND OBJECTIVE BOX
Cardiac Surgery Pre-op Note:  CC: Patient is a 43y old  Female who presents with a chief complaint of chest pain, tavr eval (05 Jul 2023 14:04)    Referring Physician:                                                                                             Surgeon: DR Ramirez  Procedure: (Date) (Procedure) TAVRT    Allergies:  penicillins (Urticaria)    HPI:  43F c hx down syndrome, cognitive impairment, AVR (1989) c/b moderate paravalvular insufficiency and mod-severe AS, nonobstructive CAD, PCOS, hypothyroidism, asthma, TRINITY on nocturnal CPAP, DM2, gout, and unconfirmed chart history of cirrhosis, CVA, reported recent hospitalization at [facility] for ?STEMI s/p LHC (5/31/23) showing nonobstructive CAD, presented from group home to [facility] on 6/29/23 with CP, now transferred to [facility] for JOSEE TAVR eval and at request of pt's father (Reji [phone]).    Pt currently A&Ox1. Per aide pt is normally A&Ox2-3 baseline. Pt reportedly presented to OSH with nonradiating CP. Pt previously had LHC. CE neg @ OSH. Now txf'd to [facility] for JOSEE TAVR eval. Pt currently denies CP, SOB, presyncopal symptoms, GI symptoms. Reports dry cough. (29 Jun 2023 23:00)    PAST MEDICAL & SURGICAL HISTORY:    MEDICATIONS  (STANDING):  aspirin enteric coated 81 milliGRAM(s) Oral daily  budesonide  80 MICROgram(s)/formoterol 4.5 MICROgram(s) Inhaler 2 Puff(s) Inhalation two times a day  calcium carbonate    500 mG (Tums) Chewable 2 Tablet(s) Chew daily  cholecalciferol 1000 Unit(s) Oral daily  enoxaparin Injectable 40 milliGRAM(s) SubCutaneous every 24 hours  FLUoxetine 20 milliGRAM(s) Oral daily  ketotifen 0.025% Ophthalmic Solution 1 Drop(s) Both EYES two times a day  levothyroxine 100 MICROGram(s) Oral daily  multivitamin 1 Tablet(s) Oral daily  pantoprazole    Tablet 40 milliGRAM(s) Oral before breakfast  polyethylene glycol 3350 17 Gram(s) Oral daily  psyllium Powder 1 Packet(s) Oral daily  senna 2 Tablet(s) Oral at bedtime  simethicone 80 milliGRAM(s) Chew two times a day    MEDICATIONS  (PRN):  albuterol    90 MICROgram(s) HFA Inhaler 2 Puff(s) Inhalation every 6 hours PRN Shortness of Breath and/or Wheezing  aluminum hydroxide/magnesium hydroxide/simethicone Suspension 30 milliLiter(s) Oral once PRN Dyspepsia    Labs:                        11.0   3.79  )-----------( 116      ( 06 Jul 2023 05:29 )             32.1     07-06    140  |  106  |  16  ----------------------------<  87  4.2   |  24  |  1.04    Ca    9.4      06 Jul 2023 05:29  Mg     1.9     07-06    Blood Type: ABO Interpretation: A (07-06 @ 05:32)    HGB A1C: A1C with Estimated Average Glucose in AM (07.01.23 @ 06:21)    A1C with Estimated Average Glucose Result: 4.3: Method: Immunoassay       Reference Range                4.0-5.6%       High risk (prediabetic)        5.7-6.4%       Diabetic, diagnostic             >=6.5%       ADA diabetic treatment goal       <7.0%  The Hemoglobin A1c testing is NGSP-certified.Reference ranges are based  upon the 2010 recommendations of  the American Diabetes Association.  Interpretation may vary for children  and adolescents. %   Estimated Average Glucose: 77: The Estimated Average Glucose (eAG) or Mean Plasma Glucose (MPG) value is  calculated from the hemoglobin A1c value and covers the same time period.   The American Diabetes Association (ADA) and other professional  organizations recommend reporting the eAG with the HgbA1c. mg/dL    Thyroid Panel: 07-01 @ 06:21/0.01  --/11.9/114  06-30 @ 08:34/0.01  --/--/--    MRSA:  / MSSA:   Urinalysis Basic - ( 06 Jul 2023 05:29 )    Color: x / Appearance: x / SG: x / pH: x  Gluc: 87 mg/dL / Ketone: x  / Bili: x / Urobili: x   Blood: x / Protein: x / Nitrite: x   Leuk Esterase: x / RBC: x / WBC x   Sq Epi: x / Non Sq Epi: x / Bacteria: x    CXR: < from: Xray Chest 1 View- PORTABLE-Urgent (Xray Chest 1 View- PORTABLE-Urgent .) (07.03.23 @ 18:19) >  Impression:    No acute pulmonary disease.    EKG: < from: 12 Lead ECG (06.30.23 @ 03:53) >    Diagnosis Line NORMAL SINUS RHYTHM  POSSIBLE INFERIOR INFARCT , AGE UNDETERMINED  ABNORMAL ECG  NO PREVIOUS ECGS AVAILABLE    PFT's:    Echocardiogram: < from: TTE W or WO Ultrasound Enhancing Agent (06.30.23 @ 10:46) >    FINDINGS:     Left Ventricle:  The left ventricular systolic function is normal with a calculated ejection fraction of 74 % by the Meyers's biplane method of disks. Thereare no regional wall motion abnormalities seen.     Right Ventricle:  Normal right ventricular cavity size, normal wall thickness and normal right ventricular systolic function. Tricuspid annular plane systolic excursion (TAPSE) is 2.2 cm (normal >=1.7 cm).     Interventricular Septum:  Perimembranous defect. Left to right shunting.     Left Atrium:  The left atrium is moderately dilated with an indexed volume of 44.17 ml/m².     Right Atrium:  The right atrium is normal with an indexed volume of20.41 ml/m².     Interatrial Septum:  The interatrial septum appears intact.    Cardiac catheterization:     Gen: WN/WD NAD  Neuro: AAOx3, nonfocal  Pulm: CTA B/L  CV: RRR, S1S2 +systolic murmur  Abd: Soft, NT, ND +BS  Ext: No edema, + peripheral pulses    Pt has AICD/PPM [ ] Yes  [x ] No             Brand Name:  Pre-op Beta Blocker ordered within 24 hrs of surgery (CABG ONLY)?  [x ] Yes  [ ] No  If not, Why?  Type & Cross  [x ] Yes  [ ] No  NPO after Midnight [x ] Yes  [ ] No  Pre-op ABX ordered, to be taped on chart:  [ x] Yes  [ ] No     Hibiclens/Peridex ordered [x ] Yes  [ ] No  Intraop on Hold: PRBCs, CXR, CECY [x ]   Consent obtained  [x ] Yes  [ ] No

## 2023-07-06 NOTE — PROGRESS NOTE ADULT - ASSESSMENT
43F with history of Trisomy 21, aortic stenosis s/p posterior root enlargement, aortic valve replacement (23 mm bovine pericardialvalve) and LVOT muscular resection on 7/1/2014, TRINITY on nocturnal C-pap, NIDDM, hypothyroidism, asthma, DM2, gout, recent hospitalization at [facility] for chest pain s/p LHC (5/31/23) showing nonobstructive CAD, presented from [facility] home to [facility] on 6/29/23 with CP, now transferred to [facility] for JOSEE TAVR eval and at request of pt's father (Reji [phone]).    outpatient congenital cardiology team (Dr. Sandoval)

## 2023-07-06 NOTE — PROGRESS NOTE ADULT - SUBJECTIVE AND OBJECTIVE BOX
The Heart Team reviewed all imaging related to the case, including the TTE and CT (and coronary angiogram report from [facility], which reported no significant CAD). There is evidence of a membranous VSD on the TTE with left to right shunting. We have recommended a CECY be done today for further anatomic assessment of this VSD (precise location, size, extent of shunt, and relationship to the aortic valve). This is relevant should we proceed with TAV in [place] and if future percutaneous closure should/would be considered. It also has implications with respect to the potential need for surgery, which will be at the discretion of Dr Ramirez. I have explained all of our impressions and recommendations to her father Reji by phone, he is in agreement to proceed. Dr Cobb will be performing the CECY and will speak to him as well to discuss the potential risks of the CECY procedure and obtain consent ([phone]). If there are no contraindications to proceed based on the CECY findings, she is tentatively scheduled for TAV in [place] tomorrow.

## 2023-07-06 NOTE — PROGRESS NOTE ADULT - SUBJECTIVE AND OBJECTIVE BOX
[facility] Division of Hospital Medicine  Laron De La Cruz MD  Available via MS Teams    SUBJECTIVE / OVERNIGHT EVENTS: No acute events overnight. Pt seen and examined at bedside. Pt seen after CECY, feels thirsty but cannot drink until 520 due to anesthesia.       MEDICATIONS  (STANDING):  aspirin enteric coated 81 milliGRAM(s) Oral daily  budesonide  80 MICROgram(s)/formoterol 4.5 MICROgram(s) Inhaler 2 Puff(s) Inhalation two times a day  calcium carbonate    500 mG (Tums) Chewable 2 Tablet(s) Chew daily  cholecalciferol 1000 Unit(s) Oral daily  enoxaparin Injectable 40 milliGRAM(s) SubCutaneous every 24 hours  FLUoxetine 20 milliGRAM(s) Oral daily  ketotifen 0.025% Ophthalmic Solution 1 Drop(s) Both EYES two times a day  levothyroxine 100 MICROGram(s) Oral daily  multivitamin 1 Tablet(s) Oral daily  pantoprazole    Tablet 40 milliGRAM(s) Oral before breakfast  polyethylene glycol 3350 17 Gram(s) Oral daily  psyllium Powder 1 Packet(s) Oral daily  senna 2 Tablet(s) Oral at bedtime  simethicone 80 milliGRAM(s) Chew two times a day  vancomycin  IVPB 1000 milliGRAM(s) IV Intermittent once    MEDICATIONS  (PRN):  albuterol    90 MICROgram(s) HFA Inhaler 2 Puff(s) Inhalation every 6 hours PRN Shortness of Breath and/or Wheezing  aluminum hydroxide/magnesium hydroxide/simethicone Suspension 30 milliLiter(s) Oral once PRN Dyspepsia      I&O's Summary    05 Jul 2023 07:01  -  06 Jul 2023 07:00  --------------------------------------------------------  IN: 780 mL / OUT: 0 mL / NET: 780 mL    06 Jul 2023 07:01  -  06 Jul 2023 16:03  --------------------------------------------------------  IN: 0 mL / OUT: 0 mL / NET: 0 mL        T(F): 98.1 (07-06-23 @ 15:30), Max: 98.1 (07-06-23 @ 15:30)  HR: 66 (07-06-23 @ 15:40) (62 - 90)  BP: 99/- (07-06-23 @ 15:35) (95/55 - 101/55)  RR: 18 (07-06-23 @ 15:40) (16 - 18)  SpO2: 96% (07-06-23 @ 15:40) (93% - 99%)    CONSTITUTIONAL: NAD, alert in stretcher  NECK: Supple, no palpable masses; no thyromegaly  RESPIRATORY: Normal respiratory effort; lungs are clear to auscultation bilaterally  CARDIOVASCULAR: systolic and diastolic murmur, regular rate  ABDOMEN: Nontender to palpation, normoactive bowel sounds, no rebound/guarding; No hepatosplenomegaly  MUSCULOSKELETAL: no clubbing or cyanosis of digits; no joint swelling or tenderness to palpation  PSYCH: A+O to person, place, and time; affect appropriate  NEUROLOGY: CN 2-12 are intact and symmetric; no gross sensory deficits   SKIN: No rashes; no palpable lesions    LABS:                        11.0   3.79  )-----------( 116      ( 06 Jul 2023 05:29 )             32.1     07-06    140  |  106  |  16  ----------------------------<  87  4.2   |  24  |  1.04    Ca    9.4      06 Jul 2023 05:29  Mg     1.9     07-06      Urinalysis Basic - ( 06 Jul 2023 05:29 )    Color: x / Appearance: x / SG: x / pH: x  Gluc: 87 mg/dL / Ketone: x  / Bili: x / Urobili: x   Blood: x / Protein: x / Nitrite: x   Leuk Esterase: x / RBC: x / WBC x   Sq Epi: x / Non Sq Epi: x / Bacteria: x        Culture - Blood (collected 03 Jul 2023 18:16)  Source: .Blood Blood  Preliminary Report (05 Jul 2023 22:02):    No growth at 48 Hours    Culture - Blood (collected 03 Jul 2023 18:16)  Source: .Blood Blood  Preliminary Report (05 Jul 2023 22:02):    No growth at 48 Hours          RADIOLOGY & ADDITIONAL TESTS:  New Results Reviewed Today:   New Imaging Personally Reviewed Today:  New Electrocardiogram Personally Reviewed Today:  Prior or Outpatient Records Reviewed Today:    COMMUNICATION:  Care Discussed with Consultants/Other Providers and Details of Discussion: Discussed with ACP  Discussions with Patient/Family:  PCP Communication:

## 2023-07-06 NOTE — PROGRESS NOTE ADULT - ASSESSMENT
43F with history of Trisomy 21, aortic stenosis s/p posterior root enlargement, aortic valve replacement (23 mm bovine pericardialvalve) and LVOT muscular resection on 7/1/2014, TRINITY on nocturnal C-pap, NIDDM, hypothyroidism, asthma, DM2, gout, recent hospitalization at [facility] for chest pain s/p LHC (5/31/23) showing nonobstructive CAD, presented from [facility] home to [facility] on 6/29/23 with CP, now transferred to [facility] for JOSEE TAVR eval and at request of pt's father (Reji [phone]).    outpatient congenital cardiology team (Dr. Sandoval)     7/6 NPO after midnight TAVR in am

## 2023-07-07 DIAGNOSIS — Z95.2 PRESENCE OF PROSTHETIC HEART VALVE: ICD-10-CM

## 2023-07-07 LAB — GLUCOSE BLDC GLUCOMTR-MCNC: 218 MG/DL — HIGH (ref 70–99)

## 2023-07-07 PROCEDURE — 33361 REPLACE AORTIC VALVE PERQ: CPT | Mod: 62,Q0

## 2023-07-07 PROCEDURE — 93010 ELECTROCARDIOGRAM REPORT: CPT

## 2023-07-07 PROCEDURE — 99232 SBSQ HOSP IP/OBS MODERATE 35: CPT

## 2023-07-07 PROCEDURE — 93306 TTE W/DOPPLER COMPLETE: CPT | Mod: 26

## 2023-07-07 DEVICE — GWIRE STR .038X180 STIFF LONG TAPR: Type: IMPLANTABLE DEVICE | Status: FUNCTIONAL

## 2023-07-07 DEVICE — GWIRE GUID  0.035INX150CM: Type: IMPLANTABLE DEVICE | Status: FUNCTIONAL

## 2023-07-07 DEVICE — BLLN TRUE DIALATION 20MMX4.5CM: Type: IMPLANTABLE DEVICE | Status: FUNCTIONAL

## 2023-07-07 DEVICE — SHEATH INTRO DRYSEAL FLEX 14FR 33CM: Type: IMPLANTABLE DEVICE | Status: FUNCTIONAL

## 2023-07-07 DEVICE — VALVE TAVR EVOLUT FX 26MM: Type: IMPLANTABLE DEVICE | Status: FUNCTIONAL

## 2023-07-07 DEVICE — SUT PERCLOSE PROGLIDE 6FR: Type: IMPLANTABLE DEVICE | Status: FUNCTIONAL

## 2023-07-07 DEVICE — CATH THERMODILUTION 7FR: Type: IMPLANTABLE DEVICE | Status: FUNCTIONAL

## 2023-07-07 DEVICE — WIRE GD CONFIDA BRECKER CRV .035X260: Type: IMPLANTABLE DEVICE | Status: FUNCTIONAL

## 2023-07-07 DEVICE — CATH VASCULAR EXPO FEMORAL LEFT CURVE (FL4) 0.045" X 5FR X 100CM: Type: IMPLANTABLE DEVICE | Status: FUNCTIONAL

## 2023-07-07 DEVICE — WIRE GUIDE EXCHANGE J TIP 3MM X 260CM: Type: IMPLANTABLE DEVICE | Status: FUNCTIONAL

## 2023-07-07 DEVICE — WIRE GD SAFARI2 275CM XSML CRV: Type: IMPLANTABLE DEVICE | Status: FUNCTIONAL

## 2023-07-07 DEVICE — PACER KT BLLN FLW DIR 5FR: Type: IMPLANTABLE DEVICE | Status: FUNCTIONAL

## 2023-07-07 DEVICE — CATH TAVR EVOLUT FX 14FR 23-29MM: Type: IMPLANTABLE DEVICE | Status: FUNCTIONAL

## 2023-07-07 DEVICE — CATH VASCULAR EXPO VENTRICULAR PIGTAIL CURVE (PIG 145) 0.045" X 5FR X 10CM: Type: IMPLANTABLE DEVICE | Status: FUNCTIONAL

## 2023-07-07 DEVICE — LOADING SYSTEM EVOLUT FX 23-29MM: Type: IMPLANTABLE DEVICE | Status: FUNCTIONAL

## 2023-07-07 RX ORDER — ASPIRIN/CALCIUM CARB/MAGNESIUM 324 MG
81 TABLET ORAL DAILY
Refills: 0 | Status: DISCONTINUED | OUTPATIENT
Start: 2023-07-07 | End: 2023-07-10

## 2023-07-07 RX ORDER — METFORMIN HYDROCHLORIDE 850 MG/1
250 TABLET ORAL THREE TIMES A DAY
Refills: 0 | Status: DISCONTINUED | OUTPATIENT
Start: 2023-07-08 | End: 2023-07-10

## 2023-07-07 RX ORDER — INSULIN LISPRO 100/ML
VIAL (ML) SUBCUTANEOUS
Refills: 0 | Status: DISCONTINUED | OUTPATIENT
Start: 2023-07-07 | End: 2023-07-10

## 2023-07-07 RX ADMIN — SENNA PLUS 2 TABLET(S): 8.6 TABLET ORAL at 23:19

## 2023-07-07 RX ADMIN — BUDESONIDE AND FORMOTEROL FUMARATE DIHYDRATE 2 PUFF(S): 160; 4.5 AEROSOL RESPIRATORY (INHALATION) at 05:13

## 2023-07-07 RX ADMIN — Medication 81 MILLIGRAM(S): at 20:56

## 2023-07-07 RX ADMIN — PANTOPRAZOLE SODIUM 40 MILLIGRAM(S): 20 TABLET, DELAYED RELEASE ORAL at 05:30

## 2023-07-07 RX ADMIN — KETOTIFEN FUMARATE 1 DROP(S): 0.34 SOLUTION OPHTHALMIC at 18:35

## 2023-07-07 RX ADMIN — SIMETHICONE 80 MILLIGRAM(S): 80 TABLET, CHEWABLE ORAL at 05:13

## 2023-07-07 RX ADMIN — KETOTIFEN FUMARATE 1 DROP(S): 0.34 SOLUTION OPHTHALMIC at 05:13

## 2023-07-07 RX ADMIN — Medication 100 MICROGRAM(S): at 05:12

## 2023-07-07 RX ADMIN — Medication 2: at 23:19

## 2023-07-07 NOTE — PRE-ANESTHESIA EVALUATION ADULT - MALLAMPATI CLASS
Class IV (difficult) - the soft palate is not visible at all
oriented to person, place, time and situation
Class III - visualization of the soft palate and the base of the uvula

## 2023-07-07 NOTE — PROGRESS NOTE ADULT - PROBLEM SELECTOR PLAN 5
-Cont nocturnal CPAP
-Cont nocturnal CPAP
Related to trisomy 21, stable
-Cont nocturnal CPAP
-Cont nocturnal CPAP
Related to trisomy 21, stable
-Cont nocturnal CPAP

## 2023-07-07 NOTE — PROGRESS NOTE ADULT - PROBLEM SELECTOR PLAN 6
Related to trisomy 21, stable
Related to trisomy 21, stable
Cont home fluoxetine
Related to trisomy 21, stable
Cont home fluoxetine
Related to trisomy 21, stable
Related to trisomy 21, stable

## 2023-07-07 NOTE — PROGRESS NOTE ADULT - SUBJECTIVE AND OBJECTIVE BOX
[facility] Division of Hospital Medicine  Laron De La Cruz MD  Available via MS Teams    SUBJECTIVE / OVERNIGHT EVENTS: No acute events overnight. Pt seen and examined at bedside. En route to CTSx, denies any chest pain or sob.       MEDICATIONS  (STANDING):  aspirin enteric coated 81 milliGRAM(s) Oral daily  budesonide  80 MICROgram(s)/formoterol 4.5 MICROgram(s) Inhaler 2 Puff(s) Inhalation two times a day  calcium carbonate    500 mG (Tums) Chewable 2 Tablet(s) Chew daily  cholecalciferol 1000 Unit(s) Oral daily  FLUoxetine 20 milliGRAM(s) Oral daily  ketotifen 0.025% Ophthalmic Solution 1 Drop(s) Both EYES two times a day  levothyroxine 100 MICROGram(s) Oral daily  multivitamin 1 Tablet(s) Oral daily  pantoprazole    Tablet 40 milliGRAM(s) Oral before breakfast  polyethylene glycol 3350 17 Gram(s) Oral daily  psyllium Powder 1 Packet(s) Oral daily  senna 2 Tablet(s) Oral at bedtime  simethicone 80 milliGRAM(s) Chew two times a day  vancomycin  IVPB 1000 milliGRAM(s) IV Intermittent once    MEDICATIONS  (PRN):  albuterol    90 MICROgram(s) HFA Inhaler 2 Puff(s) Inhalation every 6 hours PRN Shortness of Breath and/or Wheezing  aluminum hydroxide/magnesium hydroxide/simethicone Suspension 30 milliLiter(s) Oral once PRN Dyspepsia      I&O's Summary    06 Jul 2023 07:01  -  07 Jul 2023 07:00  --------------------------------------------------------  IN: 120 mL / OUT: 0 mL / NET: 120 mL        T(F): 97.6 (07-07-23 @ 11:26), Max: 98.1 (07-06-23 @ 15:30)  HR: 63 (07-07-23 @ 12:37) (62 - 72)  BP: 119/66 (07-07-23 @ 12:37) (97/51 - 119/66)  RR: 16 (07-07-23 @ 12:37) (16 - 18)  SpO2: 97% (07-07-23 @ 12:37) (95% - 99%)      CONSTITUTIONAL: NAD, alert in stretcher  NECK: Supple, no palpable masses; no thyromegaly  RESPIRATORY: Normal respiratory effort; lungs are clear to auscultation bilaterally  CARDIOVASCULAR: systolic and diastolic murmur, regular rate  ABDOMEN: Nontender to palpation, normoactive bowel sounds, no rebound/guarding; No hepatosplenomegaly  MUSCULOSKELETAL: no clubbing or cyanosis of digits; no joint swelling or tenderness to palpation  PSYCH: A+O to person, place, and time; affect appropriate  NEUROLOGY: CN 2-12 are intact and symmetric; no gross sensory deficits   SKIN: No rashes; no palpable lesions    LABS:                        11.0   3.79  )-----------( 116      ( 06 Jul 2023 05:29 )             32.1     07-06    140  |  106  |  16  ----------------------------<  87  4.2   |  24  |  1.04    Ca    9.4      06 Jul 2023 05:29  Mg     1.9     07-06      Urinalysis Basic - ( 06 Jul 2023 05:29 )    Color: x / Appearance: x / SG: x / pH: x  Gluc: 87 mg/dL / Ketone: x  / Bili: x / Urobili: x   Blood: x / Protein: x / Nitrite: x   Leuk Esterase: x / RBC: x / WBC x   Sq Epi: x / Non Sq Epi: x / Bacteria: x        Culture - Blood (collected 03 Jul 2023 18:16)  Source: .Blood Blood  Preliminary Report (05 Jul 2023 22:02):    No growth at 48 Hours    Culture - Blood (collected 03 Jul 2023 18:16)  Source: .Blood Blood  Preliminary Report (05 Jul 2023 22:02):    No growth at 48 Hours          RADIOLOGY & ADDITIONAL TESTS:  New Results Reviewed Today:   New Imaging Personally Reviewed Today:  New Electrocardiogram Personally Reviewed Today:  Prior or Outpatient Records Reviewed Today:    COMMUNICATION:  Care Discussed with Consultants/Other Providers and Details of Discussion: Discussed with ACP  Discussions with Patient/Family:  PCP Communication:

## 2023-07-07 NOTE — PROGRESS NOTE ADULT - PROBLEM SELECTOR PROBLEM 6
Down syndrome
Down syndrome
Chronic idiopathic thrombocytopenia
Down syndrome
Down syndrome
Chronic idiopathic thrombocytopenia
Chronic idiopathic thrombocytopenia

## 2023-07-07 NOTE — PACU DISCHARGE NOTE - COMMENTS
I was asked to sign this patient out of the PACU by the recovery RN.  The patient was alert, awake, and was clinically appropriate for discharge to next level of care.
No complications from anesthesia.

## 2023-07-07 NOTE — PRE-ANESTHESIA EVALUATION ADULT - NSANTHADDINFOFT_GEN_ALL_CORE
Initially discussed plan for general anesthesia with endo-tracheal intubation with patient and father. After discussing case with Dr. Henry, the case may be amenable to being done under sedation, patient agreed. Will proceed with MAC with general anesthesia as backup method.

## 2023-07-07 NOTE — PROGRESS NOTE ADULT - PROBLEM SELECTOR PLAN 7
Cont home fluoxetine
TSH low at 0.01  - F/u full TFTs in AM  - Will likely need reduced synthroid dose
TSH low at 0.01, normal T3/Te c/w subclinical hyperthyroidism, i/s/o acute illness  - cont synthroid  - repeat TFTs in 4 weeks
TSH low at 0.01, normal T3/T4 c/w subclinical hyperthyroidism, i/s/o acute illness  - cont synthroid  - repeat TFTs in 4 weeks
TSH low at 0.01, normal T3/Te c/w subclinical hyperthyroidism, i/s/o acute illness  - cont synthroid  - repeat TFTs in 4 weeks
Cont home fluoxetine

## 2023-07-07 NOTE — PRE-ANESTHESIA EVALUATION ADULT - NSANTHPMHFT_GEN_ALL_CORE
44 yo F h/o Trisomy 21, TRINITY, asthma, DM, AS s/p AVR with prosthesis stenosis here for TAVR  Normal LV function, severe AS and MS, + pulmonary HTN  on RA, no current CHF symptoms

## 2023-07-07 NOTE — PROGRESS NOTE ADULT - PROBLEM SELECTOR PROBLEM 7
Down syndrome
Hypothyroidism
Down syndrome

## 2023-07-07 NOTE — PROGRESS NOTE ADULT - SUBJECTIVE AND OBJECTIVE BOX
VITAL SIGNS    Telemetry:  rsr 65's   Vital Signs Last 24 Hrs  T(C): 36.1 (07-07-23 @ 13:48), Max: 36.6 (07-06-23 @ 18:40)  T(F): 96.9 (07-07-23 @ 13:48), Max: 97.9 (07-07-23 @ 04:21)  HR: 65 (07-07-23 @ 15:50) (62 - 72)  BP: 98/54 (07-07-23 @ 15:50) (88/52 - 119/66)  RR: 14 (07-07-23 @ 15:50) (13 - 23)  SpO2: 95% (07-07-23 @ 15:50) (90% - 97%)            07-06 @ 07:01  -  07-07 @ 07:00  --------------------------------------------------------  IN: 120 mL / OUT: 0 mL / NET: 120 mL       Daily Height in cm: 147.32 (07 Jul 2023 12:37)    Daily   Admit Wt: Drug Dosing Weight  Height (cm): 147.3 (07 Jul 2023 12:37)  Weight (kg): 67.5 (07 Jul 2023 12:37)  BMI (kg/m2): 31.1 (07 Jul 2023 12:37)  BSA (m2): 1.61 (07 Jul 2023 12:37)      CAPILLARY BLOOD GLUCOSE              LABS:    07-06 @ 07:01  -  07-07 @ 07:00  --------------------------------------------------------  IN: 120 mL / OUT: 0 mL / NET: 120 mL    cret                        11.0   3.79  )-----------( 116      ( 06 Jul 2023 05:29 )             32.1     07-06    140  |  106  |  16  ----------------------------<  87  4.2   |  24  |  1.04    Ca    9.4      06 Jul 2023 05:29  Mg     1.9     07-06          albuterol    90 MICROgram(s) HFA Inhaler 2 Puff(s) Inhalation every 6 hours PRN  aluminum hydroxide/magnesium hydroxide/simethicone Suspension 30 milliLiter(s) Oral once PRN  aspirin enteric coated 81 milliGRAM(s) Oral daily  budesonide  80 MICROgram(s)/formoterol 4.5 MICROgram(s) Inhaler 2 Puff(s) Inhalation two times a day  calcium carbonate    500 mG (Tums) Chewable 2 Tablet(s) Chew daily  cholecalciferol 1000 Unit(s) Oral daily  FLUoxetine 20 milliGRAM(s) Oral daily  ketotifen 0.025% Ophthalmic Solution 1 Drop(s) Both EYES two times a day  levothyroxine 100 MICROGram(s) Oral daily  multivitamin 1 Tablet(s) Oral daily  pantoprazole    Tablet 40 milliGRAM(s) Oral before breakfast  polyethylene glycol 3350 17 Gram(s) Oral daily  psyllium Powder 1 Packet(s) Oral daily  senna 2 Tablet(s) Oral at bedtime  simethicone 80 milliGRAM(s) Chew two times a day  vancomycin  IVPB 1000 milliGRAM(s) IV Intermittent once      PHYSICAL EXAM    Subjective: "I feel ok."   Neurology: alert; nonfocal, no gross deficits  CV : tele:  RSR 65; + murmur   Lungs: clear. RR easy, unlabored   Abdomen: soft, nontender, nondistended, positive bowel sounds, neg bowel movement   Neg N/V/D   :  pt voiding without difficulty   Extremities:   RODRIGUEZ; neg LE edema, neg calf tenderness.   PPP bilaterally; bilateral groin sites cdi w/ dressing

## 2023-07-07 NOTE — PROGRESS NOTE ADULT - PROBLEM SELECTOR PROBLEM 5
TRINITY on CPAP
Chronic idiopathic thrombocytopenia
TRINITY on CPAP
Chronic idiopathic thrombocytopenia
Chronic idiopathic thrombocytopenia
TRINITY on CPAP
Chronic idiopathic thrombocytopenia
TRINITY on CPAP
TRINITY on CPAP

## 2023-07-07 NOTE — PROGRESS NOTE ADULT - ASSESSMENT
43F with history of Trisomy 21, aortic stenosis s/p posterior root enlargement, aortic valve replacement (23 mm bovine pericardialvalve) and LVOT muscular resection on 7/1/2014, TRINITY on nocturnal C-pap, NIDDM, hypothyroidism, asthma, DM2, gout, recent hospitalization at [facility] for chest pain s/p LHC (5/31/23) showing nonobstructive CAD, presented from [facility] home to [facility] on 6/29/23 with CP, now transferred to [facility] for JOSEE TAVR eval and at request of pt's father (Reji [phone]).    outpatient congenital cardiology team (Dr. Sandoval)

## 2023-07-07 NOTE — CHART NOTE - NSCHARTNOTEFT_GEN_A_CORE
Pt s/p TAVR JOSEE 26mm Evolut FX Valve via LFA with Perclose x 2 and RFA Perclose x 2 and RFV with manual pressure with dressings intact with +hemostasis and no hematoma/bleeding.    On arrival pt is sedated but arouses easily with no complaints.  NC 1.5L     Tele NSR 60s  Post ECG NSR 65 CRISTINA 162ms; QRS duration 84ms; QTc 497ms  Baseline ECG NSR @64 CRISTINA 160ms; QRS duration 78ms; QTc 462ms    Vital Signs Last 24 Hrs  T(C): 36.1 (07 Jul 2023 13:48), Max: 36.7 (06 Jul 2023 15:30)  T(F): 96.9 (07 Jul 2023 13:48), Max: 98.1 (06 Jul 2023 15:30)  HR: 66 (07 Jul 2023 14:05) (62 - 72)  BP: 92/55 (07 Jul 2023 14:05) (89/47 - 119/66)  BP(mean): --  RR: 22 (07 Jul 2023 14:05) (16 - 23)  SpO2: 96% (07 Jul 2023 14:05) (94% - 97%)    Parameters below as of 07 Jul 2023 13:48  Patient On (Oxygen Delivery Method): nasal cannula  O2 Flow (L/min): 4      HPI:  43F c hx down syndrome, cognitive impairment, AVR (1989) c/b moderate paravalvular insufficiency and mod-severe AS, nonobstructive CAD, PCOS, hypothyroidism, asthma, TRINITY on nocturnal CPAP, DM2, gout, and unconfirmed chart history of cirrhosis, CVA, reported recent hospitalization at [facility] for ?STEMI s/p C (5/31/23) showing nonobstructive CAD, presented from group home to [facility] on 6/29/23 with CP, now transferred to [facility] for JOSEE TAVR eval and at request of pt's father (Reji [phone]) now s/p TAVR JOSEE     Plan:   Admit to Dr Ramirez and monitor on 2 [facility] Telemetry  Continue site monitoring, vitals and neurovascular checks as per protocol  ASA 81mg daily due at 2000  Received Gent/Vanco in OR 2/2 PCN allergy no further antibiotics  Bedrest 4 hours advance to ambulation as tolerated if site and pt remains stable  AM labs  TTE in am  Transfer to [address] [facility] once recovered from PACU and bed available    Susan Sellers [facility]  Cardiology  PACU NP

## 2023-07-07 NOTE — PROGRESS NOTE ADULT - PROBLEM SELECTOR PLAN 8
TSH low at 0.01, normal T3/T4 c/w subclinical hyperthyroidism, i/s/o acute illness  - cont synthroid  - repeat TFTs in 4 weeks

## 2023-07-07 NOTE — PROGRESS NOTE ADULT - ASSESSMENT
43F c hx down syndrome, cognitive impairment, AVR (1989) c/b moderate paravalvular insufficiency and mod-severe AS, nonobstructive CAD, PCOS, hypothyroidism, asthma, TRINITY on nocturnal CPAP, DM2, gout, and unconfirmed chart history of cirrhosis, CVA, reported recent hospitalization at [facility] for ?STEMI s/p LHC (5/31/23) showing nonobstructive CAD, presented from group home to [facility] on 6/29/23 with CP, now transferred to [facility] for JOSEE TAVR eval and at request of pt's father (Reji [phone]).    Pt currently A&Ox1. Per aide pt is normally A&Ox2-3 baseline. Pt reportedly presented to OSH with nonradiating CP. Pt previously had LHC. CE neg @ OSH. Now txf'd to [facility] for JOSEE TAVR eval. Pt currently denies CP, SOB, presyncopal symptoms, GI symptoms. Reports dry cough.  s/p 7/7 TAVR --> intraop hypotension requiring christopher and ivf; left groin ooze- manual compression applied; tx floor; RSR 65's;   ck echo and ekg in am  asa for AC   discharge planning- return to group home when stable

## 2023-07-08 LAB
ALBUMIN SERPL ELPH-MCNC: 3.4 G/DL — SIGNIFICANT CHANGE UP (ref 3.3–5)
ALP SERPL-CCNC: 58 U/L — SIGNIFICANT CHANGE UP (ref 40–120)
ALT FLD-CCNC: 20 U/L — SIGNIFICANT CHANGE UP (ref 10–45)
ANION GAP SERPL CALC-SCNC: 12 MMOL/L — SIGNIFICANT CHANGE UP (ref 5–17)
AST SERPL-CCNC: 37 U/L — SIGNIFICANT CHANGE UP (ref 10–40)
BASOPHILS # BLD AUTO: 0.04 K/UL — SIGNIFICANT CHANGE UP (ref 0–0.2)
BASOPHILS NFR BLD AUTO: 1 % — SIGNIFICANT CHANGE UP (ref 0–2)
BILIRUB SERPL-MCNC: 1.1 MG/DL — SIGNIFICANT CHANGE UP (ref 0.2–1.2)
BUN SERPL-MCNC: 14 MG/DL — SIGNIFICANT CHANGE UP (ref 7–23)
CALCIUM SERPL-MCNC: 8.8 MG/DL — SIGNIFICANT CHANGE UP (ref 8.4–10.5)
CHLORIDE SERPL-SCNC: 104 MMOL/L — SIGNIFICANT CHANGE UP (ref 96–108)
CO2 SERPL-SCNC: 24 MMOL/L — SIGNIFICANT CHANGE UP (ref 22–31)
CREAT SERPL-MCNC: 1.05 MG/DL — SIGNIFICANT CHANGE UP (ref 0.5–1.3)
CULTURE RESULTS: SIGNIFICANT CHANGE UP
CULTURE RESULTS: SIGNIFICANT CHANGE UP
EGFR: 67 ML/MIN/1.73M2 — SIGNIFICANT CHANGE UP
EOSINOPHIL # BLD AUTO: 0.15 K/UL — SIGNIFICANT CHANGE UP (ref 0–0.5)
EOSINOPHIL NFR BLD AUTO: 3.8 % — SIGNIFICANT CHANGE UP (ref 0–6)
GLUCOSE BLDC GLUCOMTR-MCNC: 100 MG/DL — HIGH (ref 70–99)
GLUCOSE BLDC GLUCOMTR-MCNC: 109 MG/DL — HIGH (ref 70–99)
GLUCOSE BLDC GLUCOMTR-MCNC: 113 MG/DL — HIGH (ref 70–99)
GLUCOSE BLDC GLUCOMTR-MCNC: 136 MG/DL — HIGH (ref 70–99)
GLUCOSE SERPL-MCNC: 87 MG/DL — SIGNIFICANT CHANGE UP (ref 70–99)
HCT VFR BLD CALC: 33.5 % — LOW (ref 34.5–45)
HGB BLD-MCNC: 11.4 G/DL — LOW (ref 11.5–15.5)
IMM GRANULOCYTES NFR BLD AUTO: 0.3 % — SIGNIFICANT CHANGE UP (ref 0–0.9)
LYMPHOCYTES # BLD AUTO: 0.82 K/UL — LOW (ref 1–3.3)
LYMPHOCYTES # BLD AUTO: 20.8 % — SIGNIFICANT CHANGE UP (ref 13–44)
MCHC RBC-ENTMCNC: 31.8 PG — SIGNIFICANT CHANGE UP (ref 27–34)
MCHC RBC-ENTMCNC: 34 GM/DL — SIGNIFICANT CHANGE UP (ref 32–36)
MCV RBC AUTO: 93.3 FL — SIGNIFICANT CHANGE UP (ref 80–100)
MONOCYTES # BLD AUTO: 0.55 K/UL — SIGNIFICANT CHANGE UP (ref 0–0.9)
MONOCYTES NFR BLD AUTO: 14 % — SIGNIFICANT CHANGE UP (ref 2–14)
NEUTROPHILS # BLD AUTO: 2.37 K/UL — SIGNIFICANT CHANGE UP (ref 1.8–7.4)
NEUTROPHILS NFR BLD AUTO: 60.1 % — SIGNIFICANT CHANGE UP (ref 43–77)
NRBC # BLD: 0 /100 WBCS — SIGNIFICANT CHANGE UP (ref 0–0)
PLATELET # BLD AUTO: 94 K/UL — LOW (ref 150–400)
POTASSIUM SERPL-MCNC: 3.6 MMOL/L — SIGNIFICANT CHANGE UP (ref 3.5–5.3)
POTASSIUM SERPL-SCNC: 3.6 MMOL/L — SIGNIFICANT CHANGE UP (ref 3.5–5.3)
PROT SERPL-MCNC: 5.9 G/DL — LOW (ref 6–8.3)
RBC # BLD: 3.59 M/UL — LOW (ref 3.8–5.2)
RBC # FLD: 13.3 % — SIGNIFICANT CHANGE UP (ref 10.3–14.5)
SODIUM SERPL-SCNC: 140 MMOL/L — SIGNIFICANT CHANGE UP (ref 135–145)
SPECIMEN SOURCE: SIGNIFICANT CHANGE UP
SPECIMEN SOURCE: SIGNIFICANT CHANGE UP
WBC # BLD: 3.94 K/UL — SIGNIFICANT CHANGE UP (ref 3.8–10.5)
WBC # FLD AUTO: 3.94 K/UL — SIGNIFICANT CHANGE UP (ref 3.8–10.5)

## 2023-07-08 PROCEDURE — 93010 ELECTROCARDIOGRAM REPORT: CPT | Mod: 76

## 2023-07-08 PROCEDURE — 93306 TTE W/DOPPLER COMPLETE: CPT | Mod: 26

## 2023-07-08 PROCEDURE — 76376 3D RENDER W/INTRP POSTPROCES: CPT | Mod: 26

## 2023-07-08 PROCEDURE — 99232 SBSQ HOSP IP/OBS MODERATE 35: CPT | Mod: FS

## 2023-07-08 RX ORDER — POTASSIUM CHLORIDE 20 MEQ
40 PACKET (EA) ORAL ONCE
Refills: 0 | Status: COMPLETED | OUTPATIENT
Start: 2023-07-08 | End: 2023-07-08

## 2023-07-08 RX ADMIN — KETOTIFEN FUMARATE 1 DROP(S): 0.34 SOLUTION OPHTHALMIC at 17:12

## 2023-07-08 RX ADMIN — BUDESONIDE AND FORMOTEROL FUMARATE DIHYDRATE 2 PUFF(S): 160; 4.5 AEROSOL RESPIRATORY (INHALATION) at 07:03

## 2023-07-08 RX ADMIN — Medication 100 MICROGRAM(S): at 07:03

## 2023-07-08 RX ADMIN — KETOTIFEN FUMARATE 1 DROP(S): 0.34 SOLUTION OPHTHALMIC at 08:10

## 2023-07-08 RX ADMIN — Medication 1 PACKET(S): at 13:08

## 2023-07-08 RX ADMIN — METFORMIN HYDROCHLORIDE 250 MILLIGRAM(S): 850 TABLET ORAL at 13:06

## 2023-07-08 RX ADMIN — SIMETHICONE 80 MILLIGRAM(S): 80 TABLET, CHEWABLE ORAL at 17:12

## 2023-07-08 RX ADMIN — METFORMIN HYDROCHLORIDE 250 MILLIGRAM(S): 850 TABLET ORAL at 21:42

## 2023-07-08 RX ADMIN — Medication 2 TABLET(S): at 13:06

## 2023-07-08 RX ADMIN — Medication 81 MILLIGRAM(S): at 13:06

## 2023-07-08 RX ADMIN — SIMETHICONE 80 MILLIGRAM(S): 80 TABLET, CHEWABLE ORAL at 07:03

## 2023-07-08 RX ADMIN — PANTOPRAZOLE SODIUM 40 MILLIGRAM(S): 20 TABLET, DELAYED RELEASE ORAL at 07:03

## 2023-07-08 RX ADMIN — Medication 1 TABLET(S): at 13:07

## 2023-07-08 RX ADMIN — SENNA PLUS 2 TABLET(S): 8.6 TABLET ORAL at 21:42

## 2023-07-08 RX ADMIN — Medication 1000 UNIT(S): at 13:06

## 2023-07-08 RX ADMIN — Medication 20 MILLIGRAM(S): at 13:17

## 2023-07-08 RX ADMIN — BUDESONIDE AND FORMOTEROL FUMARATE DIHYDRATE 2 PUFF(S): 160; 4.5 AEROSOL RESPIRATORY (INHALATION) at 17:12

## 2023-07-08 RX ADMIN — Medication 40 MILLIEQUIVALENT(S): at 08:10

## 2023-07-08 RX ADMIN — METFORMIN HYDROCHLORIDE 250 MILLIGRAM(S): 850 TABLET ORAL at 08:10

## 2023-07-08 NOTE — PROGRESS NOTE ADULT - SUBJECTIVE AND OBJECTIVE BOX
Subjective: Pt states "Hello" denies any CP or SOB. No acute events overnight.     Telemetry:  SR 60 - 90  Vital Signs Last 24 Hrs  T(C): 36.9 (23 @ 12:08), Max: 36.9 (23 @ 12:08)  T(F): 98.5 (23 @ 12:08), Max: 98.5 (23 @ 12:08)  HR: 78 (23 @ 12:08) (62 - 78)  BP: 99/64 (23 @ 12:08) (88/52 - 122/53)  RR: 18 (23 @ 12:08) (13 - 23)  SpO2: 96% (23 @ 12:08) (90% - 100%)              @ 07:01  -   @ 07:00  --------------------------------------------------------  IN: 60 mL / OUT: 500 mL / NET: -440 mL      Daily Weight in k.8 (2023 08:00)                        11.4   3.94  )-----------( 94       ( 2023 05:38 )             33.5     140  |  104  |  14  ----------------------------<  87  3.6   |  24  |  1.05    AST  37  /  ALT  20  /  AlkPhos  58  07-08        CAPILLARY BLOOD GLUCOSE  100 - 109        PHYSICAL EXAM  Neurology: A&Ox3, NAD  CV : RRR+S1S2  Lungs: Respirations non-labored, B/L BS CTA  Abdomen: Soft, NT/ND, +BSx4Q, last BM  (-)N/V/D  : Voiding without difficulty  Extremities: B/L LE no edema, negative calf tenderness, +PP                        B/L groins soft, CDI KINSEY, no bleeding no hematoma      MEDICATIONS  albuterol    90 MICROgram(s) HFA Inhaler 2 Puff(s) Inhalation every 6 hours PRN  aluminum hydroxide/magnesium hydroxide/simethicone Suspension 30 milliLiter(s) Oral once PRN  aspirin enteric coated 81 milliGRAM(s) Oral daily  budesonide  80 MICROgram(s)/formoterol 4.5 MICROgram(s) Inhaler 2 Puff(s) Inhalation two times a day  calcium carbonate    500 mG (Tums) Chewable 2 Tablet(s) Chew daily  cholecalciferol 1000 Unit(s) Oral daily  FLUoxetine 20 milliGRAM(s) Oral daily  insulin lispro (ADMELOG) corrective regimen sliding scale   SubCutaneous Before meals and at bedtime  ketotifen 0.025% Ophthalmic Solution 1 Drop(s) Both EYES two times a day  levothyroxine 100 MICROGram(s) Oral daily  metFORMIN 250 milliGRAM(s) Oral three times a day  multivitamin 1 Tablet(s) Oral daily  pantoprazole    Tablet 40 milliGRAM(s) Oral before breakfast  polyethylene glycol 3350 17 Gram(s) Oral daily  psyllium Powder 1 Packet(s) Oral daily  senna 2 Tablet(s) Oral at bedtime  simethicone 80 milliGRAM(s) Chew two times a day  vancomycin  IVPB 1000 milliGRAM(s) IV Intermittent once      Physical Therapy Rec:   Home  [ X ]   Home w/ PT  [  ]  Rehab  [  ]    Discussed with Cardiothoracic Team at AM rounds.

## 2023-07-08 NOTE — PROGRESS NOTE ADULT - ASSESSMENT
43F c hx down syndrome, cognitive impairment, AVR (1991) c/b moderate paravalvular insufficiency and mod-severe AS, nonobstructive CAD, PCOS, hypothyroidism, asthma, TRINITY on nocturnal CPAP, DM2, gout, and unconfirmed chart history of cirrhosis, CVA, reported recent hospitalization at [facility] for ?STEMI s/p LHC (5/31/23) showing nonobstructive CAD, presented from group home to [facility] on 6/29/23 with CP, now transferred to [facility] for JOSEE TAVR eval and at request of pt's father (Reji [phone]).    Pt currently A&Ox1. Per aide pt is normally A&Ox2-3 baseline. Pt reportedly presented to OSH with nonradiating CP. Pt previously had C. CE neg @ OSH. Now txf'd to [facility] for JOSEE TAVR eval. Pt currently denies CP, SOB, presyncopal symptoms, GI symptoms. Reports dry cough.  s/p 7/7 TAVR --> intraop hypotension requiring christopher and ivf; left groin ooze- manual compression applied; tx floor; RSR 65's;   ck echo and ekg in am, asa for AC, discharge planning- return to group home when stable   7/8 VSS, TTE done this AM, No evidence of aortic regurgitation. B/L groins stable, plan to d/c back to group home on Monday.

## 2023-07-09 LAB
ANION GAP SERPL CALC-SCNC: 12 MMOL/L — SIGNIFICANT CHANGE UP (ref 5–17)
BUN SERPL-MCNC: 10 MG/DL — SIGNIFICANT CHANGE UP (ref 7–23)
CALCIUM SERPL-MCNC: 9.1 MG/DL — SIGNIFICANT CHANGE UP (ref 8.4–10.5)
CHLORIDE SERPL-SCNC: 106 MMOL/L — SIGNIFICANT CHANGE UP (ref 96–108)
CO2 SERPL-SCNC: 23 MMOL/L — SIGNIFICANT CHANGE UP (ref 22–31)
CREAT SERPL-MCNC: 1 MG/DL — SIGNIFICANT CHANGE UP (ref 0.5–1.3)
EGFR: 71 ML/MIN/1.73M2 — SIGNIFICANT CHANGE UP
GLUCOSE BLDC GLUCOMTR-MCNC: 113 MG/DL — HIGH (ref 70–99)
GLUCOSE BLDC GLUCOMTR-MCNC: 92 MG/DL — SIGNIFICANT CHANGE UP (ref 70–99)
GLUCOSE BLDC GLUCOMTR-MCNC: 95 MG/DL — SIGNIFICANT CHANGE UP (ref 70–99)
GLUCOSE BLDC GLUCOMTR-MCNC: 97 MG/DL — SIGNIFICANT CHANGE UP (ref 70–99)
GLUCOSE SERPL-MCNC: 95 MG/DL — SIGNIFICANT CHANGE UP (ref 70–99)
HCT VFR BLD CALC: 33.8 % — LOW (ref 34.5–45)
HGB BLD-MCNC: 11.3 G/DL — LOW (ref 11.5–15.5)
MCHC RBC-ENTMCNC: 31.5 PG — SIGNIFICANT CHANGE UP (ref 27–34)
MCHC RBC-ENTMCNC: 33.4 GM/DL — SIGNIFICANT CHANGE UP (ref 32–36)
MCV RBC AUTO: 94.2 FL — SIGNIFICANT CHANGE UP (ref 80–100)
NRBC # BLD: 0 /100 WBCS — SIGNIFICANT CHANGE UP (ref 0–0)
PLATELET # BLD AUTO: 94 K/UL — LOW (ref 150–400)
POTASSIUM SERPL-MCNC: 4 MMOL/L — SIGNIFICANT CHANGE UP (ref 3.5–5.3)
POTASSIUM SERPL-SCNC: 4 MMOL/L — SIGNIFICANT CHANGE UP (ref 3.5–5.3)
RBC # BLD: 3.59 M/UL — LOW (ref 3.8–5.2)
RBC # FLD: 13.2 % — SIGNIFICANT CHANGE UP (ref 10.3–14.5)
SODIUM SERPL-SCNC: 141 MMOL/L — SIGNIFICANT CHANGE UP (ref 135–145)
WBC # BLD: 4.19 K/UL — SIGNIFICANT CHANGE UP (ref 3.8–10.5)
WBC # FLD AUTO: 4.19 K/UL — SIGNIFICANT CHANGE UP (ref 3.8–10.5)

## 2023-07-09 PROCEDURE — 99231 SBSQ HOSP IP/OBS SF/LOW 25: CPT | Mod: FS

## 2023-07-09 PROCEDURE — 93010 ELECTROCARDIOGRAM REPORT: CPT

## 2023-07-09 RX ADMIN — KETOTIFEN FUMARATE 1 DROP(S): 0.34 SOLUTION OPHTHALMIC at 05:52

## 2023-07-09 RX ADMIN — BUDESONIDE AND FORMOTEROL FUMARATE DIHYDRATE 2 PUFF(S): 160; 4.5 AEROSOL RESPIRATORY (INHALATION) at 05:53

## 2023-07-09 RX ADMIN — Medication 1 TABLET(S): at 12:52

## 2023-07-09 RX ADMIN — SIMETHICONE 80 MILLIGRAM(S): 80 TABLET, CHEWABLE ORAL at 17:19

## 2023-07-09 RX ADMIN — Medication 1000 UNIT(S): at 12:52

## 2023-07-09 RX ADMIN — KETOTIFEN FUMARATE 1 DROP(S): 0.34 SOLUTION OPHTHALMIC at 17:04

## 2023-07-09 RX ADMIN — METFORMIN HYDROCHLORIDE 250 MILLIGRAM(S): 850 TABLET ORAL at 05:53

## 2023-07-09 RX ADMIN — METFORMIN HYDROCHLORIDE 250 MILLIGRAM(S): 850 TABLET ORAL at 13:01

## 2023-07-09 RX ADMIN — Medication 100 MICROGRAM(S): at 05:53

## 2023-07-09 RX ADMIN — Medication 2 TABLET(S): at 12:51

## 2023-07-09 RX ADMIN — SIMETHICONE 80 MILLIGRAM(S): 80 TABLET, CHEWABLE ORAL at 05:53

## 2023-07-09 RX ADMIN — SENNA PLUS 2 TABLET(S): 8.6 TABLET ORAL at 22:35

## 2023-07-09 RX ADMIN — POLYETHYLENE GLYCOL 3350 17 GRAM(S): 17 POWDER, FOR SOLUTION ORAL at 12:51

## 2023-07-09 RX ADMIN — BUDESONIDE AND FORMOTEROL FUMARATE DIHYDRATE 2 PUFF(S): 160; 4.5 AEROSOL RESPIRATORY (INHALATION) at 17:19

## 2023-07-09 RX ADMIN — METFORMIN HYDROCHLORIDE 250 MILLIGRAM(S): 850 TABLET ORAL at 22:35

## 2023-07-09 RX ADMIN — Medication 81 MILLIGRAM(S): at 12:51

## 2023-07-09 RX ADMIN — Medication 20 MILLIGRAM(S): at 12:52

## 2023-07-09 RX ADMIN — PANTOPRAZOLE SODIUM 40 MILLIGRAM(S): 20 TABLET, DELAYED RELEASE ORAL at 05:53

## 2023-07-09 NOTE — PROGRESS NOTE ADULT - PROBLEM SELECTOR PLAN 2
TTE with Perimembranous defect. Left to right shunting. Severe prosthetic aortic stenosis. Mildintravalvular regurgitation. No paravalvular regurgitation. Elevated gradients measured in the descending aorta of 2.7m/s, consider further evaluation for aortic coarctation.  -structural cards recs appreciated, s/p CTCA 7/3 for evaluation for Rowdy TAVR  -tentative plan for TAV in [place] 7/7  - CTS recs noted  - CECY 7/6 for better evaluation as per CTSx
S/p recent LHC with non obstructive CAD  - monitor on tele  - cont ASA
TTE with Perimembranous defect. Left to right shunting. Severe prosthetic aortic stenosis. Mildintravalvular regurgitation. No paravalvular regurgitation. Elevated gradients measured in the descending aorta of 2.7m/s, consider further evaluation for aortic coarctation.  -structural cards recs appreciated, s/p CTCA 7/3 for evaluation for Rowdy TAVR  -tentative plan for TAV in HU[place] 7/7  - CTS recs noted
S/p recent LHC with non obstructive CAD  - monitor on tele  - cont ASA
continue cpap  monitor O2 sats
S/p recent LHC with non obstructive CAD  - monitor on tele  - cont ASA
continue cpap qHS  monitor O2 sats  Continue Symbicort BID and albuterol q6h prn
S/p recent LHC with non obstructive CAD  - monitor on tele  - cont ASA
TTE with Perimembranous defect. Left to right shunting. Severe prosthetic aortic stenosis. Mildintravalvular regurgitation. No paravalvular regurgitation. Elevated gradients measured in the descending aorta of 2.7m/s, consider further evaluation for aortic coarctation.  -structural cards recs appreciated, s/p CTCA 7/3 for evaluation for Rowdy TAVR  -tentative plan for TAV in HU[place] 7/7  - CTS recs noted  - CECY for better evaluation as per CTSx
continue cpap qHS  monitor O2 sats  Continue Symbicort BID and albuterol q6h prn
TTE with Perimembranous defect. Left to right shunting. Severe prosthetic aortic stenosis. Mildintravalvular regurgitation. No paravalvular regurgitation. Elevated gradients measured in the descending aorta of 2.7m/s, consider further evaluation for aortic coarctation.  -structural cards recs appreciated, s/p CTCA 7/3 for evaluation for Rowdy TAVR  -tentative plan for TAV in [place] 7/7  - CTS recs noted  - CECY for better evaluation as per CTSx
TTE with Perimembranous defect. Left to right shunting. Severe prosthetic aortic stenosis. Mildintravalvular regurgitation. No paravalvular regurgitation. Elevated gradients measured in the descending aorta of 2.7m/s, consider further evaluation for aortic coarctation.  -structural cards recs appreciated, s/p CTCA 7/3 for evaluation for Rowdy TAVR  -tentative plan for TAV in HU[place] 7/7  - CTS recs noted  - CECY 7/6 for better evaluation as per CTSx, results reviewed  - CTSx plan for 7/7 TAV in [place]

## 2023-07-09 NOTE — PROGRESS NOTE ADULT - SUBJECTIVE AND OBJECTIVE BOX
VITAL SIGNS-Telemetry:  SR 70-90  Vital Signs Last 24 Hrs  T(C): 36.6 (23 @ 04:05), Max: 37 (23 @ 19:57)  T(F): 97.9 (23 @ 04:05), Max: 98.6 (23 @ 19:57)  HR: 75 (23 @ 04:05) (69 - 84)  BP: 98/65 (23 @ 04:05) (98/57 - 99/64)  RR: 18 (23 @ 04:05) (18 - 18)  SpO2: 99% (23 @ 04:05) (93% - 99%)          @ 07:01  -   @ 07:00  --------------------------------------------------------  IN: 60 mL / OUT: 500 mL / NET: -440 mL     @ 07:01  -   @ 06:33  --------------------------------------------------------  IN: 980 mL / OUT: 900 mL / NET: 80 mL    Daily     Daily Weight in k.8 (2023 08:00)    CAPILLARY BLOOD GLUCOSE  POCT Blood Glucose.: 113 mg/dL (2023 21:55)  POCT Blood Glucose.: 136 mg/dL (2023 16:41)  POCT Blood Glucose.: 109 mg/dL (2023 11:35)  POCT Blood Glucose.: 100 mg/dL (2023 07:37)         PHYSICAL EXAM:  Neurology: alert and oriented x 3, nonfocal, no gross deficits  CV : S1S2  Lungs: cta  Abdomen: soft, nontender, nondistended, positive bowel sounds, last bowel movement       preop  Extremities:     no c/c/e  groin sites cdi    albuterol    90 MICROgram(s) HFA Inhaler 2 Puff(s) Inhalation every 6 hours PRN  aluminum hydroxide/magnesium hydroxide/simethicone Suspension 30 milliLiter(s) Oral once PRN  aspirin enteric coated 81 milliGRAM(s) Oral daily  budesonide  80 MICROgram(s)/formoterol 4.5 MICROgram(s) Inhaler 2 Puff(s) Inhalation two times a day  calcium carbonate    500 mG (Tums) Chewable 2 Tablet(s) Chew daily  cholecalciferol 1000 Unit(s) Oral daily  FLUoxetine 20 milliGRAM(s) Oral daily  insulin lispro (ADMELOG) corrective regimen sliding scale   SubCutaneous Before meals and at bedtime  ketotifen 0.025% Ophthalmic Solution 1 Drop(s) Both EYES two times a day  levothyroxine 100 MICROGram(s) Oral daily  metFORMIN 250 milliGRAM(s) Oral three times a day  multivitamin 1 Tablet(s) Oral daily  pantoprazole    Tablet 40 milliGRAM(s) Oral before breakfast  polyethylene glycol 3350 17 Gram(s) Oral daily  psyllium Powder 1 Packet(s) Oral daily  senna 2 Tablet(s) Oral at bedtime  simethicone 80 milliGRAM(s) Chew two times a day  vancomycin  IVPB 1000 milliGRAM(s) IV Intermittent once    Physical Therapy Rec:  group Home  [x  ]   Home w/ PT  [  ]  Rehab  [  ]  Discussed with Cardiothoracic Team at AM rounds.

## 2023-07-09 NOTE — PROGRESS NOTE ADULT - PROBLEM SELECTOR PROBLEM 1
Diabetes mellitus
Unresponsiveness
Diabetes mellitus
Diabetes mellitus
Critical stenosis of aortic valve
Critical stenosis of aortic valve
Unresponsiveness
Critical stenosis of aortic valve
Critical stenosis of aortic valve
Unresponsiveness

## 2023-07-09 NOTE — PROGRESS NOTE ADULT - PROBLEM SELECTOR PLAN 4
A1c 4.3, home medication: metformin   FS well controlled, no need for POC glucose
A1c 4.3, home medication: metformin   FS well controlled, no need for POC glucose
TSH low at 0.01, normal T3/T4 c/w subclinical hyperthyroidism, i/s/o acute illness  continue PO synthroid 100 daily  repeat TFTs in 4 weeks
A1c 4.3, home medication: metformin   FS well controlled, no need for POC glucose
-Cont nocturnal CPAP
-Cont nocturnal CPAP
TSH low at 0.01, normal T3/T4 c/w subclinical hyperthyroidism, i/s/o acute illness  continue PO synthroid 100 daily  repeat TFTs in 4 weeks
A1c 4.3, home medication: metformin   FS well controlled, no need for POC glucose
TSH low at 0.01, normal T3/T4 c/w subclinical hyperthyroidism, i/s/o acute illness  cont synthroid  repeat TFTs in 4 weeks.
-Cont nocturnal CPAP
A1c 4.3, home medication: metformin   FS well controlled, no need for POC glucose
-Cont nocturnal CPAP

## 2023-07-09 NOTE — PROGRESS NOTE ADULT - PROBLEM SELECTOR PLAN 1
Pt was likely just sleeping as it is now resolved  - Workup so far negative for infection  - CTH pending
TTE with Perimembranous defect. Left to right shunting. Severe prosthetic aortic stenosis. Mildintravalvular regurgitation. No paravalvular regurgitation. Elevated gradients measured in the descending aorta of 2.7m/s, consider further evaluation for aortic coarctation.  -structural cards recs appreciated, pending CTCA for evaluation for Rowdy TAVR
TTE with Perimembranous defect. Left to right shunting. Severe prosthetic aortic stenosis. Mildintravalvular regurgitation. No paravalvular regurgitation. Elevated gradients measured in the descending aorta of 2.7m/s, consider further evaluation for aortic coarctation.  -structural cards recs appreciated, pending CTCA for evaluation for Rowdy TAVR
Unclear etiology. Seizure vs syncope vs other? No further episodes with unremarkable CTH  - Workup so far negative for infection  - CTH without acute pathology
TTE with Perimembranous defect. Left to right shunting. Severe prosthetic aortic stenosis. Mildintravalvular regurgitation. No paravalvular regurgitation. Elevated gradients measured in the descending aorta of 2.7m/s, consider further evaluation for aortic coarctation.  -structural cards recs appreciated, pending CTCA 7/3 for evaluation for Rowdy TAVR  -tentative plan for TAV in [place] 7/7
accuchecks ac and hs  dm diet  resume metformin 250 TID
accuchecks ac and hs  dm diet  resume metformin
accuchecks ac and hs  dm diet  resume metformin 250 TID
Unclear etiology. Seizure vs syncope vs other? No further episodes with unremarkable CTH  - Workup so far negative for infection  - CTH without acute pathology  - transient episode most likely related to severe AS
Unclear etiology. Seizure vs syncope vs other? No further episodes with unremarkable CTH --resolved  - Workup so far negative for infection  - CTH without acute pathology  - transient episode most likely related to severe AS
TTE with Perimembranous defect. Left to right shunting. Severe prosthetic aortic stenosis. Mildintravalvular regurgitation. No paravalvular regurgitation. Elevated gradients measured in the descending aorta of 2.7m/s, consider further evaluation for aortic coarctation.  -structural cards consulted
Unclear etiology. Seizure vs syncope vs other? No further episodes with unremarkable CTH  - Workup so far negative for infection  - CTH without acute pathology

## 2023-07-09 NOTE — PROGRESS NOTE ADULT - PROBLEM SELECTOR PROBLEM 2
TRINITY on CPAP
Critical stenosis of aortic valve
Critical stenosis of aortic valve
TRINITY on CPAP
Critical stenosis of aortic valve
TRINITY on CPAP
Critical stenosis of aortic valve
Unstable angina pectoris
Unstable angina pectoris
Critical stenosis of aortic valve
Unstable angina pectoris
Unstable angina pectoris

## 2023-07-09 NOTE — PROGRESS NOTE ADULT - ASSESSMENT
43F c hx down syndrome, cognitive impairment, AVR (1991) c/b moderate paravalvular insufficiency and mod-severe AS, nonobstructive CAD, PCOS, hypothyroidism, asthma, TRINITY on nocturnal CPAP, DM2, gout, and unconfirmed chart history of cirrhosis, CVA, reported recent hospitalization at [facility] for ?STEMI s/p LHC (5/31/23) showing nonobstructive CAD, presented from group home to [facility] on 6/29/23 with CP, now transferred to [facility] for JOSEE TAVR eval and at request of pt's father (Reji [phone]).    Pt currently A&Ox1. Per aide pt is normally A&Ox2-3 baseline. Pt reportedly presented to OSH with nonradiating CP. Pt previously had C. CE neg @ OSH. Now txf'd to [facility] for JOSEE TAVR eval. Pt currently denies CP, SOB, presyncopal symptoms, GI symptoms. Reports dry cough.  s/p 7/7 TAVR --> intraop hypotension requiring christopher and ivf; left groin ooze- manual compression applied; tx floor; RSR 65's;   ck echo and ekg in am, asa for AC, discharge planning- return to group home when stable   7/8 VSS, TTE done this AM, No evidence of aortic regurgitation. B/L groins stable, plan to d/c back to group home on Monday.   7/9 VSS - return to group home tomorrow  groins cdi

## 2023-07-09 NOTE — PROGRESS NOTE ADULT - REASON FOR ADMISSION
chest pain, tavr eval
Degenerated SAVR with HF
chest pain, tavr eval

## 2023-07-09 NOTE — PROGRESS NOTE ADULT - PROBLEM SELECTOR PLAN 3
Continue asa 81 daily for AC  Daily EKG  TTE done 7/8 no AR  Preop meds resumed  discharge planning- return to group home Monday
Continue asa 81 daily for AC  Daily EKG  TTE done 7/8 no AR  Preop meds resumed  discharge planning- return to group home Monday
Hold home metformin  - Start ISS  - Diabetic diet
S/p recent LHC with non obstructive CAD  - monitor on tele  - cont ASA
Hold home metformin  - Start ISS  - Diabetic diet
A1c 4.3, home medication: metformin   FS well controlled, no need for POC glucose
continue postop care  tele  monitor bilateral groin sites  asa for AC  ck echo and ekg in am  resume preop meds  discharge planning- return to group home when stable
Hold home metformin  - Start ISS  - Diabetic diet
S/p recent LHC with non obstructive CAD  - monitor on tele  - cont ASA
S/p recent LHC with non obstructive CAD  - monitor on tele  - cont ASA

## 2023-07-09 NOTE — PROGRESS NOTE ADULT - PROBLEM SELECTOR PROBLEM 3
Unstable angina pectoris
S/P TAVR (transcatheter aortic valve replacement)
Unstable angina pectoris
Diabetes mellitus
Unstable angina pectoris
Diabetes mellitus
S/P TAVR (transcatheter aortic valve replacement)
Diabetes mellitus
Unstable angina pectoris
Diabetes mellitus
S/P TAVR (transcatheter aortic valve replacement)
Unstable angina pectoris

## 2023-07-09 NOTE — PROGRESS NOTE ADULT - PROBLEM SELECTOR PROBLEM 4
Endorsed MD recommendation to patient via phone.  
Hypothyroidism
TRINITY on CPAP
Diabetes mellitus
Hypothyroidism
Diabetes mellitus
TRINITY on CPAP
Hypothyroidism
Diabetes mellitus

## 2023-07-10 ENCOUNTER — TRANSCRIPTION ENCOUNTER (OUTPATIENT)
Age: 43
End: 2023-07-10

## 2023-07-10 VITALS — OXYGEN SATURATION: 95 % | HEART RATE: 86 BPM

## 2023-07-10 LAB
ANION GAP SERPL CALC-SCNC: 12 MMOL/L — SIGNIFICANT CHANGE UP (ref 5–17)
BUN SERPL-MCNC: 10 MG/DL — SIGNIFICANT CHANGE UP (ref 7–23)
CALCIUM SERPL-MCNC: 9.2 MG/DL — SIGNIFICANT CHANGE UP (ref 8.4–10.5)
CHLORIDE SERPL-SCNC: 107 MMOL/L — SIGNIFICANT CHANGE UP (ref 96–108)
CO2 SERPL-SCNC: 24 MMOL/L — SIGNIFICANT CHANGE UP (ref 22–31)
CREAT SERPL-MCNC: 1.02 MG/DL — SIGNIFICANT CHANGE UP (ref 0.5–1.3)
EGFR: 69 ML/MIN/1.73M2 — SIGNIFICANT CHANGE UP
GLUCOSE BLDC GLUCOMTR-MCNC: 90 MG/DL — SIGNIFICANT CHANGE UP (ref 70–99)
GLUCOSE SERPL-MCNC: 82 MG/DL — SIGNIFICANT CHANGE UP (ref 70–99)
HCT VFR BLD CALC: 33.6 % — LOW (ref 34.5–45)
HGB BLD-MCNC: 11.5 G/DL — SIGNIFICANT CHANGE UP (ref 11.5–15.5)
MCHC RBC-ENTMCNC: 31.9 PG — SIGNIFICANT CHANGE UP (ref 27–34)
MCHC RBC-ENTMCNC: 34.2 GM/DL — SIGNIFICANT CHANGE UP (ref 32–36)
MCV RBC AUTO: 93.3 FL — SIGNIFICANT CHANGE UP (ref 80–100)
NRBC # BLD: 0 /100 WBCS — SIGNIFICANT CHANGE UP (ref 0–0)
PLATELET # BLD AUTO: 96 K/UL — LOW (ref 150–400)
POTASSIUM SERPL-MCNC: 3.9 MMOL/L — SIGNIFICANT CHANGE UP (ref 3.5–5.3)
POTASSIUM SERPL-SCNC: 3.9 MMOL/L — SIGNIFICANT CHANGE UP (ref 3.5–5.3)
RBC # BLD: 3.6 M/UL — LOW (ref 3.8–5.2)
RBC # FLD: 13.5 % — SIGNIFICANT CHANGE UP (ref 10.3–14.5)
SODIUM SERPL-SCNC: 143 MMOL/L — SIGNIFICANT CHANGE UP (ref 135–145)
WBC # BLD: 3.63 K/UL — LOW (ref 3.8–10.5)
WBC # FLD AUTO: 3.63 K/UL — LOW (ref 3.8–10.5)

## 2023-07-10 PROCEDURE — 36415 COLL VENOUS BLD VENIPUNCTURE: CPT

## 2023-07-10 PROCEDURE — 84484 ASSAY OF TROPONIN QUANT: CPT

## 2023-07-10 PROCEDURE — 86850 RBC ANTIBODY SCREEN: CPT

## 2023-07-10 PROCEDURE — 93320 DOPPLER ECHO COMPLETE: CPT

## 2023-07-10 PROCEDURE — 76376 3D RENDER W/INTRP POSTPROCES: CPT

## 2023-07-10 PROCEDURE — 99232 SBSQ HOSP IP/OBS MODERATE 35: CPT | Mod: FS

## 2023-07-10 PROCEDURE — 75574 CT ANGIO HRT W/3D IMAGE: CPT

## 2023-07-10 PROCEDURE — 87040 BLOOD CULTURE FOR BACTERIA: CPT

## 2023-07-10 PROCEDURE — 97161 PT EVAL LOW COMPLEX 20 MIN: CPT

## 2023-07-10 PROCEDURE — 87640 STAPH A DNA AMP PROBE: CPT

## 2023-07-10 PROCEDURE — 83036 HEMOGLOBIN GLYCOSYLATED A1C: CPT

## 2023-07-10 PROCEDURE — C1769: CPT

## 2023-07-10 PROCEDURE — C1889: CPT

## 2023-07-10 PROCEDURE — 71275 CT ANGIOGRAPHY CHEST: CPT

## 2023-07-10 PROCEDURE — 94640 AIRWAY INHALATION TREATMENT: CPT

## 2023-07-10 PROCEDURE — 80053 COMPREHEN METABOLIC PANEL: CPT

## 2023-07-10 PROCEDURE — 85730 THROMBOPLASTIN TIME PARTIAL: CPT

## 2023-07-10 PROCEDURE — 97116 GAIT TRAINING THERAPY: CPT

## 2023-07-10 PROCEDURE — 93325 DOPPLER ECHO COLOR FLOW MAPG: CPT

## 2023-07-10 PROCEDURE — 74174 CTA ABD&PLVS W/CONTRAST: CPT

## 2023-07-10 PROCEDURE — 84443 ASSAY THYROID STIM HORMONE: CPT

## 2023-07-10 PROCEDURE — 84436 ASSAY OF TOTAL THYROXINE: CPT

## 2023-07-10 PROCEDURE — 80048 BASIC METABOLIC PNL TOTAL CA: CPT

## 2023-07-10 PROCEDURE — 85610 PROTHROMBIN TIME: CPT

## 2023-07-10 PROCEDURE — 93306 TTE W/DOPPLER COMPLETE: CPT

## 2023-07-10 PROCEDURE — C1887: CPT

## 2023-07-10 PROCEDURE — 83735 ASSAY OF MAGNESIUM: CPT

## 2023-07-10 PROCEDURE — 70450 CT HEAD/BRAIN W/O DYE: CPT

## 2023-07-10 PROCEDURE — 76000 FLUOROSCOPY <1 HR PHYS/QHP: CPT

## 2023-07-10 PROCEDURE — 93005 ELECTROCARDIOGRAM TRACING: CPT

## 2023-07-10 PROCEDURE — 97110 THERAPEUTIC EXERCISES: CPT

## 2023-07-10 PROCEDURE — 86901 BLOOD TYPING SEROLOGIC RH(D): CPT

## 2023-07-10 PROCEDURE — C1894: CPT

## 2023-07-10 PROCEDURE — 81001 URINALYSIS AUTO W/SCOPE: CPT

## 2023-07-10 PROCEDURE — 93010 ELECTROCARDIOGRAM REPORT: CPT

## 2023-07-10 PROCEDURE — 86900 BLOOD TYPING SEROLOGIC ABO: CPT

## 2023-07-10 PROCEDURE — 86923 COMPATIBILITY TEST ELECTRIC: CPT

## 2023-07-10 PROCEDURE — 85025 COMPLETE CBC W/AUTO DIFF WBC: CPT

## 2023-07-10 PROCEDURE — 87641 MR-STAPH DNA AMP PROBE: CPT

## 2023-07-10 PROCEDURE — 82962 GLUCOSE BLOOD TEST: CPT

## 2023-07-10 PROCEDURE — 93312 ECHO TRANSESOPHAGEAL: CPT

## 2023-07-10 PROCEDURE — 84702 CHORIONIC GONADOTROPIN TEST: CPT

## 2023-07-10 PROCEDURE — 71045 X-RAY EXAM CHEST 1 VIEW: CPT

## 2023-07-10 PROCEDURE — 84480 ASSAY TRIIODOTHYRONINE (T3): CPT

## 2023-07-10 PROCEDURE — 82550 ASSAY OF CK (CPK): CPT

## 2023-07-10 PROCEDURE — C1725: CPT

## 2023-07-10 PROCEDURE — 84100 ASSAY OF PHOSPHORUS: CPT

## 2023-07-10 PROCEDURE — C1760: CPT

## 2023-07-10 PROCEDURE — 85027 COMPLETE CBC AUTOMATED: CPT

## 2023-07-10 RX ADMIN — SIMETHICONE 80 MILLIGRAM(S): 80 TABLET, CHEWABLE ORAL at 06:18

## 2023-07-10 RX ADMIN — KETOTIFEN FUMARATE 1 DROP(S): 0.34 SOLUTION OPHTHALMIC at 06:18

## 2023-07-10 RX ADMIN — BUDESONIDE AND FORMOTEROL FUMARATE DIHYDRATE 2 PUFF(S): 160; 4.5 AEROSOL RESPIRATORY (INHALATION) at 06:25

## 2023-07-10 RX ADMIN — Medication 100 MICROGRAM(S): at 06:19

## 2023-07-10 RX ADMIN — Medication 30 MILLILITER(S): at 05:14

## 2023-07-10 RX ADMIN — METFORMIN HYDROCHLORIDE 250 MILLIGRAM(S): 850 TABLET ORAL at 06:19

## 2023-07-10 RX ADMIN — PANTOPRAZOLE SODIUM 40 MILLIGRAM(S): 20 TABLET, DELAYED RELEASE ORAL at 06:18

## 2023-07-10 NOTE — DISCHARGE NOTE NURSING/CASE MANAGEMENT/SOCIAL WORK - NSDCPEFALRISK_GEN_ALL_CORE
For information on Fall & Injury Prevention, visit: [email or web address] OR  [email or web address] OR  https://www.cdc.gov/steadi/patient.html

## 2023-07-10 NOTE — DISCHARGE NOTE PROVIDER - NSDCCPCAREPLAN_GEN_ALL_CORE_FT
PRINCIPAL DISCHARGE DIAGNOSIS  Diagnosis: S/P TAVR (transcatheter aortic valve replacement)  Assessment and Plan of Treatment: TAVR discharge instructions>Keep femoral or groin site clean,please shower daily and pat site dry.Watch site for signs of reddness or drainage, these should be reported to your doctor.You will receive a card about your valve in the mail,please carry in your wallet.  You will be discharged with a heart rythm monitoring device called an "MCOT" that will be with your for 30 days.   Make sure to follow the insutrctions provided in the hospital.   Reminders: You can shower with the device. Charge the phone every other day for an hour. Change the patch every 5 days and charge your monitor at the same time. Do not charge devices overnight. If you need to charge the devices sooner than recommended, you will get an alarm on the devices to indicate you to do so. Carry these devices with you at ALL times.  Follow up with Dr. MOORE in the office.

## 2023-07-10 NOTE — DISCHARGE NOTE PROVIDER - NSDCFUSCHEDAPPT_GEN_ALL_CORE_FT
Mike Ramirez  [facility]  CTSURG [address]  Scheduled Appointment: 07/21/2023

## 2023-07-10 NOTE — DISCHARGE NOTE NURSING/CASE MANAGEMENT/SOCIAL WORK - PATIENT PORTAL LINK FT
You can access the FollowMyHealth Patient Portal offered by [facility] by registering at the following website: [email or web address]. By joining [facility] [employer]’s FollowMyHealth portal, you will also be able to view your health information using other applications (apps) compatible with our system.

## 2023-07-10 NOTE — DISCHARGE NOTE PROVIDER - NSDCFUADDAPPT_GEN_ALL_CORE_FT
Follow up with  on at [facility] for you post op follow up appointment, if you are unable to keep this appointment please call the CTS office to re-schedule at [phone].  Follow up with  on 7/21 @ 10am at [facility] for you post op follow up appointment, if you are unable to keep this appointment please call the CTS office to re-schedule at [phone].

## 2023-07-10 NOTE — DISCHARGE NOTE PROVIDER - HOSPITAL COURSE
43F c hx down syndrome, cognitive impairment, AVR (1991) c/b moderate paravalvular insufficiency and mod-severe AS, nonobstructive CAD, PCOS, hypothyroidism, asthma, TRINTIY on nocturnal CPAP, DM2, gout, and unconfirmed chart history of cirrhosis, CVA, reported recent hospitalization at [facility] for ?STEMI s/p LHC (5/31/23) showing nonobstructive CAD, presented from group home to [facility] on 6/29/23 with CP, now transferred to [facility] for JOSEE TAVR eval and at request of pt's father (Reji [phone]).    Pt currently A&Ox1. Per aide pt is normally A&Ox2-3 baseline. Pt reportedly presented to OSH with nonradiating CP. Pt previously had C. CE neg @ OSH. Now txf'd to [facility] for JOSEE TAVR eval. Pt currently denies CP, SOB, presyncopal symptoms, GI symptoms. Reports dry cough.  s/p 7/7 TAVR --> intraop hypotension requiring christopher and ivf; left groin ooze- manual compression applied; tx floor; RSR 65's;   ck echo and ekg in am, asa for AC, discharge planning- return to group home when stable   7/8 VSS, TTE done this AM, No evidence of aortic regurgitation. B/L groins stable, plan to d/c back to group home on Monday.   7/9 VSS - return to group home tomorrow  groins cdi  7/10 VSS; Plan for discharge to group home today

## 2023-07-10 NOTE — DISCHARGE NOTE NURSING/CASE MANAGEMENT/SOCIAL WORK - NSDCFUADDAPPT_GEN_ALL_CORE_FT
Follow up with  on 7/21 @ 10am at [facility] for you post op follow up appointment, if you are unable to keep this appointment please call the CTS office to re-schedule at [phone].

## 2023-07-10 NOTE — DISCHARGE NOTE PROVIDER - NSDCMRMEDTOKEN_GEN_ALL_CORE_FT
Align 4 mg oral capsule: 1 cap(s) orally once a day  aspirin 81 mg oral delayed release tablet: 1 tab(s) orally once a day  FLUoxetine 20 mg oral capsule: 1 cap(s) orally once a day  metFORMIN 750 mg oral tablet, extended release: 1 tab(s) orally once a day  Multiple Vitamins oral tablet: 1 tab(s) orally once a day  omeprazole 40 mg oral delayed release capsule: 1 cap(s) orally once a day  Synthroid 100 mcg (0.1 mg) oral tablet: 1 tab(s) orally once a day

## 2023-07-10 NOTE — DISCHARGE NOTE PROVIDER - NSDCPNSUBOBJ_GEN_ALL_CORE
Patient is doing well, groins c/d/i. PLan for discharge home today.     ICU Vital Signs Last 24 Hrs  T(C): 36.4 (10 Jul 2023 04:03), Max: 36.8 (09 Jul 2023 20:29)  T(F): 97.5 (10 Jul 2023 04:03), Max: 98.2 (09 Jul 2023 20:29)  HR: 86 (10 Jul 2023 09:24) (78 - 87)  BP: 95/54 (10 Jul 2023 04:03) (95/54 - 133/73)  BP(mean): --  ABP: --  ABP(mean): --  RR: 18 (10 Jul 2023 04:03) (18 - 18)  SpO2: 95% (10 Jul 2023 09:24) (94% - 99%)    O2 Parameters below as of 10 Jul 2023 04:03  Patient On (Oxygen Delivery Method): room air                          11.5   3.63  )-----------( 96       ( 10 Jul 2023 07:29 )             33.6       07-10    143  |  107  |  10  ----------------------------<  82  3.9   |  24  |  1.02    Ca    9.2      10 Jul 2023 07:30      General: WN/WD NAD  Neurology: A&Ox3, nonfocal, RODRIGUEZ x 4  Head:  Normocephalic, atraumatic  ENT:  Mucosa moist, no ulcerations  Neck:  Supple, no sinuses or palpable masses  Lymphatic:  No palpable cervical, supraclavicular, axillary or inguinal adenopathy  Respiratory: CTA B/L  CV: RRR, S1S2, no murmur  Abdominal: Soft, NT, ND no palpable mass  MSK: No edema, + peripheral pulses, FROM all 4 extremity  Incisions: intact, no erythema or drainage

## 2023-07-10 NOTE — DISCHARGE NOTE PROVIDER - CARE PROVIDER_API CALL
Mike Ramirez  Thoracic and Cardiac Surgery  [address] [place] [address]  Phone: [phone]  Fax: [phone]  Scheduled Appointment: 07/21/2023 10:00 AM

## 2023-07-11 ENCOUNTER — NON-APPOINTMENT (OUTPATIENT)
Age: 43
End: 2023-07-11

## 2023-07-11 PROBLEM — Z00.00 ENCOUNTER FOR PREVENTIVE HEALTH EXAMINATION: Noted: 2023-07-11

## 2023-07-11 RX ORDER — ALUMINUM ZIRCONIUM TRICHLOROHYDREX GLY 0.2 G/G
1 STICK TOPICAL
Refills: 0 | DISCHARGE

## 2023-07-11 RX ORDER — MULTIVITAMIN
TABLET ORAL DAILY
Refills: 0 | Status: ACTIVE | COMMUNITY
Start: 2023-07-11

## 2023-07-11 RX ORDER — BIFIDOBACTERIUM LONGUM 10MM CELL
4 CAPSULE ORAL DAILY
Refills: 0 | Status: ACTIVE | COMMUNITY
Start: 2023-07-11

## 2023-07-11 RX ORDER — LEVOTHYROXINE SODIUM 100 UG/1
100 TABLET ORAL DAILY
Refills: 0 | Status: ACTIVE | COMMUNITY
Start: 2023-07-11

## 2023-07-11 RX ORDER — OMEPRAZOLE 10 MG/1
1 CAPSULE, DELAYED RELEASE ORAL
Refills: 0 | DISCHARGE

## 2023-07-11 RX ORDER — METFORMIN HYDROCHLORIDE 850 MG/1
1 TABLET ORAL
Refills: 0 | DISCHARGE

## 2023-07-11 RX ORDER — LEVOTHYROXINE SODIUM 125 MCG
1 TABLET ORAL
Refills: 0 | DISCHARGE

## 2023-07-11 RX ORDER — FLUOXETINE HCL 10 MG
1 CAPSULE ORAL
Refills: 0 | DISCHARGE

## 2023-07-11 RX ORDER — FLUOXETINE HYDROCHLORIDE 20 MG/1
20 CAPSULE ORAL DAILY
Refills: 0 | Status: ACTIVE | COMMUNITY
Start: 2023-07-11

## 2023-07-11 RX ORDER — ASPIRIN/CALCIUM CARB/MAGNESIUM 324 MG
1 TABLET ORAL
Refills: 0 | DISCHARGE

## 2023-07-11 RX ORDER — METFORMIN ER 750 MG 750 MG/1
750 TABLET ORAL DAILY
Refills: 0 | Status: ACTIVE | COMMUNITY
Start: 2023-07-11

## 2023-07-18 PROBLEM — Z95.2 S/P TAVR (TRANSCATHETER AORTIC VALVE REPLACEMENT): Status: ACTIVE | Noted: 2023-07-18

## 2023-07-21 ENCOUNTER — NON-APPOINTMENT (OUTPATIENT)
Age: 43
End: 2023-07-21

## 2023-07-21 ENCOUNTER — APPOINTMENT (OUTPATIENT)
Dept: CARDIOTHORACIC SURGERY | Facility: CLINIC | Age: 43
End: 2023-07-21
Payer: MEDICARE

## 2023-07-21 VITALS
HEIGHT: 55 IN | WEIGHT: 148 LBS | BODY MASS INDEX: 34.25 KG/M2 | TEMPERATURE: 98.2 F | RESPIRATION RATE: 16 BRPM | DIASTOLIC BLOOD PRESSURE: 64 MMHG | HEART RATE: 75 BPM | SYSTOLIC BLOOD PRESSURE: 104 MMHG | OXYGEN SATURATION: 99 %

## 2023-07-21 DIAGNOSIS — Z95.2 PRESENCE OF PROSTHETIC HEART VALVE: ICD-10-CM

## 2023-07-21 PROCEDURE — 99213 OFFICE O/P EST LOW 20 MIN: CPT

## 2023-07-21 NOTE — ASSESSMENT
[FreeTextEntry1] : Today on exam, patient's lungs are clear bilaterally, normal sinus rhythm and bilateral groins are clean, dry and intact. There is no hematoma. No peripheral edema noted on exam. EKG performed and reviewed. SBE antibiotic prophylaxis discussed at length.  Patient instructed to continue current medication regimen and followup with cardiology.\par \par Plan:\par - Follow up with cardiologist Dr. Sandoval\par - Follow up with cardiologist with TTE in one month\par - SBE antibiotic prophylaxis discussed at length\par - Call with any questions or concerns\par \par \par 43F with developmental delays s/p valve in valve TAVR doing well\par - continue current meds\par - f/u TTE with cardiologist in 1 month\par - abx ppx for dental and other procedures discussed\par - f/u prn\par

## 2023-07-21 NOTE — CONSULT LETTER
[Dear  ___] : Dear  [unfilled], [Courtesy Letter:] : I had the pleasure of seeing your patient, [unfilled], in my office today. [Please see my note below.] : Please see my note below. [Sincerely,] : Sincerely, [FreeTextEntry2] : Dr. Sandoval [FreeTextEntry3] : Anjelica Ramirez MD\par Attending Surgeon\par Cardiovascular & Thoracic Surgery\par  \par [facility]

## 2023-07-21 NOTE — REASON FOR VISIT
[de-identified] : Rowdy TAVR [de-identified] : 7/7/2023 [de-identified] : 43 year old female hx down syndrome, cognitive impairment, AVR (1991) c/b moderate paravalvular insufficiency and mod-severe AS, nonobstructive CAD, PCOS, hypothyroidism, asthma, TRINITY on nocturnal CPAP, DM2, gout, and unconfirmed chart history of cirrhosis, CVA, reported recent hospitalization at [facility] for ?STEMI \par s/p LHC (5/31/23) showing nonobstructive CAD, presented from group home to [facility] on 6/29/23 with CP, now transferred to [facility] for JOSEE TAVR eval and at request of pt's father (Reji [phone]). \par \par Intraop hypotension requiring christopher and ivf; left groin ooze- manual compression applied; asa for AC, discharge planning- return to group home  TTE with no evidence of aortic regurgitation. B/L groins stable, DC back to group home. Followed by peds cards Dr. Sandoval.\par \par Presents today with  Zoë.  She reports feeling good, notices feeling better spp TAVR. Weights stable, Eating and drinking with +BM. Denies chest pain, SOB, swelling, weight gain, palpitations, cough, fever or chills.\par

## 2023-08-09 ENCOUNTER — TRANSCRIPTION ENCOUNTER (OUTPATIENT)
Age: 43
End: 2023-08-09

## 2023-08-13 ENCOUNTER — RESULT CHARGE (OUTPATIENT)
Age: 43
End: 2023-08-13

## 2023-08-14 ENCOUNTER — APPOINTMENT (OUTPATIENT)
Dept: PEDIATRIC CARDIOLOGY | Facility: CLINIC | Age: 43
End: 2023-08-14
Payer: MEDICARE

## 2023-08-14 VITALS
OXYGEN SATURATION: 100 % | HEIGHT: 55 IN | HEART RATE: 60 BPM | DIASTOLIC BLOOD PRESSURE: 68 MMHG | BODY MASS INDEX: 33.62 KG/M2 | RESPIRATION RATE: 16 BRPM | WEIGHT: 145.28 LBS | SYSTOLIC BLOOD PRESSURE: 106 MMHG

## 2023-08-14 PROCEDURE — 93325 DOPPLER ECHO COLOR FLOW MAPG: CPT

## 2023-08-14 PROCEDURE — 99204 OFFICE O/P NEW MOD 45 MIN: CPT

## 2023-08-14 PROCEDURE — 93320 DOPPLER ECHO COMPLETE: CPT

## 2023-08-14 PROCEDURE — 93303 ECHO TRANSTHORACIC: CPT

## 2023-08-14 NOTE — CONSULT LETTER
[Today's Date] : [unfilled] [Name] : Name: [unfilled] [] : : ~~ [Today's Date:] : [unfilled] [Consult] : I had the pleasure of evaluating your patient, [unfilled]. My full evaluation follows. [Sincerely,] : Sincerely, [Dear  ___:] : Dear Dr. [unfilled]: [Consult - Multiple Provider] : Thank you very much for allowing us to participate in the care of this patient. If you have any questions, please do not hesitate to contact us. [FreeTextEntry4] : Michelle Price MD [FreeTextEntry5] : [address] [facility] [FreeTextEntry6] : [place] [id number] [de-identified] : Della Healy, MSN, CPNP-AC, PC Pediatric Cardiology, Adult Congenital Cardiology [facility] Physician [facility] Naplessheila Mcrae [facility]  Amalia Moran MD, HERSON Director, Adult Congenital Heart , High Risk Cardiovascular Obstetrics [facility] Physician [facility]  [address]: [phone] [facility] Office: [phone] [facility] Office: [phone]

## 2023-08-14 NOTE — REASON FOR VISIT
[Follow-Up] : a follow-up visit for [Patient] : patient [FreeTextEntry3] : Trisomy 21. H/O LVOT with subvalvar and valvar aortic stenosis.  S/P root enlargement, aortic valve replacement  with Bovine valve and LVOT muscular resection (2014).  S/P TAVR [Other: _____] : [unfilled]

## 2023-08-14 NOTE — PHYSICAL EXAM
[General Appearance - Alert] : alert [General Appearance - Well Nourished] : well nourished [General Appearance - Well-Appearing] : well appearing [Respiration, Rhythm And Depth] : normal respiratory rhythm and effort [Auscultation Breath Sounds / Voice Sounds] : breath sounds clear to auscultation bilaterally [Chest Surgical / Traumatic Scar] : chest incision well healed [Heart Sounds] : normal S1 and S2 [Systolic] : systolic [II] : a grade 2/6 [Bowel Sounds] : normal bowel sounds [Nail Clubbing] : no clubbing  or cyanosis of the fingernails

## 2023-08-14 NOTE — DISCUSSION/SUMMARY
[Needs SBE Prophylaxis] : [unfilled]  needs bacterial endocarditis prophylaxis. SBE prophylaxis is indicated for dental and invasive ENT procedures. (Circulation. 2007; 116: 5786-1854) [FreeTextEntry1] : In summary, Clementina is a 43 year old female with a history of Trisomy 21 and left ventricular outflow tract obstruction s/p aortic valve replacement with posterior enlargement of the root and left ventricular outflow tract muscle resection, now s/p TAVR w/ 26mm Evolut Fx aortic valve.  On today's postoperative echocardiogram, she has no aortic valve insufficiency with PSIG of 33mmHg across the aortic valve. Clementina's function remains well preserved.  We again reviewed the importance of meticulous dental hygiene and the need for regular dental cleanings. We reviewed that the SBE prophylaxis guidelines for Clementina should be Azithromycin and have sent in a prescription for her next dental appointment.  We will see her again in 6 months to check her progress, but she will not need an echocardiogram at that visit. We will then resume annual visits.  She is cleared to return to her group home and resume all activities with no restrictions.

## 2023-08-14 NOTE — CARDIOLOGY SUMMARY
[Today's Date] : [unfilled] [FreeTextEntry1] : sinus rhythm, ventricular rate 59 [FreeTextEntry2] : 1. History of aortic stenosis s/p posterior root enlargement, aortic valve replacement with a 23 mm bovine pericardial valve, and left ventricular outflow tract muscular resection on 7/1/2014. Now status post transcatheter aortic valve replacement (TAVR) on 7/7/2023 with 26mm Evolut Fx. 2. Imaging artifact at the christopher-aortic valve. No significant neoaortic regurgitation noted. PSIG across the neoaortic valve of 31 mmHg. 3. Normal systolic Doppler profile in the descending aorta at the level of the diaphragm. 4. Mild mitral valve stenosis. 5. Tethering and limited mobility of the posterior mitral valve leaflet, mean 7mmHg. 6. Trivial mitral valve regurgitation. 7. Normal left ventricular size, morphology and systolic function. 8. No residual left ventricular outflow tract obstruction. 9. Normal right ventricular morphology with qualitatively normal size and systolic function. 10. Mild tricuspid valve regurgitation, peak systolic instantaneous gradient 35.2 mmHg. 11. No pericardial effusion.

## 2023-08-14 NOTE — HISTORY OF PRESENT ILLNESS
[FreeTextEntry1] : We had the pleasure of seeing Clementina Matamoros today in the Adult Cong[facility]ital [facility] of [facility]. As you know, Clementina is a 43 year old female with Trisomy 21 and left ventricular outflow tract obstruction with subvalvar and valvar aortic stenosis. She underwent posterior root enlargement, aortic valve replacement with a 23 mm bovine pericardial valve, and left ventricular outflow tract muscular resection on 7/1/2014. She is now s/p TAVR with 26mm Evolut Fx nancy on 7/7/2023.  Since her valve replacement she is doing well. She has returned to her group home, but they have yet to let her participate in any activities.   Her father states she has more energy since the valve and is feeling better.  She denies chest pain, palpitations, shortness of breath, near syncope, or syncope.   She has TRINITY and says she is compliant with wearing her CPAP at night. She has hypothyroidism and chronic thrombocytopenia related to Trisomy 21. She is followed routinely by Hematology with stable platelet counts. Her dental care is up to date.

## 2023-09-06 ENCOUNTER — NON-APPOINTMENT (OUTPATIENT)
Age: 43
End: 2023-09-06

## 2023-09-07 ENCOUNTER — OUTPATIENT (OUTPATIENT)
Dept: OUTPATIENT SERVICES | Facility: HOSPITAL | Age: 43
LOS: 1 days | Discharge: HOME | End: 2023-09-07

## 2023-09-07 DIAGNOSIS — Z95.4 PRESENCE OF OTHER HEART-VALVE REPLACEMENT: Chronic | ICD-10-CM

## 2023-09-07 DIAGNOSIS — D69.6 THROMBOCYTOPENIA, UNSPECIFIED: ICD-10-CM

## 2023-09-07 DIAGNOSIS — Z98.89 OTHER SPECIFIED POSTPROCEDURAL STATES: Chronic | ICD-10-CM

## 2023-09-08 ENCOUNTER — RESULT REVIEW (OUTPATIENT)
Age: 43
End: 2023-09-08

## 2023-09-08 ENCOUNTER — APPOINTMENT (OUTPATIENT)
Dept: HEMATOLOGY ONCOLOGY | Facility: CLINIC | Age: 43
End: 2023-09-08

## 2023-09-08 ENCOUNTER — APPOINTMENT (OUTPATIENT)
Dept: HEMATOLOGY ONCOLOGY | Facility: CLINIC | Age: 43
End: 2023-09-08
Payer: MEDICARE

## 2023-09-08 DIAGNOSIS — Q90.9 DOWN SYNDROME, UNSPECIFIED: ICD-10-CM

## 2023-09-08 LAB
BASOPHILS # BLD AUTO: 0.04 K/UL — SIGNIFICANT CHANGE UP (ref 0–0.2)
BASOPHILS NFR BLD AUTO: 1.5 % — SIGNIFICANT CHANGE UP (ref 0–2)
EOSINOPHIL # BLD AUTO: 0.05 K/UL — SIGNIFICANT CHANGE UP (ref 0–0.5)
EOSINOPHIL NFR BLD AUTO: 1.8 % — SIGNIFICANT CHANGE UP (ref 0–6)
HCT VFR BLD CALC: 29.2 % — LOW (ref 34.5–45)
HGB BLD-MCNC: 9.5 G/DL — LOW (ref 11.5–15.5)
IMM GRANULOCYTES NFR BLD AUTO: 0.4 % — SIGNIFICANT CHANGE UP (ref 0–0.9)
LYMPHOCYTES # BLD AUTO: 0.65 K/UL — LOW (ref 1–3.3)
LYMPHOCYTES # BLD AUTO: 23.9 % — SIGNIFICANT CHANGE UP (ref 13–44)
MCHC RBC-ENTMCNC: 29.4 PG — SIGNIFICANT CHANGE UP (ref 27–34)
MCHC RBC-ENTMCNC: 32.5 G/DL — SIGNIFICANT CHANGE UP (ref 32–36)
MCV RBC AUTO: 90.4 FL — SIGNIFICANT CHANGE UP (ref 80–100)
MONOCYTES # BLD AUTO: 0.32 K/UL — SIGNIFICANT CHANGE UP (ref 0–0.9)
MONOCYTES NFR BLD AUTO: 11.8 % — SIGNIFICANT CHANGE UP (ref 2–14)
NEUTROPHILS # BLD AUTO: 1.65 K/UL — LOW (ref 1.8–7.4)
NEUTROPHILS NFR BLD AUTO: 60.6 % — SIGNIFICANT CHANGE UP (ref 43–77)
NRBC # BLD: 0 /100 WBCS — SIGNIFICANT CHANGE UP (ref 0–0)
PLATELET # BLD AUTO: 83 K/UL — LOW (ref 150–400)
RBC # BLD: 3.23 M/UL — LOW (ref 3.8–5.2)
RBC # FLD: 16.6 % — HIGH (ref 10.3–14.5)
WBC # BLD: 2.72 K/UL — LOW (ref 3.8–10.5)
WBC # FLD AUTO: 2.72 K/UL — LOW (ref 3.8–10.5)

## 2023-09-08 PROCEDURE — 99214 OFFICE O/P EST MOD 30 MIN: CPT

## 2023-09-08 NOTE — HISTORY OF PRESENT ILLNESS
[0 - No Distress] : Distress Level: 0 [80: Normal activity with effort; some signs or symptoms of disease.] : 80: Normal activity with effort; some signs or symptoms of disease.  [de-identified] : Clementina Hu is a 39 y.o. with Down syndrome, aortic stenosis S/P aortic valve replacement, obstructive sleep apnea, H/O dizziness, and hypothyroidism, who is referred because of thrombocytopenia. She has been in a group home environment and has had elementary school education. She underwent bioprosthetic aortic valve replacement in 2014. She has been noted to have intermittent thrombocytopenia, with a platelet count of 111 (5/4/16), 150 (12/8/16), and 99 (3/24/17) without other blood count abnormalities. She denies constitutional symptoms, abnormal bruising/bleeding, or recent infections.\par  \par   S/P  bone marrow biopsy and aspirate done on 9/13/17, which showed trilineage hematopoiesis with maturation and mild megakaryocytosis and increased iron stores. FISH MDS panel was negative, and cytogenetics confirmed trisomy 21. Since then, she has no complaints except for bruising while on aspirin. On 12/8/17, she has no complaints.\par   [de-identified] : Patient presents here today for a follow up. She is doing well. Denies bleeding episodes, melena, hematochezia, dizziness, lightheadedness, SOB.   Patient was recently admitted to hospital with seizures.   No other changes in medical, surgical or social history since 4/18/ 2023.

## 2023-09-08 NOTE — ASSESSMENT
[FreeTextEntry1] : Ms. Hu has chronic thrombocytopenia associated with Down syndrome. Prior bone marrow biopsy in 2017 was negative for MDS or malignancy.  9/8/23 - PLTS  83 K/uL. Results reviewed with patient. Will recheck in 4 months if still pancytopenic will recommend a BMB.  CMP, LDH pending  Greater than 50% of the encounter time was spent on counseling and coordination of care for   thrombocytopenia   and I have spent  30   minutes of face to face time with the patient.  RTC 4 months.

## 2023-09-08 NOTE — PHYSICAL EXAM
[Restricted in physically strenuous activity but ambulatory and able to carry out work of a light or sedentary nature] : Status 1- Restricted in physically strenuous activity but ambulatory and able to carry out work of a light or sedentary nature, e.g., light house work, office work [Normal] : normal appearance, no rash, nodules, vesicles, ulcers, erythema [de-identified] : Down syndrome appearance; able to converse easily and answer simple questions [de-identified] : murmur

## 2023-09-11 LAB
ALBUMIN SERPL ELPH-MCNC: 3.5 G/DL
ALP BLD-CCNC: 64 U/L
ALT SERPL-CCNC: 23 U/L
ANION GAP SERPL CALC-SCNC: 11 MMOL/L
AST SERPL-CCNC: 33 U/L
BILIRUB SERPL-MCNC: 0.4 MG/DL
BUN SERPL-MCNC: 9 MG/DL
CALCIUM SERPL-MCNC: 8.7 MG/DL
CHLORIDE SERPL-SCNC: 107 MMOL/L
CO2 SERPL-SCNC: 23 MMOL/L
CREAT SERPL-MCNC: 0.89 MG/DL
EGFR: 81 ML/MIN/1.73M2
GLUCOSE SERPL-MCNC: 98 MG/DL
LDH SERPL-CCNC: 238 U/L
POTASSIUM SERPL-SCNC: 3.7 MMOL/L
PROT SERPL-MCNC: 6 G/DL
SODIUM SERPL-SCNC: 141 MMOL/L

## 2023-10-17 NOTE — PROGRESS NOTE ADULT - PROBLEM SELECTOR PROBLEM 5
Conjuntivae and eyelids appear normal, Sclerae : White without injection
Medication management
Medication management

## 2023-11-09 ENCOUNTER — NON-APPOINTMENT (OUTPATIENT)
Age: 43
End: 2023-11-09

## 2023-12-01 ENCOUNTER — OUTPATIENT (OUTPATIENT)
Dept: OUTPATIENT SERVICES | Facility: HOSPITAL | Age: 43
LOS: 1 days | Discharge: HOME | End: 2023-12-01

## 2023-12-01 DIAGNOSIS — Z95.4 PRESENCE OF OTHER HEART-VALVE REPLACEMENT: Chronic | ICD-10-CM

## 2023-12-01 DIAGNOSIS — D69.6 THROMBOCYTOPENIA, UNSPECIFIED: ICD-10-CM

## 2023-12-01 DIAGNOSIS — Z98.89 OTHER SPECIFIED POSTPROCEDURAL STATES: Chronic | ICD-10-CM

## 2024-01-02 ENCOUNTER — RESULT REVIEW (OUTPATIENT)
Age: 44
End: 2024-01-02

## 2024-01-02 ENCOUNTER — APPOINTMENT (OUTPATIENT)
Dept: HEMATOLOGY ONCOLOGY | Facility: CLINIC | Age: 44
End: 2024-01-02

## 2024-01-02 ENCOUNTER — APPOINTMENT (OUTPATIENT)
Dept: HEMATOLOGY ONCOLOGY | Facility: CLINIC | Age: 44
End: 2024-01-02
Payer: MEDICARE

## 2024-01-02 VITALS
HEIGHT: 55 IN | BODY MASS INDEX: 34.69 KG/M2 | WEIGHT: 149.91 LBS | DIASTOLIC BLOOD PRESSURE: 82 MMHG | OXYGEN SATURATION: 100 % | SYSTOLIC BLOOD PRESSURE: 143 MMHG | TEMPERATURE: 97.4 F | HEART RATE: 63 BPM | RESPIRATION RATE: 16 BRPM

## 2024-01-02 LAB
ALBUMIN SERPL ELPH-MCNC: 3.7 G/DL
ALP BLD-CCNC: 67 U/L
ALT SERPL-CCNC: 27 U/L
ANION GAP SERPL CALC-SCNC: 9 MMOL/L
AST SERPL-CCNC: 32 U/L
BASOPHILS # BLD AUTO: 0.03 K/UL — SIGNIFICANT CHANGE UP (ref 0–0.2)
BASOPHILS # BLD AUTO: 0.03 K/UL — SIGNIFICANT CHANGE UP (ref 0–0.2)
BASOPHILS NFR BLD AUTO: 0.6 % — SIGNIFICANT CHANGE UP (ref 0–2)
BASOPHILS NFR BLD AUTO: 0.6 % — SIGNIFICANT CHANGE UP (ref 0–2)
BILIRUB SERPL-MCNC: 0.4 MG/DL
BUN SERPL-MCNC: 14 MG/DL
CALCIUM SERPL-MCNC: 9.1 MG/DL
CHLORIDE SERPL-SCNC: 108 MMOL/L
CO2 SERPL-SCNC: 28 MMOL/L
CREAT SERPL-MCNC: 1.06 MG/DL
EGFR: 66 ML/MIN/1.73M2
EOSINOPHIL # BLD AUTO: 0.13 K/UL — SIGNIFICANT CHANGE UP (ref 0–0.5)
EOSINOPHIL # BLD AUTO: 0.13 K/UL — SIGNIFICANT CHANGE UP (ref 0–0.5)
EOSINOPHIL NFR BLD AUTO: 2.7 % — SIGNIFICANT CHANGE UP (ref 0–6)
EOSINOPHIL NFR BLD AUTO: 2.7 % — SIGNIFICANT CHANGE UP (ref 0–6)
GLUCOSE SERPL-MCNC: 83 MG/DL
HCT VFR BLD CALC: 32.8 % — LOW (ref 34.5–45)
HCT VFR BLD CALC: 32.8 % — LOW (ref 34.5–45)
HGB BLD-MCNC: 10.9 G/DL — LOW (ref 11.5–15.5)
HGB BLD-MCNC: 10.9 G/DL — LOW (ref 11.5–15.5)
IMM GRANULOCYTES NFR BLD AUTO: 0.8 % — SIGNIFICANT CHANGE UP (ref 0–0.9)
IMM GRANULOCYTES NFR BLD AUTO: 0.8 % — SIGNIFICANT CHANGE UP (ref 0–0.9)
LDH SERPL-CCNC: 277 U/L
LYMPHOCYTES # BLD AUTO: 1.19 K/UL — SIGNIFICANT CHANGE UP (ref 1–3.3)
LYMPHOCYTES # BLD AUTO: 1.19 K/UL — SIGNIFICANT CHANGE UP (ref 1–3.3)
LYMPHOCYTES # BLD AUTO: 24.7 % — SIGNIFICANT CHANGE UP (ref 13–44)
LYMPHOCYTES # BLD AUTO: 24.7 % — SIGNIFICANT CHANGE UP (ref 13–44)
MCHC RBC-ENTMCNC: 30.4 PG — SIGNIFICANT CHANGE UP (ref 27–34)
MCHC RBC-ENTMCNC: 30.4 PG — SIGNIFICANT CHANGE UP (ref 27–34)
MCHC RBC-ENTMCNC: 33.2 G/DL — SIGNIFICANT CHANGE UP (ref 32–36)
MCHC RBC-ENTMCNC: 33.2 G/DL — SIGNIFICANT CHANGE UP (ref 32–36)
MCV RBC AUTO: 91.4 FL — SIGNIFICANT CHANGE UP (ref 80–100)
MCV RBC AUTO: 91.4 FL — SIGNIFICANT CHANGE UP (ref 80–100)
MONOCYTES # BLD AUTO: 0.45 K/UL — SIGNIFICANT CHANGE UP (ref 0–0.9)
MONOCYTES # BLD AUTO: 0.45 K/UL — SIGNIFICANT CHANGE UP (ref 0–0.9)
MONOCYTES NFR BLD AUTO: 9.3 % — SIGNIFICANT CHANGE UP (ref 2–14)
MONOCYTES NFR BLD AUTO: 9.3 % — SIGNIFICANT CHANGE UP (ref 2–14)
NEUTROPHILS # BLD AUTO: 2.98 K/UL — SIGNIFICANT CHANGE UP (ref 1.8–7.4)
NEUTROPHILS # BLD AUTO: 2.98 K/UL — SIGNIFICANT CHANGE UP (ref 1.8–7.4)
NEUTROPHILS NFR BLD AUTO: 61.9 % — SIGNIFICANT CHANGE UP (ref 43–77)
NEUTROPHILS NFR BLD AUTO: 61.9 % — SIGNIFICANT CHANGE UP (ref 43–77)
NRBC # BLD: 0 /100 WBCS — SIGNIFICANT CHANGE UP (ref 0–0)
NRBC # BLD: 0 /100 WBCS — SIGNIFICANT CHANGE UP (ref 0–0)
PLATELET # BLD AUTO: 141 K/UL — LOW (ref 150–400)
PLATELET # BLD AUTO: 141 K/UL — LOW (ref 150–400)
POTASSIUM SERPL-SCNC: 4 MMOL/L
PROT SERPL-MCNC: 6.3 G/DL
RBC # BLD: 3.59 M/UL — LOW (ref 3.8–5.2)
RBC # BLD: 3.59 M/UL — LOW (ref 3.8–5.2)
RBC # FLD: 18.6 % — HIGH (ref 10.3–14.5)
RBC # FLD: 18.6 % — HIGH (ref 10.3–14.5)
SODIUM SERPL-SCNC: 146 MMOL/L
WBC # BLD: 4.82 K/UL — SIGNIFICANT CHANGE UP (ref 3.8–10.5)
WBC # BLD: 4.82 K/UL — SIGNIFICANT CHANGE UP (ref 3.8–10.5)
WBC # FLD AUTO: 4.82 K/UL — SIGNIFICANT CHANGE UP (ref 3.8–10.5)
WBC # FLD AUTO: 4.82 K/UL — SIGNIFICANT CHANGE UP (ref 3.8–10.5)

## 2024-01-02 PROCEDURE — 99214 OFFICE O/P EST MOD 30 MIN: CPT

## 2024-01-02 NOTE — ASSESSMENT
[FreeTextEntry1] : Ms. Hu has chronic thrombocytopenia associated with Down syndrome. Prior bone marrow biopsy in 2017 was negative for MDS or malignancy.  - PLTS 141 K/uL. Results reviewed with patient.  CMP, LDH pending  Hypothyroidism: On Synthroid 100 mcg daily.   Greater than 50% of the encounter time was spent on counseling and coordination of care for thrombocytopenia and I have spent 30 minutes of face to face time with the patient.  RTC 6 months.

## 2024-01-02 NOTE — PHYSICAL EXAM
[Restricted in physically strenuous activity but ambulatory and able to carry out work of a light or sedentary nature] : Status 1- Restricted in physically strenuous activity but ambulatory and able to carry out work of a light or sedentary nature, e.g., light house work, office work [Normal] : normal appearance, no rash, nodules, vesicles, ulcers, erythema [de-identified] : Down syndrome appearance; able to converse easily and answer simple questions [de-identified] : murmur

## 2024-01-02 NOTE — HISTORY OF PRESENT ILLNESS
[0 - No Distress] : Distress Level: 0 [80: Normal activity with effort; some signs or symptoms of disease.] : 80: Normal activity with effort; some signs or symptoms of disease.  [de-identified] : Clementina Hu is a 39 y.o. with Down syndrome, aortic stenosis S/P aortic valve replacement, obstructive sleep apnea, H/O dizziness, and hypothyroidism, who is referred because of thrombocytopenia. She has been in a group home environment and has had elementary school education. She underwent bioprosthetic aortic valve replacement in 2014. She has been noted to have intermittent thrombocytopenia, with a platelet count of 111 (5/4/16), 150 (12/8/16), and 99 (3/24/17) without other blood count abnormalities. She denies constitutional symptoms, abnormal bruising/bleeding, or recent infections.\par  \par   S/P  bone marrow biopsy and aspirate done on 9/13/17, which showed trilineage hematopoiesis with maturation and mild megakaryocytosis and increased iron stores. FISH MDS panel was negative, and cytogenetics confirmed trisomy 21. Since then, she has no complaints except for bruising while on aspirin. On 12/8/17, she has no complaints.\par   [de-identified] : Patient presents here today for a follow up. She is doing well. Denies bleeding episodes, melena, hematochezia, dizziness, lightheadedness, SOB.  PLT count 141K.   No other changes in medical, surgical or social history since 9/8/ 2023.

## 2024-01-30 ENCOUNTER — OUTPATIENT (OUTPATIENT)
Dept: OUTPATIENT SERVICES | Facility: HOSPITAL | Age: 44
LOS: 1 days | Discharge: HOME | End: 2024-01-30

## 2024-01-30 DIAGNOSIS — Z95.4 PRESENCE OF OTHER HEART-VALVE REPLACEMENT: Chronic | ICD-10-CM

## 2024-01-30 DIAGNOSIS — D69.6 THROMBOCYTOPENIA, UNSPECIFIED: ICD-10-CM

## 2024-01-30 DIAGNOSIS — Z98.89 OTHER SPECIFIED POSTPROCEDURAL STATES: Chronic | ICD-10-CM

## 2024-02-08 ENCOUNTER — APPOINTMENT (OUTPATIENT)
Dept: HEMATOLOGY ONCOLOGY | Facility: CLINIC | Age: 44
End: 2024-02-08

## 2024-02-08 NOTE — PHYSICAL EXAM
[Restricted in physically strenuous activity but ambulatory and able to carry out work of a light or sedentary nature] : Status 1- Restricted in physically strenuous activity but ambulatory and able to carry out work of a light or sedentary nature, e.g., light house work, office work [Normal] : normal appearance, no rash, nodules, vesicles, ulcers, erythema [de-identified] : Down syndrome appearance; able to converse easily and answer simple questions [de-identified] : murmur

## 2024-02-08 NOTE — HISTORY OF PRESENT ILLNESS
[de-identified] : Clementina Hu is a 39 y.o. with Down syndrome, aortic stenosis S/P aortic valve replacement, obstructive sleep apnea, H/O dizziness, and hypothyroidism, who is referred because of thrombocytopenia. She has been in a group home environment and has had elementary school education. She underwent bioprosthetic aortic valve replacement in 2014. She has been noted to have intermittent thrombocytopenia, with a platelet count of 111 (5/4/16), 150 (12/8/16), and 99 (3/24/17) without other blood count abnormalities. She denies constitutional symptoms, abnormal bruising/bleeding, or recent infections.\par  \par   S/P  bone marrow biopsy and aspirate done on 9/13/17, which showed trilineage hematopoiesis with maturation and mild megakaryocytosis and increased iron stores. FISH MDS panel was negative, and cytogenetics confirmed trisomy 21. Since then, she has no complaints except for bruising while on aspirin. On 12/8/17, she has no complaints.\par   [de-identified] : Patient presents here today for a follow up. She is doing well. Denies bleeding episodes, melena, hematochezia, dizziness, lightheadedness, SOB.  PLT count 141K.   No other changes in medical, surgical or social history since 9/8/ 2023.  [0 - No Distress] : Distress Level: 0 [80: Normal activity with effort; some signs or symptoms of disease.] : 80: Normal activity with effort; some signs or symptoms of disease.

## 2024-03-06 ENCOUNTER — APPOINTMENT (OUTPATIENT)
Dept: PEDIATRIC CARDIOLOGY | Facility: CLINIC | Age: 44
End: 2024-03-06
Payer: MEDICARE

## 2024-03-06 VITALS
WEIGHT: 145.51 LBS | BODY MASS INDEX: 33.67 KG/M2 | HEIGHT: 55 IN | DIASTOLIC BLOOD PRESSURE: 68 MMHG | HEART RATE: 68 BPM | RESPIRATION RATE: 20 BRPM | SYSTOLIC BLOOD PRESSURE: 107 MMHG | OXYGEN SATURATION: 99 %

## 2024-03-06 PROCEDURE — 99204 OFFICE O/P NEW MOD 45 MIN: CPT

## 2024-03-06 PROCEDURE — 99214 OFFICE O/P EST MOD 30 MIN: CPT

## 2024-03-06 RX ORDER — BIFIDOBACTERIUM LONGUM 10MM CELL
4 CAPSULE ORAL
Refills: 0 | Status: DISCONTINUED | COMMUNITY
Start: 2020-03-05 | End: 2024-03-06

## 2024-03-06 RX ORDER — ASPIRIN ENTERIC COATED TABLETS 81 MG 81 MG/1
81 TABLET, DELAYED RELEASE ORAL DAILY
Qty: 30 | Refills: 0 | Status: DISCONTINUED | COMMUNITY
Start: 2023-07-11 | End: 2024-03-06

## 2024-03-06 RX ORDER — OMEPRAZOLE 20 MG/1
20 TABLET, DELAYED RELEASE ORAL DAILY
Qty: 60 | Refills: 0 | Status: DISCONTINUED | COMMUNITY
Start: 2023-07-11 | End: 2024-03-06

## 2024-03-06 RX ORDER — AZITHROMYCIN 500 MG/1
500 TABLET, FILM COATED ORAL
Qty: 1 | Refills: 5 | Status: ACTIVE | COMMUNITY
Start: 2023-03-15 | End: 1900-01-01

## 2024-03-06 RX ORDER — METFORMIN HYDROCHLORIDE 750 MG/1
750 TABLET, EXTENDED RELEASE ORAL
Refills: 0 | Status: DISCONTINUED | COMMUNITY
End: 2024-03-06

## 2024-03-06 RX ORDER — METFORMIN ER 750 MG 750 MG/1
750 TABLET ORAL
Refills: 0 | Status: DISCONTINUED | COMMUNITY
Start: 2020-03-05 | End: 2024-03-06

## 2024-03-06 RX ORDER — METFORMIN ER 500 MG 500 MG/1
500 TABLET ORAL
Qty: 60 | Refills: 0 | Status: DISCONTINUED | COMMUNITY
Start: 2022-10-07 | End: 2024-03-06

## 2024-03-06 RX ORDER — MULTIVITAMIN
TABLET ORAL
Refills: 0 | Status: DISCONTINUED | COMMUNITY
End: 2024-03-06

## 2024-03-06 RX ORDER — FLUOXETINE HYDROCHLORIDE 20 MG/1
20 CAPSULE ORAL
Qty: 30 | Refills: 0 | Status: DISCONTINUED | COMMUNITY
Start: 2022-04-25 | End: 2024-03-06

## 2024-03-06 RX ORDER — ASPIRIN 81 MG/1
81 TABLET, DELAYED RELEASE ORAL DAILY
Qty: 30 | Refills: 5 | Status: ACTIVE | COMMUNITY
Start: 2023-03-15 | End: 1900-01-01

## 2024-03-06 NOTE — HISTORY OF PRESENT ILLNESS
[FreeTextEntry1] : We had the pleasure of seeing Clementina Matamoros today in the Adult Cong[facility]ital [facility] of [facility]. As you know, Clementina is a 43 year old female with Trisomy 21 and left ventricular outflow tract obstruction with subvalvar and valvar aortic stenosis. She underwent posterior root enlargement, aortic valve replacement with a 23 mm bovine pericardial valve, and left ventricular outflow tract muscular resection on 7/1/2014. She is now s/p TAVR with 26mm Evolut Fx nancy on 7/7/2023. She followed up with us on 8/14/2023, echocardiogram showed peak gradient across the aortic valve of 33mmHg, mild MV stenosis, trivial MR, and normal RV and LV systolic function.   Since her last visit, Clementina and her group home note that she is very sleepy. She goes to sleep late at night (after 12am) playing on her iPad, and is woken at 6am for medications. She often falls asleep during the day.  She is also feeling sad lately. She takes her fluoxetine (no change) but did stop the psychologist 1 month ago as the psychologist told the group home she met her goals. She has been feeling sad, especially with the anniversary of her mother's death coming up. The group home also mentions that she can get irritable/frustrated with her peers or the staff. She has no change in diet and limited liquid intake. She occasionally feels dizzy in the morning and gets out of bed slowly. She does yoga 1x/week and walks on the treadmill for 10-15minutes ~2x/month. She denies chest pain, palpitations, shortness of breath, near syncope, or syncope.   She has TRINITY and says she is compliant with wearing her CPAP at night. She has hypothyroidism and chronic thrombocytopenia related to Trisomy 21. She is followed routinely by Hematology with stable platelet counts. Her dental care is up to date, she did not see the dentist in 2023 but saw in January 2024 and will go again soon for root canal. She takes antibiotic prophylaxis before dental procedures.  She also was diagnosed with pseudoseizures last year. She was admitted to [facility] where she reportedly had an MRI brain and EEG which were normal. According to the neurology records we have obtained, she has an old right cerebellar stroke which was documented in the March 2023 neurology note.

## 2024-03-06 NOTE — DISCUSSION/SUMMARY
[Needs SBE Prophylaxis] : [unfilled]  needs bacterial endocarditis prophylaxis. SBE prophylaxis is indicated for dental and invasive ENT procedures. (Circulation. 2007; 116: 9095-4387) [FreeTextEntry1] : In summary, Clementina is a 43 year old female with a history of Trisomy 21 and left ventricular outflow tract obstruction s/p aortic valve replacement with posterior enlargement of the root and left ventricular outflow tract muscle resection, s/p TAVR w/ 26mm Evolut Fx aortic valve (7/7/2023). Her postoperative echocardiogram showed no aortic valve insufficiency with PSIG of 33mmHg across the aortic valve. Clementina's function remains well preserved.  In regards to her increased fatigue, we discussed the importance of sleep hygiene with stopping electronics at 10pm and going to sleep by 11pm for 8 hours of sleep per night. We have asked that the group home contact us in 2 weeks to update us if this has helped her energy levels. We also recommended she go to her primary care physician for basic labs and thyroid studies.  If she does not improve with changes to her sleep habits and no abnormalities are seen on her lab work, we recommend she see the pulmonologist to check her TRINITY and CPAP settings to ensure they are optimized. We also recommend that she be considered to restart her therapy appointments.   We again reviewed the importance of meticulous dental hygiene and the need for regular dental cleanings. We reviewed that the SBE prophylaxis guidelines for Clemetnina should be Azithromycin and have sent in a prescription for her next dental appointment.  We will see her again in 6 months to check her progress and will get an echocardiogram at that time.   She is cleared to return to her group home and resume all activities with no restrictions.

## 2024-03-06 NOTE — CARDIOLOGY SUMMARY
[Today's Date] : [unfilled] [FreeTextEntry1] : sinus rhythm, ventricular rate 67, no change from previous [de-identified] : 8/14/2023 [FreeTextEntry2] : 1. History of aortic stenosis s/p posterior root enlargement, aortic valve replacement with a 23 mm bovine pericardial valve, and left ventricular outflow tract muscular resection on 7/1/2014. Now status post transcatheter aortic valve replacement (TAVR) on 7/7/2023 with 26mm Evolut Fx. 2. Imaging artifact at the christopher-aortic valve. No significant neoaortic regurgitation noted. PSIG across the neoaortic valve of 31 mmHg. 3. Normal systolic Doppler profile in the descending aorta at the level of the diaphragm. 4. Mild mitral valve stenosis. 5. Tethering and limited mobility of the posterior mitral valve leaflet, mean 7mmHg. 6. Trivial mitral valve regurgitation. 7. Normal left ventricular size, morphology and systolic function. 8. No residual left ventricular outflow tract obstruction. 9. Normal right ventricular morphology with qualitatively normal size and systolic function. 10. Mild tricuspid valve regurgitation, peak systolic instantaneous gradient 35.2 mmHg. 11. No pericardial effusion.

## 2024-03-06 NOTE — PHYSICAL EXAM
[General Appearance - Well Nourished] : well nourished [General Appearance - Well-Appearing] : well appearing [II] : a grade 2/6 [RUSB] : RUSB [Abdomen Soft] : soft [Abdomen Tenderness] : non-tender [Nondistended] : nondistended [Nail Clubbing] : no clubbing  or cyanosis of the fingernails [General Appearance - Alert] : alert [Auscultation Breath Sounds / Voice Sounds] : breath sounds clear to auscultation bilaterally [] : no respiratory distress [Respiration, Rhythm And Depth] : normal respiratory rhythm and effort [Heart Sounds] : normal S1 and S2 [Chest Surgical / Traumatic Scar] : chest incision well healed [Systolic] : systolic [Heart Sounds Click] : no clicks [III] : a grade 3/6   [LUSB] : LUSB [FreeTextEntry9] : +clubbing

## 2024-03-06 NOTE — REASON FOR VISIT
[Follow-Up] : a follow-up visit for [Patient] : patient [Other: _____] : [unfilled] [FreeTextEntry3] : Trisomy 21. H/O LVOT with subvalvar and valvar aortic stenosis.  S/P root enlargement, aortic valve replacement  with Bovine valve and LVOT muscular resection (2014).  S/P TAVR

## 2024-03-06 NOTE — CONSULT LETTER
[Today's Date] : [unfilled] [Name] : Name: [unfilled] [] : : ~~ [Today's Date:] : [unfilled] [Dear  ___:] : Dear Dr. [unfilled]: [Consult] : I had the pleasure of evaluating your patient, [unfilled]. My full evaluation follows. [Sincerely,] : Sincerely, [Consult - Multiple Provider] : Thank you very much for allowing us to participate in the care of this patient. If you have any questions, please do not hesitate to contact us. [FreeTextEntry4] : Michelle Price MD [FreeTextEntry6] : [place] [id number] [FreeTextEntry5] : [address] [facility] [de-identified] : Veronica Gould MD Pediatric Cardiology Fellow [facility]  Amalia Moran MD, HERSON Director, Adult Congenital Heart , High Risk Cardiovascular Obstetrics [facility]  [address]: [phone] [facility] Office: [phone] [facility] Office: [phone]

## 2024-03-06 NOTE — REVIEW OF SYSTEMS
[Feels Tired] : feels tired [Dizziness] : dizziness [Sleep Disturbances] : ~T sleep disturbances [Depression] : depression [Feeling Poorly] : not feeling poorly (malaise) [Fever] : no fever [Wgt Loss (___ Lbs)] : no recent weight loss [Pallor] : not pale [Cyanosis] : no cyanosis [Edema] : no edema [Diaphoresis] : not diaphoretic [Chest Pain] : no chest pain or discomfort [Exercise Intolerance] : no persistence of exercise intolerance [Palpitations] : no palpitations [Orthopnea] : no orthopnea [Fast HR] : no tachycardia [Tachypnea] : not tachypneic [Cough] : no cough [Wheezing] : no wheezing [Shortness Of Breath] : not expressed as feeling short of breath [Decrease In Appetite] : appetite not decreased [Abdominal Pain] : no abdominal pain [Fainting (Syncope)] : no fainting [Seizure] : no seizures

## 2024-03-20 ENCOUNTER — NON-APPOINTMENT (OUTPATIENT)
Age: 44
End: 2024-03-20

## 2024-04-06 ENCOUNTER — NON-APPOINTMENT (OUTPATIENT)
Age: 44
End: 2024-04-06

## 2024-05-04 ENCOUNTER — INPATIENT (INPATIENT)
Facility: HOSPITAL | Age: 44
LOS: 5 days | Discharge: NURSING, REHAB OR LONG-TERM CARE | DRG: 93 | End: 2024-05-10
Attending: STUDENT IN AN ORGANIZED HEALTH CARE EDUCATION/TRAINING PROGRAM | Admitting: STUDENT IN AN ORGANIZED HEALTH CARE EDUCATION/TRAINING PROGRAM
Payer: MEDICARE

## 2024-05-04 VITALS
DIASTOLIC BLOOD PRESSURE: 72 MMHG | OXYGEN SATURATION: 98 % | HEART RATE: 86 BPM | RESPIRATION RATE: 19 BRPM | SYSTOLIC BLOOD PRESSURE: 109 MMHG | WEIGHT: 135.14 LBS | TEMPERATURE: 98 F

## 2024-05-04 DIAGNOSIS — Z98.89 OTHER SPECIFIED POSTPROCEDURAL STATES: Chronic | ICD-10-CM

## 2024-05-04 DIAGNOSIS — R25.1 TREMOR, UNSPECIFIED: ICD-10-CM

## 2024-05-04 DIAGNOSIS — Z95.4 PRESENCE OF OTHER HEART-VALVE REPLACEMENT: Chronic | ICD-10-CM

## 2024-05-04 LAB
ALBUMIN SERPL ELPH-MCNC: 3.2 G/DL — LOW (ref 3.3–5)
ALP SERPL-CCNC: 97 U/L — SIGNIFICANT CHANGE UP (ref 40–120)
ALT FLD-CCNC: 27 U/L — SIGNIFICANT CHANGE UP (ref 12–78)
ANION GAP SERPL CALC-SCNC: 5 MMOL/L — SIGNIFICANT CHANGE UP (ref 5–17)
AST SERPL-CCNC: 45 U/L — HIGH (ref 15–37)
BASOPHILS # BLD AUTO: 0.06 K/UL — SIGNIFICANT CHANGE UP (ref 0–0.2)
BASOPHILS NFR BLD AUTO: 1.3 % — SIGNIFICANT CHANGE UP (ref 0–2)
BILIRUB SERPL-MCNC: 0.6 MG/DL — SIGNIFICANT CHANGE UP (ref 0.2–1.2)
BUN SERPL-MCNC: 17 MG/DL — SIGNIFICANT CHANGE UP (ref 7–23)
CALCIUM SERPL-MCNC: 9.3 MG/DL — SIGNIFICANT CHANGE UP (ref 8.5–10.1)
CHLORIDE SERPL-SCNC: 109 MMOL/L — HIGH (ref 96–108)
CO2 SERPL-SCNC: 28 MMOL/L — SIGNIFICANT CHANGE UP (ref 22–31)
CREAT SERPL-MCNC: 0.91 MG/DL — SIGNIFICANT CHANGE UP (ref 0.5–1.3)
D DIMER BLD IA.RAPID-MCNC: 730 NG/ML DDU — HIGH
EGFR: 79 ML/MIN/1.73M2 — SIGNIFICANT CHANGE UP
EOSINOPHIL # BLD AUTO: 0.15 K/UL — SIGNIFICANT CHANGE UP (ref 0–0.5)
EOSINOPHIL NFR BLD AUTO: 3.2 % — SIGNIFICANT CHANGE UP (ref 0–6)
GLUCOSE SERPL-MCNC: 92 MG/DL — SIGNIFICANT CHANGE UP (ref 70–99)
HCG SERPL-ACNC: 2 MIU/ML — SIGNIFICANT CHANGE UP
HCT VFR BLD CALC: 38.1 % — SIGNIFICANT CHANGE UP (ref 34.5–45)
HGB BLD-MCNC: 12.9 G/DL — SIGNIFICANT CHANGE UP (ref 11.5–15.5)
IMM GRANULOCYTES NFR BLD AUTO: 0.2 % — SIGNIFICANT CHANGE UP (ref 0–0.9)
LYMPHOCYTES # BLD AUTO: 1.45 K/UL — SIGNIFICANT CHANGE UP (ref 1–3.3)
LYMPHOCYTES # BLD AUTO: 30.5 % — SIGNIFICANT CHANGE UP (ref 13–44)
MAGNESIUM SERPL-MCNC: 2.3 MG/DL — SIGNIFICANT CHANGE UP (ref 1.6–2.6)
MCHC RBC-ENTMCNC: 30.9 PG — SIGNIFICANT CHANGE UP (ref 27–34)
MCHC RBC-ENTMCNC: 33.9 GM/DL — SIGNIFICANT CHANGE UP (ref 32–36)
MCV RBC AUTO: 91.1 FL — SIGNIFICANT CHANGE UP (ref 80–100)
MONOCYTES # BLD AUTO: 0.57 K/UL — SIGNIFICANT CHANGE UP (ref 0–0.9)
MONOCYTES NFR BLD AUTO: 12 % — SIGNIFICANT CHANGE UP (ref 2–14)
NEUTROPHILS # BLD AUTO: 2.52 K/UL — SIGNIFICANT CHANGE UP (ref 1.8–7.4)
NEUTROPHILS NFR BLD AUTO: 52.8 % — SIGNIFICANT CHANGE UP (ref 43–77)
NRBC # BLD: 0 /100 WBCS — SIGNIFICANT CHANGE UP (ref 0–0)
PLATELET # BLD AUTO: 194 K/UL — SIGNIFICANT CHANGE UP (ref 150–400)
POTASSIUM SERPL-MCNC: 4.4 MMOL/L — SIGNIFICANT CHANGE UP (ref 3.5–5.3)
POTASSIUM SERPL-SCNC: 4.4 MMOL/L — SIGNIFICANT CHANGE UP (ref 3.5–5.3)
PROT SERPL-MCNC: 7.2 G/DL — SIGNIFICANT CHANGE UP (ref 6–8.3)
RBC # BLD: 4.18 M/UL — SIGNIFICANT CHANGE UP (ref 3.8–5.2)
RBC # FLD: 15.5 % — HIGH (ref 10.3–14.5)
SODIUM SERPL-SCNC: 142 MMOL/L — SIGNIFICANT CHANGE UP (ref 135–145)
TROPONIN I, HIGH SENSITIVITY RESULT: 12.9 NG/L — SIGNIFICANT CHANGE UP
WBC # BLD: 4.76 K/UL — SIGNIFICANT CHANGE UP (ref 3.8–10.5)
WBC # FLD AUTO: 4.76 K/UL — SIGNIFICANT CHANGE UP (ref 3.8–10.5)

## 2024-05-04 PROCEDURE — 70450 CT HEAD/BRAIN W/O DYE: CPT | Mod: 26,MC

## 2024-05-04 PROCEDURE — 71275 CT ANGIOGRAPHY CHEST: CPT | Mod: 26,MC

## 2024-05-04 PROCEDURE — 99223 1ST HOSP IP/OBS HIGH 75: CPT | Mod: GC

## 2024-05-04 PROCEDURE — 99285 EMERGENCY DEPT VISIT HI MDM: CPT | Mod: FS

## 2024-05-04 PROCEDURE — 71045 X-RAY EXAM CHEST 1 VIEW: CPT | Mod: 26

## 2024-05-04 PROCEDURE — 93010 ELECTROCARDIOGRAM REPORT: CPT

## 2024-05-04 RX ORDER — SODIUM CHLORIDE 9 MG/ML
1000 INJECTION INTRAMUSCULAR; INTRAVENOUS; SUBCUTANEOUS ONCE
Refills: 0 | Status: COMPLETED | OUTPATIENT
Start: 2024-05-04 | End: 2024-05-04

## 2024-05-04 RX ADMIN — SODIUM CHLORIDE 1000 MILLILITER(S): 9 INJECTION INTRAMUSCULAR; INTRAVENOUS; SUBCUTANEOUS at 19:38

## 2024-05-04 NOTE — ED PROVIDER NOTE - CLINICAL SUMMARY MEDICAL DECISION MAKING FREE TEXT BOX
Patient is a 44-year-old female resident of a skilled nursing facility.  She has a history Down syndrome wheelchair-bound,And was witnessed to have a brief episode of shaking and unresponsiveness.  This was thought to be either syncope or seizure.  She is now back at her baseline and denies complaint of illness or injury.    On examination is a well-developed well-nourished female no apparent distress.  She is chronically ill with Down's facies, awake and alert and oriented holding a stuffed animal "midnight" black Pool.  Cardiac exam is regular rate and rhythm with a murmur.  Lung exam is clear to auscultation without respiratory distress.  Abdomen is soft and nontender without guarding or rebound.  Musculoskeletal exam demonstrates digital clubbing.  Shortening of the extremities.  With no focal acute dislocations or deformities.  Neurologic exam is nonfocal.    Plan of care includes rule out arrhythmia rule out syncope rule out seizure.  CT imaging laboratory studies procalcitonin and a lactate.  Urinalysis.  Rule out infection.  Patient will likely need to be admitted and seen by neurology have EEG monitoring and telemetry monitoring given the Down's she may have underlying cardiac disease or cardiac arrhythmia.

## 2024-05-04 NOTE — H&P ADULT - NSHPPHYSICALEXAM_GEN_ALL_CORE
T(C): 36.1 (05-04-24 @ 18:35), Max: 36.7 (05-04-24 @ 18:32)  HR: 62 (05-04-24 @ 18:35) (62 - 86)  BP: 112/70 (05-04-24 @ 18:35) (109/72 - 112/70)  RR: 16 (05-04-24 @ 18:35) (16 - 19)  SpO2: 96% (05-04-24 @ 18:35) (96% - 98%)    GENERAL: patient appears well, no acute distress, appropriately interactive  EYES: sclera clear, no exudates  ENMT: oropharynx clear without erythema, no exudates, moist mucous membranes  NECK: supple, soft  LUNGS: good air entry bilaterally, clear to auscultation, symmetric breath sounds, no wheezing or rhonchi appreciated  HEART: soft S1, S2, end systolic click, regular rate and rhythm, no other murmurs noted, no lower extremity edema  GASTROINTESTINAL: +mild ttp around umbilicus, abdomen is soft and nondistended, normoactive bowel sounds  INTEGUMENT: good skin turgor, warm skin, appears well perfused  MUSCULOSKELETAL: no clubbing or cyanosis, no obvious deformity  NEUROLOGIC: awake, alert, oriented x3, good muscle tone in all 4 extremities  HEME/LYMPH: no obvious ecchymosis or petechiae T(C): 36.1 (05-04-24 @ 18:35), Max: 36.7 (05-04-24 @ 18:32)  HR: 62 (05-04-24 @ 18:35) (62 - 86)  BP: 112/70 (05-04-24 @ 18:35) (109/72 - 112/70)  RR: 16 (05-04-24 @ 18:35) (16 - 19)  SpO2: 96% (05-04-24 @ 18:35) (96% - 98%)  GENERAL: patient appears well, no acute distress, appropriately interactive  EYES: sclera clear, no exudates  ENMT: oropharynx clear without erythema, no exudates, moist mucous membranes  NECK: supple, soft  LUNGS: good air entry bilaterally, clear to auscultation, symmetric breath sounds, no wheezing or rhonchi appreciated  HEART: soft S1, S2, end systolic click, regular rate and rhythm, no other murmurs noted, no lower extremity edema  GASTROINTESTINAL: +mild ttp around umbilicus, abdomen is soft and nondistended, normoactive bowel sounds  INTEGUMENT: good skin turgor, warm skin, appears well perfused  MUSCULOSKELETAL: no clubbing or cyanosis, no obvious deformity  NEUROLOGIC: awake, alert, oriented x3, good muscle tone in all 4 extremities  HEME/LYMPH: no obvious ecchymosis or petechiae

## 2024-05-04 NOTE — H&P ADULT - PROBLEM SELECTOR PLAN 9
Chronic:  - c/w home levothyroxine Chronic:  - CTA Chest: eccentric wall thickening of distal esophagus/EG junction  - c/w home omeprazole (therapeutic interchange)

## 2024-05-04 NOTE — ED PROVIDER NOTE - ATTENDING CONTRIBUTION TO CARE
Patient is a 44-year-old female resident of a skilled nursing facility.  She has a history Down syndrome wheelchair-bound,And was witnessed to have a brief episode of shaking and unresponsiveness.  This was thought to be either syncope or seizure.  She is now back at her baseline and denies complaint of illness or injury.    On examination is a well-developed well-nourished female no apparent distress.  She is chronically ill with Down's facies, awake and alert and oriented holding a stuffed animal "midnight" black Midnight.  Cardiac exam is regular rate and rhythm with a murmur.  Lung exam is clear to auscultation without respiratory distress.  Abdomen is soft and nontender without guarding or rebound.  Musculoskeletal exam demonstrates digital clubbing.  Shortening of the extremities.  With no focal acute dislocations or deformities.  Neurologic exam is nonfocal.    Plan of care includes rule out arrhythmia rule out syncope rule out seizure.  CT imaging laboratory studies procalcitonin and a lactate.  Urinalysis.  Rule out infection.  Patient will likely need to be admitted and seen by neurology have EEG monitoring and telemetry monitoring given the Down's she may have underlying cardiac disease or cardiac arrhythmia.

## 2024-05-04 NOTE — H&P ADULT - NSICDXPASTMEDICALHX_GEN_ALL_CORE_FT
PAST MEDICAL HISTORY:  Anxiety     CVA (cerebrovascular accident)     Down syndrome     GERD (gastroesophageal reflux disease)     H/O aortic valve stenosis     Hypothyroidism     Osteoarthritis     Osteoporosis     Seasonal allergies     Type 2 diabetes mellitus

## 2024-05-04 NOTE — H&P ADULT - PROBLEM SELECTOR PLAN 4
Chronic:  - currently saturating well on RA, no wheezing on exam  - c/w home albuterol PRN and Breo Ellipta (therapeutic interchange) Chronic:  - f/u orthostatic vitals  - c/w home midodrine Chronic:  - f/u orthostatic vitals  - c/w home midodrine with hold parameters

## 2024-05-04 NOTE — H&P ADULT - NSHPSOCIALHISTORY_GEN_ALL_CORE
Tobacco: denies  EtOH: occasional on holidays   Recreational drug use: denies  Lives at [facility] in [place], [place]; group home/assisted living facility   Ambulates: independently   ADLs: mostly independent, has some help from health aides at home

## 2024-05-04 NOTE — ED ADULT NURSE REASSESSMENT NOTE - NS ED NURSE REASSESS COMMENT FT1
Patient lying in bed with aide at the bedside informed of the plan of care with understanding call bell within reach reminded with call bell use.

## 2024-05-04 NOTE — H&P ADULT - PROBLEM SELECTOR PLAN 5
Chronic:  - c/w home alendronate and vitamin D supplement Chronic:  - currently saturating well on RA, no wheezing on exam  - c/w home albuterol PRN and Breo Ellipta (therapeutic interchange)

## 2024-05-04 NOTE — ED ADULT NURSE REASSESSMENT NOTE - NS ED NURSE REASSESS COMMENT FT1
received pt aox3 placed on cardiac monitor showing NSR, pulse ox 100% on RA. no complaints of pain or discomfort. no complaints of dizziness, weakness, sob, chest pain. iv lock 20 g placed to rac, patent and flushing. no s/s redness, swelling or infiltration. labs collected and sent. pending scans. no acute distress @ this time. received pt aox3 placed on cardiac monitor showing NSR, pulse ox 100% on RA. no complaints of pain or discomfort. no complaints of dizziness, weakness, sob, chest pain. iv lock 20 g placed to rac, patent and flushing. no s/s redness, swelling or infiltration. labs collected and sent. pending scans. no acute distress @ this time. safety precautions maintained, side rail paddings on, bed in lowest position.

## 2024-05-04 NOTE — ED PROVIDER NOTE - DIFFERENTIAL DIAGNOSIS
Differentials include but not limited to seizure, vasovagal, syncope, arrhythmia, ACS, electrolyte abnormality, dehydration Differential Diagnosis

## 2024-05-04 NOTE — H&P ADULT - PROBLEM SELECTOR PLAN 1
Pt with unwitnessed loss of consciousness with reported arm and head shaking. Workup for syncope vs. seizure. Syncopal etiologies include symptomatic bradycardia, vasovagal episode, or dehydration  - CT head negative for acute intracranial pathology  - Ddimer elevated at 730  - CTA negative for PE  - EKG with sinus bradycardia HR 55, QTc 487  - S/p 1L NS bolus x1 in ED  - orthostatic vitals ordered  - TTE ordered  - monitor on telemetry   - encourage PO intake   - Cardiology consulted, f/u recs Pt with unwitnessed loss of consciousness with reported arm and head shaking. Workup for syncope vs. seizure. Syncopal etiologies include symptomatic bradycardia, vasovagal episode, or dehydration  - CT head negative for acute intracranial pathology  - Ddimer elevated at 730  - CTA negative for PE  - EKG with sinus bradycardia HR 55, QTc 487  - S/p 1L NS bolus x1 in ED  - orthostatic vitals ordered  - TTE ordered  - monitor on telemetry   - encourage PO intake   - will hold home midodrine for now given bradycardia, possibly contributing to presentation, monitor BPs  - Cardiology consulted, f/u recs Pt with unwitnessed loss of consciousness with reported arm and head shaking. Workup for syncope vs. seizure. Syncopal etiologies include orthostatic hypotensive episode, symptomatic bradycardia, vasovagal episode, or dehydration  - CT head negative for acute intracranial pathology  - Ddimer elevated at 730  - CTA negative for PE  - EKG with sinus bradycardia HR 55, QTc 487  - S/p 1L NS bolus x1 in ED  - orthostatic vitals ordered  - TTE ordered  - monitor on telemetry   - encourage PO intake   - Cardiology consulted, f/u recs Pt with unwitnessed loss of consciousness with reported arm and head shaking. Workup for syncope vs. seizure. Syncopal etiologies include orthostatic hypotensive episode, symptomatic bradycardia, vasovagal episode, or dehydration  - CT head negative for acute intracranial pathology  - Ddimer elevated at 730  - CTA negative for PE  - EKG with sinus bradycardia HR 55, QTc 487  - S/p 1L NS bolus x1 in ED  - orthostatic vitals ordered  - TTE ordered  - EEG ordered  - monitor on telemetry   - encourage PO intake   - Cardiology consulted, f/u recs  - Neurology consulted, f/u recs Pt with unwitnessed loss of consciousness with reported arm and head shaking. Workup for syncope vs. seizure. Syncopal etiologies include orthostatic hypotensive episode, symptomatic bradycardia, vasovagal episode, or dehydration  - CT head negative for acute intracranial pathology  - Ddimer elevated at 730  - CTA negative for PE  - EKG with sinus bradycardia HR 55, QTc 487  - S/p 1L NS bolus x1 in ED  - orthostatic vitals ordered  - TTE ordered  - EEG ordered  - monitor on telemetry   - encourage PO intake   - Cardiology consulted, f/u recs  - Day team to consult neurology for seizure workup

## 2024-05-04 NOTE — H&P ADULT - NSHPREVIEWOFSYSTEMS_GEN_ALL_CORE
CONSTITUTIONAL: denies fever, chills, fatigue, weakness  HEENT: denies blurred vision, sore throat  SKIN: denies new lesions, rash  CARDIOVASCULAR: denies chest pain, chest pressure, palpitations  RESPIRATORY: denies shortness of breath, cough, sputum production  GASTROINTESTINAL: +lower abdominal pain, denies nausea, vomiting, diarrhea, melena or hematochezia  GENITOURINARY: +burning on urination, denies discharge  NEUROLOGICAL: denies numbness, headache, focal weakness  MUSCULOSKELETAL: denies new joint pain, muscle aches  HEMATOLOGIC: denies gross bleeding, bruising

## 2024-05-04 NOTE — H&P ADULT - ATTENDING COMMENTS
44 year old woman with hx of down syndrome, hx of CVA, TRINITY, cirrhosis, HLD, osteoporosis, osteoarthritis, hypothyroidism, asthma, GERD, T2DM, constipation, aortic stenosis s/p bovine aortic valve replacement presenting with reported syncopal episode. Admitted for management syncopal workup.    Agree with above. Edited where appropriate

## 2024-05-04 NOTE — H&P ADULT - PROBLEM SELECTOR PLAN 2
Pt with about 1 week of burning on urination and lower abdominal pain  - UA with elevated urobilinogen, trace leukocyte esterase, WBC 6, occasional bacteria  - Urine cx sent, f/u results  - Urobilinogen likely 2/2 known liver cirrhosis  - monitor off abx for now pending urine cx Pt with about 1 week of burning on urination and lower abdominal pain  - UA with elevated urobilinogen, trace leukocyte esterase, WBC 6, occasional bacteria  - Urine cx sent, f/u results  - Urobilinogen likely 2/2 known liver cirrhosis  - start rocephin Pt currently denies urinary symptoms   - UA with elevated urobilinogen, trace leukocyte esterase, WBC 6, occasional bacteria  - Urine cx sent, f/u results  - Urobilinogen likely 2/2 known liver cirrhosis  - monitor off abx, can start if patient develops symptoms

## 2024-05-04 NOTE — ED PROVIDER NOTE - OBJECTIVE STATEMENT
44-year-old female with history of diabetes mellitus, rheumatoid arthritis, allergic rhinitis, GERD, depression, asthma, hypothyroidism, osteoporosis, stroke and syndrome, CVA, history of orthostatic hypotension, Down syndrome, unsteady gait, and repeated falls brought in by ambulance for suspected seizure prior to arrival.  per EMS, was sitting in wheelchair and had LOC, generalized shaking, trembling, and shivering that lasted for 30 seconds.  2 episodes per EMS.  Per EMS, had some mild confusion after the incidents.  Patient reports some slight chest pain on arrival to emergency room.  Denies any headache, dizziness, blurred vision, difficulty speaking, one-sided weakness. Denies fever or recent illnesses   PCP Zulema Tolliver   Resident at [facility] 44-year-old female with history of diabetes mellitus, rheumatoid arthritis, allergic rhinitis, GERD, depression, asthma, hypothyroidism, osteoporosis, Sjogren's syndrome, history of CVA, history of orthostatic hypotension, history of mitral valve replacement (second replacement 1 year ago), Down syndrome, unsteady gait, and repeated falls brought in by ambulance for suspected seizure prior to arrival.  per EMS, was sitting in wheelchair and had LOC, generalized shaking, trembling, and shivering that lasted for 30 seconds.  2 episodes per EMS.  Per EMS, had some mild confusion after the incidents.  Patient reports some slight chest pain and shortness of breath on arrival to emergency room.  Denies any headache, dizziness, blurred vision, difficulty speaking, one-sided weakness. Denies fever or recent illnesses   PCP Charanjit   [facility] Resident (Nurse Martinez for more information, cell phone [phone]). Currently at [facility] for rehab past 4 days due to frequent falls

## 2024-05-04 NOTE — ED PEDIATRIC TRIAGE NOTE - CHIEF COMPLAINT QUOTE
Patient returned home from [facility] yesterday and states he has been having diarrhea and chills x5 days (started while in [facility])

## 2024-05-04 NOTE — H&P ADULT - PROBLEM SELECTOR PLAN 11
DVT ppx:  - Lovenox     c/w Flonase for allergies and Restasis (therapeutic interchange) for dry eyes Chronic:  - c/w home aspirin

## 2024-05-04 NOTE — ED ADULT TRIAGE NOTE - CHIEF COMPLAINT QUOTE
As per ems, staff at the nursing home stated that patient was sitting on the wheel chair and started shaking and gazing into space *2 episodes, lasting 30 seconds each- patient was initially confused and slowly came to. patient awake, alert, talking in triage- denies any headache, patient c/o chest pain. fingerstick- 151. patient has no h/o seizures

## 2024-05-04 NOTE — H&P ADULT - NSHPPOADEEPVENOUSTHROMB_GEN_A_CORE
[facility]  Progress Note   Nursery    Patient Name: Jenny Mendez  MRN: [id number]  Admission Date: 2022      Subjective:     Stable, no events noted overnight.    Feeding: Breastmilk and supplementing with formula per parental preference   Infant is voiding and stooling.    Objective:     Vital Signs (Most Recent)  Temp: 99.2 °F (37.3 °C) (22)  Pulse: 133 (22)  Resp: (!) 37 (22)  BP: (!) 80/39 (06/15/22 0930)  BP Location: Left leg (06/15/22 0930)  SpO2: (!) 99 % (22)    Most Recent Weight: 3645 g (8 lb 0.6 oz) (22 1017)  Percent Weight Change Since Birth: -0.7     Physical Exam  Vitals and nursing note reviewed.   Constitutional:       General: She is active.      Appearance: She is well-developed.   HENT:      Head: Anterior fontanelle is flat.      Nose: Nose normal.      Mouth/Throat:      Mouth: Mucous membranes are moist.      Pharynx: Oropharynx is clear. No cleft palate.   Eyes:      General: Red reflex is present bilaterally.      Conjunctiva/sclera: Conjunctivae normal.   Cardiovascular:      Rate and Rhythm: Normal rate and regular rhythm.      Heart sounds: No murmur heard.  Pulmonary:      Effort: Pulmonary effort is normal.      Breath sounds: Normal breath sounds.   Abdominal:      General: The umbilical stump is clean. Bowel sounds are normal.      Palpations: Abdomen is soft.   Genitourinary:     General: Normal vulva.      Rectum: Normal.   Musculoskeletal:         General: Normal range of motion.      Cervical back: Normal range of motion and neck supple.      Right hip: Negative right Ortolani and negative right Kwong.      Left hip: Negative left Ortolani and negative left Kwong.   Skin:     General: Skin is warm.      Capillary Refill: Capillary refill takes less than 2 seconds.      Turgor: Normal.      Coloration: Skin is not jaundiced.      Findings: No rash.   Neurological:      Mental Status: She is alert.       Motor: No abnormal muscle tone.      Primitive Reflexes: Suck normal. Symmetric Rufino.       Labs:  Recent Results (from the past 24 hour(s))   POCT bilirubinometry    Collection Time: 22  8:07 AM   Result Value Ref Range    Bilirubinometry Index 4.6            Assessment and Plan:     39w2d  , doing well. Continue routine  care.    [place] affected by maternal group B Streptococcus infection, mother treated prophylactically  Mother GBS +, treated with PCN > 2 hrs but < 4 hrs.     Per EOS calc routine vitals and labs as long as well appearing.      Single liveborn infant, delivered vaginally  Term female  born at Gestational Age: 39w2d  to a 29 y.o.    via Vaginal, Spontaneous. GBS +  (treated with PCN x 1 >2 hrs but <4 hrs) HIV/Hepatitis B/RPR -. Blood type maternal O positive/ infant A positive/albino- . ROM 45 min PTD. Breastmilk  feeding. Down -1% since birth.    Routine  care  PCP: [facility]        Yahaira Akbar MD  Pediatrics  [facility]   no

## 2024-05-04 NOTE — H&P ADULT - ASSESSMENT
44 year old woman with hx of down syndrome, hx of CVA, TRINITY, cirrhosis, HLD, osteoporosis, osteoarthritis, hypothyroidism, asthma, GERD, T2DM, constipation, aortic stenosis s/p bovine aortic valve replacement presenting with reported syncopal episode. Admitted for management syncopal workup.

## 2024-05-04 NOTE — H&P ADULT - NSICDXFAMILYHX_GEN_ALL_CORE_FT
FAMILY HISTORY:  Father  Still living? Unknown  FH: HTN (hypertension), Age at diagnosis: Age Unknown    Mother  Still living? Unknown  FH: depression, Age at diagnosis: Age Unknown  FH: HTN (hypertension), Age at diagnosis: Age Unknown

## 2024-05-04 NOTE — H&P ADULT - NSHPLABSRESULTS_GEN_ALL_CORE
< from: CT Angio Chest PE Protocol w/ IV Cont (05.04.24 @ 21:46) >    IMPRESSION:  No pulmonary thromboembolism.  Eccentric wall thickening of distal esophagus/EG junction. Recommend   clinical correlation and consider endoscopy.    --- End of Report ---    < end of copied text >    < from: CT Head No Cont (05.04.24 @ 19:52) >      IMPRESSION:  No acute intracranial hemorrhage, vasogenic edema, extra-axial collection   or hydrocephalus. Mild disproportionate cerebellar volume loss.    --- End of Report ---    < end of copied text >

## 2024-05-04 NOTE — ED PROVIDER NOTE - PROGRESS NOTE DETAILS
CTA chest was ordered due to elevated D-dimer.  Results pending.  Dr. Tavares will assume care Spoke with nurse at [facility] who provided additional history.  Patient just started to go to [facility] for rehab 4 days ago due to frequent falls.  Initially was at [facility] due to frequent falls and discharged back.  Then recently went to [facility] for frequent falls and was discharged to rehab for frequent falls.  Nurse reports history of orthostatic hypotension.  Has been on midodrine to help with blood pressure.

## 2024-05-04 NOTE — H&P ADULT - PROBLEM/PLAN-3
Acute illness visit note    Chief Complaint   Patient presents with   • Dizziness   • Headache       HISTORY OF PRESENT ILLNESS: Tonia Rodriguez is a 15 year old female who presents with episodes of dizziness and a headache.    Candi has had episodes of her vision going black.  She has not had syncope.  3 days ago she was playing badminton in gym.  Her vision went black, she felt dizzy. She sat down and her symptoms resolved.  She did not pass out.  She did not feel shortness of breath during this episode.  She did not have palpitations.  She had eaten breakfast and lunch, drinks well.      She had episodes of shortness of breath in gym class and when doing activity.  5 days ago she was doing the pacer in gym class and it hurt to breathe, she felt short of breath.  This happened in gym class again today and she went to the office.  It got better with rest.  She states she has pain and tightness in her upper chest in gym, if going up stairs.  She does not have this at rest.  She does not have this with laying down.  She is not coughing.  She has used albuterol with an illness in 12/18.      She does get dizzy if she goes from laying to sitting fast or from sitting to standing fast.  She drinks water throughout the day, she thinks she is getting 64 oz per day.  She eats 3 meals that are balanced.      She had a headache 2 days ago.  She was home from school and slept.  She has felt more tired.  She is not sure if the light bothered her.  This No double vision, night waking, vomiting, weakness, tingling, numbness, change in gait, change in bowel / bladder. She has had migraines in the past.  She has used imitrex in the past.       Her periods have been more heavy since 9/19.  She gets her period monthly.  She is changing a super tampon every 2 hours for the first 2-3 days.  Her periods last about 5 days.  Her periods were not heavy previous to 9/19.  Her lat period was 2/4/20 - 2/9/20.     Review Of  Systems:  · General:  No fever  · Eyes:  See above.  No double vision, eye redness, eye drainage.   · ENT:  No congestion, runny nose, ear pain, sore throat.  · Respiratory:  See above.  No cough.  · Cardiovascular:  See above.  No palpitations.  No syncope.  · Gastrointestinal:  No abdominal pain, vomiting, diarrhea, constipation.  · Skin:  No rashes, bruising, bleeding.    · Genitourinary:  No dysuria, hematuria, frequency.  See above for periods.  She is not sexually active.   · Musculoskeletal:  No extremity swelling, redness, pain, limp.  · Neurological:  Headache as above. Fatigue.  No weakness, numbness, tingling.  Denies anxiety.       The problem list was reviewed and updated as follows:  Patient Active Problem List    Diagnosis Date Noted   • Urticaria due to cold 11/01/2018     Priority: Low   • Atopic dermatitis      Priority: Low     Past Medical History:   Diagnosis Date   • Atopic dermatitis    • Atypical pneumonia 03/28/2017   • Wheezing 03/28/2017       Allergies as of 02/14/2020   • (No Known Allergies)       Medications:  No current medications     Physical Exam  Vitals:  Visit Vitals  /66 (BP Location: RUE - Right upper extremity, Patient Position: Standing, Cuff Size: Small Adult)   Pulse 94   Temp 97.6 °F (36.4 °C) (Tympanic)   Ht 5' 5\" (1.651 m)   Wt 56.2 kg   SpO2 98%   BMI 20.62 kg/m²   ·   · General:  Alert, no distress.  Interactive.   · Eyes:  PERRL, EOMI.  No redness, no drainage.  Eyelids normal.    · Ears:  TMs Normal bilaterally, canals normal.   · Nose:  Nasal discharge:  no.  Turbinates are normal.    · Oropharynx:  Mucous membranes moist.  Tonsils 1+, no erythema, no exudate.    · Neck:  No lymphadenopathy.  No masses.  + full range of motion.   · Lungs:  Clear to auscultation.  Respiratory effort normal  · Heart:  Regular rate and rhythm without murmur  · Abdomen:  Soft and nontender, no hepatosplenomegaly  · Skin:  Warm, well perfused.  Capillary refill is < 2 seconds.  No  swelling of her extremities.  No erythema of her extremities.   · Neuro:  CN 2-12 intact, 5/5 strength upper and lower extremities, reflexes 2+.  Normal gait.      Assessment:   Episodes of her vision going black and then feeling dizzy.  She has felt more tired.  She has had chest tightness and shortness of breath with exercise.     She has not had syncopal episodes.      She does not have illness symptoms.      She has normal vital signs at rest including a normal pulse ox.  Orthostatics done and her blood pressure has been stable, her heart rate did increase from 64 - 83 (laying to sitting), then to 94 with standing.  She has a normal physical exam.     Her periods have been heavy since 9/19.  She may be anemic now which would explain her symptoms.      She also has a history of migraines, she may be having a variation of a migraine contributing to her vision symptoms and headache.  This would not explain her breathing symptoms.      Her breathing symptoms could happen if she is anemic.  There may also be exercise induced bronchospasm.  She did have wheezing in 12/18.  She is not having shortness of breath at rest, when laying down or with light activity.        Plan:   Continue to encourage fluids, discussed goal of 8+ cups per day.  Continue to get balanced meals / snacks. Check labs today - CBC, iron, ferritin, blood sugar, TSH, free t4 (thyroid given the change in her period).  Will call with lab results.  If anemic will start iron supplementation an monitor.  If they are normal will discuss trial of albuterol for her breathing symptoms and monitor.  If labs are normal would monitor for recurrent dizziness /headache / vision symptoms.  If she has recurrent episodes of her vision going black and dizziness would get an EKG, if chest symptoms continue would get a chest x-ray.  To follow up urgently over the weekend if worsening symptoms.      Luba Valerio MD         DISPLAY PLAN FREE TEXT

## 2024-05-04 NOTE — H&P ADULT - PROBLEM SELECTOR PLAN 8
Chronic:  - CTA Chest: eccentric wall thickening of distal esophagus/EG junction  - c/w home omeprazole (therapeutic interchange) Chronic:  - c/w fluoxetine

## 2024-05-04 NOTE — H&P ADULT - PROBLEM SELECTOR PLAN 12
DVT ppx:  - Lovenox     c/w Flonase for allergies and Restasis (therapeutic interchange) for dry eyes

## 2024-05-04 NOTE — ED PROVIDER NOTE - NEUROLOGICAL, MLM
Alert and oriented, no focal deficits, no motor or sensory deficits. Symmetric eyebrow raise and smile.  Elevates tongue and shoulders without difficulty.  Normal finger-to-nose.  Good  strength bilaterally

## 2024-05-04 NOTE — H&P ADULT - HISTORY OF PRESENT ILLNESS
44 year old woman with hx of down syndrome, hx of CVA, TRINITY, cirrhosis, HLD, osteoporosis, osteoarthritis, hypothyroidism, asthma, GERD, T2DM, constipation, aortic stenosis s/p bovine aortic valve replacement presenting with reported syncopal episode. Pt lives in [facility] in [place]. Pt was alone in her room when she suddenly began to feel lightheaded and dizzy, followed by arm and head shaking with eventual loss of consciousness. Pt then awoke to people in her room and she was on the floor. Pt endorses she felt back to normal after she was found in her room by others. Prior to this event, pt endorses NBNB vomiting x3 attributed to eating a donut that upset her stomach. Pt denies preceding chest pain or palpitations. Denies any drug use. Of note patient is endorsing some burning on urination that began about one week ago accompanied by lower abdominal pain. Denies SOB, fevers, chills, palpitations, back pain, diarrhea or constipation.     ED course  VS: T 97, HR 62, /70, RR 16, SpO2 96% RA  Labs: Ddimer 730, AST 45   EKG: sinus bradycardia HR 55, QTc 487  Imaging:  - CT head non con: no acute intracranial hemorrhage, vasogenic edema, extra-axial collection   or hydrocephalus. Mild disproportionate cerebellar volume loss.  - CTA Chest: no pulmonary thromboembolism  Eccentric wall thickening of distal esophagus/EG junction. Recommend clinical correlation and consider endoscopy.  In ED given: 1L NS bolus x1     44 year old woman with hx of down syndrome, hx of CVA, TRINITY, cirrhosis, HLD, osteoporosis, osteoarthritis, hypothyroidism, asthma, GERD, T2DM, constipation, aortic stenosis s/p bovine aortic valve replacement presenting with reported syncopal episode. Pt lives in [facility] in [place]. Pt was alone in her room when she suddenly began to feel lightheaded and dizzy, followed by arm and head shaking with eventual loss of consciousness. Pt then awoke to people in her room and she was on the floor. Pt endorses she felt back to normal after she was found in her room by others. Prior to this event, pt endorses NBNB vomiting x3 attributed to eating a donut that upset her stomach. Pt denies preceding chest pain or palpitations. Denies any drug use. Of note patient is endorsing some burning on urination that began about one week ago accompanied by lower abdominal pain. Denies SOB, fevers, chills, palpitations, back pain, diarrhea or constipation.   ED course  VS: T 97, HR 62, /70, RR 16, SpO2 96% RA  Labs: Ddimer 730, AST 45   EKG: sinus bradycardia HR 55, QTc 487  Imaging:  - CT head non con: no acute intracranial hemorrhage, vasogenic edema, extra-axial collection   or hydrocephalus. Mild disproportionate cerebellar volume loss.  - CTA Chest: no pulmonary thromboembolism  Eccentric wall thickening of distal esophagus/EG junction. Recommend clinical correlation and consider endoscopy.  In ED given: 1L NS bolus x1

## 2024-05-04 NOTE — ED PROVIDER NOTE - CHIEF COMPLAINT
Assessment & Plan     Acute conjunctivitis of both eyes, unspecified acute conjunctivitis type  Counseled that most conjunctivitis is viral but given her use of contact lenses more likely bacterial we will start her on antibacterial eyedrop and she needs to use good handwashing may not rub her eyes  - trimethoprim-polymyxin b (POLYTRIM) 94122-9.1 UNIT/ML-% ophthalmic solution; Place 1 drop into both eyes 3 times daily                 No follow-ups on file.    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  She has contracted pinkeye from one of the kids.  Her left eye was draining red yesterday and then had a started in her other eye.  She does wear contacts.  She has had no fever or head cold.  She has company coming this week and is worried about having pinkeye        Objective    /58 (BP Location: Right arm, Patient Position: Sitting)   Pulse 73   Temp 98.4  F (36.9  C)   SpO2 99%   There is no height or weight on file to calculate BMI.  Physical Exam   She is alert and oriented no acute distress skin is warm pink dry TMs are clear heart is regular lungs are clear both conjunctiva are little injected with yellow dried discharge                   The patient is a 44y Female complaining of see chief complaint quote.

## 2024-05-04 NOTE — ED ADULT TRIAGE NOTE - ESI TRIAGE ACUITY LEVEL, MLM
MEDICAL RECORDS REQUEST   [facility]  Urology Clinic  [address] [place] [address]  PHONE: [phone]  Fax: [phone]        FUTURE VISIT INFORMATION                                                   Kate Luther, : 1979 scheduled for future visit at [facility]    APPOINTMENT INFORMATION:    Date: 2018    Provider:  Vish Lujan    Reason for Visit/Diagnosis: Penile candidiasis    REFERRAL INFORMATION:    Referring provider:  Damien Serrano    Specialty: NP    Referring providers clinic:  [facility]    Clinic contact number:  [phone]    RECORDS REQUESTED FOR VISIT                                                     NOTES  STATUS/DETAILS   OFFICE NOTE from referring provider  yes   OFFICE NOTE from other specialist  no   DISCHARGE SUMMARY from hospital  no   DISCHARGE REPORT from the ER  no   OPERATIVE REPORT  no   MEDICATION LIST  yes       PRE-VISIT CHECKLIST      Record collection complete yes   Appointment appropriately scheduled           (right time/right provider) Yes   MyChart activation Yes   Questionnaire complete If no, please explain in process     Completed by: Viktoria Larios  
2

## 2024-05-04 NOTE — ED PROVIDER NOTE - NS ED ATTENDING STATEMENT MOD
I have seen and examined this patient and fully participated in the care of this patient as the teaching attending.  The service was shared with the GRABIEL.  I reviewed and verified the documentation.

## 2024-05-05 DIAGNOSIS — Z29.9 ENCOUNTER FOR PROPHYLACTIC MEASURES, UNSPECIFIED: ICD-10-CM

## 2024-05-05 DIAGNOSIS — E11.9 TYPE 2 DIABETES MELLITUS WITHOUT COMPLICATIONS: ICD-10-CM

## 2024-05-05 DIAGNOSIS — Z86.73 PERSONAL HISTORY OF TRANSIENT ISCHEMIC ATTACK (TIA), AND CEREBRAL INFARCTION WITHOUT RESIDUAL DEFICITS: ICD-10-CM

## 2024-05-05 DIAGNOSIS — K21.9 GASTRO-ESOPHAGEAL REFLUX DISEASE WITHOUT ESOPHAGITIS: ICD-10-CM

## 2024-05-05 DIAGNOSIS — R55 SYNCOPE AND COLLAPSE: ICD-10-CM

## 2024-05-05 DIAGNOSIS — F41.9 ANXIETY DISORDER, UNSPECIFIED: ICD-10-CM

## 2024-05-05 DIAGNOSIS — R82.90 UNSPECIFIED ABNORMAL FINDINGS IN URINE: ICD-10-CM

## 2024-05-05 DIAGNOSIS — Z95.2 PRESENCE OF PROSTHETIC HEART VALVE: Chronic | ICD-10-CM

## 2024-05-05 DIAGNOSIS — J45.909 UNSPECIFIED ASTHMA, UNCOMPLICATED: ICD-10-CM

## 2024-05-05 DIAGNOSIS — M81.0 AGE-RELATED OSTEOPOROSIS WITHOUT CURRENT PATHOLOGICAL FRACTURE: ICD-10-CM

## 2024-05-05 DIAGNOSIS — R30.0 DYSURIA: ICD-10-CM

## 2024-05-05 DIAGNOSIS — E03.9 HYPOTHYROIDISM, UNSPECIFIED: ICD-10-CM

## 2024-05-05 DIAGNOSIS — M19.90 UNSPECIFIED OSTEOARTHRITIS, UNSPECIFIED SITE: ICD-10-CM

## 2024-05-05 DIAGNOSIS — I95.1 ORTHOSTATIC HYPOTENSION: ICD-10-CM

## 2024-05-05 LAB
A1C WITH ESTIMATED AVERAGE GLUCOSE RESULT: 4.4 % — SIGNIFICANT CHANGE UP (ref 4–5.6)
ALBUMIN SERPL ELPH-MCNC: 2.8 G/DL — LOW (ref 3.3–5)
ALP SERPL-CCNC: 89 U/L — SIGNIFICANT CHANGE UP (ref 40–120)
ALT FLD-CCNC: 25 U/L — SIGNIFICANT CHANGE UP (ref 12–78)
ANION GAP SERPL CALC-SCNC: 5 MMOL/L — SIGNIFICANT CHANGE UP (ref 5–17)
APPEARANCE UR: CLEAR — SIGNIFICANT CHANGE UP
AST SERPL-CCNC: 41 U/L — HIGH (ref 15–37)
BASOPHILS # BLD AUTO: 0.06 K/UL — SIGNIFICANT CHANGE UP (ref 0–0.2)
BASOPHILS NFR BLD AUTO: 1.5 % — SIGNIFICANT CHANGE UP (ref 0–2)
BILIRUB SERPL-MCNC: 0.6 MG/DL — SIGNIFICANT CHANGE UP (ref 0.2–1.2)
BILIRUB UR-MCNC: NEGATIVE — SIGNIFICANT CHANGE UP
BUN SERPL-MCNC: 15 MG/DL — SIGNIFICANT CHANGE UP (ref 7–23)
CALCIUM SERPL-MCNC: 9 MG/DL — SIGNIFICANT CHANGE UP (ref 8.5–10.1)
CHLORIDE SERPL-SCNC: 111 MMOL/L — HIGH (ref 96–108)
CO2 SERPL-SCNC: 27 MMOL/L — SIGNIFICANT CHANGE UP (ref 22–31)
COLOR SPEC: SIGNIFICANT CHANGE UP
CREAT SERPL-MCNC: 0.87 MG/DL — SIGNIFICANT CHANGE UP (ref 0.5–1.3)
DIFF PNL FLD: NEGATIVE — SIGNIFICANT CHANGE UP
EGFR: 83 ML/MIN/1.73M2 — SIGNIFICANT CHANGE UP
EOSINOPHIL # BLD AUTO: 0.15 K/UL — SIGNIFICANT CHANGE UP (ref 0–0.5)
EOSINOPHIL NFR BLD AUTO: 3.8 % — SIGNIFICANT CHANGE UP (ref 0–6)
ESTIMATED AVERAGE GLUCOSE: 80 MG/DL — SIGNIFICANT CHANGE UP (ref 68–114)
GLUCOSE SERPL-MCNC: 83 MG/DL — SIGNIFICANT CHANGE UP (ref 70–99)
GLUCOSE UR QL: NEGATIVE MG/DL — SIGNIFICANT CHANGE UP
HCT VFR BLD CALC: 35.7 % — SIGNIFICANT CHANGE UP (ref 34.5–45)
HGB BLD-MCNC: 12.1 G/DL — SIGNIFICANT CHANGE UP (ref 11.5–15.5)
IMM GRANULOCYTES NFR BLD AUTO: 0.3 % — SIGNIFICANT CHANGE UP (ref 0–0.9)
KETONES UR-MCNC: NEGATIVE MG/DL — SIGNIFICANT CHANGE UP
LEUKOCYTE ESTERASE UR-ACNC: ABNORMAL
LYMPHOCYTES # BLD AUTO: 1.43 K/UL — SIGNIFICANT CHANGE UP (ref 1–3.3)
LYMPHOCYTES # BLD AUTO: 35.8 % — SIGNIFICANT CHANGE UP (ref 13–44)
MCHC RBC-ENTMCNC: 30.8 PG — SIGNIFICANT CHANGE UP (ref 27–34)
MCHC RBC-ENTMCNC: 33.9 GM/DL — SIGNIFICANT CHANGE UP (ref 32–36)
MCV RBC AUTO: 90.8 FL — SIGNIFICANT CHANGE UP (ref 80–100)
MONOCYTES # BLD AUTO: 0.48 K/UL — SIGNIFICANT CHANGE UP (ref 0–0.9)
MONOCYTES NFR BLD AUTO: 12 % — SIGNIFICANT CHANGE UP (ref 2–14)
NEUTROPHILS # BLD AUTO: 1.86 K/UL — SIGNIFICANT CHANGE UP (ref 1.8–7.4)
NEUTROPHILS NFR BLD AUTO: 46.6 % — SIGNIFICANT CHANGE UP (ref 43–77)
NITRITE UR-MCNC: NEGATIVE — SIGNIFICANT CHANGE UP
NRBC # BLD: 0 /100 WBCS — SIGNIFICANT CHANGE UP (ref 0–0)
PH UR: 7.5 — SIGNIFICANT CHANGE UP (ref 5–8)
PLATELET # BLD AUTO: 156 K/UL — SIGNIFICANT CHANGE UP (ref 150–400)
POTASSIUM SERPL-MCNC: 4.3 MMOL/L — SIGNIFICANT CHANGE UP (ref 3.5–5.3)
POTASSIUM SERPL-SCNC: 4.3 MMOL/L — SIGNIFICANT CHANGE UP (ref 3.5–5.3)
PROT SERPL-MCNC: 6.5 G/DL — SIGNIFICANT CHANGE UP (ref 6–8.3)
PROT UR-MCNC: NEGATIVE MG/DL — SIGNIFICANT CHANGE UP
RBC # BLD: 3.93 M/UL — SIGNIFICANT CHANGE UP (ref 3.8–5.2)
RBC # FLD: 15.4 % — HIGH (ref 10.3–14.5)
SODIUM SERPL-SCNC: 143 MMOL/L — SIGNIFICANT CHANGE UP (ref 135–145)
SP GR SPEC: 1.04 — HIGH (ref 1–1.03)
UROBILINOGEN FLD QL: 4 MG/DL (ref 0.2–1)
WBC # BLD: 3.99 K/UL — SIGNIFICANT CHANGE UP (ref 3.8–10.5)
WBC # FLD AUTO: 3.99 K/UL — SIGNIFICANT CHANGE UP (ref 3.8–10.5)

## 2024-05-05 PROCEDURE — 99232 SBSQ HOSP IP/OBS MODERATE 35: CPT

## 2024-05-05 PROCEDURE — 99223 1ST HOSP IP/OBS HIGH 75: CPT

## 2024-05-05 RX ORDER — DEXTROSE 10 % IN WATER 10 %
125 INTRAVENOUS SOLUTION INTRAVENOUS ONCE
Refills: 0 | Status: DISCONTINUED | OUTPATIENT
Start: 2024-05-05 | End: 2024-05-10

## 2024-05-05 RX ORDER — DEXTROSE 50 % IN WATER 50 %
15 SYRINGE (ML) INTRAVENOUS ONCE
Refills: 0 | Status: DISCONTINUED | OUTPATIENT
Start: 2024-05-05 | End: 2024-05-10

## 2024-05-05 RX ORDER — CHOLECALCIFEROL (VITAMIN D3) 125 MCG
1000 CAPSULE ORAL DAILY
Refills: 0 | Status: DISCONTINUED | OUTPATIENT
Start: 2024-05-05 | End: 2024-05-10

## 2024-05-05 RX ORDER — SODIUM CHLORIDE 9 MG/ML
1000 INJECTION, SOLUTION INTRAVENOUS
Refills: 0 | Status: DISCONTINUED | OUTPATIENT
Start: 2024-05-05 | End: 2024-05-10

## 2024-05-05 RX ORDER — ASPIRIN/CALCIUM CARB/MAGNESIUM 324 MG
81 TABLET ORAL DAILY
Refills: 0 | Status: DISCONTINUED | OUTPATIENT
Start: 2024-05-05 | End: 2024-05-10

## 2024-05-05 RX ORDER — INSULIN LISPRO 100/ML
VIAL (ML) SUBCUTANEOUS AT BEDTIME
Refills: 0 | Status: DISCONTINUED | OUTPATIENT
Start: 2024-05-05 | End: 2024-05-10

## 2024-05-05 RX ORDER — MIDODRINE HYDROCHLORIDE 2.5 MG/1
10 TABLET ORAL THREE TIMES A DAY
Refills: 0 | Status: DISCONTINUED | OUTPATIENT
Start: 2024-05-05 | End: 2024-05-10

## 2024-05-05 RX ORDER — ACETAMINOPHEN 500 MG
650 TABLET ORAL EVERY 6 HOURS
Refills: 0 | Status: DISCONTINUED | OUTPATIENT
Start: 2024-05-05 | End: 2024-05-10

## 2024-05-05 RX ORDER — SODIUM CHLORIDE 0.65 %
2 AEROSOL, SPRAY (ML) NASAL
Refills: 0 | Status: DISCONTINUED | OUTPATIENT
Start: 2024-05-05 | End: 2024-05-10

## 2024-05-05 RX ORDER — ENOXAPARIN SODIUM 100 MG/ML
40 INJECTION SUBCUTANEOUS EVERY 24 HOURS
Refills: 0 | Status: DISCONTINUED | OUTPATIENT
Start: 2024-05-05 | End: 2024-05-10

## 2024-05-05 RX ORDER — DEXTROSE 50 % IN WATER 50 %
25 SYRINGE (ML) INTRAVENOUS ONCE
Refills: 0 | Status: DISCONTINUED | OUTPATIENT
Start: 2024-05-05 | End: 2024-05-10

## 2024-05-05 RX ORDER — MAGNESIUM HYDROXIDE 400 MG/1
5 TABLET, CHEWABLE ORAL DAILY
Refills: 0 | Status: DISCONTINUED | OUTPATIENT
Start: 2024-05-05 | End: 2024-05-10

## 2024-05-05 RX ORDER — ONDANSETRON 8 MG/1
4 TABLET, FILM COATED ORAL THREE TIMES A DAY
Refills: 0 | Status: DISCONTINUED | OUTPATIENT
Start: 2024-05-05 | End: 2024-05-10

## 2024-05-05 RX ORDER — FLUTICASONE PROPIONATE 50 MCG
2 SPRAY, SUSPENSION NASAL
Refills: 0 | Status: DISCONTINUED | OUTPATIENT
Start: 2024-05-05 | End: 2024-05-10

## 2024-05-05 RX ORDER — DEXTROSE 50 % IN WATER 50 %
12.5 SYRINGE (ML) INTRAVENOUS ONCE
Refills: 0 | Status: DISCONTINUED | OUTPATIENT
Start: 2024-05-05 | End: 2024-05-10

## 2024-05-05 RX ORDER — SIMETHICONE 80 MG/1
80 TABLET, CHEWABLE ORAL
Refills: 0 | Status: DISCONTINUED | OUTPATIENT
Start: 2024-05-05 | End: 2024-05-10

## 2024-05-05 RX ORDER — SOD,AMMONIUM,POTASSIUM LACTATE
1 CREAM (GRAM) TOPICAL
Refills: 0 | Status: DISCONTINUED | OUTPATIENT
Start: 2024-05-05 | End: 2024-05-10

## 2024-05-05 RX ORDER — FLUOXETINE HCL 10 MG
20 CAPSULE ORAL DAILY
Refills: 0 | Status: DISCONTINUED | OUTPATIENT
Start: 2024-05-05 | End: 2024-05-10

## 2024-05-05 RX ORDER — GLUCAGON INJECTION, SOLUTION 0.5 MG/.1ML
1 INJECTION, SOLUTION SUBCUTANEOUS ONCE
Refills: 0 | Status: DISCONTINUED | OUTPATIENT
Start: 2024-05-05 | End: 2024-05-10

## 2024-05-05 RX ORDER — INSULIN LISPRO 100/ML
VIAL (ML) SUBCUTANEOUS
Refills: 0 | Status: DISCONTINUED | OUTPATIENT
Start: 2024-05-05 | End: 2024-05-10

## 2024-05-05 RX ORDER — ALBUTEROL 90 UG/1
2 AEROSOL, METERED ORAL EVERY 6 HOURS
Refills: 0 | Status: DISCONTINUED | OUTPATIENT
Start: 2024-05-05 | End: 2024-05-10

## 2024-05-05 RX ORDER — PANTOPRAZOLE SODIUM 20 MG/1
40 TABLET, DELAYED RELEASE ORAL
Refills: 0 | Status: DISCONTINUED | OUTPATIENT
Start: 2024-05-05 | End: 2024-05-10

## 2024-05-05 RX ORDER — BUDESONIDE AND FORMOTEROL FUMARATE DIHYDRATE 160; 4.5 UG/1; UG/1
2 AEROSOL RESPIRATORY (INHALATION)
Refills: 0 | Status: DISCONTINUED | OUTPATIENT
Start: 2024-05-05 | End: 2024-05-10

## 2024-05-05 RX ORDER — LEVOTHYROXINE SODIUM 125 MCG
112 TABLET ORAL DAILY
Refills: 0 | Status: DISCONTINUED | OUTPATIENT
Start: 2024-05-05 | End: 2024-05-10

## 2024-05-05 RX ADMIN — Medication 2 SPRAY(S): at 10:00

## 2024-05-05 RX ADMIN — Medication 112 MICROGRAM(S): at 05:20

## 2024-05-05 RX ADMIN — Medication 1 APPLICATION(S): at 09:53

## 2024-05-05 RX ADMIN — MIDODRINE HYDROCHLORIDE 10 MILLIGRAM(S): 2.5 TABLET ORAL at 05:20

## 2024-05-05 RX ADMIN — Medication 20 MILLIGRAM(S): at 11:45

## 2024-05-05 RX ADMIN — SIMETHICONE 80 MILLIGRAM(S): 80 TABLET, CHEWABLE ORAL at 05:20

## 2024-05-05 RX ADMIN — BUDESONIDE AND FORMOTEROL FUMARATE DIHYDRATE 2 PUFF(S): 160; 4.5 AEROSOL RESPIRATORY (INHALATION) at 06:30

## 2024-05-05 RX ADMIN — PANTOPRAZOLE SODIUM 40 MILLIGRAM(S): 20 TABLET, DELAYED RELEASE ORAL at 05:20

## 2024-05-05 RX ADMIN — Medication 81 MILLIGRAM(S): at 11:45

## 2024-05-05 RX ADMIN — Medication 1000 UNIT(S): at 11:45

## 2024-05-05 RX ADMIN — MIDODRINE HYDROCHLORIDE 10 MILLIGRAM(S): 2.5 TABLET ORAL at 11:45

## 2024-05-05 RX ADMIN — Medication 1 DROP(S): at 06:30

## 2024-05-05 RX ADMIN — BUDESONIDE AND FORMOTEROL FUMARATE DIHYDRATE 2 PUFF(S): 160; 4.5 AEROSOL RESPIRATORY (INHALATION) at 18:22

## 2024-05-05 RX ADMIN — Medication 1 DROP(S): at 17:19

## 2024-05-05 RX ADMIN — Medication 1 TABLET(S): at 11:45

## 2024-05-05 RX ADMIN — ENOXAPARIN SODIUM 40 MILLIGRAM(S): 100 INJECTION SUBCUTANEOUS at 01:00

## 2024-05-05 RX ADMIN — MIDODRINE HYDROCHLORIDE 10 MILLIGRAM(S): 2.5 TABLET ORAL at 17:19

## 2024-05-05 RX ADMIN — SIMETHICONE 80 MILLIGRAM(S): 80 TABLET, CHEWABLE ORAL at 17:19

## 2024-05-05 NOTE — CONSULT NOTE ADULT - ASSESSMENT
44 year old woman with hx of down syndrome, hx of CVA, TRINITY, cirrhosis, HLD, osteoporosis, osteoarthritis, hypothyroidism, asthma, GERD, T2DM, constipation, aortic stenosis s/p bovine aortic valve replacement presenting with reported syncopal episode.     - she is unable to provide information about the events surrounding syncope. Noted to have shaking by staff.   - neuro eval pending. will need to r/o seizures    - EKG w SB with prolonged QTC.   - avoid QTc prolonging medications.   - check ekg daily  - Monitor and replete electrolytes. Keep K>4.0 and Mg>2.0.   - monitor tele  - has a hx of orthostasis and is on midodrine. Would check orthostatics. cont with midodrine      - known AVR(b)  - cont asa  - echo     - Appears compensated from HF POV.   - no signs of ischemia    - Further cardiac workup will depend on clinical course.   - All other workup per primary team. Will followup.

## 2024-05-05 NOTE — PROGRESS NOTE ADULT - SUBJECTIVE AND OBJECTIVE BOX
Patient is a 44y old  Female who presents with a chief complaint of syncope (05 May 2024 11:56)      INTERVAL HPI/OVERNIGHT EVENTS: Patient seen and examined at bedside. No overnight events. Tolerating diet. Denies fever, chills, chest pain, shortness of breath, abdominal pain, nausea/vomiting, headache.    MEDICATIONS  (STANDING):  ammonium lactate 12% Lotion 1 Application(s) Topical <User Schedule>  artificial  tears Solution 1 Drop(s) Both EYES two times a day  aspirin  chewable 81 milliGRAM(s) Oral daily  budesonide  80 MICROgram(s)/formoterol 4.5 MICROgram(s) Inhaler 2 Puff(s) Inhalation two times a day  cholecalciferol 1000 Unit(s) Oral daily  dextrose 10% Bolus 125 milliLiter(s) IV Bolus once  dextrose 5%. 1000 milliLiter(s) (100 mL/Hr) IV Continuous <Continuous>  dextrose 5%. 1000 milliLiter(s) (50 mL/Hr) IV Continuous <Continuous>  dextrose 50% Injectable 12.5 Gram(s) IV Push once  dextrose 50% Injectable 25 Gram(s) IV Push once  enoxaparin Injectable 40 milliGRAM(s) SubCutaneous every 24 hours  FLUoxetine 20 milliGRAM(s) Oral daily  fluticasone propionate 50 MICROgram(s)/spray Nasal Spray 2 Spray(s) Both Nostrils <User Schedule>  glucagon  Injectable 1 milliGRAM(s) IntraMuscular once  insulin lispro (ADMELOG) corrective regimen sliding scale   SubCutaneous three times a day before meals  insulin lispro (ADMELOG) corrective regimen sliding scale   SubCutaneous at bedtime  levothyroxine 112 MICROGram(s) Oral daily  midodrine. 10 milliGRAM(s) Oral three times a day  multivitamin 1 Tablet(s) Oral daily  pantoprazole    Tablet 40 milliGRAM(s) Oral before breakfast  simethicone 80 milliGRAM(s) Chew two times a day    MEDICATIONS  (PRN):  acetaminophen     Tablet .. 650 milliGRAM(s) Oral every 6 hours PRN Temp greater or equal to 38C (100.4F), Mild Pain (1 - 3)  albuterol    90 MICROgram(s) HFA Inhaler 2 Puff(s) Inhalation every 6 hours PRN for bronchospasm  bisacodyl Suppository 10 milliGRAM(s) Rectal daily PRN Constipation  dextrose Oral Gel 15 Gram(s) Oral once PRN Blood Glucose LESS THAN 70 milliGRAM(s)/deciliter  magnesium hydroxide Suspension 5 milliLiter(s) Oral daily PRN Constipation  ondansetron    Tablet 4 milliGRAM(s) Oral three times a day PRN for nausea  saline laxative (FLEET) Rectal Enema 1 Enema Rectal daily PRN for constipation  sodium chloride 0.65% Nasal 2 Spray(s) Both Nostrils two times a day PRN Nasal Congestion      Allergies    penicillin (Hives)    Intolerances        REVIEW OF SYSTEMS:  CONSTITUTIONAL: No fever or chills  HEENT:  No headache, no sore throat  RESPIRATORY: No cough, wheezing, or shortness of breath  CARDIOVASCULAR: No chest pain, palpitations  GASTROINTESTINAL: No abd pain, nausea, vomiting, or diarrhea  GENITOURINARY: No dysuria, frequency, or hematuria  NEUROLOGICAL: no focal weakness or dizziness  MUSCULOSKELETAL: no myalgias     Vital Signs Last 24 Hrs  T(C): 36.6 (05 May 2024 20:03), Max: 36.8 (05 May 2024 11:14)  T(F): 97.8 (05 May 2024 20:03), Max: 98.2 (05 May 2024 11:14)  HR: 63 (05 May 2024 20:03) (57 - 75)  BP: 113/72 (05 May 2024 20:03) (95/53 - 113/72)  BP(mean): --  RR: 17 (05 May 2024 20:03) (17 - 17)  SpO2: 98% (05 May 2024 20:03) (94% - 98%)    Parameters below as of 05 May 2024 20:03  Patient On (Oxygen Delivery Method): room air        PHYSICAL EXAM:  GENERAL: NAD  HEENT:  anicteric, moist mucous membranes  CHEST/LUNG:  CTA b/l, no rales, wheezes, or rhonchi  HEART:  RRR, S1, S2  ABDOMEN:  BS+, soft, nontender, nondistended  EXTREMITIES: no edema, cyanosis, or calf tenderness  NERVOUS SYSTEM: answers questions and follows commands appropriately    LABS:                        12.1   3.99  )-----------( 156      ( 05 May 2024 05:25 )             35.7     CBC Full  -  ( 05 May 2024 05:25 )  WBC Count : 3.99 K/uL  Hemoglobin : 12.1 g/dL  Hematocrit : 35.7 %  Platelet Count - Automated : 156 K/uL  Mean Cell Volume : 90.8 fl  Mean Cell Hemoglobin : 30.8 pg  Mean Cell Hemoglobin Concentration : 33.9 gm/dL  Auto Neutrophil # : 1.86 K/uL  Auto Lymphocyte # : 1.43 K/uL  Auto Monocyte # : 0.48 K/uL  Auto Eosinophil # : 0.15 K/uL  Auto Basophil # : 0.06 K/uL  Auto Neutrophil % : 46.6 %  Auto Lymphocyte % : 35.8 %  Auto Monocyte % : 12.0 %  Auto Eosinophil % : 3.8 %  Auto Basophil % : 1.5 %    05 May 2024 05:25    143    |  111    |  15     ----------------------------<  83     4.3     |  27     |  0.87     Ca    9.0        05 May 2024 05:25    TPro  6.5    /  Alb  2.8    /  TBili  0.6    /  DBili  x      /  AST  41     /  ALT  25     /  AlkPhos  89     05 May 2024 05:25      Urinalysis Basic - ( 05 May 2024 05:25 )    Color: x / Appearance: x / SG: x / pH: x  Gluc: 83 mg/dL / Ketone: x  / Bili: x / Urobili: x   Blood: x / Protein: x / Nitrite: x   Leuk Esterase: x / RBC: x / WBC x   Sq Epi: x / Non Sq Epi: x / Bacteria: x      CAPILLARY BLOOD GLUCOSE      POCT Blood Glucose.: 94 mg/dL (05 May 2024 16:43)  POCT Blood Glucose.: 104 mg/dL (05 May 2024 11:26)  POCT Blood Glucose.: 87 mg/dL (05 May 2024 07:32)          RADIOLOGY & ADDITIONAL TESTS:    Personally reviewed.     Consultant(s) Notes Reviewed:  [x] YES  [ ] NO

## 2024-05-05 NOTE — ED ADULT NURSE REASSESSMENT NOTE - NS ED NURSE REASSESS COMMENT FT1
report give to raymon david in [facility]. pt aox4, in stable condition. no complaints of pain or discomfort @ this time. pt ready for transport with RAYMON Diamond and William armstrong.

## 2024-05-05 NOTE — PATIENT PROFILE ADULT - STATED REASON FOR ADMISSION
Patient arrives via ALS c/o HA, loss of taste, and SOB since Friday. +vaccinated for covid, denies known contacts. Denies taking anything OTC. Tremors

## 2024-05-05 NOTE — CONSULT NOTE ADULT - SUBJECTIVE AND OBJECTIVE BOX
CHIEF COMPLAINT: Patient is a 44y old  Female who presents with a chief complaint of syncope (04 May 2024 23:55)      HPI:  44 year old woman with hx of down syndrome, hx of CVA, TRINITY, cirrhosis, HLD, osteoporosis, osteoarthritis, hypothyroidism, asthma, GERD, T2DM, constipation, aortic stenosis s/p bovine aortic valve replacement presenting with reported syncopal episode. Pt lives in [facility] in [place]. Pt was alone in her room when she suddenly began to feel lightheaded and dizzy, followed by arm and head shaking with eventual loss of consciousness. Pt then awoke to people in her room and she was on the floor. Pt endorses she felt back to normal after she was found in her room by others. Prior to this event, pt endorses NBNB vomiting x3 attributed to eating a donut that upset her stomach. Pt denies preceding chest pain or palpitations. Denies any drug use. Of note patient is endorsing some burning on urination that began about one week ago accompanied by lower abdominal pain. Denies SOB, fevers, chills, palpitations, back pain, diarrhea or constipation.   ED course  VS: T 97, HR 62, /70, RR 16, SpO2 96% RA  Labs: Ddimer 730, AST 45   EKG: sinus bradycardia HR 55, QTc 487  Imaging:  - CT head non con: no acute intracranial hemorrhage, vasogenic edema, extra-axial collection   or hydrocephalus. Mild disproportionate cerebellar volume loss.  - CTA Chest: no pulmonary thromboembolism  Eccentric wall thickening of distal esophagus/EG junction. Recommend clinical correlation and consider endoscopy.  In ED given: 1L NS bolus x1       REVIEW OF SYSTEMS:   All other review of systems are negative unless indicated above    PAST MEDICAL & SURGICAL HISTORY:  Down syndrome      Hypothyroidism      CVA (cerebrovascular accident)      Osteoporosis      Osteoarthritis      Anxiety      Seasonal allergies      Type 2 diabetes mellitus      GERD (gastroesophageal reflux disease)      H/O aortic valve stenosis      S/P aortic valve replacement          SOCIAL HISTORY:  No tobacco, ethanol, or drug abuse.    FAMILY HISTORY:  FH: depression (Mother)    FH: HTN (hypertension) (Mother, Father)  No family history of acute MI or sudden cardiac death.    MEDICATIONS  (STANDING):  ammonium lactate 12% Lotion 1 Application(s) Topical <User Schedule>  artificial  tears Solution 1 Drop(s) Both EYES two times a day  aspirin  chewable 81 milliGRAM(s) Oral daily  budesonide  80 MICROgram(s)/formoterol 4.5 MICROgram(s) Inhaler 2 Puff(s) Inhalation two times a day  cholecalciferol 1000 Unit(s) Oral daily  dextrose 10% Bolus 125 milliLiter(s) IV Bolus once  dextrose 5%. 1000 milliLiter(s) (100 mL/Hr) IV Continuous <Continuous>  dextrose 5%. 1000 milliLiter(s) (50 mL/Hr) IV Continuous <Continuous>  dextrose 50% Injectable 12.5 Gram(s) IV Push once  dextrose 50% Injectable 25 Gram(s) IV Push once  enoxaparin Injectable 40 milliGRAM(s) SubCutaneous every 24 hours  FLUoxetine 20 milliGRAM(s) Oral daily  fluticasone propionate 50 MICROgram(s)/spray Nasal Spray 2 Spray(s) Both Nostrils <User Schedule>  glucagon  Injectable 1 milliGRAM(s) IntraMuscular once  insulin lispro (ADMELOG) corrective regimen sliding scale   SubCutaneous three times a day before meals  insulin lispro (ADMELOG) corrective regimen sliding scale   SubCutaneous at bedtime  levothyroxine 112 MICROGram(s) Oral daily  midodrine. 10 milliGRAM(s) Oral three times a day  multivitamin 1 Tablet(s) Oral daily  pantoprazole    Tablet 40 milliGRAM(s) Oral before breakfast  simethicone 80 milliGRAM(s) Chew two times a day    MEDICATIONS  (PRN):  acetaminophen     Tablet .. 650 milliGRAM(s) Oral every 6 hours PRN Temp greater or equal to 38C (100.4F), Mild Pain (1 - 3)  albuterol    90 MICROgram(s) HFA Inhaler 2 Puff(s) Inhalation every 6 hours PRN for bronchospasm  bisacodyl Suppository 10 milliGRAM(s) Rectal daily PRN Constipation  dextrose Oral Gel 15 Gram(s) Oral once PRN Blood Glucose LESS THAN 70 milliGRAM(s)/deciliter  magnesium hydroxide Suspension 5 milliLiter(s) Oral daily PRN Constipation  ondansetron    Tablet 4 milliGRAM(s) Oral three times a day PRN for nausea  saline laxative (FLEET) Rectal Enema 1 Enema Rectal daily PRN for constipation  sodium chloride 0.65% Nasal 2 Spray(s) Both Nostrils two times a day PRN Nasal Congestion      Allergies    penicillin (Hives)    Intolerances        Home meds:  Home Medications:  Albuterol (Eqv-ProAir HFA) 90 mcg/inh inhalation aerosol: 2 puff(s) inhaled every 6 hours as needed for  bronchospasm (04 May 2024 23:49)  alendronate 70 mg oral tablet: 1 tab(s) orally once a week every week on Wednesday at 6:00am (05 May 2024 00:44)  ammonium lactate 12% topical lotion: Apply topically to affected area once a day apply to bilateral lower extremities (05 May 2024 00:45)  aspirin 81 mg oral tablet: 1 tab(s) orally once a day (04 May 2024 23:49)  Breo Ellipta 100 mcg-25 mcg/inh inhalation powder: 1 puff(s) inhaled once a day (04 May 2024 23:49)  D3 25 mcg (1000 intl units) oral tablet: 1 tab(s) orally once a day (04 May 2024 23:49)  Dulcolax Laxative 10 mg rectal suppository: 1 suppository(ies) rectally once a day as needed for  constipation (05 May 2024 00:48)  Fleet Enema 19 g-7 g rectal enema: 133 milliliter(s) rectally once a day as needed for  constipation (05 May 2024 00:48)  Flonase 50 mcg/inh nasal spray: 2 spray(s) in each nostril once a day (05 May 2024 00:56)  FLUoxetine 20 mg oral capsule: 1 cap(s) orally once a day (05 May 2024 00:48)  levothyroxine 112 mcg (0.112 mg) oral tablet: 1 tab(s) orally once a day (05 May 2024 00:48)  magnesium hydroxide 1200 mg/15 mL oral liquid: 5 milliliter(s) orally once a day as needed for  constipation (05 May 2024 00:48)  meloxicam 15 mg oral tablet: 1 tab(s) orally once a day as needed for  mild pain (05 May 2024 00:57)  metFORMIN 750 mg oral tablet, extended release: 1 tab(s) orally 2 times a day (05 May 2024 00:58)  midodrine 10 mg oral tablet: 1 tab(s) orally 3 times a day (05 May 2024 01:02)  Multiple Vitamins oral tablet: 1 tab(s) orally once a day (05 May 2024 01:02)  omeprazole 40 mg oral delayed release capsule: 1 cap(s) orally once a day (05 May 2024 00:48)  Pyrithione Zinc 1% topical shampoo: Apply topically to affected area 2 times a day every Wednesday and Saturday (05 May 2024 01:02)  Restasis 0.05% ophthalmic emulsion: 1 drop(s) in each affected eye 2 times a day (05 May 2024 00:53)  simethicone 80 mg oral tablet, chewable: 1 tab(s) chewed 2 times a day (05 May 2024 01:02)  sodium chloride 0.5% nasal spray: 2 spray(s) nasal 2 times a day (05 May 2024 00:55)  Zofran 4 mg oral tablet: 1 tab(s) orally 3 times a day as needed for  nausea (05 May 2024 01:02)        VITAL SIGNS:   Vital Signs Last 24 Hrs  T(C): 36.8 (05 May 2024 11:14), Max: 36.8 (05 May 2024 11:14)  T(F): 98.2 (05 May 2024 11:14), Max: 98.2 (05 May 2024 11:14)  HR: 75 (05 May 2024 11:14) (57 - 86)  BP: 98/51 (05 May 2024 11:14) (95/59 - 112/70)  BP(mean): --  RR: 17 (05 May 2024 11:14) (16 - 19)  SpO2: 94% (05 May 2024 11:14) (94% - 98%)    Parameters below as of 05 May 2024 11:14  Patient On (Oxygen Delivery Method): room air        I&O's Summary      On Exam:     Constitutional: lethargic, arousable  HEENT: Moist Mucous Membranes, Anicteric  Pulmonary: Decreased breath sounds b/l. No rales, crackles or wheeze appreciated.   Cardiovascular: Regular, S1 and S2, + SM   Gastrointestinal: Bowel Sounds present, soft, nontender.   Lymph: No peripheral edema. No lymphadenopathy.  Skin: No visible rashes or ulcers.  Psych:  Mood & affect appropriate    LABS: All Labs Reviewed:                        12.1   3.99  )-----------( 156      ( 05 May 2024 05:25 )             35.7                         12.9   4.76  )-----------( 194      ( 04 May 2024 19:41 )             38.1     05 May 2024 05:25    143    |  111    |  15     ----------------------------<  83     4.3     |  27     |  0.87   04 May 2024 19:41    142    |  109    |  17     ----------------------------<  92     4.4     |  28     |  0.91     Ca    9.0        05 May 2024 05:25  Ca    9.3        04 May 2024 19:41  Mg     2.3       04 May 2024 19:41    TPro  6.5    /  Alb  2.8    /  TBili  0.6    /  DBili  x      /  AST  41     /  ALT  25     /  AlkPhos  89     05 May 2024 05:25  TPro  7.2    /  Alb  3.2    /  TBili  0.6    /  DBili  x      /  AST  45     /  ALT  27     /  AlkPhos  97     04 May 2024 19:41        - Troponin: <-12.9  Blood Culture:           RADIOLOGY:      < from: CT Angio Chest PE Protocol w/ IV Cont (05.04.24 @ 21:46) >  IMPRESSION:  No pulmonary thromboembolism.  Eccentric wall thickening of distal esophagus/EG junction. Recommend   clinical correlation and consider endoscopy.    < end of copied text >

## 2024-05-06 LAB
ALBUMIN SERPL ELPH-MCNC: 3 G/DL — LOW (ref 3.3–5)
ALP SERPL-CCNC: 88 U/L — SIGNIFICANT CHANGE UP (ref 40–120)
ALT FLD-CCNC: 24 U/L — SIGNIFICANT CHANGE UP (ref 12–78)
ANION GAP SERPL CALC-SCNC: 5 MMOL/L — SIGNIFICANT CHANGE UP (ref 5–17)
AST SERPL-CCNC: 34 U/L — SIGNIFICANT CHANGE UP (ref 15–37)
BILIRUB SERPL-MCNC: 0.7 MG/DL — SIGNIFICANT CHANGE UP (ref 0.2–1.2)
BUN SERPL-MCNC: 13 MG/DL — SIGNIFICANT CHANGE UP (ref 7–23)
CALCIUM SERPL-MCNC: 8.9 MG/DL — SIGNIFICANT CHANGE UP (ref 8.5–10.1)
CHLORIDE SERPL-SCNC: 112 MMOL/L — HIGH (ref 96–108)
CO2 SERPL-SCNC: 26 MMOL/L — SIGNIFICANT CHANGE UP (ref 22–31)
CREAT SERPL-MCNC: 0.94 MG/DL — SIGNIFICANT CHANGE UP (ref 0.5–1.3)
EGFR: 76 ML/MIN/1.73M2 — SIGNIFICANT CHANGE UP
GLUCOSE SERPL-MCNC: 88 MG/DL — SIGNIFICANT CHANGE UP (ref 70–99)
HCT VFR BLD CALC: 34.8 % — SIGNIFICANT CHANGE UP (ref 34.5–45)
HGB BLD-MCNC: 11.6 G/DL — SIGNIFICANT CHANGE UP (ref 11.5–15.5)
MCHC RBC-ENTMCNC: 30.2 PG — SIGNIFICANT CHANGE UP (ref 27–34)
MCHC RBC-ENTMCNC: 33.3 GM/DL — SIGNIFICANT CHANGE UP (ref 32–36)
MCV RBC AUTO: 90.6 FL — SIGNIFICANT CHANGE UP (ref 80–100)
NRBC # BLD: 0 /100 WBCS — SIGNIFICANT CHANGE UP (ref 0–0)
PLATELET # BLD AUTO: 165 K/UL — SIGNIFICANT CHANGE UP (ref 150–400)
POTASSIUM SERPL-MCNC: 3.9 MMOL/L — SIGNIFICANT CHANGE UP (ref 3.5–5.3)
POTASSIUM SERPL-SCNC: 3.9 MMOL/L — SIGNIFICANT CHANGE UP (ref 3.5–5.3)
PROT SERPL-MCNC: 6.4 G/DL — SIGNIFICANT CHANGE UP (ref 6–8.3)
RBC # BLD: 3.84 M/UL — SIGNIFICANT CHANGE UP (ref 3.8–5.2)
RBC # FLD: 15.4 % — HIGH (ref 10.3–14.5)
SODIUM SERPL-SCNC: 143 MMOL/L — SIGNIFICANT CHANGE UP (ref 135–145)
WBC # BLD: 4.09 K/UL — SIGNIFICANT CHANGE UP (ref 3.8–10.5)
WBC # FLD AUTO: 4.09 K/UL — SIGNIFICANT CHANGE UP (ref 3.8–10.5)

## 2024-05-06 PROCEDURE — 99233 SBSQ HOSP IP/OBS HIGH 50: CPT

## 2024-05-06 RX ADMIN — MIDODRINE HYDROCHLORIDE 10 MILLIGRAM(S): 2.5 TABLET ORAL at 05:16

## 2024-05-06 RX ADMIN — PANTOPRAZOLE SODIUM 40 MILLIGRAM(S): 20 TABLET, DELAYED RELEASE ORAL at 05:16

## 2024-05-06 RX ADMIN — Medication 112 MICROGRAM(S): at 05:16

## 2024-05-06 RX ADMIN — BUDESONIDE AND FORMOTEROL FUMARATE DIHYDRATE 2 PUFF(S): 160; 4.5 AEROSOL RESPIRATORY (INHALATION) at 20:06

## 2024-05-06 RX ADMIN — MIDODRINE HYDROCHLORIDE 10 MILLIGRAM(S): 2.5 TABLET ORAL at 17:30

## 2024-05-06 RX ADMIN — BUDESONIDE AND FORMOTEROL FUMARATE DIHYDRATE 2 PUFF(S): 160; 4.5 AEROSOL RESPIRATORY (INHALATION) at 05:17

## 2024-05-06 RX ADMIN — ENOXAPARIN SODIUM 40 MILLIGRAM(S): 100 INJECTION SUBCUTANEOUS at 00:45

## 2024-05-06 RX ADMIN — Medication 1 DROP(S): at 17:31

## 2024-05-06 RX ADMIN — Medication 1000 UNIT(S): at 11:15

## 2024-05-06 RX ADMIN — Medication 1 TABLET(S): at 11:14

## 2024-05-06 RX ADMIN — Medication 2 SPRAY(S): at 08:57

## 2024-05-06 RX ADMIN — Medication 20 MILLIGRAM(S): at 11:15

## 2024-05-06 RX ADMIN — MIDODRINE HYDROCHLORIDE 10 MILLIGRAM(S): 2.5 TABLET ORAL at 11:15

## 2024-05-06 RX ADMIN — SIMETHICONE 80 MILLIGRAM(S): 80 TABLET, CHEWABLE ORAL at 17:30

## 2024-05-06 RX ADMIN — Medication 1 APPLICATION(S): at 08:58

## 2024-05-06 RX ADMIN — Medication 1 DROP(S): at 05:23

## 2024-05-06 RX ADMIN — SIMETHICONE 80 MILLIGRAM(S): 80 TABLET, CHEWABLE ORAL at 05:16

## 2024-05-06 RX ADMIN — Medication 81 MILLIGRAM(S): at 11:15

## 2024-05-06 NOTE — PROGRESS NOTE ADULT - SUBJECTIVE AND OBJECTIVE BOX
[facility] -- Mariia Carlson Pannella, Patel, Savella Goodger, Cohen  Office # [phone]      Follow Up:    Syncope     Subjective/Observations:   Seen bedside, in no acute distress on room air offers no complaints   Unable to provide much meaningful information    REVIEW OF SYSTEMS: All other review of systems is negative unless indicated above    PAST MEDICAL & SURGICAL HISTORY:  Down syndrome      Hypothyroidism      CVA (cerebrovascular accident)      Osteoporosis      Osteoarthritis      Anxiety      Seasonal allergies      Type 2 diabetes mellitus      GERD (gastroesophageal reflux disease)      H/O aortic valve stenosis      S/P aortic valve replacement          MEDICATIONS  (STANDING):  ammonium lactate 12% Lotion 1 Application(s) Topical <User Schedule>  artificial  tears Solution 1 Drop(s) Both EYES two times a day  aspirin  chewable 81 milliGRAM(s) Oral daily  budesonide  80 MICROgram(s)/formoterol 4.5 MICROgram(s) Inhaler 2 Puff(s) Inhalation two times a day  cholecalciferol 1000 Unit(s) Oral daily  dextrose 10% Bolus 125 milliLiter(s) IV Bolus once  dextrose 5%. 1000 milliLiter(s) (100 mL/Hr) IV Continuous <Continuous>  dextrose 5%. 1000 milliLiter(s) (50 mL/Hr) IV Continuous <Continuous>  dextrose 50% Injectable 12.5 Gram(s) IV Push once  dextrose 50% Injectable 25 Gram(s) IV Push once  enoxaparin Injectable 40 milliGRAM(s) SubCutaneous every 24 hours  FLUoxetine 20 milliGRAM(s) Oral daily  fluticasone propionate 50 MICROgram(s)/spray Nasal Spray 2 Spray(s) Both Nostrils <User Schedule>  glucagon  Injectable 1 milliGRAM(s) IntraMuscular once  insulin lispro (ADMELOG) corrective regimen sliding scale   SubCutaneous three times a day before meals  insulin lispro (ADMELOG) corrective regimen sliding scale   SubCutaneous at bedtime  levothyroxine 112 MICROGram(s) Oral daily  midodrine. 10 milliGRAM(s) Oral three times a day  multivitamin 1 Tablet(s) Oral daily  pantoprazole    Tablet 40 milliGRAM(s) Oral before breakfast  simethicone 80 milliGRAM(s) Chew two times a day    MEDICATIONS  (PRN):  acetaminophen     Tablet .. 650 milliGRAM(s) Oral every 6 hours PRN Temp greater or equal to 38C (100.4F), Mild Pain (1 - 3)  albuterol    90 MICROgram(s) HFA Inhaler 2 Puff(s) Inhalation every 6 hours PRN for bronchospasm  bisacodyl Suppository 10 milliGRAM(s) Rectal daily PRN Constipation  dextrose Oral Gel 15 Gram(s) Oral once PRN Blood Glucose LESS THAN 70 milliGRAM(s)/deciliter  magnesium hydroxide Suspension 5 milliLiter(s) Oral daily PRN Constipation  ondansetron    Tablet 4 milliGRAM(s) Oral three times a day PRN for nausea  saline laxative (FLEET) Rectal Enema 1 Enema Rectal daily PRN for constipation  sodium chloride 0.65% Nasal 2 Spray(s) Both Nostrils two times a day PRN Nasal Congestion      Allergies    penicillin (Hives)    Intolerances        Vital Signs Last 24 Hrs  T(C): 36.6 (06 May 2024 05:34), Max: 36.8 (05 May 2024 11:14)  T(F): 97.9 (06 May 2024 05:34), Max: 98.2 (05 May 2024 11:14)  HR: 65 (06 May 2024 05:34) (63 - 75)  BP: 100/61 (06 May 2024 05:34) (95/53 - 113/72)  BP(mean): --  RR: 18 (06 May 2024 05:34) (17 - 18)  SpO2: 93% (06 May 2024 05:34) (93% - 98%)    Parameters below as of 06 May 2024 05:34  Patient On (Oxygen Delivery Method): room air        I&O's Summary        PHYSICAL EXAM:  TELE: SR   Constitutional: NAD, awake and alert, well-developed  HEENT: Moist Mucous Membranes, Anicteric  Pulmonary: Non-labored, breath sounds are clear bilaterally, No wheezing, crackles or rhonchi  Cardiovascular: Regular, S1 and S2 nl , SARKIS   Gastrointestinal: Bowel Sounds present, soft, nontender.   Lymph: No lymphadenopathy. No peripheral edema.  Skin: No visible rashes or ulcers.  Psych:  Mood & affect appropriate    LABS: All Labs Reviewed:                        11.6   4.09  )-----------( 165      ( 06 May 2024 08:15 )             34.8                         12.1   3.99  )-----------( 156      ( 05 May 2024 05:25 )             35.7                         12.9   4.76  )-----------( 194      ( 04 May 2024 19:41 )             38.1     06 May 2024 08:15    143    |  112    |  13     ----------------------------<  88     3.9     |  26     |  0.94   05 May 2024 05:25    143    |  111    |  15     ----------------------------<  83     4.3     |  27     |  0.87   04 May 2024 19:41    142    |  109    |  17     ----------------------------<  92     4.4     |  28     |  0.91     Ca    8.9        06 May 2024 08:15  Ca    9.0        05 May 2024 05:25  Ca    9.3        04 May 2024 19:41  Mg     2.3       04 May 2024 19:41    TPro  6.4    /  Alb  3.0    /  TBili  0.7    /  DBili  x      /  AST  34     /  ALT  24     /  AlkPhos  88     06 May 2024 08:15  TPro  6.5    /  Alb  2.8    /  TBili  0.6    /  DBili  x      /  AST  41     /  ALT  25     /  AlkPhos  89     05 May 2024 05:25  TPro  7.2    /  Alb  3.2    /  TBili  0.6    /  DBili  x      /  AST  45     /  ALT  27     /  AlkPhos  97     04 May 2024 19:41             EC Lead ECG:   Ventricular Rate 55 BPM    Atrial Rate 55 BPM    P-R Interval 164 ms    QRS Duration 82 ms    Q-T Interval 510 ms    QTC Calculation(Bazett) 487 ms    P Axis -4 degrees    R Axis 32 degrees    T Axis 77 degrees    Diagnosis Line Sinus bradycardia  Possible Lateral infarct , age undetermined  Inferior infarct , age undetermined  Prolonged QT  Abnormal ECG  No previous ECGs available  Confirmed by MO PEMBERTON ([id number]) on 2024 12:01:06 PM (24 @ 18:44)             [facility] -- Mariia Carlson Pannella, Patel, Savella Goodger, Cohen  Office # [phone]      Follow Up:    Syncope     Subjective/Observations:   Seen bedside, in no acute distress on room air offers no complaints   Unable to provide much meaningful information    REVIEW OF SYSTEMS: All other review of systems is negative unless indicated above    PAST MEDICAL & SURGICAL HISTORY:  Down syndrome      Hypothyroidism      CVA (cerebrovascular accident)      Osteoporosis      Osteoarthritis      Anxiety      Seasonal allergies      Type 2 diabetes mellitus      GERD (gastroesophageal reflux disease)      H/O aortic valve stenosis      S/P aortic valve replacement          MEDICATIONS  (STANDING):  ammonium lactate 12% Lotion 1 Application(s) Topical <User Schedule>  artificial  tears Solution 1 Drop(s) Both EYES two times a day  aspirin  chewable 81 milliGRAM(s) Oral daily  budesonide  80 MICROgram(s)/formoterol 4.5 MICROgram(s) Inhaler 2 Puff(s) Inhalation two times a day  cholecalciferol 1000 Unit(s) Oral daily  dextrose 10% Bolus 125 milliLiter(s) IV Bolus once  dextrose 5%. 1000 milliLiter(s) (100 mL/Hr) IV Continuous <Continuous>  dextrose 5%. 1000 milliLiter(s) (50 mL/Hr) IV Continuous <Continuous>  dextrose 50% Injectable 12.5 Gram(s) IV Push once  dextrose 50% Injectable 25 Gram(s) IV Push once  enoxaparin Injectable 40 milliGRAM(s) SubCutaneous every 24 hours  FLUoxetine 20 milliGRAM(s) Oral daily  fluticasone propionate 50 MICROgram(s)/spray Nasal Spray 2 Spray(s) Both Nostrils <User Schedule>  glucagon  Injectable 1 milliGRAM(s) IntraMuscular once  insulin lispro (ADMELOG) corrective regimen sliding scale   SubCutaneous three times a day before meals  insulin lispro (ADMELOG) corrective regimen sliding scale   SubCutaneous at bedtime  levothyroxine 112 MICROGram(s) Oral daily  midodrine. 10 milliGRAM(s) Oral three times a day  multivitamin 1 Tablet(s) Oral daily  pantoprazole    Tablet 40 milliGRAM(s) Oral before breakfast  simethicone 80 milliGRAM(s) Chew two times a day    MEDICATIONS  (PRN):  acetaminophen     Tablet .. 650 milliGRAM(s) Oral every 6 hours PRN Temp greater or equal to 38C (100.4F), Mild Pain (1 - 3)  albuterol    90 MICROgram(s) HFA Inhaler 2 Puff(s) Inhalation every 6 hours PRN for bronchospasm  bisacodyl Suppository 10 milliGRAM(s) Rectal daily PRN Constipation  dextrose Oral Gel 15 Gram(s) Oral once PRN Blood Glucose LESS THAN 70 milliGRAM(s)/deciliter  magnesium hydroxide Suspension 5 milliLiter(s) Oral daily PRN Constipation  ondansetron    Tablet 4 milliGRAM(s) Oral three times a day PRN for nausea  saline laxative (FLEET) Rectal Enema 1 Enema Rectal daily PRN for constipation  sodium chloride 0.65% Nasal 2 Spray(s) Both Nostrils two times a day PRN Nasal Congestion      Allergies    penicillin (Hives)    Intolerances        Vital Signs Last 24 Hrs  T(C): 36.6 (06 May 2024 05:34), Max: 36.8 (05 May 2024 11:14)  T(F): 97.9 (06 May 2024 05:34), Max: 98.2 (05 May 2024 11:14)  HR: 65 (06 May 2024 05:34) (63 - 75)  BP: 100/61 (06 May 2024 05:34) (95/53 - 113/72)  BP(mean): --  RR: 18 (06 May 2024 05:34) (17 - 18)  SpO2: 93% (06 May 2024 05:34) (93% - 98%)    Parameters below as of 06 May 2024 05:34  Patient On (Oxygen Delivery Method): room air        I&O's Summary        PHYSICAL EXAM:  TELE: SR   Constitutional: NAD, awake and alert, well-developed  HEENT: Moist Mucous Membranes, Anicteric  Pulmonary: Non-labored, breath sounds are clear bilaterally, No wheezing, crackles or rhonchi  Cardiovascular: Regular, S1 and S2 nl , SARKIS   Gastrointestinal: Bowel Sounds present, soft, nontender.   Lymph: No lymphadenopathy. No peripheral edema.  Skin: No visible rashes or ulcers.  Psych:  Mood & affect appropriate    LABS: All Labs Reviewed:                        11.6   4.09  )-----------( 165      ( 06 May 2024 08:15 )             34.8                         12.1   3.99  )-----------( 156      ( 05 May 2024 05:25 )             35.7                         12.9   4.76  )-----------( 194      ( 04 May 2024 19:41 )             38.1     06 May 2024 08:15    143    |  112    |  13     ----------------------------<  88     3.9     |  26     |  0.94   05 May 2024 05:25    143    |  111    |  15     ----------------------------<  83     4.3     |  27     |  0.87   04 May 2024 19:41    142    |  109    |  17     ----------------------------<  92     4.4     |  28     |  0.91     Ca    8.9        06 May 2024 08:15  Ca    9.0        05 May 2024 05:25  Ca    9.3        04 May 2024 19:41  Mg     2.3       04 May 2024 19:41    TPro  6.4    /  Alb  3.0    /  TBili  0.7    /  DBili  x      /  AST  34     /  ALT  24     /  AlkPhos  88     06 May 2024 08:15  TPro  6.5    /  Alb  2.8    /  TBili  0.6    /  DBili  x      /  AST  41     /  ALT  25     /  AlkPhos  89     05 May 2024 05:25  TPro  7.2    /  Alb  3.2    /  TBili  0.6    /  DBili  x      /  AST  45     /  ALT  27     /  AlkPhos  97     04 May 2024 19:41             EC Lead ECG:   Ventricular Rate 55 BPM    Atrial Rate 55 BPM    P-R Interval 164 ms    QRS Duration 82 ms    Q-T Interval 510 ms    QTC Calculation(Bazett) 487 ms    P Axis -4 degrees    R Axis 32 degrees    T Axis 77 degrees    Diagnosis Line Sinus bradycardia  Possible Lateral infarct , age undetermined  Inferior infarct , age undetermined  Prolonged QT  Abnormal ECG  No previous ECGs available  Confirmed by MO PEMBERTON ([id number]) on 2024 12:01:06 PM (24 @ 18:44)

## 2024-05-06 NOTE — PROGRESS NOTE ADULT - ASSESSMENT
44 year old woman with hx of down syndrome, hx of CVA, TRINITY, cirrhosis, HLD, osteoporosis, osteoarthritis, hypothyroidism, asthma, GERD, T2DM, constipation, aortic stenosis s/p bovine aortic valve replacement presenting with reported syncopal episode.     Presenting with reported syncope   - she is unable to provide information about the events surrounding syncope. Noted to have shaking by staff.   - fu neuro evaluation   - head ct was unremakable    - EKG w SB with prolonged QTC.   - avoid QTc prolonging medications.   - check ekg daily  - Monitor and replete electrolytes. Keep K>4.0 and Mg>2.0.   - Tele Sr no events overnight   - has a hx of orthostasis and is on midodrine. Would check orthostatics. cont with home midodrine    - known bovine AVR  - cont asa  Bebeto on exam can check baseline echo     - Appears compensated from HF POV.   - no signs of ischemia    - Further cardiac workup will depend on clinical course.   - All other workup per primary team. Will followup.    44 year old woman with hx of down syndrome, hx of CVA, TRINITY, cirrhosis, HLD, osteoporosis, osteoarthritis, hypothyroidism, asthma, GERD, T2DM, constipation, aortic stenosis s/p bovine aortic valve replacement presenting with reported syncopal episode.     Presenting with reported syncope   - she is unable to provide information about the events surrounding syncope. Noted to have shaking by staff.   - fu neuro evaluation   - head ct was unremarkable    - EKG w SB with prolonged QTC.   - avoid QTc prolonging medications.   - check ekg daily  - Monitor and replete electrolytes. Keep K>4.0 and Mg>2.0.   - Tele Sr no events overnight   - has a hx of orthostasis and is on midodrine. Would check orthostatics. cont with home midodrine    - known bovine AVR  - cont asa  Bebteo on exam can check baseline echo     - Appears compensated from HF POV.   - no signs of ischemia    - Further cardiac workup will depend on clinical course.   - All other workup per primary team. Will followup.

## 2024-05-06 NOTE — CARE COORDINATION ASSESSMENT. - NSPASTMEDSURGHISTORY_GEN_ALL_CORE_FT
PAST MEDICAL & SURGICAL HISTORY:  H/O aortic valve stenosis      GERD (gastroesophageal reflux disease)      Type 2 diabetes mellitus      Seasonal allergies      Anxiety      Osteoarthritis      Osteoporosis      CVA (cerebrovascular accident)      Hypothyroidism      Down syndrome      S/P aortic valve replacement

## 2024-05-06 NOTE — PROGRESS NOTE ADULT - SUBJECTIVE AND OBJECTIVE BOX
neuro cons dict  seen for dizziness/loc/shaking of head  likely syncope than seizure  continue to monitor for cardiac arrhythmia  ct head- no acute cva

## 2024-05-06 NOTE — CARE COORDINATION ASSESSMENT. - OTHER PERTINENT DISCHARGE PLANNING INFORMATION:
pt is a 44 year old female, alert and oriented, h/o down syndrome admitted from [facility] rehab. sw met with pt, spoke with father Reji. plan for pt to return to [facility] when stable.

## 2024-05-06 NOTE — CARE COORDINATION ASSESSMENT. - NSCAREPROVIDERS_GEN_ALL_CORE_FT
CARE PROVIDERS:  Accepting Physician: Ewelina Gaines  Administration: Luis Pedersen  Administration: Vinny Polanco  Admitting: Ewelina Gaines  Attending: Uriel Campos  Cardiology Technician: Rebecca Diane  Clinical Doc. Improvement: Uriel Campos  Consultant: Kofi Monique  Consultant: Weil, Patricia  Consultant: Kerry Regalado  Consultant: Stephanie Santos  ED ACP: Maria Del Carmen Brunson  ED Attending: Daniel Negrete  ED Nurse: Dara Salinas  Nurse: Katy Almol  Ordered: Doctor, Unknown  Ordered: ServiceAccount, SCMMLM  Override: Melody Kitchen  Override: Jammu, Cheng  Override: Ulises Brower  PCA/Nursing Assistant: Stacey Echevarria  PCA/Nursing Assistant: Cyndi Newell  Primary Team: Ewelina Gaines  Primary Team: Agapito Lopez  Registered Dietitian: Shell Chua  : Yokasta Seth  Team: [facility], Team

## 2024-05-06 NOTE — PROGRESS NOTE ADULT - SUBJECTIVE AND OBJECTIVE BOX
Patient is a 44y old  Female who presents with a chief complaint of syncope (05 May 2024 11:56)      INTERVAL HPI/OVERNIGHT EVENTS: Patient seen and examined at bedside. No overnight events. Tolerating diet. Denies fever, chills, chest pain, shortness of breath, abdominal pain, nausea/vomiting, headache.    MEDICATIONS  (STANDING):  ammonium lactate 12% Lotion 1 Application(s) Topical <User Schedule>  artificial  tears Solution 1 Drop(s) Both EYES two times a day  aspirin  chewable 81 milliGRAM(s) Oral daily  budesonide  80 MICROgram(s)/formoterol 4.5 MICROgram(s) Inhaler 2 Puff(s) Inhalation two times a day  cholecalciferol 1000 Unit(s) Oral daily  dextrose 10% Bolus 125 milliLiter(s) IV Bolus once  dextrose 5%. 1000 milliLiter(s) (100 mL/Hr) IV Continuous <Continuous>  dextrose 5%. 1000 milliLiter(s) (50 mL/Hr) IV Continuous <Continuous>  dextrose 50% Injectable 12.5 Gram(s) IV Push once  dextrose 50% Injectable 25 Gram(s) IV Push once  enoxaparin Injectable 40 milliGRAM(s) SubCutaneous every 24 hours  FLUoxetine 20 milliGRAM(s) Oral daily  fluticasone propionate 50 MICROgram(s)/spray Nasal Spray 2 Spray(s) Both Nostrils <User Schedule>  glucagon  Injectable 1 milliGRAM(s) IntraMuscular once  insulin lispro (ADMELOG) corrective regimen sliding scale   SubCutaneous three times a day before meals  insulin lispro (ADMELOG) corrective regimen sliding scale   SubCutaneous at bedtime  levothyroxine 112 MICROGram(s) Oral daily  midodrine. 10 milliGRAM(s) Oral three times a day  multivitamin 1 Tablet(s) Oral daily  pantoprazole    Tablet 40 milliGRAM(s) Oral before breakfast  simethicone 80 milliGRAM(s) Chew two times a day    MEDICATIONS  (PRN):  acetaminophen     Tablet .. 650 milliGRAM(s) Oral every 6 hours PRN Temp greater or equal to 38C (100.4F), Mild Pain (1 - 3)  albuterol    90 MICROgram(s) HFA Inhaler 2 Puff(s) Inhalation every 6 hours PRN for bronchospasm  bisacodyl Suppository 10 milliGRAM(s) Rectal daily PRN Constipation  dextrose Oral Gel 15 Gram(s) Oral once PRN Blood Glucose LESS THAN 70 milliGRAM(s)/deciliter  magnesium hydroxide Suspension 5 milliLiter(s) Oral daily PRN Constipation  ondansetron    Tablet 4 milliGRAM(s) Oral three times a day PRN for nausea  saline laxative (FLEET) Rectal Enema 1 Enema Rectal daily PRN for constipation  sodium chloride 0.65% Nasal 2 Spray(s) Both Nostrils two times a day PRN Nasal Congestion      Allergies    penicillin (Hives)    Intolerances        REVIEW OF SYSTEMS:  CONSTITUTIONAL: No fever or chills  HEENT:  No headache, no sore throat  RESPIRATORY: No cough, wheezing, or shortness of breath  CARDIOVASCULAR: No chest pain, palpitations  GASTROINTESTINAL: No abd pain, nausea, vomiting, or diarrhea  GENITOURINARY: No dysuria, frequency, or hematuria  NEUROLOGICAL: no focal weakness or dizziness  MUSCULOSKELETAL: no myalgias     Vital Signs Last 24 Hrs  T(C): 36.4 (06 May 2024 11:20), Max: 36.6 (05 May 2024 20:03)  T(F): 97.6 (06 May 2024 11:20), Max: 97.9 (06 May 2024 05:34)  HR: 69 (06 May 2024 11:20) (63 - 69)  BP: 99/61 (06 May 2024 11:20) (95/53 - 113/72)  BP(mean): --  RR: 17 (06 May 2024 11:20) (17 - 18)  SpO2: 94% (06 May 2024 11:20) (93% - 98%)    Parameters below as of 06 May 2024 11:20  Patient On (Oxygen Delivery Method): room air        PHYSICAL EXAM:  GENERAL: NAD  HEENT:  anicteric, moist mucous membranes  CHEST/LUNG:  CTA b/l, no rales, wheezes, or rhonchi  HEART:  RRR, S1, S2  ABDOMEN:  BS+, soft, nontender, nondistended  EXTREMITIES: no edema, cyanosis, or calf tenderness  NERVOUS SYSTEM: answers questions and follows commands appropriately    LABS:                        11.6   4.09  )-----------( 165      ( 06 May 2024 08:15 )             34.8     06 May 2024 08:15    143    |  112    |  13     ----------------------------<  88     3.9     |  26     |  0.94     Ca    8.9        06 May 2024 08:15    TPro  6.4    /  Alb  3.0    /  TBili  0.7    /  DBili  x      /  AST  34     /  ALT  24     /  AlkPhos  88     06 May 2024 08:15      Urinalysis Basic - ( 06 May 2024 08:15 )    Color: x / Appearance: x / SG: x / pH: x  Gluc: 88 mg/dL / Ketone: x  / Bili: x / Urobili: x   Blood: x / Protein: x / Nitrite: x   Leuk Esterase: x / RBC: x / WBC x   Sq Epi: x / Non Sq Epi: x / Bacteria: x      CAPILLARY BLOOD GLUCOSE      POCT Blood Glucose.: 115 mg/dL (06 May 2024 11:53)  POCT Blood Glucose.: 95 mg/dL (06 May 2024 07:35)  POCT Blood Glucose.: 90 mg/dL (05 May 2024 21:27)  POCT Blood Glucose.: 94 mg/dL (05 May 2024 16:43)    UCx       RADIOLOGY & ADDITIONAL TESTS:          Urinalysis Basic - ( 05 May 2024 05:25 )    Color: x / Appearance: x / SG: x / pH: x  Gluc: 83 mg/dL / Ketone: x  / Bili: x / Urobili: x   Blood: x / Protein: x / Nitrite: x   Leuk Esterase: x / RBC: x / WBC x   Sq Epi: x / Non Sq Epi: x / Bacteria: x      CAPILLARY BLOOD GLUCOSE      POCT Blood Glucose.: 94 mg/dL (05 May 2024 16:43)  POCT Blood Glucose.: 104 mg/dL (05 May 2024 11:26)  POCT Blood Glucose.: 87 mg/dL (05 May 2024 07:32)          RADIOLOGY & ADDITIONAL TESTS:    Personally reviewed.     Consultant(s) Notes Reviewed:  [x] YES  [ ] NO

## 2024-05-06 NOTE — PATIENT CHOICE NOTE. - NSPTCHOICESTATE_GEN_ALL_CORE

## 2024-05-07 LAB
ANION GAP SERPL CALC-SCNC: 6 MMOL/L — SIGNIFICANT CHANGE UP (ref 5–17)
BUN SERPL-MCNC: 11 MG/DL — SIGNIFICANT CHANGE UP (ref 7–23)
CALCIUM SERPL-MCNC: 9.1 MG/DL — SIGNIFICANT CHANGE UP (ref 8.5–10.1)
CHLORIDE SERPL-SCNC: 113 MMOL/L — HIGH (ref 96–108)
CO2 SERPL-SCNC: 28 MMOL/L — SIGNIFICANT CHANGE UP (ref 22–31)
CREAT SERPL-MCNC: 0.86 MG/DL — SIGNIFICANT CHANGE UP (ref 0.5–1.3)
EGFR: 84 ML/MIN/1.73M2 — SIGNIFICANT CHANGE UP
GLUCOSE SERPL-MCNC: 88 MG/DL — SIGNIFICANT CHANGE UP (ref 70–99)
HCT VFR BLD CALC: 33.5 % — LOW (ref 34.5–45)
HGB BLD-MCNC: 11.7 G/DL — SIGNIFICANT CHANGE UP (ref 11.5–15.5)
MCHC RBC-ENTMCNC: 32 PG — SIGNIFICANT CHANGE UP (ref 27–34)
MCHC RBC-ENTMCNC: 34.9 GM/DL — SIGNIFICANT CHANGE UP (ref 32–36)
MCV RBC AUTO: 91.5 FL — SIGNIFICANT CHANGE UP (ref 80–100)
NRBC # BLD: 0 /100 WBCS — SIGNIFICANT CHANGE UP (ref 0–0)
PLATELET # BLD AUTO: 152 K/UL — SIGNIFICANT CHANGE UP (ref 150–400)
POTASSIUM SERPL-MCNC: 4 MMOL/L — SIGNIFICANT CHANGE UP (ref 3.5–5.3)
POTASSIUM SERPL-SCNC: 4 MMOL/L — SIGNIFICANT CHANGE UP (ref 3.5–5.3)
RBC # BLD: 3.66 M/UL — LOW (ref 3.8–5.2)
RBC # FLD: 15.4 % — HIGH (ref 10.3–14.5)
SODIUM SERPL-SCNC: 147 MMOL/L — HIGH (ref 135–145)
WBC # BLD: 4 K/UL — SIGNIFICANT CHANGE UP (ref 3.8–10.5)
WBC # FLD AUTO: 4 K/UL — SIGNIFICANT CHANGE UP (ref 3.8–10.5)

## 2024-05-07 PROCEDURE — 99232 SBSQ HOSP IP/OBS MODERATE 35: CPT

## 2024-05-07 PROCEDURE — 99233 SBSQ HOSP IP/OBS HIGH 50: CPT

## 2024-05-07 RX ORDER — LIDOCAINE 4 G/100G
1 CREAM TOPICAL ONCE
Refills: 0 | Status: COMPLETED | OUTPATIENT
Start: 2024-05-07 | End: 2024-05-07

## 2024-05-07 RX ORDER — BACITRACIN ZINC 500 UNIT/G
1 OINTMENT IN PACKET (EA) TOPICAL ONCE
Refills: 0 | Status: COMPLETED | OUTPATIENT
Start: 2024-05-07 | End: 2024-05-07

## 2024-05-07 RX ADMIN — Medication 1 DROP(S): at 05:20

## 2024-05-07 RX ADMIN — MIDODRINE HYDROCHLORIDE 10 MILLIGRAM(S): 2.5 TABLET ORAL at 18:21

## 2024-05-07 RX ADMIN — MIDODRINE HYDROCHLORIDE 10 MILLIGRAM(S): 2.5 TABLET ORAL at 13:02

## 2024-05-07 RX ADMIN — SIMETHICONE 80 MILLIGRAM(S): 80 TABLET, CHEWABLE ORAL at 18:21

## 2024-05-07 RX ADMIN — Medication 1: at 17:11

## 2024-05-07 RX ADMIN — Medication 650 MILLIGRAM(S): at 00:27

## 2024-05-07 RX ADMIN — Medication 20 MILLIGRAM(S): at 13:02

## 2024-05-07 RX ADMIN — Medication 112 MICROGRAM(S): at 05:18

## 2024-05-07 RX ADMIN — PANTOPRAZOLE SODIUM 40 MILLIGRAM(S): 20 TABLET, DELAYED RELEASE ORAL at 05:17

## 2024-05-07 RX ADMIN — ENOXAPARIN SODIUM 40 MILLIGRAM(S): 100 INJECTION SUBCUTANEOUS at 00:34

## 2024-05-07 RX ADMIN — MIDODRINE HYDROCHLORIDE 10 MILLIGRAM(S): 2.5 TABLET ORAL at 05:19

## 2024-05-07 RX ADMIN — BUDESONIDE AND FORMOTEROL FUMARATE DIHYDRATE 2 PUFF(S): 160; 4.5 AEROSOL RESPIRATORY (INHALATION) at 08:18

## 2024-05-07 RX ADMIN — BUDESONIDE AND FORMOTEROL FUMARATE DIHYDRATE 2 PUFF(S): 160; 4.5 AEROSOL RESPIRATORY (INHALATION) at 19:43

## 2024-05-07 RX ADMIN — Medication 1 APPLICATION(S): at 10:23

## 2024-05-07 RX ADMIN — Medication 650 MILLIGRAM(S): at 00:23

## 2024-05-07 RX ADMIN — Medication 2 SPRAY(S): at 09:56

## 2024-05-07 RX ADMIN — Medication 1 TABLET(S): at 13:02

## 2024-05-07 RX ADMIN — Medication 1000 UNIT(S): at 13:02

## 2024-05-07 RX ADMIN — Medication 81 MILLIGRAM(S): at 13:02

## 2024-05-07 RX ADMIN — ALBUTEROL 2 PUFF(S): 90 AEROSOL, METERED ORAL at 16:31

## 2024-05-07 RX ADMIN — SIMETHICONE 80 MILLIGRAM(S): 80 TABLET, CHEWABLE ORAL at 05:18

## 2024-05-07 RX ADMIN — Medication 1 DROP(S): at 18:23

## 2024-05-07 NOTE — SOCIAL WORK PROGRESS NOTE - NSSWPROGRESSNOTE_GEN_ALL_CORE
CRISTINA faxed to [facility], pt medically accepted and can return when stable.  currently awaiting PT eval, to be faxed to [facility] once complete. sw to follow for transition to [facility] when stable.

## 2024-05-07 NOTE — PROGRESS NOTE ADULT - SUBJECTIVE AND OBJECTIVE BOX
Follow Up:    Syncope     Subjective/Observations:   Seen bedside, in no acute distress on room air offers no complaints   Unable to provide much meaningful information    REVIEW OF SYSTEMS: All other review of systems is negative unless indicated above    PAST MEDICAL & SURGICAL HISTORY:  Down syndrome      Hypothyroidism      CVA (cerebrovascular accident)      Osteoporosis      Osteoarthritis      Anxiety      Seasonal allergies      Type 2 diabetes mellitus      GERD (gastroesophageal reflux disease)      H/O aortic valve stenosis      S/P aortic valve replacement          MEDICATIONS  (STANDING):  ammonium lactate 12% Lotion 1 Application(s) Topical <User Schedule>  artificial  tears Solution 1 Drop(s) Both EYES two times a day  aspirin  chewable 81 milliGRAM(s) Oral daily  budesonide  80 MICROgram(s)/formoterol 4.5 MICROgram(s) Inhaler 2 Puff(s) Inhalation two times a day  cholecalciferol 1000 Unit(s) Oral daily  dextrose 10% Bolus 125 milliLiter(s) IV Bolus once  dextrose 5%. 1000 milliLiter(s) (100 mL/Hr) IV Continuous <Continuous>  dextrose 5%. 1000 milliLiter(s) (50 mL/Hr) IV Continuous <Continuous>  dextrose 50% Injectable 12.5 Gram(s) IV Push once  dextrose 50% Injectable 25 Gram(s) IV Push once  enoxaparin Injectable 40 milliGRAM(s) SubCutaneous every 24 hours  FLUoxetine 20 milliGRAM(s) Oral daily  fluticasone propionate 50 MICROgram(s)/spray Nasal Spray 2 Spray(s) Both Nostrils <User Schedule>  glucagon  Injectable 1 milliGRAM(s) IntraMuscular once  insulin lispro (ADMELOG) corrective regimen sliding scale   SubCutaneous three times a day before meals  insulin lispro (ADMELOG) corrective regimen sliding scale   SubCutaneous at bedtime  levothyroxine 112 MICROGram(s) Oral daily  midodrine. 10 milliGRAM(s) Oral three times a day  multivitamin 1 Tablet(s) Oral daily  pantoprazole    Tablet 40 milliGRAM(s) Oral before breakfast  simethicone 80 milliGRAM(s) Chew two times a day    MEDICATIONS  (PRN):  acetaminophen     Tablet .. 650 milliGRAM(s) Oral every 6 hours PRN Temp greater or equal to 38C (100.4F), Mild Pain (1 - 3)  albuterol    90 MICROgram(s) HFA Inhaler 2 Puff(s) Inhalation every 6 hours PRN for bronchospasm  bisacodyl Suppository 10 milliGRAM(s) Rectal daily PRN Constipation  dextrose Oral Gel 15 Gram(s) Oral once PRN Blood Glucose LESS THAN 70 milliGRAM(s)/deciliter  magnesium hydroxide Suspension 5 milliLiter(s) Oral daily PRN Constipation  ondansetron    Tablet 4 milliGRAM(s) Oral three times a day PRN for nausea  saline laxative (FLEET) Rectal Enema 1 Enema Rectal daily PRN for constipation  sodium chloride 0.65% Nasal 2 Spray(s) Both Nostrils two times a day PRN Nasal Congestion      Allergies    penicillin (Hives)    Intolerances        Vital Signs Last 24 Hrs  T(C): 36.6 (06 May 2024 05:34), Max: 36.8 (05 May 2024 11:14)  T(F): 97.9 (06 May 2024 05:34), Max: 98.2 (05 May 2024 11:14)  HR: 65 (06 May 2024 05:34) (63 - 75)  BP: 100/61 (06 May 2024 05:34) (95/53 - 113/72)  BP(mean): --  RR: 18 (06 May 2024 05:34) (17 - 18)  SpO2: 93% (06 May 2024 05:34) (93% - 98%)    Parameters below as of 06 May 2024 05:34  Patient On (Oxygen Delivery Method): room air        I&O's Summary        PHYSICAL EXAM:  TELE: Sb 48 - 60s  Constitutional: NAD, awake and alert, well-developed  HEENT: Moist Mucous Membranes, Anicteric  Pulmonary: Non-labored, breath sounds are clear bilaterally, No wheezing, crackles or rhonchi  Cardiovascular: Regular, S1 and S2 nl , SARKIS   Gastrointestinal: Bowel Sounds present, soft, nontender.   Lymph: No lymphadenopathy. No peripheral edema.  Skin: No visible rashes or ulcers.  Psych:  Mood & affect appropriate    LABS: All Labs Reviewed:                        11.6   4.09  )-----------( 165      ( 06 May 2024 08:15 )             34.8                         12.1   3.99  )-----------( 156      ( 05 May 2024 05:25 )             35.7                         12.9   4.76  )-----------( 194      ( 04 May 2024 19:41 )             38.1     06 May 2024 08:15    143    |  112    |  13     ----------------------------<  88     3.9     |  26     |  0.94   05 May 2024 05:25    143    |  111    |  15     ----------------------------<  83     4.3     |  27     |  0.87   04 May 2024 19:41    142    |  109    |  17     ----------------------------<  92     4.4     |  28     |  0.91     Ca    8.9        06 May 2024 08:15  Ca    9.0        05 May 2024 05:25  Ca    9.3        04 May 2024 19:41  Mg     2.3       04 May 2024 19:41    TPro  6.4    /  Alb  3.0    /  TBili  0.7    /  DBili  x      /  AST  34     /  ALT  24     /  AlkPhos  88     06 May 2024 08:15  TPro  6.5    /  Alb  2.8    /  TBili  0.6    /  DBili  x      /  AST  41     /  ALT  25     /  AlkPhos  89     05 May 2024 05:25  TPro  7.2    /  Alb  3.2    /  TBili  0.6    /  DBili  x      /  AST  45     /  ALT  27     /  AlkPhos  97     04 May 2024 19:41             EC Lead ECG:   Ventricular Rate 55 BPM    Atrial Rate 55 BPM    P-R Interval 164 ms    QRS Duration 82 ms    Q-T Interval 510 ms    QTC Calculation(Bazett) 487 ms    P Axis -4 degrees    R Axis 32 degrees    T Axis 77 degrees    Diagnosis Line Sinus bradycardia  Possible Lateral infarct , age undetermined  Inferior infarct , age undetermined  Prolonged QT  Abnormal ECG  No previous ECGs available  Confirmed by MO PEMBERTON ([id number]) on 2024 12:01:06 PM (24 @ 18:44)

## 2024-05-07 NOTE — PROGRESS NOTE ADULT - SUBJECTIVE AND OBJECTIVE BOX
Neurology Follow up note    RICHELLE [id number]    HPI:  44 year old woman with hx of down syndrome, hx of CVA, TRINITY, cirrhosis, HLD, osteoporosis, osteoarthritis, hypothyroidism, asthma, GERD, T2DM, constipation, aortic stenosis s/p bovine aortic valve replacement presenting with reported syncopal episode. Pt lives in [facility] in [place]. Pt was alone in her room when she suddenly began to feel lightheaded and dizzy, followed by arm and head shaking with eventual loss of consciousness. Pt then awoke to people in her room and she was on the floor. Pt endorses she felt back to normal after she was found in her room by others. Prior to this event, pt endorses NBNB vomiting x3 attributed to eating a donut that upset her stomach. Pt denies preceding chest pain or palpitations. Denies any drug use. Of note patient is endorsing some burning on urination that began about one week ago accompanied by lower abdominal pain. Denies SOB, fevers, chills, palpitations, back pain, diarrhea or constipation.   ED course  VS: T 97, HR 62, /70, RR 16, SpO2 96% RA  Labs: Ddimer 730, AST 45   EKG: sinus bradycardia HR 55, QTc 487  Imaging:  - CT head non con: no acute intracranial hemorrhage, vasogenic edema, extra-axial collection   or hydrocephalus. Mild disproportionate cerebellar volume loss.  - CTA Chest: no pulmonary thromboembolism  Eccentric wall thickening of distal esophagus/EG junction. Recommend clinical correlation and consider endoscopy.  In ED given: 1L NS bolus x1     (04 May 2024 23:55)      Interval History -no new events    Patient is seen, chart was reviewed and case was discussed with the treatment team.  Pt is not in any distress.   Lying on bed comfortably.       Vital Signs Last 24 Hrs  T(C): 36.5 (07 May 2024 11:17), Max: 36.7 (06 May 2024 20:28)  T(F): 97.7 (07 May 2024 11:17), Max: 98.1 (06 May 2024 20:28)  HR: 60 (07 May 2024 11:17) (59 - 60)  BP: 95/54 (07 May 2024 11:17) (95/54 - 119/64)  BP(mean): --  RR: 18 (07 May 2024 11:17) (18 - 18)  SpO2: 93% (07 May 2024 11:17) (93% - 93%)    Parameters below as of 07 May 2024 11:17  Patient On (Oxygen Delivery Method): room air            REVIEW OF SYSTEMS:    Constitutional: No fever, weight loss or fatigue  Eyes: No eye pain, visual disturbances, or discharge  ENT:  No difficulty hearing, tinnitus, vertigo; No sinus or throat pain  Neck: No pain or stiffness  Respiratory: No cough, wheezing, chills or hemoptysis  Cardiovascular: No chest pain, palpitations, shortness of breath, dizziness   Gastrointestinal: No abdominal or epigastric pain. No nausea, vomiting or hematemesis;  Genitourinary: No dysuria, frequency, hematuria or incontinence  Neurological: No headaches, , loss of strength, numbness or tremors  Psychiatric: No depression, anxiety, mood swings or difficulty sleeping  Musculoskeletal: No joint pain or swelling; No muscle, back or extremity pain  Skin: No itching, burning, rashes or lesions   Lymph Nodes: No enlarged glands  Endocrine: No heat or cold intolerance;   Allergy and Immunologic: No hives or eczema    On Neurological Examination:    Mental Status - Pt is alert, awake, oriented X3.  Follows commands well and able to answer questions appropriately.Mood and affect  normal    Speech -  Normal.     Cranial Nerves - Pupils 3 mm equal and reactive to light, extraocular eye movements intact. Pt has no visual field deficit.  Pt has no  facial asymmetry. Facial sensation is intact.Tongue - is in midline.    Muscle tone - is normal    Motor Exam - 4/5 all over, No drift. No shaking or tremors.    Sensory Exam  Pt withdraws all extremities equally on stimulation. No asymmetry seen. No complaints of tingling, numbnes    coordination:    Finger to nose: lluvia    Deep tendon Reflexes - 2 plus all over.          Neck Supple -  Yes.     MEDICATIONS    acetaminophen     Tablet .. 650 milliGRAM(s) Oral every 6 hours PRN  albuterol    90 MICROgram(s) HFA Inhaler 2 Puff(s) Inhalation every 6 hours PRN  ammonium lactate 12% Lotion 1 Application(s) Topical <User Schedule>  artificial  tears Solution 1 Drop(s) Both EYES two times a day  aspirin  chewable 81 milliGRAM(s) Oral daily  bisacodyl Suppository 10 milliGRAM(s) Rectal daily PRN  budesonide  80 MICROgram(s)/formoterol 4.5 MICROgram(s) Inhaler 2 Puff(s) Inhalation two times a day  cholecalciferol 1000 Unit(s) Oral daily  dextrose 10% Bolus 125 milliLiter(s) IV Bolus once  dextrose 5%. 1000 milliLiter(s) IV Continuous <Continuous>  dextrose 5%. 1000 milliLiter(s) IV Continuous <Continuous>  dextrose 50% Injectable 12.5 Gram(s) IV Push once  dextrose 50% Injectable 25 Gram(s) IV Push once  dextrose Oral Gel 15 Gram(s) Oral once PRN  enoxaparin Injectable 40 milliGRAM(s) SubCutaneous every 24 hours  FLUoxetine 20 milliGRAM(s) Oral daily  fluticasone propionate 50 MICROgram(s)/spray Nasal Spray 2 Spray(s) Both Nostrils <User Schedule>  glucagon  Injectable 1 milliGRAM(s) IntraMuscular once  insulin lispro (ADMELOG) corrective regimen sliding scale   SubCutaneous three times a day before meals  insulin lispro (ADMELOG) corrective regimen sliding scale   SubCutaneous at bedtime  levothyroxine 112 MICROGram(s) Oral daily  magnesium hydroxide Suspension 5 milliLiter(s) Oral daily PRN  midodrine. 10 milliGRAM(s) Oral three times a day  multivitamin 1 Tablet(s) Oral daily  ondansetron    Tablet 4 milliGRAM(s) Oral three times a day PRN  pantoprazole    Tablet 40 milliGRAM(s) Oral before breakfast  saline laxative (FLEET) Rectal Enema 1 Enema Rectal daily PRN  simethicone 80 milliGRAM(s) Chew two times a day  sodium chloride 0.65% Nasal 2 Spray(s) Both Nostrils two times a day PRN      Allergies    penicillin (Hives)    Intolerances        LABS:  CBC Full  -  ( 07 May 2024 07:25 )  WBC Count : 4.00 K/uL  RBC Count : 3.66 M/uL  Hemoglobin : 11.7 g/dL  Hematocrit : 33.5 %  Platelet Count - Automated : 152 K/uL  Mean Cell Volume : 91.5 fl  Mean Cell Hemoglobin : 32.0 pg  Mean C  Urinalysis Basic - ( 07 May 2024 07:25 )    Color: x / Appearance: x / SG: x / pH: x  Gluc: 88 mg/dL / Ketone: x  / Bili: x / Urobili: x   Blood: x / Protein: x / Nitrite: x   Leuk Esterase: x / RBC: x / WBC x   Sq Epi: x / Non Sq Epi: x / Bacteria: x      05-07    147<H>  |  113<H>  |  11  ----------------------------<  88  4.0   |  28  |  0.86    Ca    9.1      07 May 2024 07:25    TPro  6.4  /  Alb  3.0<L>  /  TBili  0.7  /  DBili  x   /  AST  34  /  ALT  24  /  AlkPhos  88  05-06    Hemoglobin A1C:     Vitamin B12     RADIOLOGY    ASSESSMENT AND PLAN:      seen for loc/shaking likely syncope  seizure less likely    awaiting EEG  check for othostasis  NO aed  Physical therapy evaluation.  Pain is accessed and addressed.  Would continue to follow.

## 2024-05-07 NOTE — PROGRESS NOTE ADULT - SUBJECTIVE AND OBJECTIVE BOX
Subjective: Patient seen and examined. Doing well with no overnight events. No chest pain or light headedness.     MEDICATIONS  (STANDING):  ammonium lactate 12% Lotion 1 Application(s) Topical <User Schedule>  artificial  tears Solution 1 Drop(s) Both EYES two times a day  aspirin  chewable 81 milliGRAM(s) Oral daily  budesonide  80 MICROgram(s)/formoterol 4.5 MICROgram(s) Inhaler 2 Puff(s) Inhalation two times a day  cholecalciferol 1000 Unit(s) Oral daily  dextrose 10% Bolus 125 milliLiter(s) IV Bolus once  dextrose 5%. 1000 milliLiter(s) (100 mL/Hr) IV Continuous <Continuous>  dextrose 5%. 1000 milliLiter(s) (50 mL/Hr) IV Continuous <Continuous>  dextrose 50% Injectable 12.5 Gram(s) IV Push once  dextrose 50% Injectable 25 Gram(s) IV Push once  enoxaparin Injectable 40 milliGRAM(s) SubCutaneous every 24 hours  FLUoxetine 20 milliGRAM(s) Oral daily  fluticasone propionate 50 MICROgram(s)/spray Nasal Spray 2 Spray(s) Both Nostrils <User Schedule>  glucagon  Injectable 1 milliGRAM(s) IntraMuscular once  insulin lispro (ADMELOG) corrective regimen sliding scale   SubCutaneous three times a day before meals  insulin lispro (ADMELOG) corrective regimen sliding scale   SubCutaneous at bedtime  levothyroxine 112 MICROGram(s) Oral daily  midodrine. 10 milliGRAM(s) Oral three times a day  multivitamin 1 Tablet(s) Oral daily  pantoprazole    Tablet 40 milliGRAM(s) Oral before breakfast  simethicone 80 milliGRAM(s) Chew two times a day    MEDICATIONS  (PRN):  acetaminophen     Tablet .. 650 milliGRAM(s) Oral every 6 hours PRN Temp greater or equal to 38C (100.4F), Mild Pain (1 - 3)  albuterol    90 MICROgram(s) HFA Inhaler 2 Puff(s) Inhalation every 6 hours PRN for bronchospasm  bisacodyl Suppository 10 milliGRAM(s) Rectal daily PRN Constipation  dextrose Oral Gel 15 Gram(s) Oral once PRN Blood Glucose LESS THAN 70 milliGRAM(s)/deciliter  magnesium hydroxide Suspension 5 milliLiter(s) Oral daily PRN Constipation  ondansetron    Tablet 4 milliGRAM(s) Oral three times a day PRN for nausea  saline laxative (FLEET) Rectal Enema 1 Enema Rectal daily PRN for constipation  sodium chloride 0.65% Nasal 2 Spray(s) Both Nostrils two times a day PRN Nasal Congestion      Allergies    penicillin (Hives)    Intolerances        Vital Signs Last 24 Hrs  T(C): 36.5 (07 May 2024 11:17), Max: 36.7 (06 May 2024 20:28)  T(F): 97.7 (07 May 2024 11:17), Max: 98.1 (06 May 2024 20:28)  HR: 60 (07 May 2024 11:17) (59 - 60)  BP: 95/54 (07 May 2024 11:17) (95/54 - 119/64)  BP(mean): --  RR: 18 (07 May 2024 11:17) (18 - 18)  SpO2: 93% (07 May 2024 11:17) (93% - 93%)    Parameters below as of 07 May 2024 11:17  Patient On (Oxygen Delivery Method): room air        PHYSICAL EXAM:  GENERAL: NAD, well-groomed, well-developed  HEAD:  Atraumatic, Normocephalic  ENMT: Moist mucous membranes,   NECK: Supple, No JVD, Normal thyroid  NERVOUS SYSTEM:  All 4 extremities mobile, no gross sensory deficits.   CHEST/LUNG: Clear to auscultation bilaterally; No rales, rhonchi, wheezing, or rubs  HEART: Regular rate and rhythm; No murmurs, rubs, or gallops  ABDOMEN: Soft, Nontender, Nondistended; Bowel sounds present  EXTREMITIES:  2+ Peripheral Pulses, No clubbing, cyanosis, or edema      LABS:                        11.7   4.00  )-----------( 152      ( 07 May 2024 07:25 )             33.5     07 May 2024 07:25    147    |  113    |  11     ----------------------------<  88     4.0     |  28     |  0.86     Ca    9.1        07 May 2024 07:25        Urinalysis Basic - ( 07 May 2024 07:25 )    Color: x / Appearance: x / SG: x / pH: x  Gluc: 88 mg/dL / Ketone: x  / Bili: x / Urobili: x   Blood: x / Protein: x / Nitrite: x   Leuk Esterase: x / RBC: x / WBC x   Sq Epi: x / Non Sq Epi: x / Bacteria: x      CAPILLARY BLOOD GLUCOSE      POCT Blood Glucose.: 103 mg/dL (07 May 2024 11:33)  POCT Blood Glucose.: 96 mg/dL (07 May 2024 07:36)  POCT Blood Glucose.: 94 mg/dL (06 May 2024 21:01)  POCT Blood Glucose.: 105 mg/dL (06 May 2024 16:40)      RADIOLOGY & ADDITIONAL TESTS:    Imaging Personally Reviewed:  [ ] YES     Consultant(s) Notes Reviewed:      Care Discussed with Consultants/Other Providers:    Advanced Directives: [ ] DNR  [ ] No feeding tube  [ ] MOLST in chart  [ ] MOLST completed today  [ ] Unknown

## 2024-05-07 NOTE — PROGRESS NOTE ADULT - ASSESSMENT
44 year old woman with hx of down syndrome, hx of CVA, TRINITY, cirrhosis, HLD, osteoporosis, osteoarthritis, hypothyroidism, asthma, GERD, T2DM, constipation, aortic stenosis s/p bovine aortic valve replacement presenting with reported syncopal episode.     Presenting with reported syncope   - she is unable to provide information about the events surrounding syncope. Noted to have shaking by staff.   - fu neuro evaluation   - head ct was unremarkable    - EKG w SB with prolonged QTC.   - avoid QTc prolonging medications.   - check ekg daily  - Monitor and replete electrolytes. Keep K>4.0 and Mg>2.0.   - Tele Sb 48-60's  - has a hx of orthostasis and is on midodrine. Would check orthostatics. cont with home midodrine    - known bovine AVR  - cont asa  Bebeto on exam can check baseline echo     - Appears compensated from HF POV.   - no signs of ischemia    - Further cardiac workup will depend on clinical course.   - All other workup per primary team. Will followup.

## 2024-05-08 ENCOUNTER — RESULT REVIEW (OUTPATIENT)
Age: 44
End: 2024-05-08

## 2024-05-08 ENCOUNTER — APPOINTMENT (OUTPATIENT)
Dept: PEDIATRIC CARDIOLOGY | Facility: CLINIC | Age: 44
End: 2024-05-08

## 2024-05-08 LAB
ALBUMIN SERPL ELPH-MCNC: 3 G/DL — LOW (ref 3.3–5)
ALP SERPL-CCNC: 90 U/L — SIGNIFICANT CHANGE UP (ref 40–120)
ALT FLD-CCNC: 25 U/L — SIGNIFICANT CHANGE UP (ref 12–78)
ANION GAP SERPL CALC-SCNC: 8 MMOL/L — SIGNIFICANT CHANGE UP (ref 5–17)
ANION GAP SERPL CALC-SCNC: 8 MMOL/L — SIGNIFICANT CHANGE UP (ref 5–17)
AST SERPL-CCNC: 31 U/L — SIGNIFICANT CHANGE UP (ref 15–37)
BILIRUB SERPL-MCNC: 0.7 MG/DL — SIGNIFICANT CHANGE UP (ref 0.2–1.2)
BUN SERPL-MCNC: 11 MG/DL — SIGNIFICANT CHANGE UP (ref 7–23)
BUN SERPL-MCNC: 12 MG/DL — SIGNIFICANT CHANGE UP (ref 7–23)
CALCIUM SERPL-MCNC: 9.4 MG/DL — SIGNIFICANT CHANGE UP (ref 8.5–10.1)
CALCIUM SERPL-MCNC: 9.7 MG/DL — SIGNIFICANT CHANGE UP (ref 8.5–10.1)
CHLORIDE SERPL-SCNC: 110 MMOL/L — HIGH (ref 96–108)
CHLORIDE SERPL-SCNC: 111 MMOL/L — HIGH (ref 96–108)
CK MB BLD-MCNC: <2.5 % — SIGNIFICANT CHANGE UP (ref 0–3.5)
CK MB CFR SERPL CALC: <1 NG/ML — SIGNIFICANT CHANGE UP (ref 0–3.6)
CK SERPL-CCNC: 40 U/L — SIGNIFICANT CHANGE UP (ref 26–192)
CO2 SERPL-SCNC: 23 MMOL/L — SIGNIFICANT CHANGE UP (ref 22–31)
CO2 SERPL-SCNC: 24 MMOL/L — SIGNIFICANT CHANGE UP (ref 22–31)
CREAT SERPL-MCNC: 0.84 MG/DL — SIGNIFICANT CHANGE UP (ref 0.5–1.3)
CREAT SERPL-MCNC: 1.2 MG/DL — SIGNIFICANT CHANGE UP (ref 0.5–1.3)
EGFR: 57 ML/MIN/1.73M2 — LOW
EGFR: 87 ML/MIN/1.73M2 — SIGNIFICANT CHANGE UP
GLUCOSE SERPL-MCNC: 107 MG/DL — HIGH (ref 70–99)
GLUCOSE SERPL-MCNC: 75 MG/DL — SIGNIFICANT CHANGE UP (ref 70–99)
HCT VFR BLD CALC: 34.8 % — SIGNIFICANT CHANGE UP (ref 34.5–45)
HCT VFR BLD CALC: 36.1 % — SIGNIFICANT CHANGE UP (ref 34.5–45)
HGB BLD-MCNC: 11.8 G/DL — SIGNIFICANT CHANGE UP (ref 11.5–15.5)
HGB BLD-MCNC: 11.8 G/DL — SIGNIFICANT CHANGE UP (ref 11.5–15.5)
MCHC RBC-ENTMCNC: 30 PG — SIGNIFICANT CHANGE UP (ref 27–34)
MCHC RBC-ENTMCNC: 30.3 PG — SIGNIFICANT CHANGE UP (ref 27–34)
MCHC RBC-ENTMCNC: 32.7 GM/DL — SIGNIFICANT CHANGE UP (ref 32–36)
MCHC RBC-ENTMCNC: 33.9 GM/DL — SIGNIFICANT CHANGE UP (ref 32–36)
MCV RBC AUTO: 89.5 FL — SIGNIFICANT CHANGE UP (ref 80–100)
MCV RBC AUTO: 91.9 FL — SIGNIFICANT CHANGE UP (ref 80–100)
NRBC # BLD: 0 /100 WBCS — SIGNIFICANT CHANGE UP (ref 0–0)
NRBC # BLD: 0 /100 WBCS — SIGNIFICANT CHANGE UP (ref 0–0)
PLATELET # BLD AUTO: 156 K/UL — SIGNIFICANT CHANGE UP (ref 150–400)
PLATELET # BLD AUTO: 186 K/UL — SIGNIFICANT CHANGE UP (ref 150–400)
POTASSIUM SERPL-MCNC: 3.9 MMOL/L — SIGNIFICANT CHANGE UP (ref 3.5–5.3)
POTASSIUM SERPL-MCNC: 4 MMOL/L — SIGNIFICANT CHANGE UP (ref 3.5–5.3)
POTASSIUM SERPL-SCNC: 3.9 MMOL/L — SIGNIFICANT CHANGE UP (ref 3.5–5.3)
POTASSIUM SERPL-SCNC: 4 MMOL/L — SIGNIFICANT CHANGE UP (ref 3.5–5.3)
PROT SERPL-MCNC: 7 G/DL — SIGNIFICANT CHANGE UP (ref 6–8.3)
RBC # BLD: 3.89 M/UL — SIGNIFICANT CHANGE UP (ref 3.8–5.2)
RBC # BLD: 3.93 M/UL — SIGNIFICANT CHANGE UP (ref 3.8–5.2)
RBC # FLD: 15.2 % — HIGH (ref 10.3–14.5)
RBC # FLD: 15.4 % — HIGH (ref 10.3–14.5)
SODIUM SERPL-SCNC: 141 MMOL/L — SIGNIFICANT CHANGE UP (ref 135–145)
SODIUM SERPL-SCNC: 143 MMOL/L — SIGNIFICANT CHANGE UP (ref 135–145)
TROPONIN I, HIGH SENSITIVITY RESULT: 14.7 NG/L — SIGNIFICANT CHANGE UP
WBC # BLD: 3.89 K/UL — SIGNIFICANT CHANGE UP (ref 3.8–10.5)
WBC # BLD: 4.66 K/UL — SIGNIFICANT CHANGE UP (ref 3.8–10.5)
WBC # FLD AUTO: 3.89 K/UL — SIGNIFICANT CHANGE UP (ref 3.8–10.5)
WBC # FLD AUTO: 4.66 K/UL — SIGNIFICANT CHANGE UP (ref 3.8–10.5)

## 2024-05-08 PROCEDURE — 93306 TTE W/DOPPLER COMPLETE: CPT | Mod: 26

## 2024-05-08 PROCEDURE — 93010 ELECTROCARDIOGRAM REPORT: CPT

## 2024-05-08 PROCEDURE — 99232 SBSQ HOSP IP/OBS MODERATE 35: CPT

## 2024-05-08 PROCEDURE — 70450 CT HEAD/BRAIN W/O DYE: CPT | Mod: 26

## 2024-05-08 PROCEDURE — 99233 SBSQ HOSP IP/OBS HIGH 50: CPT

## 2024-05-08 RX ORDER — BACITRACIN ZINC 500 UNIT/G
1 OINTMENT IN PACKET (EA) TOPICAL DAILY
Refills: 0 | Status: DISCONTINUED | OUTPATIENT
Start: 2024-05-08 | End: 2024-05-10

## 2024-05-08 RX ORDER — LIDOCAINE 4 G/100G
1 CREAM TOPICAL DAILY
Refills: 0 | Status: DISCONTINUED | OUTPATIENT
Start: 2024-05-08 | End: 2024-05-10

## 2024-05-08 RX ADMIN — LIDOCAINE 1 APPLICATION(S): 4 CREAM TOPICAL at 23:24

## 2024-05-08 RX ADMIN — SIMETHICONE 80 MILLIGRAM(S): 80 TABLET, CHEWABLE ORAL at 18:38

## 2024-05-08 RX ADMIN — Medication 1 APPLICATION(S): at 23:23

## 2024-05-08 RX ADMIN — BUDESONIDE AND FORMOTEROL FUMARATE DIHYDRATE 2 PUFF(S): 160; 4.5 AEROSOL RESPIRATORY (INHALATION) at 19:56

## 2024-05-08 RX ADMIN — Medication 1 APPLICATION(S): at 00:16

## 2024-05-08 RX ADMIN — BUDESONIDE AND FORMOTEROL FUMARATE DIHYDRATE 2 PUFF(S): 160; 4.5 AEROSOL RESPIRATORY (INHALATION) at 05:58

## 2024-05-08 RX ADMIN — Medication 1 APPLICATION(S): at 12:50

## 2024-05-08 RX ADMIN — Medication 112 MICROGRAM(S): at 05:02

## 2024-05-08 RX ADMIN — Medication 1 TABLET(S): at 12:53

## 2024-05-08 RX ADMIN — MIDODRINE HYDROCHLORIDE 10 MILLIGRAM(S): 2.5 TABLET ORAL at 12:53

## 2024-05-08 RX ADMIN — LIDOCAINE 1 APPLICATION(S): 4 CREAM TOPICAL at 00:45

## 2024-05-08 RX ADMIN — Medication 1 DROP(S): at 05:02

## 2024-05-08 RX ADMIN — Medication 0.25 MILLIGRAM(S): at 20:42

## 2024-05-08 RX ADMIN — Medication 1000 UNIT(S): at 12:53

## 2024-05-08 RX ADMIN — MIDODRINE HYDROCHLORIDE 10 MILLIGRAM(S): 2.5 TABLET ORAL at 05:02

## 2024-05-08 RX ADMIN — Medication 2 SPRAY(S): at 08:50

## 2024-05-08 RX ADMIN — MIDODRINE HYDROCHLORIDE 10 MILLIGRAM(S): 2.5 TABLET ORAL at 18:37

## 2024-05-08 RX ADMIN — SIMETHICONE 80 MILLIGRAM(S): 80 TABLET, CHEWABLE ORAL at 05:02

## 2024-05-08 RX ADMIN — PANTOPRAZOLE SODIUM 40 MILLIGRAM(S): 20 TABLET, DELAYED RELEASE ORAL at 05:02

## 2024-05-08 RX ADMIN — Medication 81 MILLIGRAM(S): at 12:53

## 2024-05-08 RX ADMIN — ENOXAPARIN SODIUM 40 MILLIGRAM(S): 100 INJECTION SUBCUTANEOUS at 00:31

## 2024-05-08 RX ADMIN — Medication 1 DROP(S): at 18:59

## 2024-05-08 RX ADMIN — Medication 20 MILLIGRAM(S): at 12:52

## 2024-05-08 NOTE — CHART NOTE - NSCHARTNOTEFT_GEN_A_CORE
Rapid response was called at 20:18 for seizure-like activity.     Events leading up to Rapid Response:  Patient was seen and examined at the bedside by the rapid response team. Dr. Albarado / ICU PA at bedside. Patient was seen to have blank-stare. Patient responsive to pain, able to hold her arm up. Aid at bedside reports similar pseudoseizure activity.     Rapid Response Vital Signs:    BP: 134/56  HR: 70   RR: 18  SpO2: 99 % on RA  Temp: none   FS: 114      Physical Exam:  Gen: well appearing, NAD  HEENT: NCAT, PEERLA b/l, EOMI b/l  Cardio: regular rate and rhythm, +s1s2, no murmurs, rubs, or gallops  Pulm: CTA b/l, no wheezes, rales or rhonchi  Abdomen: soft, nontender, nondistended, +BS x4 quadrants, no guarding  Extremities: no cyanosis or edema, +2 pedal pulses  Neuro: AAOx3, CNII-XII intact, 5/5 strength all extremities, sensation intact  Skin: warm and dry      Assessment/Plan:   44 year old woman with hx of down syndrome, hx of CVA, TRINITY, cirrhosis, HLD, osteoporosis, osteoarthritis, hypothyroidism, asthma, GERD, T2DM, constipation, aortic stenosis s/p bovine aortic valve replacement presenting with reported syncopal episode. Admitted for management syncopal workup. Rapid response called for seizure like activity.     ### absence vs. pseudoseizure 2/2 anxiety     - STAT EKG: NSR HR 64, unchanged from prior EKG   - STAT CBC, CMP   - Urgent CT Head Non Con  - STAT Ativan 0.25mg IVP x 1     -Discussed with Dr. Albarado, agrees with above plan  -HHA at bedside updated by Dr. Smith  -RN to call if any changes Rapid response was called at 20:18 for seizure-like activity.     Events leading up to Rapid Response:  Patient was seen and examined at the bedside by the rapid response team. Dr. Albarado / ICU PA at bedside. Patient was seen to have blank-stare. Patient responsive to pain, able to hold her arm up. Aid at bedside reports similar pseudoseizure activity.     Rapid Response Vital Signs:    BP: 134/56  HR: 70   RR: 18  SpO2: 99 % on RA  Temp: none   FS: 114      Physical Exam:  Gen: anxious, tearful, intermittent episodes of blank stare   HEENT: NCAT   Cardio: regular rate and rhythm, +s1s2, no murmurs, rubs, or gallops  Pulm: CTA b/l, no wheezes, rales or rhonchi  Abdomen: soft, TTP around umbilicus, nondistended, no guarding  Extremities: no cyanosis or edema,   Neuro: alert, responding appropriately to questions and commands   Skin: warm and dry      Assessment/Plan:   44 year old woman with hx of down syndrome, hx of CVA, TRINITY, cirrhosis, HLD, osteoporosis, osteoarthritis, hypothyroidism, asthma, GERD, T2DM, constipation, aortic stenosis s/p bovine aortic valve replacement presenting with reported syncopal episode. Admitted for management syncopal workup. Rapid response called for seizure like activity.     ### absence vs. pseudoseizure 2/2 anxiety     - STAT EKG: NSR HR 64, unchanged from prior EKG   - STAT CBC, CMP   - Urgent CT Head Non Con  - STAT Ativan 0.25mg IVP x 1     -Discussed with Dr. Albarado, agrees with above plan  -HHA at bedside updated by Dr. Smith  -RN to call if any changes Rapid response was called at 20:18 for seizure-like activity.     Events leading up to Rapid Response:  Patient was seen and examined at the bedside by the rapid response team. Dr. Albarado / ICU PA at bedside. Patient was seen to have blank-stare. Patient responsive to pain, able to hold her arm up. Aid at bedside reports similar pseudoseizure activity.     Rapid Response Vital Signs:    BP: 134/56  HR: 70   RR: 18  SpO2: 99 % on RA  Temp: none   FS: 114      Physical Exam:  Gen: anxious, tearful, intermittent episodes of blank stare   HEENT: NCAT   Cardio: regular rate and rhythm, +s1s2, no murmurs, rubs, or gallops  Pulm: CTA b/l, no wheezes, rales or rhonchi  Abdomen: soft, TTP around umbilicus, nondistended, no guarding  Extremities: no cyanosis or edema,   Neuro: alert, responding appropriately to questions and commands   Skin: warm and dry      Assessment/Plan:   44 year old woman with hx of down syndrome, hx of CVA, TRINITY, cirrhosis, HLD, osteoporosis, osteoarthritis, hypothyroidism, asthma, GERD, T2DM, constipation, aortic stenosis s/p bovine aortic valve replacement presenting with reported syncopal episode. Admitted for management syncopal workup. Rapid response called for seizure like activity.     ### absence vs. pseudoseizure 2/2 anxiety     - STAT EKG: NSR HR 64, unchanged from prior EKG   - STAT CBC, CMP   - Urgent CT Head Non Con  - STAT Ativan 0.25mg IVP x 1     -Discussed with Dr. Albarado, agrees with above plan  -HHA at bedside updated by Dr. Smith  -RN to call if any changes      Addendum: Pt is resting comfortably, no longer complaining of pain. No further episodes of blank stares. CBC, CMP stable. CTH negative for acute infarct.  - RN to call if any changes.

## 2024-05-08 NOTE — CHART NOTE - NSCHARTNOTEFT_GEN_A_CORE
RN called as Pt complaining of painful blisters above her genitals. On exam, 2 small blisters -- nondraining, clear-fluid filled, located in the pubic region. Pt reports 9/10 pain along the blisters. Vitals all stable.  - ordered bacitracin and lidocaine gel x1  - RN to reassess and call for further changes

## 2024-05-08 NOTE — PROGRESS NOTE ADULT - SUBJECTIVE AND OBJECTIVE BOX
[facility] -- Mariia Carlson Pannella, Patel, Savella Goodger, Cohen  Office # [phone]      Follow Up:  Syncope     Subjective/Observations:   Seen bedside, in no acute distress on room air offers no complaints   Unable to provide much meaningful information   REVIEW OF SYSTEMS: All other review of systems is negative unless indicated above    PAST MEDICAL & SURGICAL HISTORY:  Down syndrome      Hypothyroidism      CVA (cerebrovascular accident)      Osteoporosis      Osteoarthritis      Anxiety      Seasonal allergies      Type 2 diabetes mellitus      GERD (gastroesophageal reflux disease)      H/O aortic valve stenosis      S/P aortic valve replacement          MEDICATIONS  (STANDING):  ammonium lactate 12% Lotion 1 Application(s) Topical <User Schedule>  artificial  tears Solution 1 Drop(s) Both EYES two times a day  aspirin  chewable 81 milliGRAM(s) Oral daily  budesonide  80 MICROgram(s)/formoterol 4.5 MICROgram(s) Inhaler 2 Puff(s) Inhalation two times a day  cholecalciferol 1000 Unit(s) Oral daily  dextrose 10% Bolus 125 milliLiter(s) IV Bolus once  dextrose 5%. 1000 milliLiter(s) (100 mL/Hr) IV Continuous <Continuous>  dextrose 5%. 1000 milliLiter(s) (50 mL/Hr) IV Continuous <Continuous>  dextrose 50% Injectable 12.5 Gram(s) IV Push once  dextrose 50% Injectable 25 Gram(s) IV Push once  enoxaparin Injectable 40 milliGRAM(s) SubCutaneous every 24 hours  FLUoxetine 20 milliGRAM(s) Oral daily  fluticasone propionate 50 MICROgram(s)/spray Nasal Spray 2 Spray(s) Both Nostrils <User Schedule>  glucagon  Injectable 1 milliGRAM(s) IntraMuscular once  insulin lispro (ADMELOG) corrective regimen sliding scale   SubCutaneous three times a day before meals  insulin lispro (ADMELOG) corrective regimen sliding scale   SubCutaneous at bedtime  levothyroxine 112 MICROGram(s) Oral daily  midodrine. 10 milliGRAM(s) Oral three times a day  multivitamin 1 Tablet(s) Oral daily  pantoprazole    Tablet 40 milliGRAM(s) Oral before breakfast  simethicone 80 milliGRAM(s) Chew two times a day    MEDICATIONS  (PRN):  acetaminophen     Tablet .. 650 milliGRAM(s) Oral every 6 hours PRN Temp greater or equal to 38C (100.4F), Mild Pain (1 - 3)  albuterol    90 MICROgram(s) HFA Inhaler 2 Puff(s) Inhalation every 6 hours PRN for bronchospasm  bisacodyl Suppository 10 milliGRAM(s) Rectal daily PRN Constipation  dextrose Oral Gel 15 Gram(s) Oral once PRN Blood Glucose LESS THAN 70 milliGRAM(s)/deciliter  magnesium hydroxide Suspension 5 milliLiter(s) Oral daily PRN Constipation  ondansetron    Tablet 4 milliGRAM(s) Oral three times a day PRN for nausea  saline laxative (FLEET) Rectal Enema 1 Enema Rectal daily PRN for constipation  sodium chloride 0.65% Nasal 2 Spray(s) Both Nostrils two times a day PRN Nasal Congestion      Allergies    penicillin (Hives)    Intolerances        Vital Signs Last 24 Hrs  T(C): 36.6 (08 May 2024 04:36), Max: 36.7 (07 May 2024 20:50)  T(F): 97.8 (08 May 2024 04:36), Max: 98 (07 May 2024 20:50)  HR: 67 (08 May 2024 04:36) (60 - 67)  BP: 94/56 (08 May 2024 04:36) (94/56 - 108/62)  BP(mean): --  RR: 18 (08 May 2024 04:36) (18 - 18)  SpO2: 96% (08 May 2024 04:36) (93% - 97%)    Parameters below as of 08 May 2024 04:36  Patient On (Oxygen Delivery Method): room air        I&O's Summary        PHYSICAL EXAM:  TELE: Sr  Constitutional: NAD, awake and alert, well-developed  HEENT: Moist Mucous Membranes, Anicteric  Pulmonary: Non-labored, breath sounds are clear bilaterally, No wheezing, crackles or rhonchi  Cardiovascular: Regular, S1 and S2 nl, Bebeto   Gastrointestinal: Bowel Sounds present, soft, nontender.   Lymph: No lymphadenopathy. No peripheral edema.  Skin: No visible rashes or ulcers.  Psych:  Mood & affect appropriate    LABS: All Labs Reviewed:                        11.7   4.00  )-----------( 152      ( 07 May 2024 07:25 )             33.5                         11.6   4.09  )-----------( 165      ( 06 May 2024 08:15 )             34.8     07 May 2024 07:25    147    |  113    |  11     ----------------------------<  88     4.0     |  28     |  0.86   06 May 2024 08:15    143    |  112    |  13     ----------------------------<  88     3.9     |  26     |  0.94     Ca    9.1        07 May 2024 07:25  Ca    8.9        06 May 2024 08:15    TPro  6.4    /  Alb  3.0    /  TBili  0.7    /  DBili  x      /  AST  34     /  ALT  24     /  AlkPhos  88     06 May 2024 08:15             EC Lead ECG:   Ventricular Rate 55 BPM    Atrial Rate 55 BPM    P-R Interval 164 ms    QRS Duration 82 ms    Q-T Interval 510 ms    QTC Calculation(Bazett) 487 ms    P Axis -4 degrees    R Axis 32 degrees    T Axis 77 degrees    Diagnosis Line Sinus bradycardia  Possible Lateral infarct , age undetermined  Inferior infarct , age undetermined  Prolonged QT  Abnormal ECG  No previous ECGs available  Confirmed by MO PEMBERTON ([id number]) on 2024 12:01:06 PM (24 @ 18:44)        Radiology:         [facility] -- Mariia Carlson Pannella, Patel, Savella Goodger, Cohen  Office # [phone]      Follow Up:  Syncope     Subjective/Observations:   Seen bedside, in no acute distress on room air offers no complaints   private aid at bedside,. on room air   denies chest pain dizziness palpitations    REVIEW OF SYSTEMS: All other review of systems is negative unless indicated above    PAST MEDICAL & SURGICAL HISTORY:  Down syndrome      Hypothyroidism      CVA (cerebrovascular accident)      Osteoporosis      Osteoarthritis      Anxiety      Seasonal allergies      Type 2 diabetes mellitus      GERD (gastroesophageal reflux disease)      H/O aortic valve stenosis      S/P aortic valve replacement          MEDICATIONS  (STANDING):  ammonium lactate 12% Lotion 1 Application(s) Topical <User Schedule>  artificial  tears Solution 1 Drop(s) Both EYES two times a day  aspirin  chewable 81 milliGRAM(s) Oral daily  budesonide  80 MICROgram(s)/formoterol 4.5 MICROgram(s) Inhaler 2 Puff(s) Inhalation two times a day  cholecalciferol 1000 Unit(s) Oral daily  dextrose 10% Bolus 125 milliLiter(s) IV Bolus once  dextrose 5%. 1000 milliLiter(s) (100 mL/Hr) IV Continuous <Continuous>  dextrose 5%. 1000 milliLiter(s) (50 mL/Hr) IV Continuous <Continuous>  dextrose 50% Injectable 12.5 Gram(s) IV Push once  dextrose 50% Injectable 25 Gram(s) IV Push once  enoxaparin Injectable 40 milliGRAM(s) SubCutaneous every 24 hours  FLUoxetine 20 milliGRAM(s) Oral daily  fluticasone propionate 50 MICROgram(s)/spray Nasal Spray 2 Spray(s) Both Nostrils <User Schedule>  glucagon  Injectable 1 milliGRAM(s) IntraMuscular once  insulin lispro (ADMELOG) corrective regimen sliding scale   SubCutaneous three times a day before meals  insulin lispro (ADMELOG) corrective regimen sliding scale   SubCutaneous at bedtime  levothyroxine 112 MICROGram(s) Oral daily  midodrine. 10 milliGRAM(s) Oral three times a day  multivitamin 1 Tablet(s) Oral daily  pantoprazole    Tablet 40 milliGRAM(s) Oral before breakfast  simethicone 80 milliGRAM(s) Chew two times a day    MEDICATIONS  (PRN):  acetaminophen     Tablet .. 650 milliGRAM(s) Oral every 6 hours PRN Temp greater or equal to 38C (100.4F), Mild Pain (1 - 3)  albuterol    90 MICROgram(s) HFA Inhaler 2 Puff(s) Inhalation every 6 hours PRN for bronchospasm  bisacodyl Suppository 10 milliGRAM(s) Rectal daily PRN Constipation  dextrose Oral Gel 15 Gram(s) Oral once PRN Blood Glucose LESS THAN 70 milliGRAM(s)/deciliter  magnesium hydroxide Suspension 5 milliLiter(s) Oral daily PRN Constipation  ondansetron    Tablet 4 milliGRAM(s) Oral three times a day PRN for nausea  saline laxative (FLEET) Rectal Enema 1 Enema Rectal daily PRN for constipation  sodium chloride 0.65% Nasal 2 Spray(s) Both Nostrils two times a day PRN Nasal Congestion      Allergies    penicillin (Hives)    Intolerances        Vital Signs Last 24 Hrs  T(C): 36.6 (08 May 2024 04:36), Max: 36.7 (07 May 2024 20:50)  T(F): 97.8 (08 May 2024 04:36), Max: 98 (07 May 2024 20:50)  HR: 67 (08 May 2024 04:36) (60 - 67)  BP: 94/56 (08 May 2024 04:36) (94/56 - 108/62)  BP(mean): --  RR: 18 (08 May 2024 04:36) (18 - 18)  SpO2: 96% (08 May 2024 04:36) (93% - 97%)    Parameters below as of 08 May 2024 04:36  Patient On (Oxygen Delivery Method): room air        I&O's Summary        PHYSICAL EXAM:  TELE: Sr  Constitutional: NAD, awake and alert, well-developed  HEENT: Moist Mucous Membranes, Anicteric  Pulmonary: Non-labored, breath sounds are clear bilaterally, No wheezing, crackles or rhonchi  Cardiovascular: Regular, S1 and S2 nl, Bebeto   Gastrointestinal: Bowel Sounds present, soft, nontender.   Lymph: No lymphadenopathy. No peripheral edema.  Skin: No visible rashes or ulcers.  Psych:  Mood & affect appropriate    LABS: All Labs Reviewed:                        11.7   4.00  )-----------( 152      ( 07 May 2024 07:25 )             33.5                         11.6   4.09  )-----------( 165      ( 06 May 2024 08:15 )             34.8     07 May 2024 07:25    147    |  113    |  11     ----------------------------<  88     4.0     |  28     |  0.86   06 May 2024 08:15    143    |  112    |  13     ----------------------------<  88     3.9     |  26     |  0.94     Ca    9.1        07 May 2024 07:25  Ca    8.9        06 May 2024 08:15    TPro  6.4    /  Alb  3.0    /  TBili  0.7    /  DBili  x      /  AST  34     /  ALT  24     /  AlkPhos  88     06 May 2024 08:15             EC Lead ECG:   Ventricular Rate 55 BPM    Atrial Rate 55 BPM    P-R Interval 164 ms    QRS Duration 82 ms    Q-T Interval 510 ms    QTC Calculation(Bazett) 487 ms    P Axis -4 degrees    R Axis 32 degrees    T Axis 77 degrees    Diagnosis Line Sinus bradycardia  Possible Lateral infarct , age undetermined  Inferior infarct , age undetermined  Prolonged QT  Abnormal ECG  No previous ECGs available  Confirmed by MO PEMBERTON ([id number]) on 2024 12:01:06 PM (24 @ 18:44)        Radiology:

## 2024-05-08 NOTE — PHYSICAL THERAPY INITIAL EVALUATION ADULT - ADDITIONAL COMMENTS
Pt resides in a group home w/ 4 steps/rail to enter.   However prior to this admission, pt was at [facility] for rehab.

## 2024-05-08 NOTE — PHYSICAL THERAPY INITIAL EVALUATION ADULT - PERTINENT HX OF CURRENT PROBLEM, REHAB EVAL
44 year old woman with hx of down syndrome, hx of CVA, TRINITY, cirrhosis, HLD, osteoporosis, osteoarthritis, hypothyroidism, asthma, GERD, T2DM, constipation, aortic stenosis s/p bovine aortic valve replacement presenting with reported syncopal episode. Admitted for management syncopal workup. Pt with unwitnessed loss of consciousness with reported arm and head shaking. Workup for syncope vs. seizure. Syncopal etiologies include orthostatic hypotensive episode, symptomatic bradycardia, vasovagal episode, or dehydration. CT head negative for acute intracranial pathology. CTA negative for PE

## 2024-05-08 NOTE — PROGRESS NOTE ADULT - ASSESSMENT
44 year old woman with hx of down syndrome, hx of CVA, TRINITY, cirrhosis, HLD, osteoporosis, osteoarthritis, hypothyroidism, asthma, GERD, T2DM, constipation, aortic stenosis s/p bovine aortic valve replacement presenting with reported syncopal episode.     Presenting with reported syncope   - she is unable to provide information about the events surrounding syncope. Noted to have shaking by staff.   - fu neuro evaluation   - head ct was unremarkable    - EKG w SB with prolonged QTC.   - avoid QTc prolonging medications.   - check ekg daily  - Monitor and replete electrolytes. Keep K>4.0 and Mg>2.0.   - Tele Sr no events   - has a hx of orthostasis and is on midodrine. Would check orthostatics. cont with home midodrine    - known bovine AVR  - cont asa  SARKIS on exam can check baseline echo     - Appears compensated from HF POV.   - no signs of ischemia    - Further cardiac workup will depend on clinical course.   - All other workup per primary team. Will followup.        44 year old woman with hx of down syndrome, hx of CVA, TRINITY, cirrhosis, HLD, osteoporosis, osteoarthritis, hypothyroidism, asthma, GERD, T2DM, constipation, aortic stenosis s/p bovine aortic valve replacement presenting with reported syncopal episode.     Presenting with reported syncope   - she is unable to provide information about the events surrounding syncope. Noted to have shaking by staff.   - fu neuro evaluation   -EEG this am   - head ct was unremarkable    - EKG w SB with prolonged QTC.   - avoid QTc prolonging medications.   - check ekg daily  - Monitor and replete electrolytes. Keep K>4.0 and Mg>2.0.   - Tele Sr no events   - has a hx of orthostasis and is on midodrine. Would check orthostatics. cont with home midodrine    - known bovine AVR  - cont asa  SARKIS on exam can check baseline echo     - Appears compensated from HF POV.   - no signs of ischemia    - Further cardiac workup will depend on clinical course.   - All other workup per primary team. Will followup.

## 2024-05-08 NOTE — PROGRESS NOTE ADULT - SUBJECTIVE AND OBJECTIVE BOX
Neurology Follow up note    RICHELLE [id number]    HPI:  44 year old woman with hx of down syndrome, hx of CVA, TRINITY, cirrhosis, HLD, osteoporosis, osteoarthritis, hypothyroidism, asthma, GERD, T2DM, constipation, aortic stenosis s/p bovine aortic valve replacement presenting with reported syncopal episode. Pt lives in [facility] in [place]. Pt was alone in her room when she suddenly began to feel lightheaded and dizzy, followed by arm and head shaking with eventual loss of consciousness. Pt then awoke to people in her room and she was on the floor. Pt endorses she felt back to normal after she was found in her room by others. Prior to this event, pt endorses NBNB vomiting x3 attributed to eating a donut that upset her stomach. Pt denies preceding chest pain or palpitations. Denies any drug use. Of note patient is endorsing some burning on urination that began about one week ago accompanied by lower abdominal pain. Denies SOB, fevers, chills, palpitations, back pain, diarrhea or constipation.   ED course  VS: T 97, HR 62, /70, RR 16, SpO2 96% RA  Labs: Ddimer 730, AST 45   EKG: sinus bradycardia HR 55, QTc 487  Imaging:  - CT head non con: no acute intracranial hemorrhage, vasogenic edema, extra-axial collection   or hydrocephalus. Mild disproportionate cerebellar volume loss.  - CTA Chest: no pulmonary thromboembolism  Eccentric wall thickening of distal esophagus/EG junction. Recommend clinical correlation and consider endoscopy.  In ED given: 1L NS bolus x1     (04 May 2024 23:55)      Interval History -no new events    Patient is seen, chart was reviewed and case was discussed with the treatment team.  Pt is not in any distress.   Lying on bed comfortably.       Vital Signs Last 24 Hrs  T(C): 36.4 (08 May 2024 12:50), Max: 36.7 (07 May 2024 20:50)  T(F): 97.5 (08 May 2024 12:50), Max: 98 (07 May 2024 20:50)  HR: 61 (08 May 2024 12:50) (60 - 67)  BP: 102/53 (08 May 2024 12:50) (94/56 - 108/62)  BP(mean): --  RR: 17 (08 May 2024 12:50) (17 - 18)  SpO2: 96% (08 May 2024 12:50) (93% - 97%)    Parameters below as of 08 May 2024 12:50  Patient On (Oxygen Delivery Method): room air                REVIEW OF SYSTEMS:    Constitutional: No fever, weight loss or fatigue  Eyes: No eye pain, visual disturbances, or discharge  ENT:  No difficulty hearing, tinnitus, vertigo; No sinus or throat pain  Neck: No pain or stiffness  Respiratory: No cough, wheezing, chills or hemoptysis  Cardiovascular: No chest pain, palpitations, shortness of breath, dizziness   Gastrointestinal: No abdominal or epigastric pain. No nausea, vomiting or hematemesis;  Genitourinary: No dysuria, frequency, hematuria or incontinence  Neurological: No headaches, , loss of strength, numbness or tremors  Psychiatric: No depression, anxiety, mood swings or difficulty sleeping  Musculoskeletal: No joint pain or swelling; No muscle, back or extremity pain  Skin: No itching, burning, rashes or lesions   Lymph Nodes: No enlarged glands  Endocrine: No heat or cold intolerance;   Allergy and Immunologic: No hives or eczema    On Neurological Examination:    Mental Status - Pt is alert, awake, oriented X3.  Follows commands well and able to answer questions appropriately.Mood and affect  normal    Speech -  Normal.     Cranial Nerves - Pupils 3 mm equal and reactive to light, extraocular eye movements intact. Pt has no visual field deficit.  Pt has no  facial asymmetry. Facial sensation is intact.Tongue - is in midline.    Muscle tone - is normal    Motor Exam - 4/5 all over, No drift. No shaking or tremors.    Sensory Exam  Pt withdraws all extremities equally on stimulation. No asymmetry seen. No complaints of tingling, numbnes    coordination:    Finger to nose: lluvia    Deep tendon Reflexes - 2 plus all over.          Neck Supple -  Yes.     MEDICATIONS    acetaminophen     Tablet .. 650 milliGRAM(s) Oral every 6 hours PRN  albuterol    90 MICROgram(s) HFA Inhaler 2 Puff(s) Inhalation every 6 hours PRN  ammonium lactate 12% Lotion 1 Application(s) Topical <User Schedule>  artificial  tears Solution 1 Drop(s) Both EYES two times a day  aspirin  chewable 81 milliGRAM(s) Oral daily  bisacodyl Suppository 10 milliGRAM(s) Rectal daily PRN  budesonide  80 MICROgram(s)/formoterol 4.5 MICROgram(s) Inhaler 2 Puff(s) Inhalation two times a day  cholecalciferol 1000 Unit(s) Oral daily  dextrose 10% Bolus 125 milliLiter(s) IV Bolus once  dextrose 5%. 1000 milliLiter(s) IV Continuous <Continuous>  dextrose 5%. 1000 milliLiter(s) IV Continuous <Continuous>  dextrose 50% Injectable 12.5 Gram(s) IV Push once  dextrose 50% Injectable 25 Gram(s) IV Push once  dextrose Oral Gel 15 Gram(s) Oral once PRN  enoxaparin Injectable 40 milliGRAM(s) SubCutaneous every 24 hours  FLUoxetine 20 milliGRAM(s) Oral daily  fluticasone propionate 50 MICROgram(s)/spray Nasal Spray 2 Spray(s) Both Nostrils <User Schedule>  glucagon  Injectable 1 milliGRAM(s) IntraMuscular once  insulin lispro (ADMELOG) corrective regimen sliding scale   SubCutaneous three times a day before meals  insulin lispro (ADMELOG) corrective regimen sliding scale   SubCutaneous at bedtime  levothyroxine 112 MICROGram(s) Oral daily  magnesium hydroxide Suspension 5 milliLiter(s) Oral daily PRN  midodrine. 10 milliGRAM(s) Oral three times a day  multivitamin 1 Tablet(s) Oral daily  ondansetron    Tablet 4 milliGRAM(s) Oral three times a day PRN  pantoprazole    Tablet 40 milliGRAM(s) Oral before breakfast  saline laxative (FLEET) Rectal Enema 1 Enema Rectal daily PRN  simethicone 80 milliGRAM(s) Chew two times a day  sodium chloride 0.65% Nasal 2 Spray(s) Both Nostrils two times a day PRN      Allergies    penicillin (Hives)    Intolerances        05-08    143  |  111<H>  |  11  ----------------------------<  75  3.9   |  24  |  0.84    Ca    9.7      08 May 2024 07:12      Hemoglobin A1C:     Vitamin B12     RADIOLOGY    ASSESSMENT AND PLAN:      seen for loc/shaking likely syncope  seizure less likely    EEG-no epileptiform activity  check for othostasis  NO aed  Physical therapy evaluation.  Pain is accessed and addressed.  Would continue to follow.

## 2024-05-08 NOTE — PROGRESS NOTE ADULT - SUBJECTIVE AND OBJECTIVE BOX
Subjective: Patient seen and examined. NO overnight events. Still pending Echocardiogram.     MEDICATIONS  (STANDING):  ammonium lactate 12% Lotion 1 Application(s) Topical <User Schedule>  artificial  tears Solution 1 Drop(s) Both EYES two times a day  aspirin  chewable 81 milliGRAM(s) Oral daily  budesonide  80 MICROgram(s)/formoterol 4.5 MICROgram(s) Inhaler 2 Puff(s) Inhalation two times a day  cholecalciferol 1000 Unit(s) Oral daily  dextrose 10% Bolus 125 milliLiter(s) IV Bolus once  dextrose 5%. 1000 milliLiter(s) (100 mL/Hr) IV Continuous <Continuous>  dextrose 5%. 1000 milliLiter(s) (50 mL/Hr) IV Continuous <Continuous>  dextrose 50% Injectable 12.5 Gram(s) IV Push once  dextrose 50% Injectable 25 Gram(s) IV Push once  enoxaparin Injectable 40 milliGRAM(s) SubCutaneous every 24 hours  FLUoxetine 20 milliGRAM(s) Oral daily  fluticasone propionate 50 MICROgram(s)/spray Nasal Spray 2 Spray(s) Both Nostrils <User Schedule>  glucagon  Injectable 1 milliGRAM(s) IntraMuscular once  insulin lispro (ADMELOG) corrective regimen sliding scale   SubCutaneous three times a day before meals  insulin lispro (ADMELOG) corrective regimen sliding scale   SubCutaneous at bedtime  levothyroxine 112 MICROGram(s) Oral daily  midodrine. 10 milliGRAM(s) Oral three times a day  multivitamin 1 Tablet(s) Oral daily  pantoprazole    Tablet 40 milliGRAM(s) Oral before breakfast  simethicone 80 milliGRAM(s) Chew two times a day    MEDICATIONS  (PRN):  acetaminophen     Tablet .. 650 milliGRAM(s) Oral every 6 hours PRN Temp greater or equal to 38C (100.4F), Mild Pain (1 - 3)  albuterol    90 MICROgram(s) HFA Inhaler 2 Puff(s) Inhalation every 6 hours PRN for bronchospasm  bisacodyl Suppository 10 milliGRAM(s) Rectal daily PRN Constipation  dextrose Oral Gel 15 Gram(s) Oral once PRN Blood Glucose LESS THAN 70 milliGRAM(s)/deciliter  magnesium hydroxide Suspension 5 milliLiter(s) Oral daily PRN Constipation  ondansetron    Tablet 4 milliGRAM(s) Oral three times a day PRN for nausea  saline laxative (FLEET) Rectal Enema 1 Enema Rectal daily PRN for constipation  sodium chloride 0.65% Nasal 2 Spray(s) Both Nostrils two times a day PRN Nasal Congestion      Allergies    penicillin (Hives)    Intolerances        Vital Signs Last 24 Hrs  T(C): 36.6 (08 May 2024 04:36), Max: 36.7 (07 May 2024 20:50)  T(F): 97.8 (08 May 2024 04:36), Max: 98 (07 May 2024 20:50)  HR: 67 (08 May 2024 04:36) (60 - 67)  BP: 94/56 (08 May 2024 04:36) (94/56 - 108/62)  BP(mean): --  RR: 18 (08 May 2024 04:36) (18 - 18)  SpO2: 96% (08 May 2024 04:36) (93% - 97%)    Parameters below as of 08 May 2024 04:36  Patient On (Oxygen Delivery Method): room air        PHYSICAL EXAM:  GENERAL: NAD, well-groomed, well-developed  HEAD:  Atraumatic, Normocephalic  ENMT: Moist mucous membranes,   NECK: Supple, No JVD, Normal thyroid  NERVOUS SYSTEM:  All 4 extremities mobile, no gross sensory deficits.   CHEST/LUNG: Clear to auscultation bilaterally; No rales, rhonchi, wheezing, or rubs  HEART: Regular rate and rhythm; No murmurs, rubs, or gallops  ABDOMEN: Soft, Nontender, Nondistended; Bowel sounds present  EXTREMITIES:  2+ Peripheral Pulses, No clubbing, cyanosis, or edema      LABS:                        11.8   3.89  )-----------( 156      ( 08 May 2024 07:12 )             36.1     08 May 2024 07:12    143    |  111    |  11     ----------------------------<  75     3.9     |  24     |  0.84     Ca    9.7        08 May 2024 07:12        Urinalysis Basic - ( 08 May 2024 07:12 )    Color: x / Appearance: x / SG: x / pH: x  Gluc: 75 mg/dL / Ketone: x  / Bili: x / Urobili: x   Blood: x / Protein: x / Nitrite: x   Leuk Esterase: x / RBC: x / WBC x   Sq Epi: x / Non Sq Epi: x / Bacteria: x      CAPILLARY BLOOD GLUCOSE      POCT Blood Glucose.: 82 mg/dL (08 May 2024 07:41)  POCT Blood Glucose.: 98 mg/dL (07 May 2024 21:31)  POCT Blood Glucose.: 160 mg/dL (07 May 2024 16:54)      RADIOLOGY & ADDITIONAL TESTS:    Imaging Personally Reviewed:  [ ] YES     Consultant(s) Notes Reviewed:      Care Discussed with Consultants/Other Providers:    Advanced Directives: [ ] DNR  [ ] No feeding tube  [ ] MOLST in chart  [ ] MOLST completed today  [ ] Unknown

## 2024-05-09 PROCEDURE — 99233 SBSQ HOSP IP/OBS HIGH 50: CPT

## 2024-05-09 PROCEDURE — 99232 SBSQ HOSP IP/OBS MODERATE 35: CPT

## 2024-05-09 RX ADMIN — Medication 1 DROP(S): at 17:19

## 2024-05-09 RX ADMIN — Medication 1000 UNIT(S): at 12:05

## 2024-05-09 RX ADMIN — Medication 81 MILLIGRAM(S): at 12:05

## 2024-05-09 RX ADMIN — Medication 1 APPLICATION(S): at 12:05

## 2024-05-09 RX ADMIN — Medication 1 DROP(S): at 05:48

## 2024-05-09 RX ADMIN — BUDESONIDE AND FORMOTEROL FUMARATE DIHYDRATE 2 PUFF(S): 160; 4.5 AEROSOL RESPIRATORY (INHALATION) at 05:47

## 2024-05-09 RX ADMIN — Medication 20 MILLIGRAM(S): at 12:05

## 2024-05-09 RX ADMIN — MIDODRINE HYDROCHLORIDE 10 MILLIGRAM(S): 2.5 TABLET ORAL at 12:05

## 2024-05-09 RX ADMIN — BUDESONIDE AND FORMOTEROL FUMARATE DIHYDRATE 2 PUFF(S): 160; 4.5 AEROSOL RESPIRATORY (INHALATION) at 18:55

## 2024-05-09 RX ADMIN — Medication 1 APPLICATION(S): at 10:05

## 2024-05-09 RX ADMIN — SIMETHICONE 80 MILLIGRAM(S): 80 TABLET, CHEWABLE ORAL at 17:19

## 2024-05-09 RX ADMIN — Medication 1 TABLET(S): at 12:05

## 2024-05-09 RX ADMIN — MIDODRINE HYDROCHLORIDE 10 MILLIGRAM(S): 2.5 TABLET ORAL at 17:19

## 2024-05-09 RX ADMIN — Medication 2 SPRAY(S): at 10:05

## 2024-05-09 RX ADMIN — PANTOPRAZOLE SODIUM 40 MILLIGRAM(S): 20 TABLET, DELAYED RELEASE ORAL at 05:47

## 2024-05-09 RX ADMIN — Medication 112 MICROGRAM(S): at 05:46

## 2024-05-09 RX ADMIN — MIDODRINE HYDROCHLORIDE 10 MILLIGRAM(S): 2.5 TABLET ORAL at 05:47

## 2024-05-09 RX ADMIN — SIMETHICONE 80 MILLIGRAM(S): 80 TABLET, CHEWABLE ORAL at 05:46

## 2024-05-09 RX ADMIN — ENOXAPARIN SODIUM 40 MILLIGRAM(S): 100 INJECTION SUBCUTANEOUS at 01:22

## 2024-05-09 NOTE — PROGRESS NOTE ADULT - PROBLEM SELECTOR PLAN 3
Chronic:  - hold home metformin   - low dose ISS  - monitor finger sticks  - consistent carb diet
unknown
Chronic:  - hold home metformin   - low dose ISS  - monitor finger sticks  - consistent carb diet

## 2024-05-09 NOTE — PROGRESS NOTE ADULT - PROBLEM SELECTOR PROBLEM 11
H/O: CVA (cerebrovascular accident)

## 2024-05-09 NOTE — PROGRESS NOTE ADULT - PROBLEM/PLAN-2
DISPLAY PLAN FREE TEXT
hard copy, drawn during this pregnancy

## 2024-05-09 NOTE — PROGRESS NOTE ADULT - ASSESSMENT
44 year old woman with hx of down syndrome, hx of CVA, TRINITY, cirrhosis, HLD, osteoporosis, osteoarthritis, hypothyroidism, asthma, GERD, T2DM, constipation, aortic stenosis s/p bovine aortic valve replacement presenting with reported syncopal episode.     Syncope/Seizure?Diastolic HF,Severe MS, Sever AS s/p TAVR  - Neuro following  - CT head neg.  EEG neg of epileptiform acivity  - No tele events.  Can D/C    - EKG w SB with prolonged QTC (487ms)  - Avoid QTc prolonging medications.     - Has a hx of orthostasis.  BP stable on home Midodrine.  Would check orthostatics  - Known bovine AVR.  TTE showed EF 58%, grade 2 DD, severe MS, bioAVR (TAVR) in aortic position, well-seated  - Continue ASA  - No evidence of volume overload    - Monitor and replete electrolytes. Keep K>4.0 and Mg>2.0.    - Will continue to follow    Barb Robbins DNP, NP-C, AGACNP-C  Cardiology   Call TEAMS

## 2024-05-09 NOTE — PROGRESS NOTE ADULT - PROBLEM SELECTOR PLAN 11
Chronic:  - c/w home aspirin

## 2024-05-09 NOTE — PROGRESS NOTE ADULT - PROBLEM SELECTOR PLAN 9
Chronic:  - CTA Chest: eccentric wall thickening of distal esophagus/EG junction  - c/w home omeprazole (therapeutic interchange)

## 2024-05-09 NOTE — PROGRESS NOTE ADULT - PROBLEM SELECTOR PLAN 10
Chronic:  - c/w home levothyroxine

## 2024-05-09 NOTE — PROGRESS NOTE ADULT - SUBJECTIVE AND OBJECTIVE BOX
[facility] -- Mariia Carlson, Sumanth, Alyssia Monique, , Armando Santos  Office # [phone]    Follow Up:  Syncope, Severe AS s/p TAVR    Subjective/Observations: Sitting on the chair, comfortable on RA.  Denies dizziness.  No tele events.  No complaints    REVIEW OF SYSTEMS: All other review of systems is negative unless indicated above  PAST MEDICAL & SURGICAL HISTORY:  Down syndrome      Hypothyroidism      CVA (cerebrovascular accident)      Osteoporosis      Osteoarthritis      Anxiety      Seasonal allergies      Type 2 diabetes mellitus      GERD (gastroesophageal reflux disease)      H/O aortic valve stenosis      S/P aortic valve replacement        MEDICATIONS  (STANDING):  ammonium lactate 12% Lotion 1 Application(s) Topical <User Schedule>  artificial  tears Solution 1 Drop(s) Both EYES two times a day  aspirin  chewable 81 milliGRAM(s) Oral daily  bacitracin   Ointment 1 Application(s) Topical daily  budesonide  80 MICROgram(s)/formoterol 4.5 MICROgram(s) Inhaler 2 Puff(s) Inhalation two times a day  cholecalciferol 1000 Unit(s) Oral daily  dextrose 10% Bolus 125 milliLiter(s) IV Bolus once  dextrose 5%. 1000 milliLiter(s) (100 mL/Hr) IV Continuous <Continuous>  dextrose 5%. 1000 milliLiter(s) (50 mL/Hr) IV Continuous <Continuous>  dextrose 50% Injectable 12.5 Gram(s) IV Push once  dextrose 50% Injectable 25 Gram(s) IV Push once  enoxaparin Injectable 40 milliGRAM(s) SubCutaneous every 24 hours  FLUoxetine 20 milliGRAM(s) Oral daily  fluticasone propionate 50 MICROgram(s)/spray Nasal Spray 2 Spray(s) Both Nostrils <User Schedule>  glucagon  Injectable 1 milliGRAM(s) IntraMuscular once  insulin lispro (ADMELOG) corrective regimen sliding scale   SubCutaneous three times a day before meals  insulin lispro (ADMELOG) corrective regimen sliding scale   SubCutaneous at bedtime  levothyroxine 112 MICROGram(s) Oral daily  lidocaine 2% Gel 1 Application(s) Topical daily  midodrine. 10 milliGRAM(s) Oral three times a day  multivitamin 1 Tablet(s) Oral daily  pantoprazole    Tablet 40 milliGRAM(s) Oral before breakfast  simethicone 80 milliGRAM(s) Chew two times a day    MEDICATIONS  (PRN):  acetaminophen     Tablet .. 650 milliGRAM(s) Oral every 6 hours PRN Temp greater or equal to 38C (100.4F), Mild Pain (1 - 3)  albuterol    90 MICROgram(s) HFA Inhaler 2 Puff(s) Inhalation every 6 hours PRN for bronchospasm  bisacodyl Suppository 10 milliGRAM(s) Rectal daily PRN Constipation  dextrose Oral Gel 15 Gram(s) Oral once PRN Blood Glucose LESS THAN 70 milliGRAM(s)/deciliter  magnesium hydroxide Suspension 5 milliLiter(s) Oral daily PRN Constipation  ondansetron    Tablet 4 milliGRAM(s) Oral three times a day PRN for nausea  saline laxative (FLEET) Rectal Enema 1 Enema Rectal daily PRN for constipation  sodium chloride 0.65% Nasal 2 Spray(s) Both Nostrils two times a day PRN Nasal Congestion    Allergies    penicillin (Hives)    Intolerances      Vital Signs Last 24 Hrs  T(C): 36.5 (09 May 2024 05:36), Max: 36.8 (08 May 2024 21:09)  T(F): 97.7 (09 May 2024 05:36), Max: 98.3 (08 May 2024 21:09)  HR: 61 (09 May 2024 05:36) (61 - 64)  BP: 102/63 (09 May 2024 05:36) (102/53 - 127/71)  BP(mean): --  RR: 18 (09 May 2024 05:36) (17 - 18)  SpO2: 96% (09 May 2024 05:36) (96% - 96%)    Parameters below as of 09 May 2024 05:36  Patient On (Oxygen Delivery Method): room air      I&O's Summary    08 May 2024 07:01  -  09 May 2024 07:00  --------------------------------------------------------  IN: 300 mL / OUT: 0 mL / NET: 300 mL     PHYSICAL EXAM:  TELE: SB at 50's  Constitutional: NAD, awake and alert  HEENT: Moist Mucous Membranes, Anicteric  Pulmonary: Non-labored, breath sounds are clear bilaterally, No wheezing, rales or rhonchi  Cardiovascular: Regular, S1 and S2, +murmurs, no rubs, gallops or clicks  Gastrointestinal: Bowel Sounds present, soft, nontender.   Lymph: No peripheral edema. No lymphadenopathy.  Skin: No visible rashes or ulcers.  Psych:  Mood & affect: pleasantly confused  LABS: All Labs Reviewed:                        11.8   4.66  )-----------( 186      ( 08 May 2024 20:35 )             34.8                         11.8   3.89  )-----------( 156      ( 08 May 2024 07:12 )             36.1                         11.7   4.00  )-----------( 152      ( 07 May 2024 07:25 )             33.5     08 May 2024 20:35    141    |  110    |  12     ----------------------------<  107    4.0     |  23     |  1.20   08 May 2024 07:12    143    |  111    |  11     ----------------------------<  75     3.9     |  24     |  0.84   07 May 2024 07:25    147    |  113    |  11     ----------------------------<  88     4.0     |  28     |  0.86     Ca    9.4        08 May 2024 20:35  Ca    9.7        08 May 2024 07:12  Ca    9.1        07 May 2024 07:25    TPro  7.0    /  Alb  3.0    /  TBili  0.7    /  DBili  x      /  AST  31     /  ALT  25     /  AlkPhos  90     08 May 2024 20:35    CARDIAC MARKERS ( 08 May 2024 20:35 )  x     / x     / 40 U/L / x     / <1.0 ng/mL    12 Lead ECG:   Ventricular Rate 55 BPM    Atrial Rate 55 BPM    P-R Interval 164 ms    QRS Duration 82 ms    Q-T Interval 510 ms    QTC Calculation(Bazett) 487 ms    P Axis -4 degrees    R Axis 32 degrees    T Axis 77 degrees    Diagnosis Line Sinus bradycardia  Possible Lateral infarct , age undetermined  Inferior infarct , age undetermined  Prolonged QT  Abnormal ECG  No previous ECGs available  Confirmed by KOFI MONIQUE ([id number]) on 5/5/2024 12:01:06 PM (05-04-24 @ 18:44)      TRANSTHORACIC ECHOCARDIOGRAM REPORT  ________________________________________________________________________________                                      _______    Pt. Name:       RICHELLE PEREZ Study Date:    5/8/2024  MRN:            [id number]          YOB: 1980  Accession #:    [id number]          Age:           44 years  Account#:       [id number]         Gender:        F  Visit ID#  Heart Rate:                        Height:        58.66 in (149.00 cm)  Rhythm:                     Weight:        140.60 lb (63.78 kg)  Blood Pressure: 94/56 mmHg         BSA/BMI:       1.58 m² / 28.73 kg/m²  ________________________________________________________________________________________  Referring Physician:    [id number] Ewelina Gaines  Interpreting Physician: Kofi Monique  Primary Sonographer:    Rebecca Diane [facility]    CPT:               ECHO TTE WO CON COMP W DOPP - 76696.m  Indication(s):     Syncope and collapse - R55  Procedure:         Transthoracic echocardiogram with 2-D, M-mode and complete                     spectral and color flow Doppler.  Ordering Location: [facility]  Admission Status:  Inpatient  Study Information: Image quality for this study is technically difficult.    _______________________________________________________________________________________     CONCLUSIONS:      1. Technically difficult image quality.   2. Left ventricular endocardium is not well visualized; however, the left ventricular systolic function appears grossly normal.   3. Left ventricular systolic function is normal with an ejection fraction of 58 % by Meyers's method of disks.   4. There is moderate (grade 2) left ventricular diastolic dysfunction, with elevated filling pressure. Analysis of left ventricular diastolic function and filling pressure is made challenging by the presence of mitral annular calcification.   5. Normal right ventricular cavity size, with normal wall thickness, and normal systolic function.   6. The left atrium is moderately dilated.   7. Mitral valve leaflets are diffusely calcified.   8. The peak transmitral mitral gradient is 10mmHg and a myles of 6mmHg at a HR of 59bpm. The MVA via PHT is approximately 1.4sqcm which is consistent with severe mitral stenosis.   9. Trace mitral regurgitation.  10. A bioprosthetic valve replacement Transcatheter deployed (TAVR) valve replacement is present in the aortic position The prosthetic valve is well seated.  11. Estimated pulmonary artery systolic pressure is 32 mmHg, consistent with normal pulmonary artery pressure.    ________________________________________________________________________________________  FINDINGS:     Left Ventricle:  Left ventricular systolic function is normal with a calculated ejection fraction of 58 % by the Meyers's biplane method of disks. There is moderate (grade 2) left ventricular diastolic dysfunction, with elevated filling pressure. Analysis of left ventricular diastolic function and filling pressure is made challenging by the presence of mitral annular calcification. Left ventricular endocardium is not well visualized; however, the left ventricular systolic function appears grossly normal.     Right Ventricle:  The right ventricular cavity is normal in size, with normal wall thickness and normal systolic function.     Left Atrium:  The left atrium is moderately dilated with an indexed volume of 13.28 ml/m².     Right Atrium:  The right atrium is normal in size with an indexed volume of 8.48 ml/m².     Aortic Valve:  A bioprostheticvalve replacement is present in the aortic position. A a transcatheter deployed (TAVR) is present in the aortic position. The prosthetic valve is well seated. The peak transaortic velocity is 2.42 m/s, peak transaortic gradient is 23.4 mmHg and mean transaortic gradient is 12.0 mmHg with an LVOT/aortic valve VTI ratio of 0.45.     Mitral Valve:  The posterior leaflet is heavily calficied and restricted in its motion. There is calcification of the mitral valve annulus. Mitral valve leaflets are diffusely calcified. There is mild mitral valve stenosis. The peak transmitral mitral gradient is 10mmHg and a myles of 6mmHg at a HR of 59bpm. The MVA via PHT is approximately 1.4sqcm which is consistent with severe mitral stenosis. There is trace mitralregurgitation.     Tricuspid Valve:  Structurally normal tricuspid valve with normal leaflet excursion. There is mild tricuspid regurgitation. Estimated pulmonary artery systolic pressure is 32 mmHg, consistent with normal pulmonary artery pressure.     Pulmonic Valve:  The pulmonic valve was not well visualized.     Aorta:  The aortic root at the sinuses of Valsalva is normal in size, measuring 2.10 cm (indexed 1.33 cm/m²). The aortic arch diameter is normal in size, measuring 2.0 cm (indexed 1.27 cm/m²).     Pericardium:  No pericardial effusion seen.     Systemic Veins:  The inferior vena cava is normal in size measuring 1.27 cm in diameter, (normal <2.1cm) with normal inspiratory collapse (normal >50%) consistent with normal right atrial pressure (~3, range 0-5mmHg).  ____________________________________________________________________  QUANTITATIVE DATA:  Left Ventricle Measurements: (Indexed to BSA)     IVSd (2D):   1.0 cm  LVPWd (2D):  1.1 cm  LVIDd (2D):  4.1 cm  LVIDs (2D):  2.6 cm  LV Mass:     138 g  87.1 g/m²  LV Vol d, MOD A2C: 43.7 ml 27.64 ml/m²  LV Vol d, MOD A4C: 96.7 ml 61.17 ml/m²  LV Vol d, MOD BP:  67.0 ml 42.37 ml/m²  LV Vol s, MOD A2C: 17.7 ml 11.20 ml/m²  LV Vol s, MOD A4C: 43.3 ml 27.39 ml/m²  LV Vol s, MOD BP:  28.3 ml 17.92 ml/m²  LVOT SV MOD BP:    38.7 ml  LV EF% MOD BP:     58 %     MV E Vmax:    1.37 m/s  MV A Vmax:    1.42 m/s  MV E/A:       0.96  e' lateral:   4.90 cm/s  e' medial:    5.00 cm/s  E/e' lateral: 27.96  E/e' medial:  27.40  E/e' Average: 27.68  MV DT:        384 msec    Aorta Measurements: (Normal range) (Indexed to BSA)     Sinuses of Valsalva: 2.10 cm (2.7 - 3.3 cm)  Ao Arch:             2.0 cm       Left Atrium Measurements: (Indexed to BSA)  LA Diam 2D:        3.20 cm  LA Vol s, MOD A4C: 16.40 ml.  LA Vol s, MOD A2C: 29.50 ml.  LA Vol s, MOD BP:  21.00 ml  13.28 ml/m²    Right Ventricle Measurements: Right Atrial Measurements:     RV Base (RVID1):  3.6 cm      RA Vol:       13.40 ml  RV Mid (RVID2):   3.0 cm      RA Vol Index: 8.48 ml/m²  RV Major (RVID3): 8.0 cm       LVOT / RVOT/ Qp/Qs Data: (Indexed to BSA)  LVOT Vmax:      1.04 m/s  LVOT Vmn:       0.661 m/s  LVOT VTI:       25.10 cm  LVOT peak grad: 4 mmHg  LVOT mean grad: 2.0 mmHg    Aortic Valve Measurements:  AVVmax:                2.4 m/s  AV Peak Gradient:       23.4 mmHg  AV Mean Gradient:       12.0 mmHg  AV VTI:                 56.2 cm  AV VTI Ratio:           0.45  AoV Dimensionless Index 0.45    Mitral Valve Measurements:     MV Vmax:      1.63 m/s  MV VTI, aurea   0.692 m  MV Mean Grad: 6.0 mmHg  MV Peak Grad: 10.6 mmHg  MV E Vmax:    1.4 m/s  MV A Vmax:    1.4 m/s  MV E/A:       1.0  MV P1/2T:     167 msec       Tricuspid Valve Measurements:     TR Vmax:          2.7 m/s  TR Peak Gradient: 29.4 mmHg  RA Pressure:      3 mmHg  PASP:             32 mmHg    ________________________________________________________________________________________  Electronically signed on 5/8/2024 at 3:07:07 PM by Kofi Monique         *** Final ***

## 2024-05-09 NOTE — PROGRESS NOTE ADULT - SUBJECTIVE AND OBJECTIVE BOX
Neurology Follow up note    RICHELLE PEREZ44yFemale    HPI:  44 year old woman with hx of down syndrome, hx of CVA, TRINITY, cirrhosis, HLD, osteoporosis, osteoarthritis, hypothyroidism, asthma, GERD, T2DM, constipation, aortic stenosis s/p bovine aortic valve replacement presenting with reported syncopal episode. Pt lives in [facility] in [place]. Pt was alone in her room when she suddenly began to feel lightheaded and dizzy, followed by arm and head shaking with eventual loss of consciousness. Pt then awoke to people in her room and she was on the floor. Pt endorses she felt back to normal after she was found in her room by others. Prior to this event, pt endorses NBNB vomiting x3 attributed to eating a donut that upset her stomach. Pt denies preceding chest pain or palpitations. Denies any drug use. Of note patient is endorsing some burning on urination that began about one week ago accompanied by lower abdominal pain. Denies SOB, fevers, chills, palpitations, back pain, diarrhea or constipation.   ED course  VS: T 97, HR 62, /70, RR 16, SpO2 96% RA  Labs: Ddimer 730, AST 45   EKG: sinus bradycardia HR 55, QTc 487  Imaging:  - CT head non con: no acute intracranial hemorrhage, vasogenic edema, extra-axial collection   or hydrocephalus. Mild disproportionate cerebellar volume loss.  - CTA Chest: no pulmonary thromboembolism  Eccentric wall thickening of distal esophagus/EG junction. Recommend clinical correlation and consider endoscopy.  In ED given: 1L NS bolus x1     (04 May 2024 23:55)      Interval History -last night events noted    Patient is seen, chart was reviewed and case was discussed with the treatment team.  Pt is not in any distress.   Lying on bed comfortably.       Vital Signs Last 24 Hrs  T(C): 36.4 (09 May 2024 11:09), Max: 36.8 (08 May 2024 21:09)  T(F): 97.5 (09 May 2024 11:09), Max: 98.3 (08 May 2024 21:09)  HR: 67 (09 May 2024 17:08) (61 - 67)  BP: 96/56 (09 May 2024 17:08) (96/56 - 127/71)  BP(mean): --  RR: 18 (09 May 2024 11:09) (18 - 18)  SpO2: 97% (09 May 2024 11:09) (96% - 97%)    Parameters below as of 09 May 2024 11:09  Patient On (Oxygen Delivery Method): room air                    REVIEW OF SYSTEMS:    Constitutional: No fever, weight loss or fatigue  Eyes: No eye pain, visual disturbances, or discharge  ENT:  No difficulty hearing, tinnitus, vertigo; No sinus or throat pain  Neck: No pain or stiffness  Respiratory: No cough, wheezing, chills or hemoptysis  Cardiovascular: No chest pain, palpitations, shortness of breath, dizziness   Gastrointestinal: No abdominal or epigastric pain. No nausea, vomiting or hematemesis;  Genitourinary: No dysuria, frequency, hematuria or incontinence  Neurological: No headaches, , loss of strength, numbness or tremors  Psychiatric: No depression, anxiety, mood swings or difficulty sleeping  Musculoskeletal: No joint pain or swelling; No muscle, back or extremity pain  Skin: No itching, burning, rashes or lesions   Lymph Nodes: No enlarged glands  Endocrine: No heat or cold intolerance;   Allergy and Immunologic: No hives or eczema    On Neurological Examination:    Mental Status - Pt is alert, awake, oriented X3.  Follows commands well and able to answer questions appropriately.Mood and affect  normal    Speech -  Normal.     Cranial Nerves - Pupils 3 mm equal and reactive to light, extraocular eye movements intact. Pt has no visual field deficit.  Pt has no  facial asymmetry. Facial sensation is intact.Tongue - is in midline.    Muscle tone - is normal    Motor Exam - 4/5 all over, No drift. No shaking or tremors.    Sensory Exam  Pt withdraws all extremities equally on stimulation. No asymmetry seen. No complaints of tingling, numbnes    coordination:    Finger to nose: lluvia    Deep tendon Reflexes - 2 plus all over.          Neck Supple -  Yes.     MEDICATIONS    acetaminophen     Tablet .. 650 milliGRAM(s) Oral every 6 hours PRN  albuterol    90 MICROgram(s) HFA Inhaler 2 Puff(s) Inhalation every 6 hours PRN  ammonium lactate 12% Lotion 1 Application(s) Topical <User Schedule>  artificial  tears Solution 1 Drop(s) Both EYES two times a day  aspirin  chewable 81 milliGRAM(s) Oral daily  bisacodyl Suppository 10 milliGRAM(s) Rectal daily PRN  budesonide  80 MICROgram(s)/formoterol 4.5 MICROgram(s) Inhaler 2 Puff(s) Inhalation two times a day  cholecalciferol 1000 Unit(s) Oral daily  dextrose 10% Bolus 125 milliLiter(s) IV Bolus once  dextrose 5%. 1000 milliLiter(s) IV Continuous <Continuous>  dextrose 5%. 1000 milliLiter(s) IV Continuous <Continuous>  dextrose 50% Injectable 12.5 Gram(s) IV Push once  dextrose 50% Injectable 25 Gram(s) IV Push once  dextrose Oral Gel 15 Gram(s) Oral once PRN  enoxaparin Injectable 40 milliGRAM(s) SubCutaneous every 24 hours  FLUoxetine 20 milliGRAM(s) Oral daily  fluticasone propionate 50 MICROgram(s)/spray Nasal Spray 2 Spray(s) Both Nostrils <User Schedule>  glucagon  Injectable 1 milliGRAM(s) IntraMuscular once  insulin lispro (ADMELOG) corrective regimen sliding scale   SubCutaneous three times a day before meals  insulin lispro (ADMELOG) corrective regimen sliding scale   SubCutaneous at bedtime  levothyroxine 112 MICROGram(s) Oral daily  magnesium hydroxide Suspension 5 milliLiter(s) Oral daily PRN  midodrine. 10 milliGRAM(s) Oral three times a day  multivitamin 1 Tablet(s) Oral daily  ondansetron    Tablet 4 milliGRAM(s) Oral three times a day PRN  pantoprazole    Tablet 40 milliGRAM(s) Oral before breakfast  saline laxative (FLEET) Rectal Enema 1 Enema Rectal daily PRN  simethicone 80 milliGRAM(s) Chew two times a day  sodium chloride 0.65% Nasal 2 Spray(s) Both Nostrils two times a day PRN      Allergies    penicillin (Hives)    Intolerances      05-08    141  |  110<H>  |  12  ----------------------------<  107<H>  4.0   |  23  |  1.20    Ca    9.4      08 May 2024 20:35    TPro  7.0  /  Alb  3.0<L>  /  TBili  0.7  /  DBili  x   /  AST  31  /  ALT  25  /  AlkPhos  90  05-08      Hemoglobin A1C:     Vitamin B12     RADIOLOGY    ASSESSMENT AND PLAN:      seen for loc/shaking likely syncope  seizure less likely  starring blanking episodes with crying /anxiety likely non epileptic  psychogenic seizure       EEG-no epileptiform activity  check for orthostasis   NO aed  Physical therapy evaluation.  Pain is accessed and addressed.  Would continue to follow.

## 2024-05-09 NOTE — PROGRESS NOTE ADULT - PROBLEM SELECTOR PLAN 7
Chronic:  - c/w meloxicam

## 2024-05-09 NOTE — PROGRESS NOTE ADULT - PROBLEM SELECTOR PLAN 5
Chronic:  - currently saturating well on RA, no wheezing on exam  - c/w home albuterol PRN and Breo Ellipta (therapeutic interchange)
105.1

## 2024-05-09 NOTE — PROGRESS NOTE ADULT - PROBLEM SELECTOR PLAN 2
Pt currently denies urinary symptoms   - UA with elevated urobilinogen, trace leukocyte esterase, WBC 6, occasional bacteria  - Urine cx sent, f/u results  - Urobilinogen likely 2/2 known liver cirrhosis  - monitor off abx, can start if patient develops symptoms

## 2024-05-09 NOTE — PROGRESS NOTE ADULT - SUBJECTIVE AND OBJECTIVE BOX
Subjective: Patient seen and examined. Overnight had RRT and was thought to have seizure like activity.     MEDICATIONS  (STANDING):  ammonium lactate 12% Lotion 1 Application(s) Topical <User Schedule>  artificial  tears Solution 1 Drop(s) Both EYES two times a day  aspirin  chewable 81 milliGRAM(s) Oral daily  bacitracin   Ointment 1 Application(s) Topical daily  budesonide  80 MICROgram(s)/formoterol 4.5 MICROgram(s) Inhaler 2 Puff(s) Inhalation two times a day  cholecalciferol 1000 Unit(s) Oral daily  dextrose 10% Bolus 125 milliLiter(s) IV Bolus once  dextrose 5%. 1000 milliLiter(s) (100 mL/Hr) IV Continuous <Continuous>  dextrose 5%. 1000 milliLiter(s) (50 mL/Hr) IV Continuous <Continuous>  dextrose 50% Injectable 12.5 Gram(s) IV Push once  dextrose 50% Injectable 25 Gram(s) IV Push once  enoxaparin Injectable 40 milliGRAM(s) SubCutaneous every 24 hours  FLUoxetine 20 milliGRAM(s) Oral daily  fluticasone propionate 50 MICROgram(s)/spray Nasal Spray 2 Spray(s) Both Nostrils <User Schedule>  glucagon  Injectable 1 milliGRAM(s) IntraMuscular once  insulin lispro (ADMELOG) corrective regimen sliding scale   SubCutaneous three times a day before meals  insulin lispro (ADMELOG) corrective regimen sliding scale   SubCutaneous at bedtime  levothyroxine 112 MICROGram(s) Oral daily  lidocaine 2% Gel 1 Application(s) Topical daily  midodrine. 10 milliGRAM(s) Oral three times a day  multivitamin 1 Tablet(s) Oral daily  pantoprazole    Tablet 40 milliGRAM(s) Oral before breakfast  simethicone 80 milliGRAM(s) Chew two times a day    MEDICATIONS  (PRN):  acetaminophen     Tablet .. 650 milliGRAM(s) Oral every 6 hours PRN Temp greater or equal to 38C (100.4F), Mild Pain (1 - 3)  albuterol    90 MICROgram(s) HFA Inhaler 2 Puff(s) Inhalation every 6 hours PRN for bronchospasm  bisacodyl Suppository 10 milliGRAM(s) Rectal daily PRN Constipation  dextrose Oral Gel 15 Gram(s) Oral once PRN Blood Glucose LESS THAN 70 milliGRAM(s)/deciliter  magnesium hydroxide Suspension 5 milliLiter(s) Oral daily PRN Constipation  ondansetron    Tablet 4 milliGRAM(s) Oral three times a day PRN for nausea  saline laxative (FLEET) Rectal Enema 1 Enema Rectal daily PRN for constipation  sodium chloride 0.65% Nasal 2 Spray(s) Both Nostrils two times a day PRN Nasal Congestion      Allergies    penicillin (Hives)    Intolerances        Vital Signs Last 24 Hrs  T(C): 36.4 (09 May 2024 11:09), Max: 36.8 (08 May 2024 21:09)  T(F): 97.5 (09 May 2024 11:09), Max: 98.3 (08 May 2024 21:09)  HR: 62 (09 May 2024 11:09) (61 - 64)  BP: 110/68 (09 May 2024 11:09) (102/53 - 127/71)  BP(mean): --  RR: 18 (09 May 2024 11:09) (17 - 18)  SpO2: 97% (09 May 2024 11:09) (96% - 97%)    Parameters below as of 09 May 2024 11:09  Patient On (Oxygen Delivery Method): room air        PHYSICAL EXAM:  GENERAL: NAD, well-groomed, well-developed  HEAD:  Atraumatic, Normocephalic  ENMT: Moist mucous membranes,   NECK: Supple, No JVD, Normal thyroid  NERVOUS SYSTEM:  All 4 extremities mobile, no gross sensory deficits.   CHEST/LUNG: Clear to auscultation bilaterally; No rales, rhonchi, wheezing, or rubs  HEART: Regular rate and rhythm; No murmurs, rubs, or gallops  ABDOMEN: Soft, Nontender, Nondistended; Bowel sounds present  EXTREMITIES:  2+ Peripheral Pulses, No clubbing, cyanosis, or edema      LABS:                        11.8   4.66  )-----------( 186      ( 08 May 2024 20:35 )             34.8     08 May 2024 20:35    141    |  110    |  12     ----------------------------<  107    4.0     |  23     |  1.20     Ca    9.4        08 May 2024 20:35    TPro  7.0    /  Alb  3.0    /  TBili  0.7    /  DBili  x      /  AST  31     /  ALT  25     /  AlkPhos  90     08 May 2024 20:35      Urinalysis Basic - ( 08 May 2024 20:35 )    Color: x / Appearance: x / SG: x / pH: x  Gluc: 107 mg/dL / Ketone: x  / Bili: x / Urobili: x   Blood: x / Protein: x / Nitrite: x   Leuk Esterase: x / RBC: x / WBC x   Sq Epi: x / Non Sq Epi: x / Bacteria: x      CAPILLARY BLOOD GLUCOSE      POCT Blood Glucose.: 94 mg/dL (09 May 2024 11:21)  POCT Blood Glucose.: 91 mg/dL (09 May 2024 07:19)  POCT Blood Glucose.: 103 mg/dL (08 May 2024 22:37)  POCT Blood Glucose.: 114 mg/dL (08 May 2024 20:24)  POCT Blood Glucose.: 92 mg/dL (08 May 2024 17:09)      RADIOLOGY & ADDITIONAL TESTS:    Imaging Personally Reviewed:  [ ] YES     Consultant(s) Notes Reviewed:      Care Discussed with Consultants/Other Providers:    Advanced Directives: [ ] DNR  [ ] No feeding tube  [ ] MOLST in chart  [ ] MOLST completed today  [ ] Unknown

## 2024-05-09 NOTE — PROGRESS NOTE ADULT - PROBLEM SELECTOR PLAN 1
Pt with unwitnessed loss of consciousness with reported arm and head shaking. Workup for syncope vs. seizure. Syncopal etiologies include orthostatic hypotensive episode, symptomatic bradycardia, vasovagal episode, or dehydration  - CT head negative for acute intracranial pathology  - Ddimer elevated at 730  - CTA negative for PE  - EKG with sinus bradycardia HR 55, QTc 487  - S/p 1L NS bolus x1 in ED  - TTE ordered  - EEG ordered  - monitor on telemetry   - encourage PO intake   - Cardiology consulted, recs appreciated  - Neuro (Dr. Chin) consulted, f/u recs
Pt with unwitnessed loss of consciousness with reported arm and head shaking. Workup for syncope vs. seizure. Syncopal etiologies include orthostatic hypotensive episode, symptomatic bradycardia, vasovagal episode, or dehydration  - CT head negative for acute intracranial pathology  - Ddimer elevated at 730  - CTA negative for PE  - EKG with sinus bradycardia HR 55, QTc 487  - S/p 1L NS bolus x1 in ED  - TTE ordered  - EEG ordered  - monitor on telemetry   - encourage PO intake   - Cardiology consulted, recs appreciated  - Neuro (Dr. Chin) consulted, f/u recs
Pt with unwitnessed loss of consciousness with reported arm and head shaking. Workup for syncope vs. seizure. Syncopal etiologies include orthostatic hypotensive episode, symptomatic bradycardia, vasovagal episode, or dehydration  - CT head negative for acute intracranial pathology  - Ddimer elevated at 730  - CTA negative for PE  - EKG with sinus bradycardia HR 55, QTc 487  - S/p 1L NS bolus x1 in ED  - orthostatic vitals ordered  - TTE ordered  - EEG ordered  - monitor on telemetry   - encourage PO intake   - Cardiology consulted, recs appreciated  - Neuro (Dr. Chin) consulted, f/u recs
Pt with unwitnessed loss of consciousness with reported arm and head shaking. Workup for syncope vs. seizure. Yesterday 5/9/24 appears to be seizure like activity that was witnessed.     - CT head negative for acute intracranial pathology  - Ddimer elevated at 730  - CTA negative for PE  - EKG with sinus bradycardia HR 55, QTc 487  - S/p 1L NS bolus x1 in ED  - TTE ordered  - EEG ordered  - monitor on telemetry   - encourage PO intake   - Cardiology consulted, recs appreciated  - Neuro (Dr. Chin) consulted, f/u recs
Pt with unwitnessed loss of consciousness with reported arm and head shaking. Workup for syncope vs. seizure. Syncopal etiologies include orthostatic hypotensive episode, symptomatic bradycardia, vasovagal episode, or dehydration  - CT head negative for acute intracranial pathology  - Ddimer elevated at 730  - CTA negative for PE  - EKG with sinus bradycardia HR 55, QTc 487  - S/p 1L NS bolus x1 in ED  - TTE ordered  - EEG ordered  - monitor on telemetry   - encourage PO intake   - Cardiology consulted, recs appreciated  - Neuro (Dr. Chni) consulted, f/u recs

## 2024-05-09 NOTE — PROGRESS NOTE ADULT - PROBLEM SELECTOR PLAN 4
Chronic:  - f/u orthostatic vitals  - c/w home midodrine with hold parameters

## 2024-05-09 NOTE — PROGRESS NOTE ADULT - PROBLEM SELECTOR PLAN 6
Chronic:  - c/w home alendronate and vitamin D supplement

## 2024-05-09 NOTE — PROGRESS NOTE ADULT - PROBLEM SELECTOR PLAN 8
Chronic:  - c/w fluoxetine

## 2024-05-10 ENCOUNTER — TRANSCRIPTION ENCOUNTER (OUTPATIENT)
Age: 44
End: 2024-05-10

## 2024-05-10 VITALS
TEMPERATURE: 98 F | OXYGEN SATURATION: 93 % | SYSTOLIC BLOOD PRESSURE: 102 MMHG | DIASTOLIC BLOOD PRESSURE: 51 MMHG | RESPIRATION RATE: 18 BRPM | HEART RATE: 64 BPM

## 2024-05-10 LAB
ANION GAP SERPL CALC-SCNC: 5 MMOL/L — SIGNIFICANT CHANGE UP (ref 5–17)
BUN SERPL-MCNC: 13 MG/DL — SIGNIFICANT CHANGE UP (ref 7–23)
CALCIUM SERPL-MCNC: 9 MG/DL — SIGNIFICANT CHANGE UP (ref 8.5–10.1)
CHLORIDE SERPL-SCNC: 109 MMOL/L — HIGH (ref 96–108)
CO2 SERPL-SCNC: 28 MMOL/L — SIGNIFICANT CHANGE UP (ref 22–31)
CREAT SERPL-MCNC: 0.91 MG/DL — SIGNIFICANT CHANGE UP (ref 0.5–1.3)
EGFR: 79 ML/MIN/1.73M2 — SIGNIFICANT CHANGE UP
GLUCOSE SERPL-MCNC: 77 MG/DL — SIGNIFICANT CHANGE UP (ref 70–99)
HCT VFR BLD CALC: 35.1 % — SIGNIFICANT CHANGE UP (ref 34.5–45)
HGB BLD-MCNC: 12 G/DL — SIGNIFICANT CHANGE UP (ref 11.5–15.5)
MCHC RBC-ENTMCNC: 31.6 PG — SIGNIFICANT CHANGE UP (ref 27–34)
MCHC RBC-ENTMCNC: 34.2 GM/DL — SIGNIFICANT CHANGE UP (ref 32–36)
MCV RBC AUTO: 92.4 FL — SIGNIFICANT CHANGE UP (ref 80–100)
NRBC # BLD: 0 /100 WBCS — SIGNIFICANT CHANGE UP (ref 0–0)
PLATELET # BLD AUTO: 163 K/UL — SIGNIFICANT CHANGE UP (ref 150–400)
POTASSIUM SERPL-MCNC: 3.6 MMOL/L — SIGNIFICANT CHANGE UP (ref 3.5–5.3)
POTASSIUM SERPL-SCNC: 3.6 MMOL/L — SIGNIFICANT CHANGE UP (ref 3.5–5.3)
RBC # BLD: 3.8 M/UL — SIGNIFICANT CHANGE UP (ref 3.8–5.2)
RBC # FLD: 15.7 % — HIGH (ref 10.3–14.5)
SODIUM SERPL-SCNC: 142 MMOL/L — SIGNIFICANT CHANGE UP (ref 135–145)
WBC # BLD: 3.9 K/UL — SIGNIFICANT CHANGE UP (ref 3.8–10.5)
WBC # FLD AUTO: 3.9 K/UL — SIGNIFICANT CHANGE UP (ref 3.8–10.5)

## 2024-05-10 PROCEDURE — 93005 ELECTROCARDIOGRAM TRACING: CPT

## 2024-05-10 PROCEDURE — 85027 COMPLETE CBC AUTOMATED: CPT

## 2024-05-10 PROCEDURE — 82962 GLUCOSE BLOOD TEST: CPT

## 2024-05-10 PROCEDURE — 99285 EMERGENCY DEPT VISIT HI MDM: CPT | Mod: 25

## 2024-05-10 PROCEDURE — 71045 X-RAY EXAM CHEST 1 VIEW: CPT

## 2024-05-10 PROCEDURE — 83735 ASSAY OF MAGNESIUM: CPT

## 2024-05-10 PROCEDURE — 99239 HOSP IP/OBS DSCHRG MGMT >30: CPT

## 2024-05-10 PROCEDURE — 84484 ASSAY OF TROPONIN QUANT: CPT

## 2024-05-10 PROCEDURE — 81001 URINALYSIS AUTO W/SCOPE: CPT

## 2024-05-10 PROCEDURE — 80053 COMPREHEN METABOLIC PANEL: CPT

## 2024-05-10 PROCEDURE — 85025 COMPLETE CBC W/AUTO DIFF WBC: CPT

## 2024-05-10 PROCEDURE — 82550 ASSAY OF CK (CPK): CPT

## 2024-05-10 PROCEDURE — 84702 CHORIONIC GONADOTROPIN TEST: CPT

## 2024-05-10 PROCEDURE — 36415 COLL VENOUS BLD VENIPUNCTURE: CPT

## 2024-05-10 PROCEDURE — 80048 BASIC METABOLIC PNL TOTAL CA: CPT

## 2024-05-10 PROCEDURE — 99232 SBSQ HOSP IP/OBS MODERATE 35: CPT

## 2024-05-10 PROCEDURE — 85379 FIBRIN DEGRADATION QUANT: CPT

## 2024-05-10 PROCEDURE — 97162 PT EVAL MOD COMPLEX 30 MIN: CPT

## 2024-05-10 PROCEDURE — 82553 CREATINE MB FRACTION: CPT

## 2024-05-10 PROCEDURE — 95816 EEG AWAKE AND DROWSY: CPT

## 2024-05-10 PROCEDURE — 71275 CT ANGIOGRAPHY CHEST: CPT | Mod: MC

## 2024-05-10 PROCEDURE — 94640 AIRWAY INHALATION TREATMENT: CPT

## 2024-05-10 PROCEDURE — 93306 TTE W/DOPPLER COMPLETE: CPT

## 2024-05-10 PROCEDURE — 83036 HEMOGLOBIN GLYCOSYLATED A1C: CPT

## 2024-05-10 PROCEDURE — 70450 CT HEAD/BRAIN W/O DYE: CPT | Mod: MC

## 2024-05-10 RX ADMIN — MIDODRINE HYDROCHLORIDE 10 MILLIGRAM(S): 2.5 TABLET ORAL at 05:40

## 2024-05-10 RX ADMIN — BUDESONIDE AND FORMOTEROL FUMARATE DIHYDRATE 2 PUFF(S): 160; 4.5 AEROSOL RESPIRATORY (INHALATION) at 08:01

## 2024-05-10 RX ADMIN — PANTOPRAZOLE SODIUM 40 MILLIGRAM(S): 20 TABLET, DELAYED RELEASE ORAL at 05:39

## 2024-05-10 RX ADMIN — Medication 1000 UNIT(S): at 11:04

## 2024-05-10 RX ADMIN — Medication 20 MILLIGRAM(S): at 11:04

## 2024-05-10 RX ADMIN — Medication 1 TABLET(S): at 11:04

## 2024-05-10 RX ADMIN — Medication 1 DROP(S): at 05:39

## 2024-05-10 RX ADMIN — Medication 650 MILLIGRAM(S): at 08:00

## 2024-05-10 RX ADMIN — Medication 1 APPLICATION(S): at 08:02

## 2024-05-10 RX ADMIN — MIDODRINE HYDROCHLORIDE 10 MILLIGRAM(S): 2.5 TABLET ORAL at 11:04

## 2024-05-10 RX ADMIN — Medication 2 SPRAY(S): at 08:01

## 2024-05-10 RX ADMIN — Medication 650 MILLIGRAM(S): at 09:16

## 2024-05-10 RX ADMIN — Medication 1 APPLICATION(S): at 11:03

## 2024-05-10 RX ADMIN — ENOXAPARIN SODIUM 40 MILLIGRAM(S): 100 INJECTION SUBCUTANEOUS at 05:38

## 2024-05-10 RX ADMIN — Medication 81 MILLIGRAM(S): at 11:04

## 2024-05-10 RX ADMIN — SIMETHICONE 80 MILLIGRAM(S): 80 TABLET, CHEWABLE ORAL at 05:39

## 2024-05-10 RX ADMIN — Medication 112 MICROGRAM(S): at 05:39

## 2024-05-10 NOTE — DISCHARGE NOTE PROVIDER - NSDCCPCAREPLAN_GEN_ALL_CORE_FT
PRINCIPAL DISCHARGE DIAGNOSIS  Diagnosis: Episode of shaking  Assessment and Plan of Treatment: You were found to have shaking that was suspicious for seizure like activity. After thorough workup and evaluation by our Neurology and Cardiology team it was deemed that this was pscyhogenic in nature.  EEG was performed and was negative for any seizure like activity. 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

## 2024-05-10 NOTE — CAREGIVER ENGAGEMENT NOTE - CAREGIVER OUTREACH NOTES - FREE TEXT
[place] spoke with [facility] and they confirm that they are able to accept pt today. [place] spoke with pt's father Reji and he is accepting bed for pt to d/c to [facility]. [place]N scheduled Medicaid lette via [employer] for today at 2pm. SW to follow and remain available for any needs.

## 2024-05-10 NOTE — DISCHARGE NOTE PROVIDER - HOSPITAL COURSE
44 year old woman with hx of down syndrome, hx of CVA, TRINITY, cirrhosis, HLD, osteoporosis, osteoarthritis, hypothyroidism, asthma, GERD, T2DM, constipation, aortic stenosis s/p bovine aortic valve replacement presenting with reported syncopal episode. Admitted for management syncopal workup.           Problem/Plan - 1:  ·  Problem: Syncope.   ·  Plan: Pt with unwitnessed loss of consciousness with reported arm and head shaking. Workup for syncope vs. seizure.  5/9/24 appears to be seizure like activity that was witnessed and after EEG perrformed with Neurology workup it was deemed that this was psychogenic in nature / pseudoseizure.      - CT head negative for acute intracranial pathology  - Ddimer elevated at 730  - CTA negative for PE  - EKG with sinus bradycardia HR 55, QTc 487  - TTE with no acute findings  - EEG with no acute findings  - Neurology and Cardiology evaluation noted     Problem/Plan - 2:  ·  Problem: Abnormal urinalysis.   ·  Plan: Pt currently denies urinary symptoms   - UA with elevated urobilinogen, trace leukocyte esterase, WBC 6, occasional bacteria  - Urine cx sent, f/u results  - Urobilinogen likely 2/2 known liver cirrhosis  - monitor off abx, can start if patient develops symptoms.     Problem/Plan - 3:  ·  Problem: Type 2 diabetes mellitus.   ·  Plan: Chronic:  - Resume home regimen     Problem/Plan - 4:  ·  Problem: Orthostatic hypotension.   ·  Plan: Chronic:  - f/u orthostatic vitals  - c/w home midodrine with hold parameters.     Problem/Plan - 5:  ·  Problem: Asthma.   ·  Plan: Chronic:  - currently saturating well on RA, no wheezing on exam  - c/w home albuterol PRN and Breo Ellipta (therapeutic interchange).     Problem/Plan - 6:  ·  Problem: Osteoporosis.   ·  Plan: Chronic:  - c/w home alendronate and vitamin D supplement.     Problem/Plan - 7:  ·  Problem: Osteoarthritis.   ·  Plan: Chronic:  - c/w meloxicam.     Problem/Plan - 8:  ·  Problem: Anxiety.   ·  Plan: Chronic:  - c/w fluoxetine.     Problem/Plan - 9:  ·  Problem: GERD (gastroesophageal reflux disease).   ·  Plan: Chronic:  - CTA Chest: eccentric wall thickening of distal esophagus/EG junction  - c/w home omeprazole (therapeutic interchange).     Problem/Plan - 10:  ·  Problem: Hypothyroidism.   ·  Plan; Chronic:  - c/w home levothyroxine.     Problem/Plan - 11:  ·  Problem: H/O: CVA (cerebrovascular accident).   ·  Plan: Chronic:  - c/w home aspirin.

## 2024-05-10 NOTE — PROGRESS NOTE ADULT - SUBJECTIVE AND OBJECTIVE BOX
Neurology Follow up note    RICHELLE [id number]    HPI:  44 year old woman with hx of down syndrome, hx of CVA, TRINITY, cirrhosis, HLD, osteoporosis, osteoarthritis, hypothyroidism, asthma, GERD, T2DM, constipation, aortic stenosis s/p bovine aortic valve replacement presenting with reported syncopal episode. Pt lives in [facility] in [place]. Pt was alone in her room when she suddenly began to feel lightheaded and dizzy, followed by arm and head shaking with eventual loss of consciousness. Pt then awoke to people in her room and she was on the floor. Pt endorses she felt back to normal after she was found in her room by others. Prior to this event, pt endorses NBNB vomiting x3 attributed to eating a donut that upset her stomach. Pt denies preceding chest pain or palpitations. Denies any drug use. Of note patient is endorsing some burning on urination that began about one week ago accompanied by lower abdominal pain. Denies SOB, fevers, chills, palpitations, back pain, diarrhea or constipation.   ED course  VS: T 97, HR 62, /70, RR 16, SpO2 96% RA  Labs: Ddimer 730, AST 45   EKG: sinus bradycardia HR 55, QTc 487  Imaging:  - CT head non con: no acute intracranial hemorrhage, vasogenic edema, extra-axial collection   or hydrocephalus. Mild disproportionate cerebellar volume loss.  - CTA Chest: no pulmonary thromboembolism  Eccentric wall thickening of distal esophagus/EG junction. Recommend clinical correlation and consider endoscopy.  In ED given: 1L NS bolus x1     (04 May 2024 23:55)      Interval History -no new events    Patient is seen, chart was reviewed and case was discussed with the treatment team.  Pt is not in any distress.   Lying on bed comfortably.       Vital Signs Last 24 Hrs  T(C): 36.6 (10 May 2024 12:28), Max: 36.8 (09 May 2024 19:55)  T(F): 97.9 (10 May 2024 12:28), Max: 98.2 (09 May 2024 19:55)  HR: 64 (10 May 2024 12:28) (59 - 64)  BP: 102/51 (10 May 2024 12:28) (102/51 - 115/59)  BP(mean): --  RR: 18 (10 May 2024 12:28) (18 - 18)  SpO2: 93% (10 May 2024 12:28) (93% - 96%)    Parameters below as of 10 May 2024 12:28  Patient On (Oxygen Delivery Method): room air                        REVIEW OF SYSTEMS:    Constitutional: No fever, weight loss or fatigue  Eyes: No eye pain, visual disturbances, or discharge  ENT:  No difficulty hearing, tinnitus, vertigo; No sinus or throat pain  Neck: No pain or stiffness  Respiratory: No cough, wheezing, chills or hemoptysis  Cardiovascular: No chest pain, palpitations, shortness of breath, dizziness   Gastrointestinal: No abdominal or epigastric pain. No nausea, vomiting or hematemesis;  Genitourinary: No dysuria, frequency, hematuria or incontinence  Neurological: No headaches, , loss of strength, numbness or tremors  Psychiatric: No depression, anxiety, mood swings or difficulty sleeping  Musculoskeletal: No joint pain or swelling; No muscle, back or extremity pain  Skin: No itching, burning, rashes or lesions   Lymph Nodes: No enlarged glands  Endocrine: No heat or cold intolerance;   Allergy and Immunologic: No hives or eczema    On Neurological Examination:    Mental Status - Pt is alert, awake, oriented X3.  Follows commands well and able to answer questions appropriately.Mood and affect  normal    Speech -  Normal.     Cranial Nerves - Pupils 3 mm equal and reactive to light, extraocular eye movements intact. Pt has no visual field deficit.  Pt has no  facial asymmetry. Facial sensation is intact.Tongue - is in midline.    Muscle tone - is normal    Motor Exam - 4/5 all over, No drift. No shaking or tremors.    Sensory Exam  Pt withdraws all extremities equally on stimulation. No asymmetry seen. No complaints of tingling, numbnes    coordination:    Finger to nose: lluvia    Deep tendon Reflexes - 2 plus all over.          Neck Supple -  Yes.     MEDICATIONS    acetaminophen     Tablet .. 650 milliGRAM(s) Oral every 6 hours PRN  albuterol    90 MICROgram(s) HFA Inhaler 2 Puff(s) Inhalation every 6 hours PRN  ammonium lactate 12% Lotion 1 Application(s) Topical <User Schedule>  artificial  tears Solution 1 Drop(s) Both EYES two times a day  aspirin  chewable 81 milliGRAM(s) Oral daily  bisacodyl Suppository 10 milliGRAM(s) Rectal daily PRN  budesonide  80 MICROgram(s)/formoterol 4.5 MICROgram(s) Inhaler 2 Puff(s) Inhalation two times a day  cholecalciferol 1000 Unit(s) Oral daily  dextrose 10% Bolus 125 milliLiter(s) IV Bolus once  dextrose 5%. 1000 milliLiter(s) IV Continuous <Continuous>  dextrose 5%. 1000 milliLiter(s) IV Continuous <Continuous>  dextrose 50% Injectable 12.5 Gram(s) IV Push once  dextrose 50% Injectable 25 Gram(s) IV Push once  dextrose Oral Gel 15 Gram(s) Oral once PRN  enoxaparin Injectable 40 milliGRAM(s) SubCutaneous every 24 hours  FLUoxetine 20 milliGRAM(s) Oral daily  fluticasone propionate 50 MICROgram(s)/spray Nasal Spray 2 Spray(s) Both Nostrils <User Schedule>  glucagon  Injectable 1 milliGRAM(s) IntraMuscular once  insulin lispro (ADMELOG) corrective regimen sliding scale   SubCutaneous three times a day before meals  insulin lispro (ADMELOG) corrective regimen sliding scale   SubCutaneous at bedtime  levothyroxine 112 MICROGram(s) Oral daily  magnesium hydroxide Suspension 5 milliLiter(s) Oral daily PRN  midodrine. 10 milliGRAM(s) Oral three times a day  multivitamin 1 Tablet(s) Oral daily  ondansetron    Tablet 4 milliGRAM(s) Oral three times a day PRN  pantoprazole    Tablet 40 milliGRAM(s) Oral before breakfast  saline laxative (FLEET) Rectal Enema 1 Enema Rectal daily PRN  simethicone 80 milliGRAM(s) Chew two times a day  sodium chloride 0.65% Nasal 2 Spray(s) Both Nostrils two times a day PRN      Allergies    penicillin (Hives)    Intolerances                          12.0   3.90  )-----------( 163      ( 10 May 2024 06:05 )             35.1         Hemoglobin A1C:     Vitamin B12     RADIOLOGY    ASSESSMENT AND PLAN:      seen for loc/shaking likely syncope  seizure less likely  starring blanking episodes with crying /anxiety likely non epileptic  psychogenic seizure     dc to [facility]  EEG-no epileptiform activity  NO aed  Pain is accessed and addressed.

## 2024-05-10 NOTE — PROGRESS NOTE ADULT - SUBJECTIVE AND OBJECTIVE BOX
[facility] -- Mariia Carlson, Sumanth, Alyssia Monique, , Armando Santos  Office # [phone]    Follow Up: Syncope, Severe AS s/p TAVR    Subjective/Observations: Sitting on the chair, comfortable on RA.  Denies dizziness or lightheadedness.  Denies any from of respiratory or cardiac discomfort    REVIEW OF SYSTEMS: All other review of systems is negative unless indicated above  PAST MEDICAL & SURGICAL HISTORY:  Down syndrome  Hypothyroidism  CVA (cerebrovascular accident)  Osteoporosis  Osteoarthritis  Anxiety  Seasonal allergies  Type 2 diabetes mellitus  GERD (gastroesophageal reflux disease)  H/O aortic valve stenosis  S/P aortic valve replacement    MEDICATIONS  (STANDING):  ammonium lactate 12% Lotion 1 Application(s) Topical <User Schedule>  artificial  tears Solution 1 Drop(s) Both EYES two times a day  aspirin  chewable 81 milliGRAM(s) Oral daily  bacitracin   Ointment 1 Application(s) Topical daily  budesonide  80 MICROgram(s)/formoterol 4.5 MICROgram(s) Inhaler 2 Puff(s) Inhalation two times a day  cholecalciferol 1000 Unit(s) Oral daily  dextrose 10% Bolus 125 milliLiter(s) IV Bolus once  dextrose 5%. 1000 milliLiter(s) (100 mL/Hr) IV Continuous <Continuous>  dextrose 5%. 1000 milliLiter(s) (50 mL/Hr) IV Continuous <Continuous>  dextrose 50% Injectable 25 Gram(s) IV Push once  dextrose 50% Injectable 12.5 Gram(s) IV Push once  enoxaparin Injectable 40 milliGRAM(s) SubCutaneous every 24 hours  FLUoxetine 20 milliGRAM(s) Oral daily  fluticasone propionate 50 MICROgram(s)/spray Nasal Spray 2 Spray(s) Both Nostrils <User Schedule>  glucagon  Injectable 1 milliGRAM(s) IntraMuscular once  insulin lispro (ADMELOG) corrective regimen sliding scale   SubCutaneous three times a day before meals  insulin lispro (ADMELOG) corrective regimen sliding scale   SubCutaneous at bedtime  levothyroxine 112 MICROGram(s) Oral daily  lidocaine 2% Gel 1 Application(s) Topical daily  midodrine. 10 milliGRAM(s) Oral three times a day  multivitamin 1 Tablet(s) Oral daily  pantoprazole    Tablet 40 milliGRAM(s) Oral before breakfast  simethicone 80 milliGRAM(s) Chew two times a day    MEDICATIONS  (PRN):  acetaminophen     Tablet .. 650 milliGRAM(s) Oral every 6 hours PRN Temp greater or equal to 38C (100.4F), Mild Pain (1 - 3)  albuterol    90 MICROgram(s) HFA Inhaler 2 Puff(s) Inhalation every 6 hours PRN for bronchospasm  bisacodyl Suppository 10 milliGRAM(s) Rectal daily PRN Constipation  dextrose Oral Gel 15 Gram(s) Oral once PRN Blood Glucose LESS THAN 70 milliGRAM(s)/deciliter  magnesium hydroxide Suspension 5 milliLiter(s) Oral daily PRN Constipation  ondansetron    Tablet 4 milliGRAM(s) Oral three times a day PRN for nausea  saline laxative (FLEET) Rectal Enema 1 Enema Rectal daily PRN for constipation  sodium chloride 0.65% Nasal 2 Spray(s) Both Nostrils two times a day PRN Nasal Congestion    Allergies    penicillin (Hives)    Intolerances    Vital Signs Last 24 Hrs  T(C): 36.6 (10 May 2024 05:02), Max: 36.8 (09 May 2024 19:55)  T(F): 97.8 (10 May 2024 05:02), Max: 98.2 (09 May 2024 19:55)  HR: 62 (10 May 2024 05:02) (59 - 67)  BP: 115/59 (10 May 2024 05:02) (96/56 - 115/59)  BP(mean): --  RR: 18 (10 May 2024 05:02) (18 - 18)  SpO2: 94% (10 May 2024 05:02) (94% - 96%)    Parameters below as of 10 May 2024 05:02  Patient On (Oxygen Delivery Method): room air    I&O's Summary      PHYSICAL EXAM:  TELE: Not on tele  Constitutional: NAD, awake and alert  HEENT: Moist Mucous Membranes, Anicteric  Pulmonary: Non-labored, breath sounds are clear bilaterally, No wheezing, rales or rhonchi  Cardiovascular: Regular, S1 and S2, +murmurs, no rubs, gallops or clicks  Gastrointestinal: Bowel Sounds present, soft, nontender.   Lymph: No peripheral edema. No lymphadenopathy.  Skin: No visible rashes or ulcers.  Psych:  Mood & affect: pleasantly confused    LABS: All Labs Reviewed:                        12.0   3.90  )-----------( 163      ( 10 May 2024 06:05 )             35.1                         11.8   4.66  )-----------( 186      ( 08 May 2024 20:35 )             34.8                         11.8   3.89  )-----------( 156      ( 08 May 2024 07:12 )             36.1     10 May 2024 06:05    142    |  109    |  13     ----------------------------<  77     3.6     |  28     |  0.91   08 May 2024 20:35    141    |  110    |  12     ----------------------------<  107    4.0     |  23     |  1.20   08 May 2024 07:12    143    |  111    |  11     ----------------------------<  75     3.9     |  24     |  0.84     Ca    9.0        10 May 2024 06:05  Ca    9.4        08 May 2024 20:35  Ca    9.7        08 May 2024 07:12    TPro  7.0    /  Alb  3.0    /  TBili  0.7    /  DBili  x      /  AST  31     /  ALT  25     /  AlkPhos  90     08 May 2024 20:35      CARDIAC MARKERS ( 08 May 2024 20:35 )  x     / x     / 40 U/L / x     / <1.0 ng/mL      12 Lead ECG:   Ventricular Rate 64 BPM    Atrial Rate 64 BPM    P-R Interval 148 ms    QRS Duration 80 ms    Q-T Interval 484 ms    QTC Calculation(Bazett) 499 ms    P Axis -2 degrees    R Axis 46 degrees    T Axis 75 degrees    Diagnosis Line Normal sinus rhythm  Inferior infarct (cited on or before 04-MAY-2024)  Abnormal ECG  When compared with ECG of 04-MAY-2024 18:44,  No significant change was found  Confirmed by KOFI MONIQUE ([id number]) on 5/9/2024 7:51:35 PM (05-08-24 @ 20:35)      TRANSTHORACIC ECHOCARDIOGRAM REPORT  ________________________________________________________________________________                                      _______       Pt. Name:       RICHELLE PEREZ Study Date:    5/8/2024  MRN:            [id number]          YOB: 1980  Accession #:    [id number]          Age:           44 years  Account#:       [id number]         Gender:        F  Visit ID#  Heart Rate:                        Height:        58.66 in (149.00 cm)  Rhythm:                     Weight:        140.60 lb (63.78 kg)  Blood Pressure: 94/56 mmHg         BSA/BMI:       1.58 m² / 28.73 kg/m²  ________________________________________________________________________________________  Referring Physician:    [id number] Ewelina Gaines  Interpreting Physician: Kofi Monique  Primary Sonographer:    Rebecca Diane RDCS    CPT:               ECHO TTE WO CON COMP W DOPP - 76781.m  Indication(s):     Syncope and collapse - R55  Procedure:         Transthoracic echocardiogram with 2-D, M-mode and complete                     spectral and color flow Doppler.  Ordering Location: [facility]  Admission Status:  Inpatient  Study Information: Image quality for this study is technically difficult.    _______________________________________________________________________________________     CONCLUSIONS:      1. Technically difficult image quality.   2. Left ventricular endocardium is not well visualized; however, the left ventricular systolic function appears grossly normal.   3. Left ventricular systolic function is normal with an ejection fraction of 58 % by Meyers's method of disks.   4. There is moderate (grade 2) left ventricular diastolic dysfunction, with elevated filling pressure. Analysis of left ventricular diastolic function and filling pressure is made challenging by the presence of mitral annular calcification.   5. Normal right ventricular cavity size, with normal wall thickness, and normal systolic function.   6. The left atrium is moderately dilated.   7. Mitral valve leaflets are diffusely calcified.   8. The peak transmitral mitral gradient is 10mmHg and a myles of 6mmHg at a HR of 59bpm. The MVA via PHT is approximately 1.4sqcm which is consistent with severe mitral stenosis.   9. Trace mitral regurgitation.  10. A bioprosthetic valve replacement Transcatheter deployed (TAVR) valve replacement is present in the aortic position The prosthetic valve is well seated.  11. Estimated pulmonary artery systolic pressure is 32 mmHg, consistent with normal pulmonary artery pressure.    ________________________________________________________________________________________  FINDINGS:     Left Ventricle:  Left ventricular systolic function is normal with a calculated ejection fraction of 58 % by the Meyers's biplane method of disks. There is moderate (grade 2) left ventricular diastolic dysfunction, with elevated filling pressure. Analysis of left ventricular diastolic function and filling pressure is made challenging by the presence of mitral annular calcification. Left ventricular endocardium is not well visualized; however, the left ventricular systolic function appears grossly normal.     Right Ventricle:  The right ventricular cavity is normal in size, with normal wall thickness and normal systolic function.     Left Atrium:  The left atrium is moderately dilated with an indexed volume of 13.28 ml/m².     Right Atrium:  The right atrium is normal in size with an indexed volume of 8.48 ml/m².     Aortic Valve:  A bioprostheticvalve replacement is present in the aortic position. A a transcatheter deployed (TAVR) is present in the aortic position. The prosthetic valve is well seated. The peak transaortic velocity is 2.42 m/s, peak transaortic gradient is 23.4 mmHg and mean transaortic gradient is 12.0 mmHg with an LVOT/aortic valve VTI ratio of 0.45.     Mitral Valve:  The posterior leaflet is heavily calcified and restricted in its motion. There is calcification of the mitral valve annulus. Mitral valve leaflets are diffusely calcified. There is mild mitral valve stenosis. The peak transmitral mitral gradient is 10mmHg and a myles of 6mmHg at a HR of 59bpm. The MVA via PHT is approximately 1.4sqcm which is consistent with severe mitral stenosis. There is trace mitral regurgitation.     Tricuspid Valve:  Structurally normal tricuspid valve with normal leaflet excursion. There is mild tricuspid regurgitation. Estimated pulmonary artery systolic pressure is 32 mmHg, consistent with normal pulmonary artery pressure.     Pulmonic Valve:  The pulmonic valve was not well visualized.     Aorta:  The aortic root at the sinuses of Valsalva is normal in size, measuring 2.10 cm (indexed 1.33 cm/m²). The aortic arch diameter is normal in size, measuring 2.0 cm (indexed 1.27 cm/m²).     Pericardium:  No pericardial effusion seen.     Systemic Veins:  The inferior vena cava is normal in size measuring 1.27 cm in diameter, (normal <2.1cm) with normal inspiratory collapse (normal >50%) consistent with normal right atrial pressure (~3, range 0-5mmHg).  ____________________________________________________________________  QUANTITATIVE DATA:  Left Ventricle Measurements: (Indexed to BSA)     IVSd (2D):   1.0 cm  LVPWd (2D):  1.1 cm  LVIDd (2D):  4.1 cm  LVIDs (2D):  2.6 cm  LV Mass:     138 g  87.1 g/m²  LV Vol d, MOD A2C: 43.7 ml 27.64 ml/m²  LV Vol d, MOD A4C: 96.7 ml 61.17 ml/m²  LV Vol d, MOD BP:  67.0 ml 42.37 ml/m²  LV Vol s, MOD A2C: 17.7 ml 11.20 ml/m²  LV Vol s, MOD A4C: 43.3 ml 27.39 ml/m²  LV Vol s, MOD BP:  28.3 ml 17.92 ml/m²  LVOT SV MOD BP:    38.7 ml  LV EF% MOD BP:     58 %     MV E Vmax:    1.37 m/s  MV A Vmax:    1.42 m/s  MV E/A:       0.96  e' lateral:   4.90 cm/s  e' medial:    5.00 cm/s  E/e' lateral: 27.96  E/e' medial:  27.40  E/e' Average: 27.68  MV DT:        384 msec    Aorta Measurements: (Normal range) (Indexed to BSA)     Sinuses of Valsalva: 2.10 cm (2.7 - 3.3 cm)  Ao Arch:             2.0 cm       Left Atrium Measurements: (Indexed to BSA)  LA Diam 2D:        3.20 cm  LA Vol s, MOD A4C: 16.40 ml.  LA Vol s, MOD A2C: 29.50 ml.  LA Vol s, MOD BP:  21.00 ml  13.28 ml/m²    Right Ventricle Measurements: Right Atrial Measurements:     RV Base (RVID1):  3.6 cm      RA Vol:       13.40 ml  RV Mid (RVID2):   3.0 cm      RA Vol Index: 8.48 ml/m²  RV Major (RVID3): 8.0 cm       LVOT / RVOT/ Qp/Qs Data: (Indexed to BSA)  LVOT Vmax:      1.04 m/s  LVOT Vmn:       0.661 m/s  LVOT VTI:       25.10 cm  LVOT peak grad: 4 mmHg  LVOT mean grad: 2.0 mmHg    Aortic Valve Measurements:  AVVmax:                2.4 m/s  AV Peak Gradient:       23.4 mmHg  AV Mean Gradient:       12.0 mmHg  AV VTI:                 56.2 cm  AV VTI Ratio:           0.45  AoV Dimensionless Index 0.45    Mitral Valve Measurements:     MV Vmax:      1.63 m/s  MV VTI, aurea   0.692 m  MV Mean Grad: 6.0 mmHg  MV Peak Grad: 10.6 mmHg  MV E Vmax:    1.4 m/s  MV A Vmax:    1.4 m/s  MV E/A:       1.0  MV P1/2T:     167 msec       Tricuspid Valve Measurements:     TR Vmax:          2.7 m/s  TR Peak Gradient: 29.4 mmHg  RA Pressure:      3 mmHg  PASP:             32 mmHg    ________________________________________________________________________________________  Electronically signed on 5/8/2024 at 3:07:07 PM by Kofi Monique         *** Final ***

## 2024-05-10 NOTE — DISCHARGE NOTE PROVIDER - ATTENDING DISCHARGE PHYSICAL EXAMINATION:
PHYSICAL EXAM:  Vital Signs Last 24 Hrs  T(C): 36.6 (10 May 2024 05:02), Max: 36.8 (09 May 2024 19:55)  T(F): 97.8 (10 May 2024 05:02), Max: 98.2 (09 May 2024 19:55)  HR: 62 (10 May 2024 05:02) (59 - 67)  BP: 115/59 (10 May 2024 05:02) (96/56 - 115/59)  BP(mean): --  RR: 18 (10 May 2024 05:02) (18 - 18)  SpO2: 94% (10 May 2024 05:02) (94% - 96%)    Parameters below as of 10 May 2024 05:02  Patient On (Oxygen Delivery Method): room air        GENERAL: NAD  HEAD:  Atraumatic, Normocephalic  ENMT: Jose Mucous Membranes  NECK: Supple, No JVD, Normal thyroid  HEART: Regular rate and rhythm; No murmurs, rubs, or gallops  CHEST/LUNG: Clear to auscultation bilaterally; No rales, rhonchi, wheezing, or rubs  ABDOMEN: Soft, Nontender, Nondistended; Bowel sounds present  EXTREMITIES:  2+ Peripheral Pulses, No clubbing, cyanosis, or edema  SKIN: No rashes or lesions

## 2024-05-10 NOTE — DISCHARGE NOTE PROVIDER - NSDCMRMEDTOKEN_GEN_ALL_CORE_FT
Albuterol (Eqv-ProAir HFA) 90 mcg/inh inhalation aerosol: 2 puff(s) inhaled every 6 hours as needed for  bronchospasm  alendronate 70 mg oral tablet: 1 tab(s) orally once a week every week on Wednesday at 6:00am  ammonium lactate 12% topical lotion: Apply topically to affected area once a day apply to bilateral lower extremities  aspirin 81 mg oral tablet: 1 tab(s) orally once a day  Breo Ellipta 100 mcg-25 mcg/inh inhalation powder: 1 puff(s) inhaled once a day  D3 25 mcg (1000 intl units) oral tablet: 1 tab(s) orally once a day  Dulcolax Laxative 10 mg rectal suppository: 1 suppository(ies) rectally once a day as needed for  constipation  Fleet Enema 19 g-7 g rectal enema: 133 milliliter(s) rectally once a day as needed for  constipation  Flonase 50 mcg/inh nasal spray: 2 spray(s) in each nostril once a day  FLUoxetine 20 mg oral capsule: 1 cap(s) orally once a day  levothyroxine 112 mcg (0.112 mg) oral tablet: 1 tab(s) orally once a day  magnesium hydroxide 1200 mg/15 mL oral liquid: 5 milliliter(s) orally once a day as needed for  constipation  meloxicam 15 mg oral tablet: 1 tab(s) orally once a day as needed for  mild pain  metFORMIN 750 mg oral tablet, extended release: 1 tab(s) orally 2 times a day  midodrine 10 mg oral tablet: 1 tab(s) orally 3 times a day  Multiple Vitamins oral tablet: 1 tab(s) orally once a day  omeprazole 40 mg oral delayed release capsule: 1 cap(s) orally once a day  Pyrithione Zinc 1% topical shampoo: Apply topically to affected area 2 times a day every Wednesday and Saturday  Restasis 0.05% ophthalmic emulsion: 1 drop(s) in each affected eye 2 times a day  simethicone 80 mg oral tablet, chewable: 1 tab(s) chewed 2 times a day  sodium chloride 0.5% nasal spray: 2 spray(s) nasal 2 times a day  Zofran 4 mg oral tablet: 1 tab(s) orally 3 times a day as needed for  nausea

## 2024-05-10 NOTE — DISCHARGE NOTE NURSING/CASE MANAGEMENT/SOCIAL WORK - NSDCPEFALRISK_GEN_ALL_CORE
For information on Fall & Injury Prevention, visit: [email or web address] OR  [email or web address] OR  https://www.cdc.gov/steadi/patient.html

## 2024-05-10 NOTE — PROGRESS NOTE ADULT - ASSESSMENT
44 year old woman with hx of down syndrome, hx of CVA, TRINITY, cirrhosis, HLD, osteoporosis, osteoarthritis, hypothyroidism, asthma, GERD, T2DM, constipation, aortic stenosis s/p bovine aortic valve replacement presenting with reported syncopal episode.     Syncope/Seizure?Diastolic HF, Severe MS, Severe AS s/p TAVR  - Neuro following.     - CT head neg.  EEG neg of epileptiform acivity  - No tele events while on tele, now off    - EKG w/ SB with prolonged QTC (487ms)  - Avoid QTc prolonging medications.     - Has a hx of orthostasis.  BP stable at systolic 90's-110's on home Midodrine.  Would check orthostatics  - Known bovine AVR.  TTE showed EF 58%, grade 2 DD, severe MS, bioAVR (TAVR) in aortic position, well-seated  - Continue ASA  - No evidence of volume overload    - Monitor and replete electrolytes. Keep K>4.0 and Mg>2.0.   - Will continue to follow.  Stable from cardiac standpoint    Barb Robbins DNP, NP-C, AGACNP-C  Cardiology   Call TEAMS

## 2024-05-10 NOTE — DISCHARGE NOTE NURSING/CASE MANAGEMENT/SOCIAL WORK - PATIENT PORTAL LINK FT
You can access the FollowMyHealth Patient Portal offered by [facility] by registering at the following website: [email or web address]. By joining [employer]’s FollowMyHealth portal, you will also be able to view your health information using other applications (apps) compatible with our system.

## 2024-05-10 NOTE — PROGRESS NOTE ADULT - PROVIDER SPECIALTY LIST ADULT
Neurology
Neurology
Cardiology
Neurology
Cardiology
Hospitalist
Neurology
Neurology
Cardiology
Hospitalist

## 2024-05-10 NOTE — PROGRESS NOTE ADULT - REASON FOR ADMISSION
syncope

## 2024-05-10 NOTE — PROGRESS NOTE ADULT - NS ATTEND AMEND GEN_ALL_CORE FT
44 year old woman with hx of down syndrome, hx of CVA, TRINITY, cirrhosis, HLD, osteoporosis, osteoarthritis, hypothyroidism, asthma, GERD, T2DM, constipation, aortic stenosis s/p bovine aortic valve replacement presenting with reported syncopal episode.     - she is unable to provide information about the events surrounding syncope. Noted to have shaking by staff.   - fu neuro evaluation   -EEG this am   - avoid QTc prolonging medications.   - check ekgs  - Tele Sr no events   - has a hx of orthostasis and is on midodrine. Would check orthostatics. cont with home midodrine  - cont asa  TTE showed EF 58%, grade 2 DD, severe MS, bioAVR (TAVR) in aortic position, well-seated  HR is controlled.
44 year old woman with hx of down syndrome, hx of CVA, TRINITY, cirrhosis, HLD, osteoporosis, osteoarthritis, hypothyroidism, asthma, GERD, T2DM, constipation, aortic stenosis s/p bovine aortic valve replacement presenting with reported syncopal episode.     - she is unable to provide information about the events surrounding syncope. Noted to have shaking by staff.   - neuro following   - avoid QTc prolonging medications.   - Tele Sr no events   - has a hx of orthostasis and is on midodrine. BP stable at systolic 90's-110's on home Midodrine.   - cont asa  - TTE showed EF 58%, grade 2 DD, severe MS, bioAVR (TAVR) in aortic position, well-seated  - HR is controlled.
44 year old woman with hx of down syndrome, hx of CVA, TRINITY, cirrhosis, HLD, osteoporosis, osteoarthritis, hypothyroidism, asthma, GERD, T2DM, constipation, aortic stenosis s/p bovine aortic valve replacement presenting with reported syncopal episode.     - she is unable to provide information about the events surrounding syncope. Noted to have shaking by staff.   - TTE is pending  - EKG w SB with prolonged QTC.   - avoid QTc prolonging medications.   - check ekg daily  - Tele with no events  - On midodrine for orthostatic hypotension  - Known AVR on ASA  - Should be on statin given history of CVA
44 year old woman with hx of down syndrome, hx of CVA, TRINITY, cirrhosis, HLD, osteoporosis, osteoarthritis, hypothyroidism, asthma, GERD, T2DM, constipation, aortic stenosis s/p bovine aortic valve replacement presenting with reported syncopal episode.     possible syncope  prolonged qt avoid meds which might prolong qt  hx of orthostasis, cont mido    Bebeto on exam can check baseline echo   - Appears compensated from HF POV.   - no acute ischemia
44 year old woman with hx of down syndrome, hx of CVA, TRINITY, cirrhosis, HLD, osteoporosis, osteoarthritis, hypothyroidism, asthma, GERD, T2DM, constipation, aortic stenosis s/p bovine aortic valve replacement presenting with reported syncopal episode.     - she is unable to provide information about the events surrounding syncope. Noted to have shaking by staff.   - fu neuro evaluation   -EEG this am   - avoid QTc prolonging medications.   - check ekg daily  - Tele Sr no events   - has a hx of orthostasis and is on midodrine. Would check orthostatics. cont with home midodrine  - cont asa  SARKIS on exam can check baseline echo

## 2024-05-29 RX ORDER — FLUOXETINE HCL 10 MG
1 CAPSULE ORAL
Refills: 0 | DISCHARGE

## 2024-05-29 RX ORDER — MELOXICAM 15 MG/1
1 TABLET ORAL
Refills: 0 | DISCHARGE

## 2024-05-29 RX ORDER — MIDODRINE HYDROCHLORIDE 2.5 MG/1
1 TABLET ORAL
Refills: 0 | DISCHARGE

## 2024-05-29 RX ORDER — ALBUTEROL 90 UG/1
2 AEROSOL, METERED ORAL
Refills: 0 | DISCHARGE

## 2024-05-29 RX ORDER — METFORMIN HYDROCHLORIDE 850 MG/1
1 TABLET ORAL
Refills: 0 | DISCHARGE

## 2024-05-29 RX ORDER — ALENDRONATE SODIUM 70 MG/1
1 TABLET ORAL
Refills: 0 | DISCHARGE

## 2024-05-29 RX ORDER — PYRITHIONE ZINC 1 %
1 SHAMPOO TOPICAL
Refills: 0 | DISCHARGE

## 2024-05-29 RX ORDER — ASPIRIN/CALCIUM CARB/MAGNESIUM 324 MG
1 TABLET ORAL
Refills: 0 | DISCHARGE

## 2024-05-29 RX ORDER — LEVOTHYROXINE SODIUM 125 MCG
1 TABLET ORAL
Refills: 0 | DISCHARGE

## 2024-05-29 RX ORDER — FLUTICASONE FUROATE AND VILANTEROL TRIFENATATE 100; 25 UG/1; UG/1
1 POWDER RESPIRATORY (INHALATION)
Refills: 0 | DISCHARGE

## 2024-05-29 RX ORDER — CHOLECALCIFEROL (VITAMIN D3) 125 MCG
1 CAPSULE ORAL
Refills: 0 | DISCHARGE

## 2024-05-29 RX ORDER — ONDANSETRON 8 MG/1
1 TABLET, FILM COATED ORAL
Refills: 0 | DISCHARGE

## 2024-05-29 RX ORDER — OMEPRAZOLE 10 MG/1
1 CAPSULE, DELAYED RELEASE ORAL
Refills: 0 | DISCHARGE

## 2024-05-29 RX ORDER — SOD,AMMONIUM,POTASSIUM LACTATE
1 CREAM (GRAM) TOPICAL
Refills: 0 | DISCHARGE

## 2024-05-29 RX ORDER — CYCLOSPORINE 0.5 MG/ML
1 EMULSION OPHTHALMIC
Refills: 0 | DISCHARGE

## 2024-05-29 RX ORDER — FLUTICASONE PROPIONATE 50 MCG
2 SPRAY, SUSPENSION NASAL
Refills: 0 | DISCHARGE

## 2024-05-29 RX ORDER — SIMETHICONE 80 MG/1
1 TABLET, CHEWABLE ORAL
Refills: 0 | DISCHARGE

## 2024-05-29 RX ORDER — MAGNESIUM HYDROXIDE 400 MG/1
5 TABLET, CHEWABLE ORAL
Refills: 0 | DISCHARGE

## 2024-06-05 DIAGNOSIS — R55 SYNCOPE AND COLLAPSE: ICD-10-CM

## 2024-06-12 ENCOUNTER — APPOINTMENT (OUTPATIENT)
Dept: PEDIATRIC CARDIOLOGY | Facility: CLINIC | Age: 44
End: 2024-06-12

## 2024-06-27 DIAGNOSIS — D69.6 THROMBOCYTOPENIA, UNSPECIFIED: ICD-10-CM

## 2024-06-28 ENCOUNTER — OUTPATIENT (OUTPATIENT)
Dept: OUTPATIENT SERVICES | Facility: HOSPITAL | Age: 44
LOS: 1 days | Discharge: HOME | End: 2024-06-28

## 2024-06-28 DIAGNOSIS — D69.8 OTHER SPECIFIED HEMORRHAGIC CONDITIONS: ICD-10-CM

## 2024-06-28 DIAGNOSIS — Z95.4 PRESENCE OF OTHER HEART-VALVE REPLACEMENT: Chronic | ICD-10-CM

## 2024-06-28 DIAGNOSIS — Z98.89 OTHER SPECIFIED POSTPROCEDURAL STATES: Chronic | ICD-10-CM

## 2024-06-28 DIAGNOSIS — Z95.2 PRESENCE OF PROSTHETIC HEART VALVE: Chronic | ICD-10-CM

## 2024-06-28 PROBLEM — M81.0 AGE-RELATED OSTEOPOROSIS WITHOUT CURRENT PATHOLOGICAL FRACTURE: Chronic | Status: ACTIVE | Noted: 2024-05-05

## 2024-06-28 PROBLEM — K21.9 GASTRO-ESOPHAGEAL REFLUX DISEASE WITHOUT ESOPHAGITIS: Chronic | Status: ACTIVE | Noted: 2024-05-05

## 2024-06-28 PROBLEM — J30.2 OTHER SEASONAL ALLERGIC RHINITIS: Chronic | Status: ACTIVE | Noted: 2024-05-05

## 2024-06-28 PROBLEM — M19.90 UNSPECIFIED OSTEOARTHRITIS, UNSPECIFIED SITE: Chronic | Status: ACTIVE | Noted: 2024-05-05

## 2024-06-28 PROBLEM — Z86.79 PERSONAL HISTORY OF OTHER DISEASES OF THE CIRCULATORY SYSTEM: Chronic | Status: ACTIVE | Noted: 2024-05-05

## 2024-06-28 PROBLEM — E03.9 HYPOTHYROIDISM, UNSPECIFIED: Chronic | Status: ACTIVE | Noted: 2024-05-05

## 2024-06-28 PROBLEM — I63.9 CEREBRAL INFARCTION, UNSPECIFIED: Chronic | Status: ACTIVE | Noted: 2024-05-05

## 2024-06-28 PROBLEM — F41.9 ANXIETY DISORDER, UNSPECIFIED: Chronic | Status: ACTIVE | Noted: 2024-05-05

## 2024-06-28 PROBLEM — E11.9 TYPE 2 DIABETES MELLITUS WITHOUT COMPLICATIONS: Chronic | Status: ACTIVE | Noted: 2024-05-05

## 2024-06-28 PROBLEM — Q90.9 DOWN SYNDROME, UNSPECIFIED: Chronic | Status: ACTIVE | Noted: 2024-05-05

## 2024-07-02 ENCOUNTER — APPOINTMENT (OUTPATIENT)
Dept: HEMATOLOGY ONCOLOGY | Facility: CLINIC | Age: 44
End: 2024-07-02

## 2024-07-23 ENCOUNTER — RESULT CHARGE (OUTPATIENT)
Age: 44
End: 2024-07-23

## 2024-07-24 ENCOUNTER — APPOINTMENT (OUTPATIENT)
Dept: PEDIATRIC CARDIOLOGY | Facility: CLINIC | Age: 44
End: 2024-07-24
Payer: MEDICARE

## 2024-07-24 VITALS
HEART RATE: 73 BPM | HEIGHT: 55 IN | BODY MASS INDEX: 33.67 KG/M2 | DIASTOLIC BLOOD PRESSURE: 65 MMHG | WEIGHT: 145.51 LBS | SYSTOLIC BLOOD PRESSURE: 102 MMHG | OXYGEN SATURATION: 99 %

## 2024-07-24 PROCEDURE — G2211 COMPLEX E/M VISIT ADD ON: CPT

## 2024-07-24 PROCEDURE — 99214 OFFICE O/P EST MOD 30 MIN: CPT

## 2024-07-24 PROCEDURE — 93306 TTE W/DOPPLER COMPLETE: CPT

## 2024-07-24 RX ORDER — MIDODRINE HYDROCHLORIDE 10 MG/1
10 TABLET ORAL 3 TIMES DAILY
Refills: 0 | Status: ACTIVE | COMMUNITY
Start: 2024-07-24

## 2024-07-24 RX ORDER — ALENDRONATE SODIUM 70 MG/1
70 TABLET ORAL
Refills: 0 | Status: ACTIVE | COMMUNITY
Start: 2024-07-24

## 2024-07-24 NOTE — CONSULT LETTER
[Today's Date] : [unfilled] [Name] : Name: [unfilled] [] : : ~~ [Today's Date:] : [unfilled] [Dear  ___:] : Dear Dr. [unfilled]: [Consult] : I had the pleasure of evaluating your patient, [unfilled]. My full evaluation follows. [Consult - Multiple Provider] : Thank you very much for allowing us to participate in the care of this patient. If you have any questions, please do not hesitate to contact us. [Sincerely,] : Sincerely, [FreeTextEntry4] : Michelle Price MD [FreeTextEntry5] : [address] [facility] [FreeTextEntry6] : [place] [id number] [de-identified] : Veronica Gould MD Pediatric Cardiology Fellow [facility]  Amalia Moran MD, HERSON Director, Adult Congenital Heart , High Risk Cardiovascular Obstetrics [facility]  [address]: [phone] [facility] Office: [phone] [facility] Office: [phone]

## 2024-07-24 NOTE — CARDIOLOGY SUMMARY
[de-identified] : 8/14/2023 [Today's Date] : [unfilled] [FreeTextEntry1] : sinus rhythm@66bmp, inferior/ anteriolateral Q no change from previous [FreeTextEntry2] : Summary: 1. History of aortic stenosis s/p posterior root enlargement, aortic valve replacement with a 23 mm bovine pericardial valve, and left ventricular outflow tract muscular resection on 7/1/2014. Now status post transcatheter aortic valve replacement (TAVR) on 7/7/2023 with 26mm Evolut Fx. 2. Compared to the previous echocardiogram; no significant change. 3. Imaging artifact at the christopher-aortic valve. No significant neoaortic regurgitation noted. PSIG across the neoaortic valve of 30 mmHg. 4. Mild mitral valve stenosis. 5. Tethering and limited mobility of the posterior mitral valve leaflet, mean inflow gradient of 5-8 mmHg. 6. Trivial mitral valve regurgitation. 7. No residual left ventricular outflow tract obstruction. 8. Qualitatively mild concentric left ventricular hypertrophy with normal systolic function. 9. Normal right ventricular morphology with qualitatively normal size and systolic function. 10. Mild tricuspid valve regurgitation, peak systolic instantaneous gradient 28.9 mmHg. 11. Right ventricular systolic pressure estimate = 33.9 mmHg (estimated right atrial pressure of 5 mmHg). 12. No pericardial effusion.

## 2024-07-24 NOTE — DISCUSSION/SUMMARY
[Needs SBE Prophylaxis] : [unfilled]  needs bacterial endocarditis prophylaxis. SBE prophylaxis is indicated for dental and invasive ENT procedures. (Circulation. 2007; 116: 0967-9793) [FreeTextEntry1] : In summary, Clementina is a 44 year old female with a history of Trisomy 21 and left ventricular outflow tract obstruction s/p aortic valve replacement with posterior enlargement of the root and left ventricular outflow tract muscle resection, s/p TAVR w/ 26mm Evolut Fx aortic valve (7/7/2023). Her echocardiogram demonstrates no aortic valve insufficiency with PSIG of 30mmHg across the aortic valve with normal biventricular function.   She should continue with ASA for her aortic valve.   She should continue with midodrine in the setting of low blood pressure and multiple falls.   We again reviewed the importance of meticulous dental hygiene and the need for regular dental cleanings. We reviewed that the SBE prophylaxis guidelines for Clementina should be Azithromycin and have sent in a prescription for her next dental appointment.  We will see her again in 6 months,   She is cleared to return to her group home and resume all activities with no restrictions.

## 2024-07-24 NOTE — HISTORY OF PRESENT ILLNESS
[FreeTextEntry1] : We had the pleasure of seeing Clementina Matamoros today in the Adult [facility] of [facility]. As you know, Clementina is a 44year old female with Trisomy 21 and left ventricular outflow tract obstruction with subvalvar and valvar aortic stenosis. She underwent posterior root enlargement, aortic valve replacement with a 23 mm bovine pericardial valve, and left ventricular outflow tract muscular resection on 7/1/2014. She is now s/p TAVR with 26mm Evolut Fx valve on 7/7/2023.   Since her last visit, Clementina had multiple falls. She had a recent stay at a rehab facility after the fall. She is walking with a walker.  She can walk up 9 steps.   She denies chest pain, palpitations, shortness of breath, near syncope, or syncope.   She has TRINITY and says she is compliant with wearing her CPAP at night. She has hypothyroidism and chronic thrombocytopenia related to Trisomy 21. She is followed routinely by Hematology with stable platelet counts. Her dental care is up to date. She takes antibiotic prophylaxis before dental procedures.  She also was diagnosed with pseudoseizures last year. She was admitted to [facility] where she reportedly had an MRI brain and EEG which were normal. According to the neurology records we have obtained, she has an old right cerebellar stroke which was documented in the March 2023 neurology note.

## 2024-07-24 NOTE — REVIEW OF SYSTEMS
[Feels Tired] : feels tired [Dizziness] : dizziness [Sleep Disturbances] : ~T sleep disturbances [Depression] : depression [Feeling Poorly] : not feeling poorly (malaise) [Fever] : no fever [Wgt Loss (___ Lbs)] : no recent weight loss [Pallor] : not pale [Cyanosis] : no cyanosis [Edema] : no edema [Diaphoresis] : not diaphoretic [Chest Pain] : no chest pain or discomfort [Exercise Intolerance] : no persistence of exercise intolerance [Palpitations] : no palpitations [Orthopnea] : no orthopnea [Fast HR] : no tachycardia [Tachypnea] : not tachypneic [Wheezing] : no wheezing [Cough] : no cough [Shortness Of Breath] : not expressed as feeling short of breath [Abdominal Pain] : no abdominal pain [Decrease In Appetite] : appetite not decreased [Fainting (Syncope)] : no fainting [Seizure] : no seizures

## 2024-07-24 NOTE — CONSULT LETTER
[Today's Date] : [unfilled] [Name] : Name: [unfilled] [] : : ~~ [Today's Date:] : [unfilled] [Dear  ___:] : Dear Dr. [unfilled]: [Consult] : I had the pleasure of evaluating your patient, [unfilled]. My full evaluation follows. [Consult - Multiple Provider] : Thank you very much for allowing us to participate in the care of this patient. If you have any questions, please do not hesitate to contact us. [Sincerely,] : Sincerely, [FreeTextEntry4] : Michelle Price MD [FreeTextEntry5] : [address] [facility] [FreeTextEntry6] : [place] [id number] [de-identified] : Veronica Gould MD Pediatric Cardiology Fellow [facility]  Amalia Moran MD, HERSON Director, Adult Congenital Heart , High Risk Cardiovascular Obstetrics [facility]  [address]: [phone] [facility] Office: [phone] [facility] Office: [phone]

## 2024-07-24 NOTE — PHYSICAL EXAM
[General Appearance - Well Nourished] : well nourished [General Appearance - Well-Appearing] : well appearing [Respiration, Rhythm And Depth] : normal respiratory rhythm and effort [II] : a grade 2/6 [RUSB] : RUSB [Abdomen Soft] : soft [Nondistended] : nondistended [Abdomen Tenderness] : non-tender [Nail Clubbing] : no clubbing  or cyanosis of the fingernails [General Appearance - Alert] : alert [] : no respiratory distress [Auscultation Breath Sounds / Voice Sounds] : breath sounds clear to auscultation bilaterally [Chest Surgical / Traumatic Scar] : chest incision well healed [Heart Sounds] : normal S1 and S2 [Heart Sounds Click] : no clicks [Systolic] : systolic [III] : a grade 3/6   [LUSB] : LUSB [FreeTextEntry9] : +clubbing

## 2024-07-24 NOTE — DISCUSSION/SUMMARY
[Needs SBE Prophylaxis] : [unfilled]  needs bacterial endocarditis prophylaxis. SBE prophylaxis is indicated for dental and invasive ENT procedures. (Circulation. 2007; 116: 0733-5742) [FreeTextEntry1] : In summary, Clementina is a 44 year old female with a history of Trisomy 21 and left ventricular outflow tract obstruction s/p aortic valve replacement with posterior enlargement of the root and left ventricular outflow tract muscle resection, s/p TAVR w/ 26mm Evolut Fx aortic valve (7/7/2023). Her echocardiogram demonstrates no aortic valve insufficiency with PSIG of 30mmHg across the aortic valve with normal biventricular function.   She should continue with ASA for her aortic valve.   She should continue with midodrine in the setting of low blood pressure and multiple falls.   We again reviewed the importance of meticulous dental hygiene and the need for regular dental cleanings. We reviewed that the SBE prophylaxis guidelines for Clementina should be Azithromycin and have sent in a prescription for her next dental appointment.  We will see her again in 6 months,   She is cleared to return to her group home and resume all activities with no restrictions.

## 2024-07-24 NOTE — CARDIOLOGY SUMMARY
[de-identified] : 8/14/2023 [Today's Date] : [unfilled] [FreeTextEntry1] : sinus rhythm@66bmp, inferior/ anteriolateral Q no change from previous [FreeTextEntry2] : Summary: 1. History of aortic stenosis s/p posterior root enlargement, aortic valve replacement with a 23 mm bovine pericardial valve, and left ventricular outflow tract muscular resection on 7/1/2014. Now status post transcatheter aortic valve replacement (TAVR) on 7/7/2023 with 26mm Evolut Fx. 2. Compared to the previous echocardiogram; no significant change. 3. Imaging artifact at the christopher-aortic valve. No significant neoaortic regurgitation noted. PSIG across the neoaortic valve of 30 mmHg. 4. Mild mitral valve stenosis. 5. Tethering and limited mobility of the posterior mitral valve leaflet, mean inflow gradient of 5-8 mmHg. 6. Trivial mitral valve regurgitation. 7. No residual left ventricular outflow tract obstruction. 8. Qualitatively mild concentric left ventricular hypertrophy with normal systolic function. 9. Normal right ventricular morphology with qualitatively normal size and systolic function. 10. Mild tricuspid valve regurgitation, peak systolic instantaneous gradient 28.9 mmHg. 11. Right ventricular systolic pressure estimate = 33.9 mmHg (estimated right atrial pressure of 5 mmHg). 12. No pericardial effusion.

## 2024-07-24 NOTE — HISTORY OF PRESENT ILLNESS
[FreeTextEntry1] : We had the pleasure of seeing Clementina Matamoros today in the Adult [facility] of [facility]. As you know, Clementina is a 44year old female with Trisomy 21 and left ventricular outflow tract obstruction with subvalvar and valvar aortic stenosis. She underwent posterior root enlargement, aortic valve replacement with a 23 mm bovine pericardial valve, and left ventricular outflow tract muscular resection on 7/1/2014. She is now s/p TAVR with 26mm Evolut Fx valve on 7/7/2023.   Since her last visit, Clementina had multiple falls. She had a recent stay at a rehab facility after the fall. She is walking with a walker.  She can walk up 9 steps.   She denies chest pain, palpitations, shortness of breath, near syncope, or syncope.   She has TRINITY and says she is compliant with wearing her CPAP at night. She has hypothyroidism and chronic thrombocytopenia related to Trisomy 21. She is followed routinely by Hematology with stable platelet counts. Her dental care is up to date. She takes antibiotic prophylaxis before dental procedures.  She also was diagnosed with pseudoseizures last year. She was admitted to [facility] where she reportedly had an MRI brain and EEG which were normal. According to the neurology records we have obtained, she has an old right cerebellar stroke which was documented in the March 2023 neurology note.

## 2024-08-23 ENCOUNTER — OUTPATIENT (OUTPATIENT)
Dept: OUTPATIENT SERVICES | Facility: HOSPITAL | Age: 44
LOS: 1 days | Discharge: HOME | End: 2024-08-23

## 2024-08-23 DIAGNOSIS — D69.6 THROMBOCYTOPENIA, UNSPECIFIED: ICD-10-CM

## 2024-08-23 DIAGNOSIS — Z98.89 OTHER SPECIFIED POSTPROCEDURAL STATES: Chronic | ICD-10-CM

## 2024-08-23 DIAGNOSIS — Z95.4 PRESENCE OF OTHER HEART-VALVE REPLACEMENT: Chronic | ICD-10-CM

## 2024-08-23 DIAGNOSIS — Z95.2 PRESENCE OF PROSTHETIC HEART VALVE: Chronic | ICD-10-CM

## 2024-09-05 ENCOUNTER — APPOINTMENT (OUTPATIENT)
Dept: HEMATOLOGY ONCOLOGY | Facility: CLINIC | Age: 44
End: 2024-09-05

## 2024-09-12 ENCOUNTER — NON-APPOINTMENT (OUTPATIENT)
Age: 44
End: 2024-09-12

## 2024-09-13 ENCOUNTER — APPOINTMENT (OUTPATIENT)
Dept: HEMATOLOGY ONCOLOGY | Facility: CLINIC | Age: 44
End: 2024-09-13

## 2024-09-13 ENCOUNTER — RESULT REVIEW (OUTPATIENT)
Age: 44
End: 2024-09-13

## 2024-09-13 ENCOUNTER — APPOINTMENT (OUTPATIENT)
Dept: HEMATOLOGY ONCOLOGY | Facility: CLINIC | Age: 44
End: 2024-09-13
Payer: MEDICARE

## 2024-09-13 VITALS
OXYGEN SATURATION: 98 % | RESPIRATION RATE: 18 BRPM | BODY MASS INDEX: 28.36 KG/M2 | DIASTOLIC BLOOD PRESSURE: 61 MMHG | WEIGHT: 122.56 LBS | HEIGHT: 55 IN | HEART RATE: 69 BPM | TEMPERATURE: 97 F | SYSTOLIC BLOOD PRESSURE: 116 MMHG

## 2024-09-13 DIAGNOSIS — D69.6 THROMBOCYTOPENIA, UNSPECIFIED: ICD-10-CM

## 2024-09-13 DIAGNOSIS — Q90.9 DOWN SYNDROME, UNSPECIFIED: ICD-10-CM

## 2024-09-13 LAB
BASOPHILS # BLD AUTO: 0.04 K/UL — SIGNIFICANT CHANGE UP (ref 0–0.2)
BASOPHILS NFR BLD AUTO: 1.1 % — SIGNIFICANT CHANGE UP (ref 0–2)
EOSINOPHIL # BLD AUTO: 0.07 K/UL — SIGNIFICANT CHANGE UP (ref 0–0.5)
EOSINOPHIL NFR BLD AUTO: 1.9 % — SIGNIFICANT CHANGE UP (ref 0–6)
HCT VFR BLD CALC: 34.8 % — SIGNIFICANT CHANGE UP (ref 34.5–45)
HGB BLD-MCNC: 11.9 G/DL — SIGNIFICANT CHANGE UP (ref 11.5–15.5)
IMM GRANULOCYTES NFR BLD AUTO: 0.3 % — SIGNIFICANT CHANGE UP (ref 0–0.9)
LYMPHOCYTES # BLD AUTO: 1.3 K/UL — SIGNIFICANT CHANGE UP (ref 1–3.3)
LYMPHOCYTES # BLD AUTO: 36.1 % — SIGNIFICANT CHANGE UP (ref 13–44)
MCHC RBC-ENTMCNC: 31 PG — SIGNIFICANT CHANGE UP (ref 27–34)
MCHC RBC-ENTMCNC: 34.2 G/DL — SIGNIFICANT CHANGE UP (ref 32–36)
MCV RBC AUTO: 90.6 FL — SIGNIFICANT CHANGE UP (ref 80–100)
MONOCYTES # BLD AUTO: 0.36 K/UL — SIGNIFICANT CHANGE UP (ref 0–0.9)
MONOCYTES NFR BLD AUTO: 10 % — SIGNIFICANT CHANGE UP (ref 2–14)
NEUTROPHILS # BLD AUTO: 1.82 K/UL — SIGNIFICANT CHANGE UP (ref 1.8–7.4)
NEUTROPHILS NFR BLD AUTO: 50.6 % — SIGNIFICANT CHANGE UP (ref 43–77)
NRBC # BLD: 0 /100 WBCS — SIGNIFICANT CHANGE UP (ref 0–0)
NRBC BLD-RTO: 0 /100 WBCS — SIGNIFICANT CHANGE UP (ref 0–0)
PLATELET # BLD AUTO: 145 K/UL — LOW (ref 150–400)
RBC # BLD: 3.84 M/UL — SIGNIFICANT CHANGE UP (ref 3.8–5.2)
RBC # FLD: 17.2 % — HIGH (ref 10.3–14.5)
WBC # BLD: 3.6 K/UL — LOW (ref 3.8–10.5)
WBC # FLD AUTO: 3.6 K/UL — LOW (ref 3.8–10.5)

## 2024-09-13 PROCEDURE — 99213 OFFICE O/P EST LOW 20 MIN: CPT

## 2024-09-13 NOTE — ASSESSMENT
[FreeTextEntry1] : Ms. Hu has chronic thrombocytopenia associated with Down syndrome. Prior bone marrow biopsy in 2017 was negative for MDS or malignancy.  - PLTS 145 K/uL. Results reviewed with patient.  CMP, LDH pending  Hypothyroidism: On Synthroid 100 mcg daily.  Hypotension: On Midodrine 10 mg TID.   Greater than 50% of the encounter time was spent on counseling and coordination of care for thrombocytopenia and I have spent 30 minutes of face-to-face time with the patient.  RTC 6 months.

## 2024-09-13 NOTE — PHYSICAL EXAM
[Restricted in physically strenuous activity but ambulatory and able to carry out work of a light or sedentary nature] : Status 1- Restricted in physically strenuous activity but ambulatory and able to carry out work of a light or sedentary nature, e.g., light house work, office work [Normal] : normal appearance, no rash, nodules, vesicles, ulcers, erythema [de-identified] : Down syndrome appearance; able to converse easily and answer simple questions [de-identified] : murmur

## 2024-09-13 NOTE — HISTORY OF PRESENT ILLNESS
[0 - No Distress] : Distress Level: 0 [80: Normal activity with effort; some signs or symptoms of disease.] : 80: Normal activity with effort; some signs or symptoms of disease.  [de-identified] : Clementina Hu is a 39 y.o. with Down syndrome, aortic stenosis S/P aortic valve replacement, obstructive sleep apnea, H/O dizziness, and hypothyroidism, who is referred because of thrombocytopenia. She has been in a group home environment and has had elementary school education. She underwent bioprosthetic aortic valve replacement in 2014. She has been noted to have intermittent thrombocytopenia, with a platelet count of 111 (5/4/16), 150 (12/8/16), and 99 (3/24/17) without other blood count abnormalities. She denies constitutional symptoms, abnormal bruising/bleeding, or recent infections.\par  \par   S/P  bone marrow biopsy and aspirate done on 9/13/17, which showed trilineage hematopoiesis with maturation and mild megakaryocytosis and increased iron stores. FISH MDS panel was negative, and cytogenetics confirmed trisomy 21. Since then, she has no complaints except for bruising while on aspirin. On 12/8/17, she has no complaints.\par   [de-identified] : Patient presents here today for a follow up. Denies bleeding episodes, melena, hematochezia. Patient has history of hypotension and had recently 3 falls, on Midodrine 10 mg TID.  PLT count 145K.   No other changes in medical, surgical or social history since 1/2/ 2024.

## 2024-09-14 LAB
ALBUMIN SERPL ELPH-MCNC: 4.1 G/DL
ALP BLD-CCNC: 59 U/L
ALT SERPL-CCNC: 20 U/L
ANION GAP SERPL CALC-SCNC: 15 MMOL/L
AST SERPL-CCNC: 36 U/L
BILIRUB SERPL-MCNC: 0.6 MG/DL
BUN SERPL-MCNC: 15 MG/DL
CALCIUM SERPL-MCNC: 9.7 MG/DL
CHLORIDE SERPL-SCNC: 104 MMOL/L
CO2 SERPL-SCNC: 23 MMOL/L
CREAT SERPL-MCNC: 0.89 MG/DL
EGFR: 81 ML/MIN/1.73M2
GLUCOSE SERPL-MCNC: 84 MG/DL
LDH SERPL-CCNC: 228 U/L
POTASSIUM SERPL-SCNC: 4.1 MMOL/L
PROT SERPL-MCNC: 6.9 G/DL
SODIUM SERPL-SCNC: 142 MMOL/L

## 2024-09-24 NOTE — PATIENT PROFILE ADULT - NSTRANSFERBELONGINGSRESP_GEN_A_NUR
yes
Detail Level: Simple
Initiate Treatment: dapsone 5 % topical gel \\nQuantity: 60.0 g  Days Supply: 30\\nSig: Apply 1 gram by topical route to affected areas of the face one to two times a day.\\n\\ndoxycycline hyclate 100 mg capsule \\nQuantity: 60.0 Capsule\\nSig: Take one cap po BID with food.

## 2024-10-29 ENCOUNTER — NON-APPOINTMENT (OUTPATIENT)
Age: 44
End: 2024-10-29

## 2024-12-18 RX ORDER — AZITHROMYCIN 500 MG/1
500 TABLET, FILM COATED ORAL
Qty: 1 | Refills: 6 | Status: ACTIVE | COMMUNITY
Start: 2024-12-18 | End: 1900-01-01

## 2025-01-17 DIAGNOSIS — I35.0 NONRHEUMATIC AORTIC (VALVE) STENOSIS: ICD-10-CM

## 2025-01-17 DIAGNOSIS — Z95.2 PRESENCE OF PROSTHETIC HEART VALVE: ICD-10-CM

## 2025-01-17 DIAGNOSIS — I33.9 ACUTE AND SUBACUTE ENDOCARDITIS, UNSPECIFIED: ICD-10-CM

## 2025-01-22 ENCOUNTER — APPOINTMENT (OUTPATIENT)
Dept: PEDIATRIC CARDIOLOGY | Facility: CLINIC | Age: 45
End: 2025-01-22
Payer: MEDICARE

## 2025-01-22 VITALS
HEART RATE: 63 BPM | HEIGHT: 55 IN | BODY MASS INDEX: 28.78 KG/M2 | WEIGHT: 124.34 LBS | OXYGEN SATURATION: 98 % | DIASTOLIC BLOOD PRESSURE: 47 MMHG | SYSTOLIC BLOOD PRESSURE: 106 MMHG

## 2025-01-22 DIAGNOSIS — R00.2 PALPITATIONS: ICD-10-CM

## 2025-01-22 PROCEDURE — 99214 OFFICE O/P EST MOD 30 MIN: CPT

## 2025-01-22 PROCEDURE — G2211 COMPLEX E/M VISIT ADD ON: CPT

## 2025-01-22 RX ORDER — BACLOFEN 10 MG/1
10 TABLET ORAL
Refills: 0 | Status: ACTIVE | COMMUNITY
Start: 2025-01-22

## 2025-02-26 ENCOUNTER — NON-APPOINTMENT (OUTPATIENT)
Age: 45
End: 2025-02-26

## 2025-02-28 DIAGNOSIS — D69.6 THROMBOCYTOPENIA, UNSPECIFIED: ICD-10-CM

## 2025-03-12 ENCOUNTER — NON-APPOINTMENT (OUTPATIENT)
Age: 45
End: 2025-03-12

## 2025-03-17 ENCOUNTER — APPOINTMENT (OUTPATIENT)
Dept: HEMATOLOGY ONCOLOGY | Facility: CLINIC | Age: 45
End: 2025-03-17
Payer: MEDICARE

## 2025-03-17 ENCOUNTER — NON-APPOINTMENT (OUTPATIENT)
Age: 45
End: 2025-03-17

## 2025-03-17 ENCOUNTER — RESULT REVIEW (OUTPATIENT)
Age: 45
End: 2025-03-17

## 2025-03-17 VITALS
HEART RATE: 60 BPM | BODY MASS INDEX: 29.39 KG/M2 | WEIGHT: 127 LBS | RESPIRATION RATE: 17 BRPM | OXYGEN SATURATION: 97 % | DIASTOLIC BLOOD PRESSURE: 77 MMHG | SYSTOLIC BLOOD PRESSURE: 131 MMHG | HEIGHT: 55 IN | TEMPERATURE: 98.2 F

## 2025-03-17 DIAGNOSIS — D69.6 THROMBOCYTOPENIA, UNSPECIFIED: ICD-10-CM

## 2025-03-17 PROCEDURE — 99214 OFFICE O/P EST MOD 30 MIN: CPT

## 2025-04-03 ENCOUNTER — NON-APPOINTMENT (OUTPATIENT)
Age: 45
End: 2025-04-03

## 2025-04-03 ENCOUNTER — EMERGENCY (EMERGENCY)
Facility: HOSPITAL | Age: 45
LOS: 1 days | Discharge: HOME | End: 2025-04-03
Attending: STUDENT IN AN ORGANIZED HEALTH CARE EDUCATION/TRAINING PROGRAM | Admitting: STUDENT IN AN ORGANIZED HEALTH CARE EDUCATION/TRAINING PROGRAM
Payer: MEDICARE

## 2025-04-03 VITALS
RESPIRATION RATE: 17 BRPM | SYSTOLIC BLOOD PRESSURE: 105 MMHG | HEART RATE: 79 BPM | OXYGEN SATURATION: 99 % | DIASTOLIC BLOOD PRESSURE: 62 MMHG

## 2025-04-03 VITALS
TEMPERATURE: 98 F | RESPIRATION RATE: 16 BRPM | SYSTOLIC BLOOD PRESSURE: 94 MMHG | OXYGEN SATURATION: 100 % | HEART RATE: 81 BPM | DIASTOLIC BLOOD PRESSURE: 47 MMHG | HEIGHT: 55 IN | WEIGHT: 125 LBS

## 2025-04-03 DIAGNOSIS — Z95.2 PRESENCE OF PROSTHETIC HEART VALVE: Chronic | ICD-10-CM

## 2025-04-03 DIAGNOSIS — Z98.89 OTHER SPECIFIED POSTPROCEDURAL STATES: Chronic | ICD-10-CM

## 2025-04-03 DIAGNOSIS — Z95.4 PRESENCE OF OTHER HEART-VALVE REPLACEMENT: Chronic | ICD-10-CM

## 2025-04-03 PROCEDURE — 99284 EMERGENCY DEPT VISIT MOD MDM: CPT | Mod: 25

## 2025-04-03 PROCEDURE — 73630 X-RAY EXAM OF FOOT: CPT

## 2025-04-03 PROCEDURE — 93970 EXTREMITY STUDY: CPT | Mod: 26

## 2025-04-03 PROCEDURE — 99284 EMERGENCY DEPT VISIT MOD MDM: CPT | Mod: FS

## 2025-04-03 PROCEDURE — 93970 EXTREMITY STUDY: CPT

## 2025-04-03 PROCEDURE — 73610 X-RAY EXAM OF ANKLE: CPT

## 2025-04-03 PROCEDURE — 73610 X-RAY EXAM OF ANKLE: CPT | Mod: 26,RT

## 2025-04-03 PROCEDURE — 73630 X-RAY EXAM OF FOOT: CPT | Mod: 26,RT

## 2025-04-03 RX ORDER — ACETAMINOPHEN 500 MG/5ML
650 LIQUID (ML) ORAL ONCE
Refills: 0 | Status: COMPLETED | OUTPATIENT
Start: 2025-04-03 | End: 2025-04-03

## 2025-04-03 RX ADMIN — Medication 650 MILLIGRAM(S): at 21:28

## 2025-04-03 RX ADMIN — Medication 650 MILLIGRAM(S): at 22:08

## 2025-04-03 NOTE — ED PROVIDER NOTE - CARE PROVIDER_API CALL
YOUR PCP,   Phone: (   )    -  Fax: (   )    -  Follow Up Time: 1-3 Days    Hima Gipson  Orthopaedic Surgery  [address] [place] [id number]  Phone: [phone]  Fax: [phone]  Follow Up Time: 4-6 Days

## 2025-04-03 NOTE — ED ADULT TRIAGE NOTE - AS TEMP SITE
oral Azithromycin Counseling:  I discussed with the patient the risks of azithromycin including but not limited to GI upset, allergic reaction, drug rash, diarrhea, and yeast infections.

## 2025-04-03 NOTE — ED ADULT TRIAGE NOTE - CHIEF COMPLAINT QUOTE
Pt has swelling to right ankle since today; pt has arthritis and gets cortisone shots in her back; pt able to ambulate; denies injury to right ankle

## 2025-04-03 NOTE — ED PROVIDER NOTE - OBJECTIVE STATEMENT
44-year-old female history of hypertension, , hypothyroidism, Down syndrome, osteoporosis, osteoarthritis brought in by staff member from [facility] for evaluation of right ankle/lower leg today.  Patient and staff member states that they noticed swelling and discomfort to the right ankle this morning.  States that patient has been able to ambulate at her baseline with some difficulty today.  Denies fever, difficulty breathing, trauma/fall, calf pain, chest pain, numbness, tingling or other symptoms.

## 2025-04-03 NOTE — ED PROVIDER NOTE - PATIENT PORTAL LINK FT
You can access the FollowMyHealth Patient Portal offered by [facility] by registering at the following website: [email or web address]. By joining [facility]’s FollowMyHealth portal, you will also be able to view your health information using other applications (apps) compatible with our system.

## 2025-04-03 NOTE — ED PROVIDER NOTE - PROGRESS NOTE DETAILS
Pt systolic low 90s-100 baseline for pt as per staff from [facility]. X-rays and US results reviewed with staff member. right ankle placed in ace wrap, advised rest, elevate, nsaids prn pain, f/u with Orthopedist. Pt ambulatory in ED.

## 2025-04-03 NOTE — ED ADULT NURSE NOTE - CHPI ED NUR SYMPTOMS NEG
Please call pt and inform them their doppler results are normal. Continue lasix and f/u next week
no bruising/no deformity/no difficulty bearing weight

## 2025-04-03 NOTE — ED PROVIDER NOTE - NSFOLLOWUPINSTRUCTIONS_ED_ALL_ED_FT
Follow-up with orthopedist for reevaluation, ongoing care and treatment.  Rest, elevate ankle, use Ace wrap for compression as needed.  Give patient over-the-counter Tylenol or Motrin with food as directed for pain.  If having worsening of symptoms or other related symptoms, return to the ER immediately.    Swollen Ankle Joint    WHAT YOU NEED TO KNOW:    What do I need to know about a swollen ankle joint? A swollen ankle joint may be caused by conditions such as arthritis or gout, or by an injury. You may have other symptoms such as pain and trouble moving or putting weight on your ankle.    How is the cause of a swollen ankle joint diagnosed? Your healthcare provider will ask about your symptoms and any medical conditions you have. He or she will also ask if you have had any recent injuries. He or she will examine your ankle and check how well it moves in different directions. Blood tests or x-rays may be used to help find the cause of the swelling. Fluid may be removed from your ankle joint and sent to a lab for tests.    How is a swollen ankle joint treated? Treatment depends on the cause of your swollen ankle joint. Your healthcare provider may recommend any of the following:    Rest your ankle. Avoid activities that make the swelling or pain worse. You may need to avoid putting weight on your ankle while you have pain. Crutches or a walker can be used to avoid putting weight on your ankle.    Apply ice on your ankle for 15 to 20 minutes every hour or as directed. Use an ice pack, or put crushed ice in a plastic bag. Cover it with a towel. Ice helps prevent tissue damage and decreases swelling and pain.  Ice and Elevation      Compress your ankle with a brace or bandage to help reduce swelling. Use a brace or bandage only as directed.    Elevate your ankle above the level of your heart as often as you can. This will help decrease swelling and pain. Prop your joint on pillows or blankets to keep it elevated comfortably.    Apply heat on your ankle for 20 to 30 minutes every 2 hours for as many days as directed. Heat helps decrease pain.    NSAIDs, such as ibuprofen, help decrease swelling, pain, and fever. This medicine is available with or without a doctor's order. NSAIDs can cause stomach bleeding or kidney problems in certain people. If you take blood thinner medicine, always ask your healthcare provider if NSAIDs are safe for you. Always read the medicine label and follow directions.    Physical therapy may be recommended. A physical therapist teaches you exercises to help improve movement and strength, and to decrease pain.  When should I seek immediate care?    You cannot move your ankle at all.    You have severe pain that does not get better with pain medicine.  When should I call my doctor?    You have a fever.    You have redness or warmth over your ankle.    The swelling does not go down with treatment.    You have questions or concerns about your condition or care.

## 2025-04-03 NOTE — ED PROVIDER NOTE - MUSCULOSKELETAL, MLM
chronic valgus deformities of toes of B/L feet, no pain with active/passive ROM of B/L ankles, +trace edema B/L LE (R>L), +swelling noted to right ankle, no erythema noted, calf B NT, FROM BLE, toes warm & mobile, distal pulses and sensation intact, 2+ DP pulses B/L, NVI

## 2025-04-03 NOTE — ED ADULT NURSE NOTE - NURSING MUSC STRENGTH
hand grasp, leg strength strong and equal bilaterally Spine appears normal, range of motion is not limited, no muscle or joint tenderness

## 2025-04-03 NOTE — ED PROVIDER NOTE - ATTENDING APP SHARED VISIT CONTRIBUTION OF CARE
Benedict Land MD:  patient seen and evaluated with the PA.  I was present for key portions of the History & Physical, and I agree with the Impression & Plan.     44F PMH down syndrome, hx of CVA, TRINITY, cirrhosis, HLD, osteoporosis, osteoarthritis, hypothyroidism, asthma, GERD, T2DM, constipation, aortic stenosis s/p bovine aortic valve replacement p/w group home aide at bedside for B/L lower leg swelling. Pt denies trauma or falls. She is noted to have B/L ankle swelling on dorsal aspect. she does get cortisone shots to her back for arthritis in the past. No hx of clots. normally ambulates with walker and the aide states that pt was able to ambulate w/ walker today    Gen: NAD, non-toxic appearing, awake alert   HEENT: normal conjunctiva, oral mucosa moist  Lung: CTAB, no respiratory distress, no wheezes/rhonchi/rales B/L, speaking in full sentences  CV: RRR  Abd: soft, NT, ND, no guarding, no rigidity, no rebound tenderness  MSK: no visible deformities, soft compartments of B/L LE  Neuro: chronic valgus deformities of toes of B/L feet, no pain with active/passive ROM of B/L ankles, 2+ DP pulses B/L  Skin: Warm, well perfused, no rash, trace leg swelling distally of B/L LE's    DDx includes but not limited to: will eval for fx, dislocation, DVT. Low concern for septic joint, compartment syndrome, arterial occlusion  Plan: duplex, xr's, pain meds, reassess symptoms    See Progress Notes for further updates on ED Course

## 2025-04-03 NOTE — ED ADULT NURSE NOTE - NSFALLUNIVINTERV_ED_ALL_ED
Bed/Stretcher in lowest position, wheels locked, appropriate side rails in place/Call bell, personal items and telephone in reach/Instruct patient to call for assistance before getting out of bed/chair/stretcher/Non-slip footwear applied when patient is off stretcher/[place] to call system/Physically safe environment - no spills, clutter or unnecessary equipment/Purposeful proactive rounding/Room/bathroom lighting operational, light cord in reach

## 2025-04-03 NOTE — ED ADULT NURSE NOTE - BREATH SOUNDS, MLM
Patient is a 70y old  Male who presents with a chief complaint of not feeling well (15 Oct 2018 08:13)      INTERVAL /OVERNIGHT EVENTS: lethargic    MEDICATIONS  (STANDING):  ALBUTerol    0.083% 2.5 milliGRAM(s) Nebulizer every 6 hours  fentaNYL   Patch  25 MICROgram(s)/Hr 1 Patch Transdermal every 72 hours  folic acid 1 milliGRAM(s) Oral daily  lidocaine   Patch 1 Patch Transdermal daily  metoclopramide Injectable 5 milliGRAM(s) IV Push every 8 hours  morphine  Infusion 2 mG/Hr (2 mL/Hr) IV Continuous <Continuous>  multivitamin 1 Tablet(s) Oral daily  nystatin Powder 1 Application(s) Topical two times a day  QUEtiapine 100 milliGRAM(s) Oral daily  QUEtiapine 100 milliGRAM(s) Oral at bedtime    MEDICATIONS  (PRN):  aluminum hydroxide/magnesium hydroxide/simethicone Suspension 30 milliLiter(s) Oral every 6 hours PRN Dyspepsia  artificial  tears Solution 1 Drop(s) Both EYES four times a day PRN Dry Eyes  atropine 1% Ophthalmic Solution for SubLingual Use 2 Drop(s) SubLingual four times a day PRN oral secretions  bisacodyl 5 milliGRAM(s) Oral every 12 hours PRN Constipation  bisacodyl Suppository 10 milliGRAM(s) Rectal daily PRN Constipation  HYDROmorphone  Injectable 1 milliGRAM(s) IV Push every 90 minutes PRN distress, pain, dyspnea,  HYDROmorphone  Injectable 1 milliGRAM(s) IV Push three times a day PRN Mild Pain (1 - 3)  LORazepam   Injectable 1 milliGRAM(s) IV Push every 2 hours PRN dyspnea, distress, anxiety  magnesium hydroxide Suspension 30 milliLiter(s) Oral daily PRN Constipation  morphine  - Injectable 2 milliGRAM(s) IV Push every 4 hours PRN breakthrough pain  ondansetron Injectable 4 milliGRAM(s) IV Push every 4 hours PRN Nausea  polyethylene glycol 3350 17 Gram(s) Oral daily PRN Constipation  promethazine 25 milliGRAM(s) Oral four times a day PRN breakthrough nausea / vomiting      Allergies    penicillin (Unknown)  strawberry (Unknown)    Intolerances        REVIEW OF SYSTEMS: unable to obtain    Vital Signs Last 24 Hrs  T(C): --  T(F): --  HR: 96 (15 Oct 2018 00:59) (80 - 101)  BP: --  BP(mean): --  RR: --  SpO2: 96% (15 Oct 2018 00:59) (92% - 96%)    PHYSICAL EXAM:  GENERAL: NAD, well-groomed, well-developed  HEAD:  Atraumatic, Normocephalic  EYES: EOMI, PERRLA, conjunctiva and sclera clear  ENMT: No tonsillar erythema, exudates, or enlargement; Moist mucous membranes, Good dentition, No lesions  NECK: Supple, No JVD, Normal thyroid  NERVOUS SYSTEM:  arousable CHEST/LUNG: Clear to auscultation bilaterally; No rales, rhonchi, wheezing, or rubs  HEART: Regular rate and rhythm; No murmurs, rubs, or gallops  ABDOMEN: Soft, Nontender, Nondistended; Bowel sounds present  EXTREMITIES:  2+ Peripheral Pulses, No clubbing, cyanosis, + edema  LYMPH: No lymphadenopathy noted  SKIN: No rashes or lesions    LABS:              CAPILLARY BLOOD GLUCOSE          RADIOLOGY & ADDITIONAL TESTS:    Notes Reviewed:  [x ] YES  [ ] NO    Care Discussed with Consultants/Other Providers [x ] YES  [ ] NO Clear

## 2025-04-03 NOTE — ED PROVIDER NOTE - CLINICAL SUMMARY MEDICAL DECISION MAKING FREE TEXT BOX
44F PMH down syndrome, hx of CVA, TRINITY, cirrhosis, HLD, osteoporosis, osteoarthritis, hypothyroidism, asthma, GERD, T2DM, constipation, aortic stenosis s/p bovine aortic valve replacement p/w group home aide at bedside for B/L lower leg swelling. Pt denies trauma or falls. She is noted to have B/L ankle swelling on dorsal aspect. she does get cortisone shots to her back for arthritis in the past. No hx of clots. normally ambulates with walker and the aide states that pt was able to ambulate w/ walker today    Gen: NAD, non-toxic appearing, awake alert   HEENT: normal conjunctiva, oral mucosa moist  Lung: CTAB, no respiratory distress, no wheezes/rhonchi/rales B/L, speaking in full sentences  CV: RRR  Abd: soft, NT, ND, no guarding, no rigidity, no rebound tenderness  MSK: no visible deformities, soft compartments of B/L LE  Neuro: chronic valgus deformities of toes of B/L feet, no pain with active/passive ROM of B/L ankles, 2+ DP pulses B/L  Skin: Warm, well perfused, no rash, trace leg swelling distally of B/L LE's    DDx includes but not limited to: will eval for fx, dislocation, DVT. Low concern for septic joint, compartment syndrome, arterial occlusion  Plan: duplex, xr's, pain meds, reassess symptoms    See Progress Notes for further updates on ED Course

## 2025-04-03 NOTE — ED ADULT NURSE NOTE - NSFALLRISKASMT_ED_ALL_ED_DT
I have ordered Breo and Spiriva since none of the options listed below with substitute triple therapy.
LVM for patient letting him know that Savannah Mani and Spiriva were sent to pharmacy for him
Patient's insurance denied breztri. I did Prior auth and they still denied it. Patient has only tried the [place] and spiriva. They will cover Anoro, Bevespi, Servent Diskus, stiolto or symbicort.  Please Advise
03-Apr-2025 21:21

## 2025-04-03 NOTE — ED ADULT NURSE NOTE - NSSEPSISSUSPECTED_ED_A_ED
Steroids as directed  May use OTC wart preparations    Pityriasis rosea   WHAT YOU NEED TO KNOW:   Pityriasis rosea is a skin disorder that causes a scaly rash  The cause of Pityriasis rosea is not known  It usually goes away on its own in 2 to 12 weeks  Pityriasis rosea most often occurs in people who are 8to 28years old and during pregnancy  DISCHARGE INSTRUCTIONS:   Medicines:   · Medicines  may be given to help reduce inflammation and itching  They may be given as a pill or cream     · Take your medicine as directed  Contact your healthcare provider if you think your medicine is not helping or if you have side effects  Tell him or her if you are allergic to any medicine  Keep a list of the medicines, vitamins, and herbs you take  Include the amounts, and when and why you take them  Bring the list or the pill bottles to follow-up visits  Carry your medicine list with you in case of an emergency  Follow up with your healthcare provider or dermatologist as directed:  Write down your questions so you remember to ask them during your visits  Self-care:  Heat may irritate your skin and cause itching  Avoid hot showers and physical activity that may make your skin too warm  Contact your healthcare provider if:   · Your rash does not improve within 10 weeks  · Your itching becomes worse  · Your rash becomes more red and you have fever  © 2017 [employer]  Information is for End User's use only and may not be sold, redistributed or otherwise used for commercial purposes  All illustrations and images included in CareNotes® are the copyrighted property of A D A M , Inc  or [employer]ima Tapia  The above information is an  only  It is not intended as medical advice for individual conditions or treatments  Talk to your doctor, nurse or pharmacist before following any medical regimen to see if it is safe and effective for you 
No

## 2025-04-04 NOTE — ED POST DISCHARGE NOTE - DETAILS
LVM with KINA Lema.  Ever AZUL second attempt made -Sammie Hunter PA-C third call made -Sammie Hunter PA-C

## 2025-04-09 ENCOUNTER — INPATIENT (INPATIENT)
Facility: HOSPITAL | Age: 45
LOS: 4 days | Discharge: NURSING, REHAB OR LONG-TERM CARE | DRG: 556 | End: 2025-04-14
Attending: INTERNAL MEDICINE | Admitting: INTERNAL MEDICINE
Payer: MEDICARE

## 2025-04-09 VITALS
OXYGEN SATURATION: 99 % | WEIGHT: 145.06 LBS | HEIGHT: 55 IN | DIASTOLIC BLOOD PRESSURE: 72 MMHG | RESPIRATION RATE: 16 BRPM | TEMPERATURE: 98 F | SYSTOLIC BLOOD PRESSURE: 111 MMHG | HEART RATE: 72 BPM

## 2025-04-09 DIAGNOSIS — Z95.4 PRESENCE OF OTHER HEART-VALVE REPLACEMENT: Chronic | ICD-10-CM

## 2025-04-09 DIAGNOSIS — Z95.2 PRESENCE OF PROSTHETIC HEART VALVE: Chronic | ICD-10-CM

## 2025-04-09 DIAGNOSIS — Z98.89 OTHER SPECIFIED POSTPROCEDURAL STATES: Chronic | ICD-10-CM

## 2025-04-09 PROCEDURE — 73562 X-RAY EXAM OF KNEE 3: CPT | Mod: 26,50

## 2025-04-09 PROCEDURE — 99285 EMERGENCY DEPT VISIT HI MDM: CPT

## 2025-04-09 NOTE — ED PROVIDER NOTE - NSICDXPASTSURGICALHX_GEN_ALL_CORE_FT
PAST SURGICAL HISTORY:  Aortic valve replaced July 2014 Bovine    S/P aortic valve replacement     S/P laparotomy 2004 removal of ovarian cyst

## 2025-04-09 NOTE — ED PROVIDER NOTE - NSICDXPASTMEDICALHX_GEN_ALL_CORE_FT
PAST MEDICAL HISTORY:  Anxiety     Asthma never been admitted in the hospital or intubated    CVA (cerebrovascular accident)     DM (diabetes mellitus) Type II    Down syndrome     Down syndrome     GERD (gastroesophageal reflux disease)     H/O aortic valve stenosis     HTN (hypertension)     Hypothyroidism     Hypothyroidism     Osteoarthritis     Osteoporosis     Ovarian cyst     Seasonal allergies     Sleep apnea     Type 2 diabetes mellitus

## 2025-04-09 NOTE — ED PROVIDER NOTE - PHYSICAL EXAMINATION
Gen: MRDD, no acute distress  Head/eyes: NC/AT, PERRL  ENT: airway patent  Neck: supple  Pulm/lung: Bilateral clear BS  CV/heart: RRR  GI/Abd: soft, NT/ND, +BS, no guarding/rebound tenderness  Musculoskeletal: no edema/erythema/cyanosis, FROM b/l knee, +right foot hallus deformity noted  Skin: no rash  Neuro: MRDD

## 2025-04-09 NOTE — ED PROVIDER NOTE - OBJECTIVE STATEMENT
DISPLAY PLAN FREE TEXT 44-year-old female history of Down syndrome diabetes CVA asthma anxiety hypertension aortic valve replacement osteoarthritis brought in from group home complaining of bilateral knee pain difficulty ambulating today.  Was seen a week ago at [facility] for some right leg swelling and ultrasound that was negative for DVT had an x-ray of her foot that was questioning a stress fracture versus osteonecrosis.  Follow-up team and call facility multiple times no answer documented with a certified letter that was sent.  As per facility patient needs to have the ability to walk up multiple steps

## 2025-04-09 NOTE — ED PROVIDER NOTE - CLINICAL SUMMARY MEDICAL DECISION MAKING FREE TEXT BOX
44-year-old female history of Down syndrome diabetes CVA asthma anxiety hypertension aortic valve replacement osteoarthritis brought in from group home complaining of bilateral knee pain difficulty ambulating today.  Was seen a week ago at [facility] for some right leg swelling and ultrasound that was negative for DVT had an x-ray of her foot that was questioning a stress fracture versus osteonecrosis.  Follow-up team and call facility multiple times no answer documented with a certified letter that was sent.  As per facility patient needs to have the ability to walk up multiple steps    r/o fx, XR, podiatry eval for foot xray finding from 4/3, PT eval in AM

## 2025-04-09 NOTE — ED PROVIDER NOTE - NSICDXFAMILYHX_GEN_ALL_CORE_FT
FAMILY HISTORY:  Family history of MS (multiple sclerosis)    Father  Still living? Yes, Estimated age: Age Unknown  Family history of coronary artery disease, Age at diagnosis: Age Unknown  FH: HTN (hypertension), Age at diagnosis: Age Unknown    Mother  Still living? Unknown  FH: depression, Age at diagnosis: Age Unknown  FH: HTN (hypertension), Age at diagnosis: Age Unknown    Sibling  Still living? Unknown  Family history of epilepsy, Age at diagnosis: Age Unknown

## 2025-04-10 DIAGNOSIS — M20.11 HALLUX VALGUS (ACQUIRED), RIGHT FOOT: ICD-10-CM

## 2025-04-10 DIAGNOSIS — E11.9 TYPE 2 DIABETES MELLITUS WITHOUT COMPLICATIONS: ICD-10-CM

## 2025-04-10 DIAGNOSIS — J45.909 UNSPECIFIED ASTHMA, UNCOMPLICATED: ICD-10-CM

## 2025-04-10 DIAGNOSIS — E03.9 HYPOTHYROIDISM, UNSPECIFIED: ICD-10-CM

## 2025-04-10 DIAGNOSIS — R26.2 DIFFICULTY IN WALKING, NOT ELSEWHERE CLASSIFIED: ICD-10-CM

## 2025-04-10 LAB
ALBUMIN SERPL ELPH-MCNC: 3.2 G/DL — LOW (ref 3.3–5)
ALP SERPL-CCNC: 84 U/L — SIGNIFICANT CHANGE UP (ref 40–120)
ALT FLD-CCNC: 27 U/L — SIGNIFICANT CHANGE UP (ref 12–78)
ANION GAP SERPL CALC-SCNC: 8 MMOL/L — SIGNIFICANT CHANGE UP (ref 5–17)
AST SERPL-CCNC: 31 U/L — SIGNIFICANT CHANGE UP (ref 15–37)
BASOPHILS # BLD AUTO: 0.06 K/UL — SIGNIFICANT CHANGE UP (ref 0–0.2)
BASOPHILS NFR BLD AUTO: 1.6 % — SIGNIFICANT CHANGE UP (ref 0–2)
BILIRUB SERPL-MCNC: 0.9 MG/DL — SIGNIFICANT CHANGE UP (ref 0.2–1.2)
BUN SERPL-MCNC: 16 MG/DL — SIGNIFICANT CHANGE UP (ref 7–23)
CALCIUM SERPL-MCNC: 9.4 MG/DL — SIGNIFICANT CHANGE UP (ref 8.5–10.1)
CHLORIDE SERPL-SCNC: 110 MMOL/L — HIGH (ref 96–108)
CO2 SERPL-SCNC: 26 MMOL/L — SIGNIFICANT CHANGE UP (ref 22–31)
CREAT SERPL-MCNC: 0.85 MG/DL — SIGNIFICANT CHANGE UP (ref 0.5–1.3)
EGFR: 86 ML/MIN/1.73M2 — SIGNIFICANT CHANGE UP
EGFR: 86 ML/MIN/1.73M2 — SIGNIFICANT CHANGE UP
EOSINOPHIL # BLD AUTO: 0.06 K/UL — SIGNIFICANT CHANGE UP (ref 0–0.5)
EOSINOPHIL NFR BLD AUTO: 1.6 % — SIGNIFICANT CHANGE UP (ref 0–6)
GLUCOSE BLDC GLUCOMTR-MCNC: 86 MG/DL — SIGNIFICANT CHANGE UP (ref 70–99)
GLUCOSE BLDC GLUCOMTR-MCNC: 94 MG/DL — SIGNIFICANT CHANGE UP (ref 70–99)
GLUCOSE SERPL-MCNC: 89 MG/DL — SIGNIFICANT CHANGE UP (ref 70–99)
HCT VFR BLD CALC: 32.9 % — LOW (ref 34.5–45)
HGB BLD-MCNC: 11.6 G/DL — SIGNIFICANT CHANGE UP (ref 11.5–15.5)
IMM GRANULOCYTES NFR BLD AUTO: 0 % — SIGNIFICANT CHANGE UP (ref 0–0.9)
LYMPHOCYTES # BLD AUTO: 1.43 K/UL — SIGNIFICANT CHANGE UP (ref 1–3.3)
LYMPHOCYTES # BLD AUTO: 37.8 % — SIGNIFICANT CHANGE UP (ref 13–44)
MCHC RBC-ENTMCNC: 32.1 PG — SIGNIFICANT CHANGE UP (ref 27–34)
MCHC RBC-ENTMCNC: 35.3 G/DL — SIGNIFICANT CHANGE UP (ref 32–36)
MCV RBC AUTO: 91.1 FL — SIGNIFICANT CHANGE UP (ref 80–100)
MONOCYTES # BLD AUTO: 0.64 K/UL — SIGNIFICANT CHANGE UP (ref 0–0.9)
MONOCYTES NFR BLD AUTO: 16.9 % — HIGH (ref 2–14)
NEUTROPHILS # BLD AUTO: 1.59 K/UL — LOW (ref 1.8–7.4)
NEUTROPHILS NFR BLD AUTO: 42.1 % — LOW (ref 43–77)
NRBC BLD AUTO-RTO: 0 /100 WBCS — SIGNIFICANT CHANGE UP (ref 0–0)
PLATELET # BLD AUTO: 127 K/UL — LOW (ref 150–400)
POTASSIUM SERPL-MCNC: 3.9 MMOL/L — SIGNIFICANT CHANGE UP (ref 3.5–5.3)
POTASSIUM SERPL-SCNC: 3.9 MMOL/L — SIGNIFICANT CHANGE UP (ref 3.5–5.3)
PROT SERPL-MCNC: 6.5 G/DL — SIGNIFICANT CHANGE UP (ref 6–8.3)
RBC # BLD: 3.61 M/UL — LOW (ref 3.8–5.2)
RBC # FLD: 14.1 % — SIGNIFICANT CHANGE UP (ref 10.3–14.5)
SODIUM SERPL-SCNC: 144 MMOL/L — SIGNIFICANT CHANGE UP (ref 135–145)
WBC # BLD: 3.78 K/UL — LOW (ref 3.8–10.5)
WBC # FLD AUTO: 3.78 K/UL — LOW (ref 3.8–10.5)

## 2025-04-10 PROCEDURE — 76376 3D RENDER W/INTRP POSTPROCES: CPT | Mod: 26

## 2025-04-10 PROCEDURE — 99222 1ST HOSP IP/OBS MODERATE 55: CPT

## 2025-04-10 PROCEDURE — 73700 CT LOWER EXTREMITY W/O DYE: CPT | Mod: 26,50

## 2025-04-10 PROCEDURE — 73620 X-RAY EXAM OF FOOT: CPT | Mod: 26,50

## 2025-04-10 RX ORDER — DEXTROSE 50 % IN WATER 50 %
25 SYRINGE (ML) INTRAVENOUS ONCE
Refills: 0 | Status: DISCONTINUED | OUTPATIENT
Start: 2025-04-10 | End: 2025-04-14

## 2025-04-10 RX ORDER — ONDANSETRON HCL/PF 4 MG/2 ML
4 VIAL (ML) INJECTION EVERY 6 HOURS
Refills: 0 | Status: DISCONTINUED | OUTPATIENT
Start: 2025-04-10 | End: 2025-04-14

## 2025-04-10 RX ORDER — SODIUM CHLORIDE 9 G/1000ML
1000 INJECTION, SOLUTION INTRAVENOUS
Refills: 0 | Status: DISCONTINUED | OUTPATIENT
Start: 2025-04-10 | End: 2025-04-14

## 2025-04-10 RX ORDER — HYPROMELLOSE 0.4 %
1 DROPS OPHTHALMIC (EYE) DAILY
Refills: 0 | Status: DISCONTINUED | OUTPATIENT
Start: 2025-04-10 | End: 2025-04-14

## 2025-04-10 RX ORDER — METFORMIN HYDROCHLORIDE 850 MG/1
750 TABLET ORAL DAILY
Refills: 0 | Status: DISCONTINUED | OUTPATIENT
Start: 2025-04-10 | End: 2025-04-14

## 2025-04-10 RX ORDER — BACLOFEN 10 MG/20ML
1 INJECTION INTRATHECAL
Refills: 0 | DISCHARGE

## 2025-04-10 RX ORDER — PSYLLIUM SEED (WITH DEXTROSE)
0 POWDER (GRAM) ORAL
Refills: 0 | DISCHARGE

## 2025-04-10 RX ORDER — CLINDAMYCIN 1 G/10ML
1 GEL TOPICAL
Refills: 0 | DISCHARGE

## 2025-04-10 RX ORDER — BACLOFEN 10 MG/20ML
10 INJECTION INTRATHECAL EVERY 8 HOURS
Refills: 0 | Status: DISCONTINUED | OUTPATIENT
Start: 2025-04-10 | End: 2025-04-14

## 2025-04-10 RX ORDER — LEVOTHYROXINE SODIUM 300 MCG
112 TABLET ORAL DAILY
Refills: 0 | Status: DISCONTINUED | OUTPATIENT
Start: 2025-04-10 | End: 2025-04-14

## 2025-04-10 RX ORDER — HYPROMELLOSE 0.4 %
1 DROPS OPHTHALMIC (EYE)
Refills: 0 | DISCHARGE

## 2025-04-10 RX ORDER — INSULIN LISPRO 100 U/ML
INJECTION, SOLUTION INTRAVENOUS; SUBCUTANEOUS AT BEDTIME
Refills: 0 | Status: DISCONTINUED | OUTPATIENT
Start: 2025-04-10 | End: 2025-04-14

## 2025-04-10 RX ORDER — DEXTROSE 50 % IN WATER 50 %
15 SYRINGE (ML) INTRAVENOUS ONCE
Refills: 0 | Status: DISCONTINUED | OUTPATIENT
Start: 2025-04-10 | End: 2025-04-14

## 2025-04-10 RX ORDER — SIMETHICONE 80 MG
80 TABLET,CHEWABLE ORAL
Refills: 0 | Status: DISCONTINUED | OUTPATIENT
Start: 2025-04-10 | End: 2025-04-14

## 2025-04-10 RX ORDER — EMOLLIENT COMBINATION NO.35
0 CREAM (GRAM) TOPICAL
Refills: 0 | DISCHARGE

## 2025-04-10 RX ORDER — ALBUTEROL SULFATE 2.5 MG/3ML
2 VIAL, NEBULIZER (ML) INHALATION
Refills: 0 | DISCHARGE

## 2025-04-10 RX ORDER — FLUOXETINE HYDROCHLORIDE 20 MG/1
20 CAPSULE ORAL DAILY
Refills: 0 | Status: DISCONTINUED | OUTPATIENT
Start: 2025-04-10 | End: 2025-04-14

## 2025-04-10 RX ORDER — GLUCAGON 3 MG/1
1 POWDER NASAL ONCE
Refills: 0 | Status: DISCONTINUED | OUTPATIENT
Start: 2025-04-10 | End: 2025-04-14

## 2025-04-10 RX ORDER — B1/B2/B3/B5/B6/B12/VIT C/FOLIC 500-0.5 MG
1 TABLET ORAL DAILY
Refills: 0 | Status: DISCONTINUED | OUTPATIENT
Start: 2025-04-10 | End: 2025-04-14

## 2025-04-10 RX ORDER — KETOTIFEN FUMARATE 0.35 MG/ML
1 SOLUTION/ DROPS OPHTHALMIC
Refills: 0 | Status: DISCONTINUED | OUTPATIENT
Start: 2025-04-10 | End: 2025-04-14

## 2025-04-10 RX ORDER — POLYETHYLENE GLYCOL 3350 17 G/17G
17 POWDER, FOR SOLUTION ORAL DAILY
Refills: 0 | Status: DISCONTINUED | OUTPATIENT
Start: 2025-04-10 | End: 2025-04-14

## 2025-04-10 RX ORDER — INSULIN LISPRO 100 U/ML
INJECTION, SOLUTION INTRAVENOUS; SUBCUTANEOUS
Refills: 0 | Status: DISCONTINUED | OUTPATIENT
Start: 2025-04-10 | End: 2025-04-14

## 2025-04-10 RX ORDER — MELOXICAM 15 MG/1
1 TABLET ORAL
Refills: 0 | DISCHARGE

## 2025-04-10 RX ORDER — DEXTROSE 50 % IN WATER 50 %
12.5 SYRINGE (ML) INTRAVENOUS ONCE
Refills: 0 | Status: DISCONTINUED | OUTPATIENT
Start: 2025-04-10 | End: 2025-04-14

## 2025-04-10 RX ORDER — MELATONIN 5 MG
3 TABLET ORAL AT BEDTIME
Refills: 0 | Status: DISCONTINUED | OUTPATIENT
Start: 2025-04-10 | End: 2025-04-14

## 2025-04-10 RX ORDER — ASPIRIN 325 MG
1 TABLET ORAL
Refills: 0 | DISCHARGE

## 2025-04-10 RX ORDER — MAGNESIUM, ALUMINUM HYDROXIDE 200-200 MG
30 TABLET,CHEWABLE ORAL EVERY 4 HOURS
Refills: 0 | Status: DISCONTINUED | OUTPATIENT
Start: 2025-04-10 | End: 2025-04-14

## 2025-04-10 RX ORDER — VARENICLINE TARTRATE 0.5 MG/1
1 TABLET, FILM COATED ORAL
Refills: 0 | DISCHARGE

## 2025-04-10 RX ORDER — LIDOCAINE HYDROCHLORIDE 20 MG/ML
1 JELLY TOPICAL
Refills: 0 | DISCHARGE

## 2025-04-10 RX ORDER — ASPIRIN 325 MG
81 TABLET ORAL DAILY
Refills: 0 | Status: DISCONTINUED | OUTPATIENT
Start: 2025-04-10 | End: 2025-04-14

## 2025-04-10 RX ORDER — MIDODRINE HYDROCHLORIDE 5 MG/1
10 TABLET ORAL
Refills: 0 | Status: DISCONTINUED | OUTPATIENT
Start: 2025-04-10 | End: 2025-04-14

## 2025-04-10 RX ORDER — ACETAMINOPHEN 500 MG/5ML
650 LIQUID (ML) ORAL EVERY 6 HOURS
Refills: 0 | Status: DISCONTINUED | OUTPATIENT
Start: 2025-04-10 | End: 2025-04-14

## 2025-04-10 RX ADMIN — Medication 80 MILLIGRAM(S): at 18:13

## 2025-04-10 RX ADMIN — INSULIN LISPRO 0: 100 INJECTION, SOLUTION INTRAVENOUS; SUBCUTANEOUS at 16:28

## 2025-04-10 RX ADMIN — MIDODRINE HYDROCHLORIDE 10 MILLIGRAM(S): 5 TABLET ORAL at 18:16

## 2025-04-10 RX ADMIN — BACLOFEN 10 MILLIGRAM(S): 10 INJECTION INTRATHECAL at 14:50

## 2025-04-10 RX ADMIN — BACLOFEN 10 MILLIGRAM(S): 10 INJECTION INTRATHECAL at 21:21

## 2025-04-10 NOTE — ED ADULT NURSE NOTE - OBJECTIVE STATEMENT
Received patient in the care of her aid . ( Down sydrome).. alert and orientated times 4  calm, cooperative,,  with c/o bilateral knee pain, Seen by dr gonsalves, hx of arthritis,,  has no other complaints at this time .. Aid at the bedside    emotional support provided,  Call bell with in reach

## 2025-04-10 NOTE — PHYSICAL THERAPY INITIAL EVALUATION ADULT - ADDITIONAL COMMENTS
Pt resides in a group home and was ambulatory with RW and supervision and performed ADls w/ supervision. Pt has to negotiate steps at group home

## 2025-04-10 NOTE — CONSULT NOTE ADULT - PROBLEM SELECTOR RECOMMENDATION 9
Patient seen and evaluated  Discussed condition with pt and care giver present at bedside  Xrays b/l foot reveal severe hallux valgus deformity  Pt is wb as tolerated  Understands surgical intervention may be necessary if symptoms present   Pt stable from Podiatry standpoint, will follow up with Dr. Garrett post d/c from hospital  Concern for knee pain b/l, rec orthopedic consult  Will discuss with all attendings

## 2025-04-10 NOTE — PHYSICAL THERAPY INITIAL EVALUATION ADULT - LEVEL OF INDEPENDENCE: SIT/STAND, REHAB EVAL
attempted w/ 2 person assist but unable secondary to pt experiencing pain and safety concern in ER with height of stretcher/unable to perform

## 2025-04-10 NOTE — PATIENT PROFILE ADULT - NSPROPTRIGHTCAREGIVER_GEN_A_NUR
Written discharge instructions were given and reviewed, including follow up appointments, and precautions to take at home. Pt's caregiver verbalized understanding.      
no

## 2025-04-10 NOTE — H&P ADULT - TIME BILLING
Extensive chart review and discussion with consulting physicians and nursing.  Extensive discussion with the patient and the family  Emotional support and counseling provided regarding various aspects of the patient's care  Opportunity to ask questions was provided, all questions/concerns were addressed.

## 2025-04-10 NOTE — CARE COORDINATION ASSESSMENT. - NSCAREPROVIDERS_GEN_ALL_CORE_FT
CARE PROVIDERS:  Accepting Physician: Javier Gibson  Access Services: Pricilla Rodriguez  Administration: Luis Pedersen  Administration: Reynaldo Huang  Administration: Sherry Davidson  Admitting: Doctor, Unknown  Attending: Doctor, Unknown  Consultant: Christiano Sethi  Covering Team: Javier Gibson  ED Attending: Frank Titus  ED Nurse: Carol Loya  ED Nurse 2: Yoselyn Gomez  Emergency Medicine: Belinda Freitas  Nurse: Yoselyn Gomez  Ordered: ServiceAccount, SCMMLM  PCA/Nursing Assistant: Catia Payne  Physical Therapy: Hussain Sams  Primary Team: Indy Rocha  : Lee Ann Hopper// Supp. Assoc.: Safia Dave

## 2025-04-10 NOTE — CHART NOTE - NSCHARTNOTEFT_GEN_A_CORE
CT b/l knee and b/l feet noted to have fractures. Will keep patient on bedrest for now pending ortho and podiatry recommendations.    < from: CT Foot No Cont, Bilateral (04.10.25 @ 15:17) >      ACC: [id number] EXAM:  CT 3D RECONSTRUCT WO MONICA   ORDERED BY: PATO HANSON     ACC: [id number] EXAM:  CT FOOT ONLY BI   ORDERED BY: PATO HANSON     PROCEDURE DATE:  04/10/2025          INTERPRETATION:  CT OF THE bilateral feet WITHOUT CONTRAST.    CLINICAL INFORMATION: Pain. Concern for occult fracture.  TECHNIQUE: Axial CT images were obtained of the bilateral feet with   coronal and sagittal reconstructions. No contrast was administered. Three   dimensional reconstructions were obtained.    COMPARISON: Radiographs of the right foot and ankle performed 4/3/2025.    FINDINGS:    Osseous structures: There is severe bilateral hallux valgus with   associated advanced first MTP osteoarthrosis. There is mild to moderate   bilateral midfoot osteoarthrosis. Chronic fracture deformity at the right   first metatarsal mid diaphysis. Hammertoe deformities of the second   through fifth toes bilaterally. There is a intra-articular curvilinear   obliquely oriented fracture line the distal tibial plafond laterally   (series 322, image 23 and series 3, images 25 through 42). Osteopenia is   present. Multiple ossific densities at the left greater than right   navicular bone, potentially enthesopathic changes.    Soft tissues: Prominent softtissue edema at the first MTP joints   bilaterally favored to represent findings in the setting of hallux   valgus. Soft tissue edema at the dorsal and plantar surface of the foot   and circumferentially at the level of the ankle. No drainable fluid   collection.    IMPRESSION:    CT of bilateral feet demonstrates an obliquely oriented curvilinear   oblique displaced fracture at the lateral tibial plafond and with   intra-articular extension as described.    Diffuse edema at bilateral ankles intothe dorsal and plantar surface of   the feet. No drainable fluid collection.    Advanced bilateral hallux valgus and bunion formation.    --- End of Report ---             ANNY ROLDAN MD; Attending Radiologist  This document has been electronically signed. Apr 10 2025  3:48PM    < end of copied text >    < from: CT Knee No Cont, Bilateral (04.10.25 @ 15:14) >      ACC: [id number] EXAM:  CT 3D RECONSTRUCT WO MONICA   ORDERED BY: PATO HANSON     ACC: [id number] EXAM:  CT KNEE ONLY BI   ORDERED BY: PATO GRISELDA RAMIREZMARGIE     PROCEDURE DATE:  04/10/2025          INTERPRETATION:  CT OF THE bilateral knees WITHOUT CONTRAST.    CLINICAL INFORMATION: History of fall. Difficulty standing. Concern for   fracture.  TECHNIQUE: Axial CT images were obtained of the bilateral knees with   coronal and sagittal reconstructions. No contrast was administered. Three   dimensional reconstructions were obtained.    COMPARISON: Radiographs of the right and left knee performed 5/9/2024.    FINDINGS:    Osseous structures: Incomplete nondisplaced curvilinear fracture at the   left posterior lateral femoral condyle (series 303, image 118). No   additional fractures identified. Osteopenia is present. There is advanced   tricompartmental osteoarthrosis of bilateral knees with subchondral   cystic changes and subchondral sclerosis, most advanced at the left   patellofemoral compartment where there is bone-on-bone apposition.    Synovium/joint fluid: Moderate left greater than right joint effusions.    Subcutaneous tissues: Edema within the posterior and the lateral   subcutaneous tissues of bilateral knees. No drainable fluid collection.    Neurovascular structures: Intact within the limitations of CT imaging.    IMPRESSION:    CT of bilateral knees demonstrates an incomplete nondisplaced curvilinear   fracture at the posterior lateral femoral condyle of the left knee.   Finding is favored to be in association with an osteochondral lesion of   indeterminate age.    Advanced left greater than the right knee osteoarthrosis with bone on   bone apposition at the left patellofemoral compartment. Moderate left   greater than right effusions, nonspecific.    --- End of Report ---             ANNY ROLDAN MD; Attending Radiologist  This document has been electronically signed. Apr 10 2025  3:38PM    < end of copied text >      >15 min

## 2025-04-10 NOTE — H&P ADULT - HISTORY OF PRESENT ILLNESS
44-year-old female history of Down syndrome diabetes CVA asthma anxiety hypertension aortic valve replacement osteoarthritis brought in from group home complaining of bilateral knee pain difficulty ambulating yesterday.  Was seen a week ago at [facility] for some right leg swelling and ultrasound that was negative for DVT had an x-ray of her foot that was questioning a stress fracture versus osteonecrosis.  Follow-up team and call facility multiple times no answer documented with a certified letter that was sent.  As per facility patient needs to have the ability to walk up multiple steps. Yesterday her knees buckled going up a flight of stairs. She reports difficulty walking b/c of b/l knee pain and foot pain.

## 2025-04-10 NOTE — H&P ADULT - PROBLEM SELECTOR PLAN 1
Admit  Likely 2/2 to severe OA of b/l knees  Check CT knees and feet to r/o occult fx  Podiatry/Ortho consults  Pain meds prn  PT  Further work-up/management pending clinical course.

## 2025-04-10 NOTE — PATIENT PROFILE ADULT - FALL HARM RISK - RISK INTERVENTIONS

## 2025-04-10 NOTE — CONSULT NOTE ADULT - SUBJECTIVE AND OBJECTIVE BOX
S : 44y year old Female seen at bedside for b/l bunion    Chief Complaint : Patient is a 44y old  Female who presents with a chief complaint of knee pain.   HPI : HPI:  44-year-old female history of Down syndrome diabetes CVA asthma anxiety hypertension aortic valve replacement osteoarthritis brought in from group home complaining of bilateral knee pain difficulty ambulating yesterday.  Was seen a week ago at [facility] for some right leg swelling and ultrasound that was negative for DVT had an x-ray of her foot that was questioning a stress fracture versus osteonecrosis.  Follow-up team and call facility multiple times no answer documented with a certified letter that was sent.  As per facility patient needs to have the ability to walk up multiple steps. Yesterday her knees buckled going up a flight of stairs. She reports difficulty walking b/c of b/l knee pain and foot pain. (10 Apr 2025 11:25)      Patient admits to  (-) Fevers, (-) Chills, (-) Nausea, (-) Vomiting, (-) Shortness of Breath      PMH: Down syndrome    HTN (hypertension)    DM (diabetes mellitus)    Asthma    Sleep apnea    Hypothyroidism    Ovarian cyst    Down syndrome    Hypothyroidism    CVA (cerebrovascular accident)    Osteoporosis    Osteoarthritis    Anxiety    Seasonal allergies    Type 2 diabetes mellitus    GERD (gastroesophageal reflux disease)    H/O aortic valve stenosis      PSH:S/P laparotomy    Aortic valve replaced    S/P aortic valve replacement        Allergies:penicillins (Urticaria)  penicillin (Unknown)  penicillin (Hives)      Labs:                          11.6   3.78  )-----------( 127      ( 09 Apr 2025 23:50 )             32.9     WBC Trend  3.78[L] Date (04-09 @ 23:50)      Chem  04-09    144  |  110[H]  |  16  ----------------------------<  89  3.9   |  26  |  0.85    Ca    9.4      09 Apr 2025 23:50    TPro  6.5  /  Alb  3.2[L]  /  TBili  0.9  /  DBili  x   /  AST  31  /  ALT  27  /  AlkPhos  84  04-09          T(F): 98.4 (04-10-25 @ 11:46), Max: 98.4 (04-10-25 @ 07:58)  HR: 83 (04-10-25 @ 11:46) (72 - 87)  BP: 92/51 (04-10-25 @ 11:46) (92/51 - 118/72)  RR: 16 (04-10-25 @ 11:46) (16 - 17)  SpO2: 93% (04-10-25 @ 11:46) (93% - 99%)  Wt(kg): --    O:   Integument:  Skin warm, dry and supple bilateral.    Vascular: Dorsalis Pedis and Posterior Tibial pulses 2/4.  Capillary re-fill time less then 3 seconds digits 1-5 bilateral.    Neuro: Protective sensation intact to the level of the digits bilateral.  MSK: Muscle strength 4/5 all major muscle groups bilateral.  No pain upon palpation of b/l foot.  Noted severe b/l hallux valgus deformity

## 2025-04-10 NOTE — CONSULT NOTE ADULT - ATTENDING COMMENTS
severe bunion deformity as per patient non painful.  wide tox box shoes. silicone toe sleeve for bunion  Discussed with patient   may follow up outpatient as needed for care severe bunion deformity as per patient non painful.  wide tox box shoes. silicone toe sleeve for bunion  Discussed with patient   may follow up outpatient as needed for care  CT scan reviewed distal lateral tibial plafond fracture age intedeterminated  she is not having pain at that area. if it is painful can likely weightbear as tolerated in cam boot versus nonweightbearing if having difficulty bearing weight

## 2025-04-10 NOTE — CARE COORDINATION ASSESSMENT. - NSPASTMEDSURGHISTORY_GEN_ALL_CORE_FT
PAST MEDICAL & SURGICAL HISTORY:  Ovarian cyst      Hypothyroidism      Sleep apnea      Asthma  never been admitted in the hospital or intubated      DM (diabetes mellitus)  Type II      HTN (hypertension)      Down syndrome      S/P laparotomy  2004 removal of ovarian cyst      Aortic valve replaced  July 2014 Bovine      H/O aortic valve stenosis      GERD (gastroesophageal reflux disease)      Type 2 diabetes mellitus      Seasonal allergies      Anxiety      Osteoarthritis      Osteoporosis      CVA (cerebrovascular accident)      Hypothyroidism      Down syndrome      S/P aortic valve replacement

## 2025-04-10 NOTE — CARE COORDINATION ASSESSMENT. - OTHER PERTINENT REFERRAL INFORMATION
Sw met with pt at bedside and spoke with pts father Reji over the phone. The pt has a hx of down syndrome, diabetes, CVS, asthma and anxiety. Sw introduced self and role and pts father expressed verbal understanding. The patient currently resides at [facility] in [place]. Main number [phone], Manger Cell # [phone], Nurse cell # [phone]. Pt was ambulatory with Supervision and RW and has supervision with ADLS. Pt has steps at group home and needs to be able to negotiate steps. Pt was seen by PT and was recommenced for [facility]. Sw spoke with pts father and he is in agreement with [facility]. SW will cont to follow.

## 2025-04-10 NOTE — PHYSICAL THERAPY INITIAL EVALUATION ADULT - PERTINENT HX OF CURRENT PROBLEM, REHAB EVAL
44-year-old female in ER 4/10 history of Down syndrome, diabetes, CVA, asthma, anxiety, hypertension, aortic valve replacement, osteoarthritis, brought in from group home complaining of bilateral knee pain and difficulty ambulating.  Was seen a week ago at [facility] for some right leg swelling and ultrasound that was negative for DVT had an x-ray of her foot that was questioning a stress fracture versus osteonecrosis. Wet ready B  knee Xrays: negative for fx

## 2025-04-10 NOTE — PATIENT CHOICE NOTE. - NSPTCHOICESTATE_GEN_ALL_CORE

## 2025-04-10 NOTE — ED ADULT NURSE NOTE - NSFALLHARMRISKINTERV_ED_ALL_ED

## 2025-04-11 LAB
A1C WITH ESTIMATED AVERAGE GLUCOSE RESULT: 4.4 % — SIGNIFICANT CHANGE UP (ref 4–5.6)
ANION GAP SERPL CALC-SCNC: 8 MMOL/L — SIGNIFICANT CHANGE UP (ref 5–17)
BUN SERPL-MCNC: 16 MG/DL — SIGNIFICANT CHANGE UP (ref 7–23)
CALCIUM SERPL-MCNC: 9 MG/DL — SIGNIFICANT CHANGE UP (ref 8.5–10.1)
CHLORIDE SERPL-SCNC: 111 MMOL/L — HIGH (ref 96–108)
CO2 SERPL-SCNC: 25 MMOL/L — SIGNIFICANT CHANGE UP (ref 22–31)
CREAT SERPL-MCNC: 0.86 MG/DL — SIGNIFICANT CHANGE UP (ref 0.5–1.3)
EGFR: 84 ML/MIN/1.73M2 — SIGNIFICANT CHANGE UP
EGFR: 84 ML/MIN/1.73M2 — SIGNIFICANT CHANGE UP
ESTIMATED AVERAGE GLUCOSE: 80 MG/DL — SIGNIFICANT CHANGE UP (ref 68–114)
GLUCOSE BLDC GLUCOMTR-MCNC: 107 MG/DL — HIGH (ref 70–99)
GLUCOSE BLDC GLUCOMTR-MCNC: 111 MG/DL — HIGH (ref 70–99)
GLUCOSE BLDC GLUCOMTR-MCNC: 92 MG/DL — SIGNIFICANT CHANGE UP (ref 70–99)
GLUCOSE BLDC GLUCOMTR-MCNC: 93 MG/DL — SIGNIFICANT CHANGE UP (ref 70–99)
GLUCOSE SERPL-MCNC: 88 MG/DL — SIGNIFICANT CHANGE UP (ref 70–99)
HCT VFR BLD CALC: 32.1 % — LOW (ref 34.5–45)
HGB BLD-MCNC: 11.1 G/DL — LOW (ref 11.5–15.5)
MAGNESIUM SERPL-MCNC: 1.9 MG/DL — SIGNIFICANT CHANGE UP (ref 1.6–2.6)
MCHC RBC-ENTMCNC: 31.4 PG — SIGNIFICANT CHANGE UP (ref 27–34)
MCHC RBC-ENTMCNC: 34.6 G/DL — SIGNIFICANT CHANGE UP (ref 32–36)
MCV RBC AUTO: 90.9 FL — SIGNIFICANT CHANGE UP (ref 80–100)
NRBC BLD AUTO-RTO: 0 /100 WBCS — SIGNIFICANT CHANGE UP (ref 0–0)
PLATELET # BLD AUTO: 121 K/UL — LOW (ref 150–400)
POTASSIUM SERPL-MCNC: 3.9 MMOL/L — SIGNIFICANT CHANGE UP (ref 3.5–5.3)
POTASSIUM SERPL-SCNC: 3.9 MMOL/L — SIGNIFICANT CHANGE UP (ref 3.5–5.3)
RBC # BLD: 3.53 M/UL — LOW (ref 3.8–5.2)
RBC # FLD: 14 % — SIGNIFICANT CHANGE UP (ref 10.3–14.5)
SODIUM SERPL-SCNC: 144 MMOL/L — SIGNIFICANT CHANGE UP (ref 135–145)
WBC # BLD: 3.26 K/UL — LOW (ref 3.8–10.5)
WBC # FLD AUTO: 3.26 K/UL — LOW (ref 3.8–10.5)

## 2025-04-11 RX ADMIN — Medication 40 MILLIGRAM(S): at 05:46

## 2025-04-11 RX ADMIN — POLYETHYLENE GLYCOL 3350 17 GRAM(S): 17 POWDER, FOR SOLUTION ORAL at 12:48

## 2025-04-11 RX ADMIN — Medication 1000 UNIT(S): at 12:49

## 2025-04-11 RX ADMIN — BACLOFEN 10 MILLIGRAM(S): 10 INJECTION INTRATHECAL at 21:28

## 2025-04-11 RX ADMIN — Medication 80 MILLIGRAM(S): at 05:44

## 2025-04-11 RX ADMIN — Medication 1 DOSE(S): at 19:00

## 2025-04-11 RX ADMIN — Medication 1 DROP(S): at 12:48

## 2025-04-11 RX ADMIN — KETOTIFEN FUMARATE 1 DROP(S): 0.35 SOLUTION/ DROPS OPHTHALMIC at 17:08

## 2025-04-11 RX ADMIN — MIDODRINE HYDROCHLORIDE 10 MILLIGRAM(S): 5 TABLET ORAL at 08:26

## 2025-04-11 RX ADMIN — Medication 80 MILLIGRAM(S): at 17:08

## 2025-04-11 RX ADMIN — KETOTIFEN FUMARATE 1 DROP(S): 0.35 SOLUTION/ DROPS OPHTHALMIC at 05:44

## 2025-04-11 RX ADMIN — BACLOFEN 10 MILLIGRAM(S): 10 INJECTION INTRATHECAL at 14:29

## 2025-04-11 RX ADMIN — METFORMIN HYDROCHLORIDE 750 MILLIGRAM(S): 850 TABLET ORAL at 12:57

## 2025-04-11 RX ADMIN — Medication 1 DOSE(S): at 05:43

## 2025-04-11 RX ADMIN — Medication 112 MICROGRAM(S): at 05:44

## 2025-04-11 RX ADMIN — Medication 81 MILLIGRAM(S): at 12:49

## 2025-04-11 RX ADMIN — MIDODRINE HYDROCHLORIDE 10 MILLIGRAM(S): 5 TABLET ORAL at 12:49

## 2025-04-11 RX ADMIN — Medication 1 DOSE(S): at 08:26

## 2025-04-11 RX ADMIN — Medication 1 TABLET(S): at 12:49

## 2025-04-11 RX ADMIN — FLUOXETINE HYDROCHLORIDE 20 MILLIGRAM(S): 20 CAPSULE ORAL at 12:49

## 2025-04-11 RX ADMIN — MIDODRINE HYDROCHLORIDE 10 MILLIGRAM(S): 5 TABLET ORAL at 17:08

## 2025-04-11 RX ADMIN — BACLOFEN 10 MILLIGRAM(S): 10 INJECTION INTRATHECAL at 05:44

## 2025-04-11 NOTE — DIETITIAN INITIAL EVALUATION ADULT - OTHER INFO
44-year-old female history of Down syndrome diabetes CVA asthma anxiety hypertension aortic valve replacement osteoarthritis brought in from group home complaining of bilateral knee pain difficulty ambulating yesterday. OA bilateral knees  weight 145# 147# weight from past admit appears stable   Ht 4'7"

## 2025-04-11 NOTE — DIETITIAN INITIAL EVALUATION ADULT - PERTINENT MEDS FT
MEDICATIONS  (STANDING):  artificial tears (preservative free) Ophthalmic Solution 1 Drop(s) Both EYES daily  aspirin enteric coated 81 milliGRAM(s) Oral daily  baclofen 10 milliGRAM(s) Oral every 8 hours  cholecalciferol 1000 Unit(s) Oral daily  dextrose 5%. 1000 milliLiter(s) (50 mL/Hr) IV Continuous <Continuous>  dextrose 5%. 1000 milliLiter(s) (100 mL/Hr) IV Continuous <Continuous>  dextrose 50% Injectable 25 Gram(s) IV Push once  dextrose 50% Injectable 12.5 Gram(s) IV Push once  dextrose 50% Injectable 25 Gram(s) IV Push once  FLUoxetine 20 milliGRAM(s) Oral daily  fluticasone propionate/ salmeterol 250-50 MICROgram(s) Diskus 1 Dose(s) Inhalation two times a day  glucagon  Injectable 1 milliGRAM(s) IntraMuscular once  insulin lispro (ADMELOG) corrective regimen sliding scale   SubCutaneous three times a day before meals  insulin lispro (ADMELOG) corrective regimen sliding scale   SubCutaneous at bedtime  ketotifen 0.025% Ophthalmic Solution 1 Drop(s) Both EYES two times a day  levothyroxine 112 MICROGram(s) Oral daily  metFORMIN 750 milliGRAM(s) Oral daily  midodrine. 10 milliGRAM(s) Oral <User Schedule>  multivitamin 1 Tablet(s) Oral daily  pantoprazole    Tablet 40 milliGRAM(s) Oral before breakfast  polyethylene glycol 3350 17 Gram(s) Oral daily  simethicone 80 milliGRAM(s) Chew two times a day    MEDICATIONS  (PRN):  acetaminophen     Tablet .. 650 milliGRAM(s) Oral every 6 hours PRN Temp greater or equal to 38C (100.4F), Mild Pain (1 - 3)  aluminum hydroxide/magnesium hydroxide/simethicone Suspension 30 milliLiter(s) Oral every 4 hours PRN Dyspepsia  dextrose Oral Gel 15 Gram(s) Oral once PRN Blood Glucose LESS THAN 70 milliGRAM(s)/deciliter  melatonin 3 milliGRAM(s) Oral at bedtime PRN Insomnia  ondansetron Injectable 4 milliGRAM(s) IV Push every 6 hours PRN Nausea and/or Vomiting

## 2025-04-11 NOTE — DIETITIAN INITIAL EVALUATION ADULT - NS FNS DIET ORDER
Diet, DASH/TLC:   Sodium & Cholesterol Restricted  Consistent Carbohydrate {No Snacks} (04-10-25 @ 13:55)

## 2025-04-11 NOTE — SOCIAL WORK PROGRESS NOTE - NSSWPROGRESSNOTE_GEN_ALL_CORE
sw has sent information to [facility] for eval. Sw to follow for anticipated plan for michi/  to also obtain level 2 in anticipation of dc to [facility].

## 2025-04-11 NOTE — DIETITIAN INITIAL EVALUATION ADULT - PERTINENT LABORATORY DATA
04-11    144  |  111[H]  |  16  ----------------------------<  88  3.9   |  25  |  0.86    Ca    9.0      11 Apr 2025 08:25  Mg     1.9     04-11    TPro  6.5  /  Alb  3.2[L]  /  TBili  0.9  /  DBili  x   /  AST  31  /  ALT  27  /  AlkPhos  84  04-09  POCT Blood Glucose.: 93 mg/dL (04-11-25 @ 07:27)  A1C with Estimated Average Glucose Result: 4.4 % (05-05-24 @ 05:25)

## 2025-04-11 NOTE — CONSULT NOTE ADULT - SUBJECTIVE AND OBJECTIVE BOX
Patient is a 44yFemale home ambulator without assistive devices who presents to [facility] ED w/ a c/o of BLLE pain. Patient was previously seen for LE edema last week at [facility].   Patient with history of downs syndrome and therefore history is limited, notes a fall at an unknown time and difficulty with ambulation. Information cross checked with chart notes  Denies HS/LOC.   Denies any numbness or tingling. Denies having any other pain elsewhere.   No other orthopedic concerns at this time.      Down syndrome    HTN (hypertension)    DM (diabetes mellitus)    Asthma    Sleep apnea    Hypothyroidism    Ovarian cyst    Down syndrome    Hypothyroidism    CVA (cerebrovascular accident)    Osteoporosis    Osteoarthritis    Anxiety    Seasonal allergies    Type 2 diabetes mellitus    GERD (gastroesophageal reflux disease)    H/O aortic valve stenosis            penicillins (Urticaria)  penicillin (Unknown)  penicillin (Hives)      PHYSICAL EXAM:  T(C): 36.6 (04-11-25 @ 08:22), Max: 37 (04-10-25 @ 17:02)  HR: 75 (04-11-25 @ 08:22) (72 - 90)  BP: 100/60 (04-11-25 @ 08:22) (92/51 - 112/65)  RR: 18 (04-11-25 @ 08:22) (16 - 18)  SpO2: 94% (04-11-25 @ 08:22) (93% - 95%)    Gen: NAD, Resting comfortably    LLE:  Skin intact, no erythema or ecchymosis  + bony tenderness to palpation of the L knee worse than right  +EHL/FHL/TA/GSC  +SILT L3-S1  + DP/PT pulse  Compartments soft and compressible  No calf tenderness        Secondary Survey:   RUE/LUE: No TTP over bony prominences, SILT, palpable pulses, full/painless range of motion, compartments soft  TTP about the R knee, imaging reviewed, diffuse OA noted  TTP about the R ankle, imaging reviewed subacute v acute Lateral pilon fx noted- managed by podiatry    Spine: No bony tenderness. No palpable stepoffs.       < from: CT Knee No Cont, Bilateral (04.10.25 @ 15:14) >  ACC: [id number] EXAM:  CT 3D RECONSTRUCT WO MONICA   ORDERED BY: PATO HANSON     ACC: [id number] EXAM:  CT KNEE ONLY BI   ORDERED BY: PATO HANSON     PROCEDURE DATE:  04/10/2025          INTERPRETATION:  CT OF THE bilateral knees WITHOUT CONTRAST.    CLINICAL INFORMATION: History of fall. Difficulty standing. Concern for   fracture.  TECHNIQUE: Axial CT images were obtained of the bilateral knees with   coronal and sagittal reconstructions. No contrast was administered. Three   dimensional reconstructions were obtained.    COMPARISON: Radiographs of the right and left knee performed 5/9/2024.    FINDINGS:    Osseous structures: Incomplete nondisplaced curvilinear fracture at the   left posterior lateral femoral condyle (series 303, image 118). No   additional fractures identified. Osteopenia is present. There is advanced   tricompartmental osteoarthrosis of bilateral knees with subchondral   cystic changes and subchondral sclerosis, most advanced at the left   patellofemoral compartment where there is bone-on-bone apposition.    Synovium/joint fluid: Moderate left greater than right joint effusions.    Subcutaneous tissues: Edema within the posterior and the lateral   subcutaneous tissues of bilateral knees. No drainable fluid collection.    Neurovascular structures: Intact within the limitations of CT imaging.    IMPRESSION:    CT of bilateral knees demonstrates an incomplete nondisplaced curvilinear   fracture at the posterior lateral femoral condyle of the left knee.   Finding is favored to be in association with an osteochondral lesion of   indeterminate age.    Advanced left greater than the right knee osteoarthrosis with bone on   bone apposition at the left patellofemoral compartment. Moderate left   greater than right effusions, nonspecific.    --- End of Report ---             ANNY ROLDAN MD; Attending Radiologist  This document has been electronically signed. Apr 10 2025  3:38PM    < end of copied text >      A/P: 44 F with subacute L Lateral femoral condyle incomplete fracture that is likely associated with an osteochondral lesion  Analgesia  NWB in BJKI  Ice and elevate as tolerated  DVT ppx- SCD/ A81 per primary team  PT/OT as tolerated while keeping NWB to the LLE  No acute orthopedic surgical intervention indicated at this time  Orthopedically stable for Discharge  Fracture of the R distal tibia/pilon is to be managed by podiatry, please see their recs  D/w attending in regard to the above plan; will updated as needed   Patient is a 44yFemale home ambulator without assistive devices who presents to [facility] ED w/ a c/o of BLLE pain. Patient was previously seen for LE edema last week at [place].   Patient with history of downs syndrome and therefore history is limited, notes a fall at an unknown time and difficulty with ambulation. Information cross checked with chart notes  Denies HS/LOC.   Denies any numbness or tingling. Denies having any other pain elsewhere.   No other orthopedic concerns at this time.      Down syndrome    HTN (hypertension)    DM (diabetes mellitus)    Asthma    Sleep apnea    Hypothyroidism    Ovarian cyst    Down syndrome    Hypothyroidism    CVA (cerebrovascular accident)    Osteoporosis    Osteoarthritis    Anxiety    Seasonal allergies    Type 2 diabetes mellitus    GERD (gastroesophageal reflux disease)    H/O aortic valve stenosis            penicillins (Urticaria)  penicillin (Unknown)  penicillin (Hives)      PHYSICAL EXAM:  T(C): 36.6 (04-11-25 @ 08:22), Max: 37 (04-10-25 @ 17:02)  HR: 75 (04-11-25 @ 08:22) (72 - 90)  BP: 100/60 (04-11-25 @ 08:22) (92/51 - 112/65)  RR: 18 (04-11-25 @ 08:22) (16 - 18)  SpO2: 94% (04-11-25 @ 08:22) (93% - 95%)    Gen: NAD, Resting comfortably    LLE:  Skin intact, no erythema or ecchymosis  + bony tenderness to palpation of the L knee worse than right  +EHL/FHL/TA/GSC  +SILT L3-S1  + DP/PT pulse  Compartments soft and compressible  No calf tenderness        Secondary Survey:   RUE/LUE: No TTP over bony prominences, SILT, palpable pulses, full/painless range of motion, compartments soft  TTP about the R knee, imaging reviewed, diffuse OA noted  TTP about the L ankle, imaging reviewed subacute v acute Lateral minimally displaced pilon fx noted- managed by podiatry    Spine: No bony tenderness. No palpable stepoffs.       < from: CT Knee No Cont, Bilateral (04.10.25 @ 15:14) >  ACC: [id number] EXAM:  CT 3D RECONSTRUCT WO MONICA   ORDERED BY: PATO HANSON     ACC: [id number] EXAM:  CT KNEE ONLY BI   ORDERED BY: PATO S MARGIE     PROCEDURE DATE:  04/10/2025          INTERPRETATION:  CT OF THE bilateral knees WITHOUT CONTRAST.    CLINICAL INFORMATION: History of fall. Difficulty standing. Concern for   fracture.  TECHNIQUE: Axial CT images were obtained of the bilateral knees with   coronal and sagittal reconstructions. No contrast was administered. Three   dimensional reconstructions were obtained.    COMPARISON: Radiographs of the right and left knee performed 5/9/2024.    FINDINGS:    Osseous structures: Incomplete nondisplaced curvilinear fracture at the   left posterior lateral femoral condyle (series 303, image 118). No   additional fractures identified. Osteopenia is present. There is advanced   tricompartmental osteoarthrosis of bilateral knees with subchondral   cystic changes and subchondral sclerosis, most advanced at the left   patellofemoral compartment where there is bone-on-bone apposition.    Synovium/joint fluid: Moderate left greater than right joint effusions.    Subcutaneous tissues: Edema within the posterior and the lateral   subcutaneous tissues of bilateral knees. No drainable fluid collection.    Neurovascular structures: Intact within the limitations of CT imaging.    IMPRESSION:    CT of bilateral knees demonstrates an incomplete nondisplaced curvilinear   fracture at the posterior lateral femoral condyle of the left knee.   Finding is favored to be in association with an osteochondral lesion of   indeterminate age.    Advanced left greater than the right knee osteoarthrosis with bone on   bone apposition at the left patellofemoral compartment. Moderate left   greater than right effusions, nonspecific.    --- End of Report ---             ANNY ROLDAN MD; Attending Radiologist  This document has been electronically signed. Apr 10 2025  3:38PM    < end of copied text >      A/P: 44 F with subacute L Lateral femoral condyle incomplete fracture that is likely associated with an osteochondral lesion  Analgesia  NWB in BJKI  Ice and elevate as tolerated  DVT ppx- SCD/ A81 per primary team  PT/OT as tolerated while keeping NWB to the LLE  No acute orthopedic surgical intervention indicated at this time  Orthopedically stable for Discharge  Fracture of the L distal tibia/pilon is to be managed by podiatry, please see their recs  D/w attending in regard to the above plan; will updated as needed   Patient is a 44yFemale home ambulator without assistive devices who presents to [facility] ED w/ a c/o of BLLE pain. Patient was previously seen for LE edema last week at [place].   Patient with history of downs syndrome and therefore history is limited, notes a fall at an unknown time and difficulty with ambulation. Information cross checked with chart notes  Denies HS/LOC.   Denies any numbness or tingling. Denies having any other pain elsewhere.   No other orthopedic concerns at this time.      Down syndrome    HTN (hypertension)    DM (diabetes mellitus)    Asthma    Sleep apnea    Hypothyroidism    Ovarian cyst    Down syndrome    Hypothyroidism    CVA (cerebrovascular accident)    Osteoporosis    Osteoarthritis    Anxiety    Seasonal allergies    Type 2 diabetes mellitus    GERD (gastroesophageal reflux disease)    H/O aortic valve stenosis            penicillins (Urticaria)  penicillin (Unknown)  penicillin (Hives)      PHYSICAL EXAM:  T(C): 36.6 (04-11-25 @ 08:22), Max: 37 (04-10-25 @ 17:02)  HR: 75 (04-11-25 @ 08:22) (72 - 90)  BP: 100/60 (04-11-25 @ 08:22) (92/51 - 112/65)  RR: 18 (04-11-25 @ 08:22) (16 - 18)  SpO2: 94% (04-11-25 @ 08:22) (93% - 95%)    Gen: NAD, Resting comfortably    LLE:  Skin intact, no erythema or ecchymosis  + bony tenderness to palpation of the L knee worse than right  +EHL/FHL/TA/GSC  +SILT L3-S1  + DP/PT pulse  Compartments soft and compressible  No calf tenderness        Secondary Survey:   RUE/LUE: No TTP over bony prominences, SILT, palpable pulses, full/painless range of motion, compartments soft  TTP about the R knee, imaging reviewed, diffuse OA noted  TTP about the L ankle, imaging reviewed subacute v acute Lateral minimally displaced pilon fx noted- managed by podiatry    Spine: No bony tenderness. No palpable stepoffs.       < from: CT Knee No Cont, Bilateral (04.10.25 @ 15:14) >  ACC: [id number] EXAM:  CT 3D RECONSTRUCT WO MONICA   ORDERED BY: PATO HANSON     ACC: [id number] EXAM:  CT KNEE ONLY BI   ORDERED BY: PATO S MARGIE     PROCEDURE DATE:  04/10/2025          INTERPRETATION:  CT OF THE bilateral knees WITHOUT CONTRAST.    CLINICAL INFORMATION: History of fall. Difficulty standing. Concern for   fracture.  TECHNIQUE: Axial CT images were obtained of the bilateral knees with   coronal and sagittal reconstructions. No contrast was administered. Three   dimensional reconstructions were obtained.    COMPARISON: Radiographs of the right and left knee performed 5/9/2024.    FINDINGS:    Osseous structures: Incomplete nondisplaced curvilinear fracture at the   left posterior lateral femoral condyle (series 303, image 118). No   additional fractures identified. Osteopenia is present. There is advanced   tricompartmental osteoarthrosis of bilateral knees with subchondral   cystic changes and subchondral sclerosis, most advanced at the left   patellofemoral compartment where there is bone-on-bone apposition.    Synovium/joint fluid: Moderate left greater than right joint effusions.    Subcutaneous tissues: Edema within the posterior and the lateral   subcutaneous tissues of bilateral knees. No drainable fluid collection.    Neurovascular structures: Intact within the limitations of CT imaging.    IMPRESSION:    CT of bilateral knees demonstrates an incomplete nondisplaced curvilinear   fracture at the posterior lateral femoral condyle of the left knee.   Finding is favored to be in association with an osteochondral lesion of   indeterminate age.    Advanced left greater than the right knee osteoarthrosis with bone on   bone apposition at the left patellofemoral compartment. Moderate left   greater than right effusions, nonspecific.    --- End of Report ---             ANNY ROLDAN MD; Attending Radiologist  This document has been electronically signed. Apr 10 2025  3:38PM    < end of copied text >      A/P: 44 F with subacute L Lateral femoral condyle incomplete fracture that is likely associated with an osteochondral lesion  Analgesia  NWB in BJKI  Ice and elevate as tolerated  DVT ppx- SCD/ A81 per primary team  PT/OT as tolerated while keeping NWB to the LLE  No acute orthopedic surgical intervention indicated at this time  Orthopedically stable for Discharge  Fracture of the L distal tibia/pilon is to be managed by podiatry, please see their recs  D/w attending in regard to the above plan; will updated as needed; orthopedics to sign off, please reconsult as needed

## 2025-04-11 NOTE — DIETITIAN INITIAL EVALUATION ADULT - ORAL INTAKE PTA/DIET HISTORY
patient seen with aide from group home at bedside. patient and RN report patient with good PO intake. no difficulties chewing or swallowing. no food allergies. per transfer papers Low Na diet PTA. spoke of consistent cho diet with patient and aide. apparently patient adds honey to many foods. PMH DM type 2 on metformin PTA  per aide only whole wheat bread from facility with fresh fruits and vegetables taken by patient.

## 2025-04-11 NOTE — SOCIAL WORK PROGRESS NOTE - NSSWPROGRESSNOTE_GEN_ALL_CORE
Pt was accepted to [facility] for rehab. Sw completed a level 2 screen due to patients MRDD diagnosis. Level 2 screen must be reviewed and accepted by office of [facility] before pt can be discharged to [facility]. Level 2 and additional information as been faxed and is currently being reviewed. Sw will cont to follow for safe dc planning.

## 2025-04-12 LAB
GLUCOSE BLDC GLUCOMTR-MCNC: 101 MG/DL — HIGH (ref 70–99)
GLUCOSE BLDC GLUCOMTR-MCNC: 91 MG/DL — SIGNIFICANT CHANGE UP (ref 70–99)
GLUCOSE BLDC GLUCOMTR-MCNC: 92 MG/DL — SIGNIFICANT CHANGE UP (ref 70–99)

## 2025-04-12 RX ORDER — ENOXAPARIN SODIUM 100 MG/ML
40 INJECTION SUBCUTANEOUS EVERY 24 HOURS
Refills: 0 | Status: DISCONTINUED | OUTPATIENT
Start: 2025-04-12 | End: 2025-04-14

## 2025-04-12 RX ORDER — TRAMADOL HYDROCHLORIDE 50 MG/1
25 TABLET, FILM COATED ORAL EVERY 6 HOURS
Refills: 0 | Status: DISCONTINUED | OUTPATIENT
Start: 2025-04-12 | End: 2025-04-14

## 2025-04-12 RX ORDER — TRAMADOL HYDROCHLORIDE 50 MG/1
50 TABLET, FILM COATED ORAL EVERY 6 HOURS
Refills: 0 | Status: DISCONTINUED | OUTPATIENT
Start: 2025-04-12 | End: 2025-04-14

## 2025-04-12 RX ORDER — OXYCODONE HYDROCHLORIDE 30 MG/1
2.5 TABLET ORAL ONCE
Refills: 0 | Status: DISCONTINUED | OUTPATIENT
Start: 2025-04-12 | End: 2025-04-12

## 2025-04-12 RX ADMIN — BACLOFEN 10 MILLIGRAM(S): 10 INJECTION INTRATHECAL at 06:21

## 2025-04-12 RX ADMIN — Medication 1 DROP(S): at 11:57

## 2025-04-12 RX ADMIN — Medication 1000 UNIT(S): at 11:57

## 2025-04-12 RX ADMIN — ENOXAPARIN SODIUM 40 MILLIGRAM(S): 100 INJECTION SUBCUTANEOUS at 11:56

## 2025-04-12 RX ADMIN — Medication 80 MILLIGRAM(S): at 18:02

## 2025-04-12 RX ADMIN — Medication 1 DOSE(S): at 07:43

## 2025-04-12 RX ADMIN — TRAMADOL HYDROCHLORIDE 50 MILLIGRAM(S): 50 TABLET, FILM COATED ORAL at 09:30

## 2025-04-12 RX ADMIN — Medication 40 MILLIGRAM(S): at 06:21

## 2025-04-12 RX ADMIN — POLYETHYLENE GLYCOL 3350 17 GRAM(S): 17 POWDER, FOR SOLUTION ORAL at 11:56

## 2025-04-12 RX ADMIN — BACLOFEN 10 MILLIGRAM(S): 10 INJECTION INTRATHECAL at 13:37

## 2025-04-12 RX ADMIN — OXYCODONE HYDROCHLORIDE 2.5 MILLIGRAM(S): 30 TABLET ORAL at 04:20

## 2025-04-12 RX ADMIN — Medication 81 MILLIGRAM(S): at 11:57

## 2025-04-12 RX ADMIN — TRAMADOL HYDROCHLORIDE 50 MILLIGRAM(S): 50 TABLET, FILM COATED ORAL at 10:30

## 2025-04-12 RX ADMIN — Medication 1 TABLET(S): at 11:57

## 2025-04-12 RX ADMIN — MIDODRINE HYDROCHLORIDE 10 MILLIGRAM(S): 5 TABLET ORAL at 18:02

## 2025-04-12 RX ADMIN — KETOTIFEN FUMARATE 1 DROP(S): 0.35 SOLUTION/ DROPS OPHTHALMIC at 18:01

## 2025-04-12 RX ADMIN — Medication 650 MILLIGRAM(S): at 08:58

## 2025-04-12 RX ADMIN — OXYCODONE HYDROCHLORIDE 2.5 MILLIGRAM(S): 30 TABLET ORAL at 05:20

## 2025-04-12 RX ADMIN — Medication 112 MICROGRAM(S): at 06:20

## 2025-04-12 RX ADMIN — FLUOXETINE HYDROCHLORIDE 20 MILLIGRAM(S): 20 CAPSULE ORAL at 11:57

## 2025-04-12 RX ADMIN — Medication 80 MILLIGRAM(S): at 06:20

## 2025-04-12 RX ADMIN — KETOTIFEN FUMARATE 1 DROP(S): 0.35 SOLUTION/ DROPS OPHTHALMIC at 06:21

## 2025-04-12 RX ADMIN — METFORMIN HYDROCHLORIDE 750 MILLIGRAM(S): 850 TABLET ORAL at 13:35

## 2025-04-12 RX ADMIN — MIDODRINE HYDROCHLORIDE 10 MILLIGRAM(S): 5 TABLET ORAL at 11:57

## 2025-04-12 RX ADMIN — MIDODRINE HYDROCHLORIDE 10 MILLIGRAM(S): 5 TABLET ORAL at 07:43

## 2025-04-12 RX ADMIN — Medication 650 MILLIGRAM(S): at 09:20

## 2025-04-12 RX ADMIN — BACLOFEN 10 MILLIGRAM(S): 10 INJECTION INTRATHECAL at 22:32

## 2025-04-12 NOTE — PROGRESS NOTE ADULT - ATTENDING COMMENTS
Bunion deformity with distal lateral tibial plafond fracture   diffuse osteopenia  chronic fracture 5th metatarsal and severe bunion    does not appear to be having pain at the site of fracture, If having difficulty ambulating would weightbear as tolerated in CAM boot or nonweightbearing in cAM boot if having issues applying weight to area which she is not having. it is non operative    she may follow out patient within 2 weeks of discharge  continue physical therapy

## 2025-04-13 LAB
GLUCOSE BLDC GLUCOMTR-MCNC: 103 MG/DL — HIGH (ref 70–99)
GLUCOSE BLDC GLUCOMTR-MCNC: 114 MG/DL — HIGH (ref 70–99)
GLUCOSE BLDC GLUCOMTR-MCNC: 98 MG/DL — SIGNIFICANT CHANGE UP (ref 70–99)
GLUCOSE BLDC GLUCOMTR-MCNC: 98 MG/DL — SIGNIFICANT CHANGE UP (ref 70–99)
GLUCOSE BLDC GLUCOMTR-MCNC: 99 MG/DL — SIGNIFICANT CHANGE UP (ref 70–99)

## 2025-04-13 RX ORDER — TRAMADOL HYDROCHLORIDE 50 MG/1
0.5 TABLET, FILM COATED ORAL
Qty: 0 | Refills: 0 | DISCHARGE
Start: 2025-04-13

## 2025-04-13 RX ORDER — TRAMADOL HYDROCHLORIDE 50 MG/1
1 TABLET, FILM COATED ORAL
Qty: 0 | Refills: 0 | DISCHARGE
Start: 2025-04-13

## 2025-04-13 RX ORDER — ENOXAPARIN SODIUM 100 MG/ML
40 INJECTION SUBCUTANEOUS
Qty: 0 | Refills: 0 | DISCHARGE
Start: 2025-04-13

## 2025-04-13 RX ORDER — ACETAMINOPHEN 500 MG/5ML
2 LIQUID (ML) ORAL
Qty: 0 | Refills: 0 | DISCHARGE
Start: 2025-04-13

## 2025-04-13 RX ADMIN — Medication 80 MILLIGRAM(S): at 06:30

## 2025-04-13 RX ADMIN — Medication 112 MICROGRAM(S): at 06:30

## 2025-04-13 RX ADMIN — KETOTIFEN FUMARATE 1 DROP(S): 0.35 SOLUTION/ DROPS OPHTHALMIC at 07:02

## 2025-04-13 RX ADMIN — Medication 3 MILLIGRAM(S): at 22:02

## 2025-04-13 RX ADMIN — Medication 1 TABLET(S): at 11:46

## 2025-04-13 RX ADMIN — Medication 1 DOSE(S): at 06:30

## 2025-04-13 RX ADMIN — TRAMADOL HYDROCHLORIDE 50 MILLIGRAM(S): 50 TABLET, FILM COATED ORAL at 22:02

## 2025-04-13 RX ADMIN — KETOTIFEN FUMARATE 1 DROP(S): 0.35 SOLUTION/ DROPS OPHTHALMIC at 18:30

## 2025-04-13 RX ADMIN — Medication 40 MILLIGRAM(S): at 06:30

## 2025-04-13 RX ADMIN — MIDODRINE HYDROCHLORIDE 10 MILLIGRAM(S): 5 TABLET ORAL at 13:31

## 2025-04-13 RX ADMIN — TRAMADOL HYDROCHLORIDE 50 MILLIGRAM(S): 50 TABLET, FILM COATED ORAL at 23:02

## 2025-04-13 RX ADMIN — POLYETHYLENE GLYCOL 3350 17 GRAM(S): 17 POWDER, FOR SOLUTION ORAL at 11:47

## 2025-04-13 RX ADMIN — TRAMADOL HYDROCHLORIDE 50 MILLIGRAM(S): 50 TABLET, FILM COATED ORAL at 07:28

## 2025-04-13 RX ADMIN — MIDODRINE HYDROCHLORIDE 10 MILLIGRAM(S): 5 TABLET ORAL at 17:56

## 2025-04-13 RX ADMIN — Medication 81 MILLIGRAM(S): at 11:46

## 2025-04-13 RX ADMIN — FLUOXETINE HYDROCHLORIDE 20 MILLIGRAM(S): 20 CAPSULE ORAL at 11:46

## 2025-04-13 RX ADMIN — Medication 1 DROP(S): at 11:47

## 2025-04-13 RX ADMIN — Medication 80 MILLIGRAM(S): at 17:56

## 2025-04-13 RX ADMIN — Medication 1 DOSE(S): at 18:40

## 2025-04-13 RX ADMIN — ENOXAPARIN SODIUM 40 MILLIGRAM(S): 100 INJECTION SUBCUTANEOUS at 11:46

## 2025-04-13 RX ADMIN — MIDODRINE HYDROCHLORIDE 10 MILLIGRAM(S): 5 TABLET ORAL at 11:49

## 2025-04-13 RX ADMIN — BACLOFEN 10 MILLIGRAM(S): 10 INJECTION INTRATHECAL at 06:30

## 2025-04-13 RX ADMIN — Medication 1000 UNIT(S): at 11:46

## 2025-04-13 RX ADMIN — BACLOFEN 10 MILLIGRAM(S): 10 INJECTION INTRATHECAL at 22:03

## 2025-04-13 RX ADMIN — METFORMIN HYDROCHLORIDE 750 MILLIGRAM(S): 850 TABLET ORAL at 13:30

## 2025-04-13 RX ADMIN — TRAMADOL HYDROCHLORIDE 50 MILLIGRAM(S): 50 TABLET, FILM COATED ORAL at 08:20

## 2025-04-13 RX ADMIN — BACLOFEN 10 MILLIGRAM(S): 10 INJECTION INTRATHECAL at 13:31

## 2025-04-13 NOTE — DISCHARGE NOTE PROVIDER - NSDCMRMEDTOKEN_GEN_ALL_CORE_FT
acetaminophen 325 mg oral tablet: 2 tab(s) orally every 6 hours As needed Temp greater or equal to 38C (100.4F), Mild Pain (1 - 3)  alendronate 70 mg oral tablet: 1 tab(s) orally once a week every week on Sunday  Align 4 mg oral capsule: 1 cap(s) orally once a day  ammonium lactate 12% topical cream: Apply topically to affected area 2 times a day to feet  aspirin 81 mg oral tablet: 1 tab(s) orally once a day  baclofen 10 mg oral tablet: 1 tab(s) orally 3 times a day  Breo Ellipta 100 mcg-25 mcg/inh inhalation powder: 1 puff(s) inhaled once a day  clindamycin 1% topical lotion: 1 application to the buttocks and thighs 2 times a day  D3 25 mcg (1000 intl units) oral tablet: 1 tab(s) orally once a day  diclofenac 1% topical gel: Apply topically to affected area to ankles 2 times a day  enoxaparin: 40 milligram(s) subcutaneous once a day  Eucerin Eczema Relief topical cream: Apply topically on the body 2 times a day  FLUoxetine 20 mg oral capsule: 1 cap(s) orally once a day (in the morning)  levothyroxine 112 mcg (0.112 mg) oral tablet: 1 tab(s) orally once a day  Lidocare Pain Relief Patch 4% topical film: 1 patch topically to intact skin of both big toe joints once a day for 8-12 hours  meloxicam 15 mg oral tablet: 1 tab(s) orally once a day as needed  metFORMIN 750 mg oral tablet, extended release: 1 tab(s) orally once a day with breakfast  midodrine 10 mg oral tablet: 1 tab(s) orally 3 times a day (8am,12pm, 4pm)  Multiple Vitamins oral tablet: 1 tab(s) orally once a day  omeprazole 40 mg oral delayed release capsule: 1 cap(s) orally once a day  Pataday 0.2% ophthalmic solution: 1 drop(s) in each affected eye 2 times a day *Apply before other medications*  psyllium oral powder for reconstitution: Mix and drink by mouth as directed once a day  Refresh Optive ophthalmic gel: 1 drop(s) in each eye once a day (in the evening) *apply 10 mins after Pataday drops*  Restasis 0.05% ophthalmic emulsion: 1 drop(s) in each eye 2 times a day **apply 10 minutes after pataday**  simethicone 80 mg oral tablet, chewable: 1 tab(s) chewed 2 times a day  traMADol 50 mg oral tablet: 1 tab(s) orally every 6 hours As needed Severe Pain (7 - 10)  traMADol 50 mg oral tablet: 0.5 tab(s) orally every 6 hours As needed Moderate Pain (4 - 6)  Tyrvaya 0.03 mg nasal spray: 1 spray(s) in each nostril 2 times a day  Ventolin HFA 90 mcg/inh inhalation aerosol: 2 puff(s) inhaled every 4 hours as needed for  shortness of breath and/or wheezing

## 2025-04-13 NOTE — DISCHARGE NOTE PROVIDER - HOSPITAL COURSE
44-year-old female history of Down syndrome diabetes CVA asthma anxiety hypertension aortic valve replacement osteoarthritis brought in from group home complaining of bilateral knee pain difficulty ambulating yesterday.  Was seen a week ago at [facility] for some right leg swelling and ultrasound that was negative for DVT had an x-ray of her foot that was questioning a stress fracture versus osteonecrosis.  Follow-up team and call facility multiple times no answer documented with a certified letter that was sent.  As per facility patient needs to have the ability to walk up multiple steps. Yesterday her knees buckled going up a flight of stairs. She reports difficulty walking b/c of b/l knee pain and foot pain.     Problem/Plan - 1:  ·  Problem: Difficulty walking.   ·  Plan: Likely 2/2 to L femoral fx  Ortho consult noted -- Placed in [facility]I -- NWB  Podiatry f/u for abnormal foot CT noted  Pain meds prn  Physiatry consult appreciated  Will need [facility].     Problem/Plan - 2:  ·  Problem: Hypothyroidism.   ·  Plan: Continue Synthroid.     Problem/Plan - 3:  ·  Problem: Asthma.   ·  Plan: Continue inhalers.     Problem/Plan - 4:  ·  Problem: Diabetes mellitus, type 2.   ·  Plan: Continue metformin  RISS.       Additional Information:  Additional Information: D/C planning to [facility]    >35 minutes spent on discharge

## 2025-04-13 NOTE — DISCHARGE NOTE PROVIDER - NSDCCPCAREPLAN_GEN_ALL_CORE_FT
PRINCIPAL DISCHARGE DIAGNOSIS  Diagnosis: Difficulty walking  Assessment and Plan of Treatment: PT  F/u with ortho      SECONDARY DISCHARGE DIAGNOSES  Diagnosis: Bilateral knee pain  Assessment and Plan of Treatment: PT  F/u ortho

## 2025-04-13 NOTE — DISCHARGE NOTE PROVIDER - CARE PROVIDER_API CALL
Eris Hull  Orthopaedic Surgery  [address], [address]  [place] [address]  Phone: [phone]  Fax: [phone]  Follow Up Time: 1 week

## 2025-04-14 ENCOUNTER — TRANSCRIPTION ENCOUNTER (OUTPATIENT)
Age: 45
End: 2025-04-14

## 2025-04-14 VITALS
DIASTOLIC BLOOD PRESSURE: 64 MMHG | OXYGEN SATURATION: 94 % | TEMPERATURE: 98 F | SYSTOLIC BLOOD PRESSURE: 112 MMHG | HEART RATE: 86 BPM | RESPIRATION RATE: 18 BRPM

## 2025-04-14 LAB — GLUCOSE BLDC GLUCOMTR-MCNC: 95 MG/DL — SIGNIFICANT CHANGE UP (ref 70–99)

## 2025-04-14 PROCEDURE — 83735 ASSAY OF MAGNESIUM: CPT

## 2025-04-14 PROCEDURE — 97530 THERAPEUTIC ACTIVITIES: CPT

## 2025-04-14 PROCEDURE — 73700 CT LOWER EXTREMITY W/O DYE: CPT | Mod: MC

## 2025-04-14 PROCEDURE — 73620 X-RAY EXAM OF FOOT: CPT

## 2025-04-14 PROCEDURE — 73562 X-RAY EXAM OF KNEE 3: CPT

## 2025-04-14 PROCEDURE — 97161 PT EVAL LOW COMPLEX 20 MIN: CPT

## 2025-04-14 PROCEDURE — 85025 COMPLETE CBC W/AUTO DIFF WBC: CPT

## 2025-04-14 PROCEDURE — 94640 AIRWAY INHALATION TREATMENT: CPT

## 2025-04-14 PROCEDURE — 80053 COMPREHEN METABOLIC PANEL: CPT

## 2025-04-14 PROCEDURE — 76376 3D RENDER W/INTRP POSTPROCES: CPT

## 2025-04-14 PROCEDURE — 85027 COMPLETE CBC AUTOMATED: CPT

## 2025-04-14 PROCEDURE — 80048 BASIC METABOLIC PNL TOTAL CA: CPT

## 2025-04-14 PROCEDURE — 83036 HEMOGLOBIN GLYCOSYLATED A1C: CPT

## 2025-04-14 PROCEDURE — 82962 GLUCOSE BLOOD TEST: CPT

## 2025-04-14 PROCEDURE — 99285 EMERGENCY DEPT VISIT HI MDM: CPT | Mod: 25

## 2025-04-14 PROCEDURE — 36415 COLL VENOUS BLD VENIPUNCTURE: CPT

## 2025-04-14 RX ADMIN — Medication 1 DOSE(S): at 05:54

## 2025-04-14 RX ADMIN — Medication 80 MILLIGRAM(S): at 05:53

## 2025-04-14 RX ADMIN — KETOTIFEN FUMARATE 1 DROP(S): 0.35 SOLUTION/ DROPS OPHTHALMIC at 05:53

## 2025-04-14 RX ADMIN — Medication 112 MICROGRAM(S): at 05:53

## 2025-04-14 RX ADMIN — BACLOFEN 10 MILLIGRAM(S): 10 INJECTION INTRATHECAL at 05:53

## 2025-04-14 RX ADMIN — Medication 40 MILLIGRAM(S): at 05:53

## 2025-04-14 RX ADMIN — MIDODRINE HYDROCHLORIDE 10 MILLIGRAM(S): 5 TABLET ORAL at 09:36

## 2025-04-14 NOTE — PROGRESS NOTE ADULT - PROBLEM SELECTOR PROBLEM 4
Diabetes mellitus, type 2
patient

## 2025-04-14 NOTE — DISCHARGE NOTE NURSING/CASE MANAGEMENT/SOCIAL WORK - NSDCPELOVENOX_GEN_ALL_CORE
; recently moved back to  to live with parents; unemployed; quit cigarettes 5 weeks ago, has a 10pack year smoking history; denies illicit substances except for marijuana occasionally
Enoxaparin/Lovenox - Compliance/Enoxaparin/Lovenox - Dietary Advice/Enoxaparin/Lovenox - Follow up monitoring/Enoxaparin/Lovenox - Potential for adverse drug reactions and interactions

## 2025-04-14 NOTE — DISCHARGE NOTE NURSING/CASE MANAGEMENT/SOCIAL WORK - NSDCPEFALRISK_GEN_ALL_CORE
For information on Fall & Injury Prevention, visit: [email or web address] OR  [email or web address] OR  https://www.cdc.gov/steadi/patient.html

## 2025-04-14 NOTE — DISCHARGE NOTE NURSING/CASE MANAGEMENT/SOCIAL WORK - FINANCIAL ASSISTANCE
[facility] provides services at a reduced cost to those who are determined to be eligible through [facility]’s financial assistance program. Information regarding [facility]’s financial assistance program can be found by going to [email or web address] or by calling [phone].

## 2025-04-14 NOTE — DISCHARGE NOTE NURSING/CASE MANAGEMENT/SOCIAL WORK - PATIENT PORTAL LINK FT
You can access the FollowMyHealth Patient Portal offered by [facility] by registering at the following website: [email or web address]. By joining [facility]’s FollowMyHealth portal, you will also be able to view your health information using other applications (apps) compatible with our system.

## 2025-04-14 NOTE — SOCIAL WORK PROGRESS NOTE - NSSWPROGRESSNOTE_GEN_ALL_CORE
Pt to be DC to [facility] today at 11:30 via [facility] ([facility]). Level II screen received and sent to facility. Pt/facility/MD all aware and in agreement with plan for DC. SW notified Pt's father [phone] and group home of plan for DC. SW will remain available as needed to ensure safe transition.

## 2025-04-14 NOTE — PROGRESS NOTE ADULT - PROBLEM SELECTOR PROBLEM 1
"Video-Visit Details    Type of service:  Video Visit    Video Start Time (time video started): 9:02    Video End Time (time video stopped): 9:27    Originating Location (pt. Location): Home    Distant Location (provider location):  [facility] [place]/remote location     Mode of Communication:  Video Conference via [facility]        BARIATRIC PROGRESS NOTE - 4 Week Post Op  DATE OF VISIT: June 26, 2022    Bethel Woods  1975  male  [id number]  47 year old    ASSESSMENT:    REASON FOR VISIT:  Bethel Woods is a 47 year old year old male presents today for a 4 Week Post Op nutrition follow-up appointment. Patient is accompanied by self      DIAGNOSIS:  Status: post gastric bypass surgery.   Obesity Grade III BMI >40    ANTHROPOMETRICS:  Height: 68\"   Initial weight: 376.2 lb    Current Weight: 327 lb   BMI: 49.72  kg/(m^2).    VITAMINS AND MINERALS:   1 [facility] Bariatric Multivitamin/ mineral with 18 mg iron-adequate thiamine, vitamin D) -7 to 8 am    650 mg Viactiv Calcium carbonate chew with Vitamin D-  BID at noon and 9 pm       NUTRITION HISTORY:  Tolerating diet: Bariatric pureed diet  Fluids/water intake: yesterday on pureed chicken with full fat gravy ounces/day  Small bites: Yes  Portion size: 1/2 cup or less  20-30 minute meals: Yes  Fluids and meals  by 30 minutes: Yes  Chew foods thoroughly: Yes  Breakfast: protein drink with 25 grams protein  Lunch: 1/4 cup mashed potatoes with low-fat shredded cheese or 1/3 cup blended sloppy rosamaria    Dinner: protein drink with 25 grams protein  Snacks: 2 small pieces water melon  Nausea: no  Vomiting: yesterday on pureed chicken with full fat gravy  Constipation: no  Additional Information: Patient is excited to move on to pureed foods. Stressed the importance of not prematurely advancing diet until 8-12 weeks post-op. Also discussed avoiding high fat foods.      PHYSICAL ACTIVITY:  Type: walking or gym membership "
(treadmill)  Frequency: 3-4 days a week.  Duration: 45 minutes.    DIAGNOSIS:   *Previous Nutrition Diagnosis: Altered gastrointestinal function related to alternation in gastrointestinal structure as evidenced by history of gastric bypass.   No change    Previous Goals:  Start puree diet at day 14 post op.-met  Start 5000 mcg vitamin B-12 one time per week-not met  Start 100 mg thiamine per week or switch to a bariatric product that has adequate thiamine and vitamin D (offered suggestions)-met  Start: calcium and vitamin D-met  Continue MVI-met (switched to a bariatric product)  Aim for 60 to 90 grams protein-met  Continue 48 to 64 oz of fluid per day-met      Current Nutrition Diagnosis: Altered gastrointestinal function related to alternation in gastrointestinal structure as evidenced by history of gastric bypass.     INTERVENTION  Nutrition Prescription: Recommended bariatric soft diet.    Goals:  Start soft diet at day 28 post op.  Restart 5000 mcg vitamin B-12 one time per week  Continue MVI, calcium with vitamin D  Aim for 60 to 90 grams protein.  Continue 48 to 64 oz of fluid per day  Select foods with < 10 grams of total fat per serving    Implementation:  Discussed transition to soft diet.  Emphasized importance of adequate protein.  Reviewed required vitamins and mineral supplements.  Verbalizes fair-good understanding of surgery diet guidelines.  Assessed learning needs and learning preferences.      NUTRITION MONITORING AND EVALUATION:   Monitor diet tolerance and weight loss.      Anticipated Compliance: fair  Follow Up: Continue to monitor patient closely regarding weight loss and diet.  At 3 months Post Op    TIME SPENT WITH PATIENT: 25 minutes.  Luisito Hair RD, LD  [facility], [place]    
Hallux valgus, bilateral
Difficulty walking

## 2025-04-14 NOTE — PROGRESS NOTE ADULT - PROBLEM SELECTOR PLAN 1
Likely 2/2 to L femoral fx  Ortho consult noted -- Placed in [facility] -- NWB  Podiatry f/u for abnormal foot CT noted  Pain meds prn  Physiatry consult appreciated  Will need JOSE LUIS
Likely 2/2 to L femoral fx  Ortho consult noted -- Placed in [facility] -- NWB  Podiatry f/u for abnormal foot CT  Pain meds prn  Will need JOSE LUIS
Patient seen and evaluated  Discussed condition with pt and care giver present at bedside  Xrays b/l foot reveal severe hallux valgus deformity  CT results reviewed  Pt is wb as tolerated  Understands surgical intervention may be necessary if symptoms present   Pt stable from Podiatry standpoint, will follow up with Dr. Garrett post d/c from hospital  Will discuss with all attendings.
Likely 2/2 to L femoral fx  Ortho consult noted -- Placed in [facility]I -- NWB  Podiatry f/u for abnormal foot CT noted  Pain meds prn  Physiatry consult appreciated  For JOSE LUIS
Likely 2/2 to L femoral fx  Ortho consult noted -- Placed in [facility]I -- NWB  Podiatry f/u for abnormal foot CT

## 2025-04-14 NOTE — PROGRESS NOTE ADULT - SUBJECTIVE AND OBJECTIVE BOX
Date of Service: 04-13-25 @ 12:04    Patient is a 45y old  Female who presents with a chief complaint of Difficulty in walking, not elsewhere classified     (11 Apr 2025 11:35)      INTERVAL HPI/OVERNIGHT EVENTS: Patient seen and examined. NAD. c/o L knee pain    Vital Signs Last 24 Hrs  T(C): 36.5 (13 Apr 2025 05:00), Max: 36.7 (12 Apr 2025 18:00)  T(F): 97.7 (13 Apr 2025 05:00), Max: 98 (12 Apr 2025 18:00)  HR: 80 (13 Apr 2025 05:00) (73 - 93)  BP: 95/54 (13 Apr 2025 05:00) (92/57 - 117/52)  BP(mean): --  RR: 17 (13 Apr 2025 05:00) (17 - 18)  SpO2: 95% (13 Apr 2025 05:00) (95% - 98%)    Parameters below as of 13 Apr 2025 05:00  Patient On (Oxygen Delivery Method): room air                  CAPILLARY BLOOD GLUCOSE      POCT Blood Glucose.: 99 mg/dL (13 Apr 2025 11:40)  POCT Blood Glucose.: 98 mg/dL (13 Apr 2025 07:45)  POCT Blood Glucose.: 114 mg/dL (13 Apr 2025 00:00)  POCT Blood Glucose.: 101 mg/dL (12 Apr 2025 16:14)              acetaminophen     Tablet .. 650 milliGRAM(s) Oral every 6 hours PRN  aluminum hydroxide/magnesium hydroxide/simethicone Suspension 30 milliLiter(s) Oral every 4 hours PRN  artificial tears (preservative free) Ophthalmic Solution 1 Drop(s) Both EYES daily  aspirin enteric coated 81 milliGRAM(s) Oral daily  baclofen 10 milliGRAM(s) Oral every 8 hours  cholecalciferol 1000 Unit(s) Oral daily  dextrose 5%. 1000 milliLiter(s) IV Continuous <Continuous>  dextrose 5%. 1000 milliLiter(s) IV Continuous <Continuous>  dextrose 50% Injectable 25 Gram(s) IV Push once  dextrose 50% Injectable 12.5 Gram(s) IV Push once  dextrose 50% Injectable 25 Gram(s) IV Push once  dextrose Oral Gel 15 Gram(s) Oral once PRN  enoxaparin Injectable 40 milliGRAM(s) SubCutaneous every 24 hours  FLUoxetine 20 milliGRAM(s) Oral daily  fluticasone propionate/ salmeterol 250-50 MICROgram(s) Diskus 1 Dose(s) Inhalation two times a day  glucagon  Injectable 1 milliGRAM(s) IntraMuscular once  insulin lispro (ADMELOG) corrective regimen sliding scale   SubCutaneous three times a day before meals  insulin lispro (ADMELOG) corrective regimen sliding scale   SubCutaneous at bedtime  ketotifen 0.025% Ophthalmic Solution 1 Drop(s) Both EYES two times a day  levothyroxine 112 MICROGram(s) Oral daily  melatonin 3 milliGRAM(s) Oral at bedtime PRN  metFORMIN 750 milliGRAM(s) Oral daily  midodrine. 10 milliGRAM(s) Oral <User Schedule>  multivitamin 1 Tablet(s) Oral daily  ondansetron Injectable 4 milliGRAM(s) IV Push every 6 hours PRN  pantoprazole    Tablet 40 milliGRAM(s) Oral before breakfast  polyethylene glycol 3350 17 Gram(s) Oral daily  simethicone 80 milliGRAM(s) Chew two times a day  traMADol 50 milliGRAM(s) Oral every 6 hours PRN  traMADol 25 milliGRAM(s) Oral every 6 hours PRN              REVIEW OF SYSTEMS:  CONSTITUTIONAL: No fever, no weight loss, or no fatigue  NECK: No pain, no stiffness  RESPIRATORY: No cough, no wheezing, no chills, no hemoptysis, No shortness of breath  CARDIOVASCULAR: No chest pain, no palpitations, no dizziness, no leg swelling  GASTROINTESTINAL: No abdominal pain. No nausea, no vomiting, no hematemesis; No diarrhea, no constipation. No melena, no hematochezia.  GENITOURINARY: No dysuria, no frequency, no hematuria, no incontinence  NEUROLOGICAL: No headaches, no loss of strength, no numbness, no tremors  SKIN: No itching, no burning  MUSCULOSKELETAL: No joint pain, no swelling; No muscle, no back, +LL extremity pain  PSYCHIATRIC: No depression, no mood swings,   HEME/LYMPH: No easy bruising, no bleeding gums  ALLERY AND IMMUNOLOGIC: No hives       Consultant(s) Notes Reviewed:  [X] YES  [ ] NO    PHYSICAL EXAM:  GENERAL: NAD  HEAD:  Atraumatic, Normocephalic  EYES: EOMI, PERRLA, conjunctiva and sclera clear  ENMT: No tonsillar erythema, exudates, or enlargement; Moist mucous membranes  NECK: Supple, No JVD  NERVOUS SYSTEM:  Awake & alert  CHEST/LUNG: Clear to auscultation bilaterally; No rales, rhonchi, wheezing,  HEART: Regular rate and rhythm  ABDOMEN: Soft, Nontender, Nondistended; Bowel sounds present  EXTREMITIES:  No clubbing, cyanosis, or edema; LLE in brace  LYMPH: No lymphadenopathy noted  SKIN: No rashes      Advanced care planning discussed with patient/family [X] YES   [ ] NO    Advanced care planning discussed with patient/family. Patient's health status was discussed. All appropriate changes have been made regarding patient's end-of-life care. Advanced care planning forms reviewed/discussed/completed.  20 minutes spent.   
  PROGRESS NOTE   Patient is a 45y old  Female who presents with a chief complaint of Difficulty in walking, not elsewhere classified     (11 Apr 2025 11:35)      HPI:  44-year-old female history of Down syndrome diabetes CVA asthma anxiety hypertension aortic valve replacement osteoarthritis brought in from group home complaining of bilateral knee pain difficulty ambulating yesterday.  Was seen a week ago at [facility] for some right leg swelling and ultrasound that was negative for DVT had an x-ray of her foot that was questioning a stress fracture versus osteonecrosis.  Follow-up team and call facility multiple times no answer documented with a certified letter that was sent.  As per facility patient needs to have the ability to walk up multiple steps. Yesterday her knees buckled going up a flight of stairs. She reports difficulty walking b/c of b/l knee pain and foot pain. (10 Apr 2025 11:25)      Vital Signs Last 24 Hrs  T(C): 36.7 (12 Apr 2025 07:43), Max: 37.1 (11 Apr 2025 12:57)  T(F): 98 (12 Apr 2025 07:43), Max: 98.7 (11 Apr 2025 12:57)  HR: 81 (12 Apr 2025 07:43) (70 - 85)  BP: 92/45 (12 Apr 2025 07:43) (92/45 - 105/59)  BP(mean): --  RR: 18 (12 Apr 2025 07:43) (18 - 18)  SpO2: 93% (12 Apr 2025 07:43) (92% - 95%)    Parameters below as of 12 Apr 2025 07:43  Patient On (Oxygen Delivery Method): room air                              11.1   3.26  )-----------( 121      ( 11 Apr 2025 08:25 )             32.1               04-11    144  |  111[H]  |  16  ----------------------------<  88  3.9   |  25  |  0.86    Ca    9.0      11 Apr 2025 08:25  Mg     1.9     04-11        O:   Integument:  Skin warm, dry and supple bilateral.    Vascular: Dorsalis Pedis and Posterior Tibial pulses 2/4.  Capillary re-fill time less then 3 seconds digits 1-5 bilateral.    Neuro: Protective sensation intact to the level of the digits bilateral.  MSK: Muscle strength 4/5 all major muscle groups bilateral.  No pain upon palpation of b/l foot. No pain upon palpation of b/l ankle.  No pain upon ROM of b/l ankle.  Noted severe b/l hallux valgus deformity    CT b/l foot:  CT of bilateral feet demonstrates an obliquely oriented curvilinear oblique displaced fracture at the lateral tibial plafond and with intra-articular extension as described.  Diffuse edema at bilateral ankles into the dorsal and plantar surface of the feet. No drainable fluid collection.  Advanced bilateral hallux valgus and bunion formation.
Date of Service: 04-14-25 @ 09:25    Patient is a 45y old  Female who presents with a chief complaint of Difficulty in walking, not elsewhere classified     (11 Apr 2025 11:35)      INTERVAL HPI/OVERNIGHT EVENTS: Patient seen and examined. NAD. No complaints.    Vital Signs Last 24 Hrs  T(C): 36.8 (14 Apr 2025 05:06), Max: 37.7 (13 Apr 2025 12:00)  T(F): 98.2 (14 Apr 2025 05:06), Max: 99.8 (13 Apr 2025 12:00)  HR: 87 (14 Apr 2025 05:06) (85 - 95)  BP: 111/66 (14 Apr 2025 05:06) (102/63 - 111/66)  BP(mean): --  RR: 19 (14 Apr 2025 05:06) (17 - 19)  SpO2: 93% (14 Apr 2025 05:06) (93% - 95%)    Parameters below as of 13 Apr 2025 20:28  Patient On (Oxygen Delivery Method): room air                  CAPILLARY BLOOD GLUCOSE      POCT Blood Glucose.: 95 mg/dL (14 Apr 2025 07:25)  POCT Blood Glucose.: 103 mg/dL (13 Apr 2025 21:02)  POCT Blood Glucose.: 98 mg/dL (13 Apr 2025 17:04)  POCT Blood Glucose.: 99 mg/dL (13 Apr 2025 11:40)              acetaminophen     Tablet .. 650 milliGRAM(s) Oral every 6 hours PRN  aluminum hydroxide/magnesium hydroxide/simethicone Suspension 30 milliLiter(s) Oral every 4 hours PRN  artificial tears (preservative free) Ophthalmic Solution 1 Drop(s) Both EYES daily  aspirin enteric coated 81 milliGRAM(s) Oral daily  baclofen 10 milliGRAM(s) Oral every 8 hours  cholecalciferol 1000 Unit(s) Oral daily  dextrose 5%. 1000 milliLiter(s) IV Continuous <Continuous>  dextrose 5%. 1000 milliLiter(s) IV Continuous <Continuous>  dextrose 50% Injectable 25 Gram(s) IV Push once  dextrose 50% Injectable 12.5 Gram(s) IV Push once  dextrose 50% Injectable 25 Gram(s) IV Push once  dextrose Oral Gel 15 Gram(s) Oral once PRN  enoxaparin Injectable 40 milliGRAM(s) SubCutaneous every 24 hours  FLUoxetine 20 milliGRAM(s) Oral daily  fluticasone propionate/ salmeterol 250-50 MICROgram(s) Diskus 1 Dose(s) Inhalation two times a day  glucagon  Injectable 1 milliGRAM(s) IntraMuscular once  insulin lispro (ADMELOG) corrective regimen sliding scale   SubCutaneous three times a day before meals  insulin lispro (ADMELOG) corrective regimen sliding scale   SubCutaneous at bedtime  ketotifen 0.025% Ophthalmic Solution 1 Drop(s) Both EYES two times a day  levothyroxine 112 MICROGram(s) Oral daily  melatonin 3 milliGRAM(s) Oral at bedtime PRN  metFORMIN 750 milliGRAM(s) Oral daily  midodrine. 10 milliGRAM(s) Oral <User Schedule>  multivitamin 1 Tablet(s) Oral daily  ondansetron Injectable 4 milliGRAM(s) IV Push every 6 hours PRN  pantoprazole    Tablet 40 milliGRAM(s) Oral before breakfast  polyethylene glycol 3350 17 Gram(s) Oral daily  simethicone 80 milliGRAM(s) Chew two times a day  traMADol 50 milliGRAM(s) Oral every 6 hours PRN  traMADol 25 milliGRAM(s) Oral every 6 hours PRN              REVIEW OF SYSTEMS:  CONSTITUTIONAL: No fever, no weight loss, or no fatigue  NECK: No pain, no stiffness  RESPIRATORY: No cough, no wheezing, no chills, no hemoptysis, No shortness of breath  CARDIOVASCULAR: No chest pain, no palpitations, no dizziness, no leg swelling  GASTROINTESTINAL: No abdominal pain. No nausea, no vomiting, no hematemesis; No diarrhea, no constipation. No melena, no hematochezia.  GENITOURINARY: No dysuria, no frequency, no hematuria, no incontinence  NEUROLOGICAL: No headaches, no loss of strength, no numbness, no tremors  SKIN: No itching, no burning  MUSCULOSKELETAL: No joint pain, no swelling; No muscle, no back, no extremity pain  PSYCHIATRIC: No depression, no mood swings,   HEME/LYMPH: No easy bruising, no bleeding gums  ALLERY AND IMMUNOLOGIC: No hives       Consultant(s) Notes Reviewed:  [X] YES  [ ] NO    PHYSICAL EXAM:  GENERAL: NAD  HEAD:  Atraumatic, Normocephalic  EYES: EOMI, PERRLA, conjunctiva and sclera clear  ENMT: No tonsillar erythema, exudates, or enlargement; Moist mucous membranes  NECK: Supple, No JVD  NERVOUS SYSTEM:  Awake & alert  CHEST/LUNG: Clear to auscultation bilaterally; No rales, rhonchi, wheezing,  HEART: Regular rate and rhythm  ABDOMEN: Soft, Nontender, Nondistended; Bowel sounds present  EXTREMITIES:  No clubbing, cyanosis, or edema  LYMPH: No lymphadenopathy noted  SKIN: No rashes      Advanced care planning discussed with patient/family [X] YES   [ ] NO    Advanced care planning discussed with patient/family. Patient's health status was discussed. All appropriate changes have been made regarding patient's end-of-life care. Advanced care planning forms reviewed/discussed/completed.  20 minutes spent.   
Date of Service: 04-11-25 @ 15:04    Patient is a 44y old  Female who presents with a chief complaint of Difficulty in walking, not elsewhere classified     (11 Apr 2025 11:35)      INTERVAL HPI/OVERNIGHT EVENTS:  Patient seen and examined. NAD. No complaints.    Vital Signs Last 24 Hrs  T(C): 37.1 (11 Apr 2025 12:57), Max: 37.1 (11 Apr 2025 12:57)  T(F): 98.7 (11 Apr 2025 12:57), Max: 98.7 (11 Apr 2025 12:57)  HR: 85 (11 Apr 2025 12:57) (72 - 90)  BP: 103/63 (11 Apr 2025 12:57) (98/62 - 112/65)  BP(mean): --  RR: 18 (11 Apr 2025 12:57) (17 - 18)  SpO2: 93% (11 Apr 2025 12:57) (93% - 95%)    Parameters below as of 11 Apr 2025 12:57  Patient On (Oxygen Delivery Method): room air        04-11    144  |  111[H]  |  16  ----------------------------<  88  3.9   |  25  |  0.86    Ca    9.0      11 Apr 2025 08:25  Mg     1.9     04-11    TPro  6.5  /  Alb  3.2[L]  /  TBili  0.9  /  DBili  x   /  AST  31  /  ALT  27  /  AlkPhos  84  04-09                          11.1   3.26  )-----------( 121      ( 11 Apr 2025 08:25 )             32.1       CAPILLARY BLOOD GLUCOSE      POCT Blood Glucose.: 107 mg/dL (11 Apr 2025 11:47)  POCT Blood Glucose.: 93 mg/dL (11 Apr 2025 07:27)  POCT Blood Glucose.: 94 mg/dL (10 Apr 2025 21:23)  POCT Blood Glucose.: 86 mg/dL (10 Apr 2025 16:18)    Urinalysis Basic - ( 11 Apr 2025 08:25 )    Color: x / Appearance: x / SG: x / pH: x  Gluc: 88 mg/dL / Ketone: x  / Bili: x / Urobili: x   Blood: x / Protein: x / Nitrite: x   Leuk Esterase: x / RBC: x / WBC x   Sq Epi: x / Non Sq Epi: x / Bacteria: x              acetaminophen     Tablet .. 650 milliGRAM(s) Oral every 6 hours PRN  aluminum hydroxide/magnesium hydroxide/simethicone Suspension 30 milliLiter(s) Oral every 4 hours PRN  artificial tears (preservative free) Ophthalmic Solution 1 Drop(s) Both EYES daily  aspirin enteric coated 81 milliGRAM(s) Oral daily  baclofen 10 milliGRAM(s) Oral every 8 hours  cholecalciferol 1000 Unit(s) Oral daily  dextrose 5%. 1000 milliLiter(s) IV Continuous <Continuous>  dextrose 5%. 1000 milliLiter(s) IV Continuous <Continuous>  dextrose 50% Injectable 25 Gram(s) IV Push once  dextrose 50% Injectable 12.5 Gram(s) IV Push once  dextrose 50% Injectable 25 Gram(s) IV Push once  dextrose Oral Gel 15 Gram(s) Oral once PRN  FLUoxetine 20 milliGRAM(s) Oral daily  fluticasone propionate/ salmeterol 250-50 MICROgram(s) Diskus 1 Dose(s) Inhalation two times a day  glucagon  Injectable 1 milliGRAM(s) IntraMuscular once  insulin lispro (ADMELOG) corrective regimen sliding scale   SubCutaneous three times a day before meals  insulin lispro (ADMELOG) corrective regimen sliding scale   SubCutaneous at bedtime  ketotifen 0.025% Ophthalmic Solution 1 Drop(s) Both EYES two times a day  levothyroxine 112 MICROGram(s) Oral daily  melatonin 3 milliGRAM(s) Oral at bedtime PRN  metFORMIN 750 milliGRAM(s) Oral daily  midodrine. 10 milliGRAM(s) Oral <User Schedule>  multivitamin 1 Tablet(s) Oral daily  ondansetron Injectable 4 milliGRAM(s) IV Push every 6 hours PRN  pantoprazole    Tablet 40 milliGRAM(s) Oral before breakfast  polyethylene glycol 3350 17 Gram(s) Oral daily  simethicone 80 milliGRAM(s) Chew two times a day              REVIEW OF SYSTEMS:  CONSTITUTIONAL: No fever, no weight loss, or no fatigue  NECK: No pain, no stiffness  RESPIRATORY: No cough, no wheezing, no chills, no hemoptysis, No shortness of breath  CARDIOVASCULAR: No chest pain, no palpitations, no dizziness, no leg swelling  GASTROINTESTINAL: No abdominal pain. No nausea, no vomiting, no hematemesis; No diarrhea, no constipation. No melena, no hematochezia.  GENITOURINARY: No dysuria, no frequency, no hematuria, no incontinence  NEUROLOGICAL: No headaches, no loss of strength, no numbness, no tremors  SKIN: No itching, no burning  MUSCULOSKELETAL: No joint pain, no swelling; No muscle, no back, no extremity pain  PSYCHIATRIC: No depression, no mood swings,   HEME/LYMPH: No easy bruising, no bleeding gums  ALLERY AND IMMUNOLOGIC: No hives       Consultant(s) Notes Reviewed:  [X] YES  [ ] NO    PHYSICAL EXAM:  GENERAL: NAD  HEAD:  Atraumatic, Normocephalic  EYES: EOMI, PERRLA, conjunctiva and sclera clear  ENMT: No tonsillar erythema, exudates, or enlargement; Moist mucous membranes  NECK: Supple, No JVD  NERVOUS SYSTEM:  Awake & alert  CHEST/LUNG: Clear to auscultation bilaterally; No rales, rhonchi, wheezing,  HEART: Regular rate and rhythm  ABDOMEN: Soft, Nontender, Nondistended; Bowel sounds present  EXTREMITIES:  No clubbing, cyanosis, or edema  LYMPH: No lymphadenopathy noted  SKIN: No rashes      Advanced care planning discussed with patient/family [X] YES   [ ] NO    Advanced care planning discussed with patient/family. Patient's health status was discussed. All appropriate changes have been made regarding patient's end-of-life care. Advanced care planning forms reviewed/discussed/completed.  20 minutes spent.   
Date of Service: 04-12-25 @ 11:06    Patient is a 45y old  Female who presents with a chief complaint of Difficulty in walking, not elsewhere classified     (11 Apr 2025 11:35)      INTERVAL HPI/OVERNIGHT EVENTS: Patient seen and examined. NAD. C/O L knee pain.    Vital Signs Last 24 Hrs  T(C): 36.7 (12 Apr 2025 07:43), Max: 37.1 (11 Apr 2025 12:57)  T(F): 98 (12 Apr 2025 07:43), Max: 98.7 (11 Apr 2025 12:57)  HR: 81 (12 Apr 2025 07:43) (70 - 85)  BP: 92/45 (12 Apr 2025 07:43) (92/45 - 105/59)  BP(mean): --  RR: 18 (12 Apr 2025 07:43) (18 - 18)  SpO2: 93% (12 Apr 2025 07:43) (92% - 95%)    Parameters below as of 12 Apr 2025 07:43  Patient On (Oxygen Delivery Method): room air        04-11    144  |  111[H]  |  16  ----------------------------<  88  3.9   |  25  |  0.86    Ca    9.0      11 Apr 2025 08:25  Mg     1.9     04-11                            11.1   3.26  )-----------( 121      ( 11 Apr 2025 08:25 )             32.1       CAPILLARY BLOOD GLUCOSE      POCT Blood Glucose.: 92 mg/dL (12 Apr 2025 07:49)  POCT Blood Glucose.: 111 mg/dL (11 Apr 2025 22:17)  POCT Blood Glucose.: 92 mg/dL (11 Apr 2025 16:44)  POCT Blood Glucose.: 107 mg/dL (11 Apr 2025 11:47)    Urinalysis Basic - ( 11 Apr 2025 08:25 )    Color: x / Appearance: x / SG: x / pH: x  Gluc: 88 mg/dL / Ketone: x  / Bili: x / Urobili: x   Blood: x / Protein: x / Nitrite: x   Leuk Esterase: x / RBC: x / WBC x   Sq Epi: x / Non Sq Epi: x / Bacteria: x              acetaminophen     Tablet .. 650 milliGRAM(s) Oral every 6 hours PRN  aluminum hydroxide/magnesium hydroxide/simethicone Suspension 30 milliLiter(s) Oral every 4 hours PRN  artificial tears (preservative free) Ophthalmic Solution 1 Drop(s) Both EYES daily  aspirin enteric coated 81 milliGRAM(s) Oral daily  baclofen 10 milliGRAM(s) Oral every 8 hours  cholecalciferol 1000 Unit(s) Oral daily  dextrose 5%. 1000 milliLiter(s) IV Continuous <Continuous>  dextrose 5%. 1000 milliLiter(s) IV Continuous <Continuous>  dextrose 50% Injectable 25 Gram(s) IV Push once  dextrose 50% Injectable 12.5 Gram(s) IV Push once  dextrose 50% Injectable 25 Gram(s) IV Push once  dextrose Oral Gel 15 Gram(s) Oral once PRN  enoxaparin Injectable 40 milliGRAM(s) SubCutaneous every 24 hours  FLUoxetine 20 milliGRAM(s) Oral daily  fluticasone propionate/ salmeterol 250-50 MICROgram(s) Diskus 1 Dose(s) Inhalation two times a day  glucagon  Injectable 1 milliGRAM(s) IntraMuscular once  insulin lispro (ADMELOG) corrective regimen sliding scale   SubCutaneous three times a day before meals  insulin lispro (ADMELOG) corrective regimen sliding scale   SubCutaneous at bedtime  ketotifen 0.025% Ophthalmic Solution 1 Drop(s) Both EYES two times a day  levothyroxine 112 MICROGram(s) Oral daily  melatonin 3 milliGRAM(s) Oral at bedtime PRN  metFORMIN 750 milliGRAM(s) Oral daily  midodrine. 10 milliGRAM(s) Oral <User Schedule>  multivitamin 1 Tablet(s) Oral daily  ondansetron Injectable 4 milliGRAM(s) IV Push every 6 hours PRN  pantoprazole    Tablet 40 milliGRAM(s) Oral before breakfast  polyethylene glycol 3350 17 Gram(s) Oral daily  simethicone 80 milliGRAM(s) Chew two times a day  traMADol 50 milliGRAM(s) Oral every 6 hours PRN  traMADol 25 milliGRAM(s) Oral every 6 hours PRN              REVIEW OF SYSTEMS:  CONSTITUTIONAL: No fever, no weight loss, or no fatigue  NECK: No pain, no stiffness  RESPIRATORY: No cough, no wheezing, no chills, no hemoptysis, No shortness of breath  CARDIOVASCULAR: No chest pain, no palpitations, no dizziness, no leg swelling  GASTROINTESTINAL: No abdominal pain. No nausea, no vomiting, no hematemesis; No diarrhea, no constipation. No melena, no hematochezia.  GENITOURINARY: No dysuria, no frequency, no hematuria, no incontinence  NEUROLOGICAL: No headaches, no loss of strength, no numbness, no tremors  SKIN: No itching, no burning  MUSCULOSKELETAL: No joint pain, no swelling; No muscle, no back, no extremity pain  PSYCHIATRIC: No depression, no mood swings,   HEME/LYMPH: No easy bruising, no bleeding gums  ALLERY AND IMMUNOLOGIC: No hives       Consultant(s) Notes Reviewed:  [X] YES  [ ] NO    PHYSICAL EXAM:  GENERAL: NAD  HEAD:  Atraumatic, Normocephalic  EYES: EOMI, PERRLA, conjunctiva and sclera clear  ENMT: No tonsillar erythema, exudates, or enlargement; Moist mucous membranes  NECK: Supple, No JVD  NERVOUS SYSTEM:  Awake & alert  CHEST/LUNG: Clear to auscultation bilaterally; No rales, rhonchi, wheezing,  HEART: Regular rate and rhythm  ABDOMEN: Soft, Nontender, Nondistended; Bowel sounds present  EXTREMITIES:  No clubbing, cyanosis, or edema  LYMPH: No lymphadenopathy noted  SKIN: No rashes      Advanced care planning discussed with patient/family [X] YES   [ ] NO    Advanced care planning discussed with patient/family. Patient's health status was discussed. All appropriate changes have been made regarding patient's end-of-life care. Advanced care planning forms reviewed/discussed/completed.  20 minutes spent.

## (undated) DEVICE — TUBING SUCTION 20FT

## (undated) DEVICE — GLV 8 PROTEXIS (WHITE)

## (undated) DEVICE — SOL IRR POUR NS 0.9% 500ML

## (undated) DEVICE — VESSEL LOOP MAXI-RED  0.120" X 16"

## (undated) DEVICE — SUT PROLENE 3-0 36" SH

## (undated) DEVICE — SUT PROLENE 4-0 36" SH

## (undated) DEVICE — DRSG OPSITE 13.75 X 4"

## (undated) DEVICE — NDL COUNTER FOAM AND MAGNET 40-70

## (undated) DEVICE — SUT PLEDGET PRE PUNCH 4.8 X 9.5 X 1.5 MM

## (undated) DEVICE — SUT PDS II 3-0 36" SH

## (undated) DEVICE — PACK CARDIAC YELLOW

## (undated) DEVICE — CONNECTOR STRAIGHT 3/8 X 3/8" W LUER LOCK

## (undated) DEVICE — BLADE SCALPEL SAFETYLOCK #15

## (undated) DEVICE — SUT PROLENE 5-0 30" RB-2

## (undated) DEVICE — ELCTR BOVIE PENCIL HANDPIECE ROCKER SWITCH 15FT

## (undated) DEVICE — GOWN TRIMAX XXL

## (undated) DEVICE — LAP PAD 18 X 18"

## (undated) DEVICE — GLV 7.5 PROTEXIS (WHITE)

## (undated) DEVICE — GLV 8 RADIATION

## (undated) DEVICE — ELCTR HEX BLADE

## (undated) DEVICE — SUT POLYSORB 2-0 30" GS-21 UNDYED

## (undated) DEVICE — DRAPE MAYO STAND 30"

## (undated) DEVICE — PACK UNIVERSAL CARDIAC

## (undated) DEVICE — CHEST DRAIN PLEUR-EVAC WET/WET ADULT-PEDS SINGLE (QUICK)

## (undated) DEVICE — GLV 7 PROTEXIS (WHITE)

## (undated) DEVICE — VENODYNE/SCD SLEEVE CALF LARGE

## (undated) DEVICE — SAW BLADE MICROAIRE STERNUM 1.1X50X42MM

## (undated) DEVICE — MEDICATION LABELS W MARKER

## (undated) DEVICE — SPONGE DISSECTOR PEANUT

## (undated) DEVICE — SUT PROLENE 4-0 36" BB

## (undated) DEVICE — DRAPE 1/2 SHEET 40X57"

## (undated) DEVICE — DRAPE INSTRUMENT POUCH 6.75" X 11"

## (undated) DEVICE — VENTING ADAPTER "Y" (RED/BLUE) 7.5"

## (undated) DEVICE — FOLEY TRAY 16FR 5CC LF LUBRISIL ADVANCE TEMP CLOSED

## (undated) DEVICE — PACK UNIVERSAL CARDIAC SUPPLEMNTAL B

## (undated) DEVICE — DRAPE 3/4 SHEET W REINFORCEMENT 56X77"

## (undated) DEVICE — STAPLER SKIN VISI-STAT 35 WIDE

## (undated) DEVICE — DRAPE TOWEL BLUE 17" X 24"

## (undated) DEVICE — TUBING ATS SUCTION LINE

## (undated) DEVICE — BULLDOG SPRING CLIP 6MM SOFT/SOFT

## (undated) DEVICE — SUT PROLENE 5-0 36" RB-1

## (undated) DEVICE — SUT SURGICAL STEEL 6 30" BP-1

## (undated) DEVICE — DRAPE LIGHT HANDLE COVER (BLUE)

## (undated) DEVICE — VISITEC 4X4

## (undated) DEVICE — STEALTH CLAMP INSERT FIBRA/FIBRA 90MM

## (undated) DEVICE — SUCTION TUBE CARDIAC SOFT TIP 6FR SHAFT 10FR TIP 6"

## (undated) DEVICE — SOL NORMOSOL-R PH7.4 1000ML

## (undated) DEVICE — STEALTH CLAMP INSERT FIBRA/FIBRA 60MM

## (undated) DEVICE — SUT SOFSILK 2 60" TIES

## (undated) DEVICE — DRAPE IOBAN 23" X 23"

## (undated) DEVICE — WARMING BLANKET DUO-THERM HYPER/HYPOTHERM ADULT

## (undated) DEVICE — PREP CHLORAPREP HI-LITE ORANGE 26ML

## (undated) DEVICE — TOURNIQUET SET 12FR (1 RED, 1 BLUE, 1 SNARE) 7"

## (undated) DEVICE — DRSG OPSITE 2.5 X 2"

## (undated) DEVICE — WARMING BLANKET FULL UNDERBODY

## (undated) DEVICE — PACING CABLE A/V TEMP SCREW DOWN 6FT

## (undated) DEVICE — DRAIN CHANNEL 19FR ROUND FULL FLUTED

## (undated) DEVICE — DRAPE FEMORAL ANGIOGRAPHY W TROUGH

## (undated) DEVICE — NDL PERC BASEPLT 18GX7CM

## (undated) DEVICE — DRSG TEGADERM 6"X8"

## (undated) DEVICE — SUT SILK 0 30" TIES

## (undated) DEVICE — BLADE SCALPEL SAFETYLOCK #10

## (undated) DEVICE — SUT PDS II 0 27" OS-6

## (undated) DEVICE — SPECIMEN CONTAINER 100ML

## (undated) DEVICE — CONNECTOR STRAIGHT 3/8 X 1/2"

## (undated) DEVICE — TUBING PRESSURE 100"

## (undated) DEVICE — SYR ASEPTO

## (undated) DEVICE — SUMP INTRACARDIAC 20FR 1/4" ADULT

## (undated) DEVICE — FOLEY HOLDER STATLOCK 2 WAY ADULT

## (undated) DEVICE — PACING CABLE TEMP MEDTRONIC WITH PAC-LOC

## (undated) DEVICE — SUT POLYSORB 2 30" GS-26

## (undated) DEVICE — SUCTION YANKAUER NO CONTROL VENT

## (undated) DEVICE — GLV 6.5 PROTEXIS (WHITE)

## (undated) DEVICE — DRSG MASTISOL

## (undated) DEVICE — GOWN LG

## (undated) DEVICE — BLADE SCALPEL SAFETYLOCK #11

## (undated) DEVICE — GLV 6 PROTEXIS W HYDROGEL

## (undated) DEVICE — SENSOR MYOCARDIAL TEMP 15MM

## (undated) DEVICE — ELCTR BOVIE PENCIL HANDPIECE

## (undated) DEVICE — SUT BLUNT SZ 5

## (undated) DEVICE — SUT BIOSYN 4-0 18" P-12

## (undated) DEVICE — SUT BOOT STANDARD (ASSORTED) 5 PAIR

## (undated) DEVICE — FOLEY TRAY 16FR 5CC LTX UMETER CLOSED

## (undated) DEVICE — Device

## (undated) DEVICE — DRSG STERISTRIPS 0.5 X 4"